# Patient Record
Sex: MALE | Race: ASIAN | NOT HISPANIC OR LATINO | URBAN - METROPOLITAN AREA
[De-identification: names, ages, dates, MRNs, and addresses within clinical notes are randomized per-mention and may not be internally consistent; named-entity substitution may affect disease eponyms.]

---

## 2023-04-23 ENCOUNTER — INPATIENT (INPATIENT)
Facility: HOSPITAL | Age: 68
LOS: 7 days | Discharge: HOME | DRG: 871 | End: 2023-05-01
Attending: STUDENT IN AN ORGANIZED HEALTH CARE EDUCATION/TRAINING PROGRAM | Admitting: STUDENT IN AN ORGANIZED HEALTH CARE EDUCATION/TRAINING PROGRAM
Payer: MEDICAID

## 2023-04-23 VITALS
HEIGHT: 71 IN | SYSTOLIC BLOOD PRESSURE: 86 MMHG | RESPIRATION RATE: 20 BRPM | HEART RATE: 99 BPM | DIASTOLIC BLOOD PRESSURE: 59 MMHG | TEMPERATURE: 98 F | OXYGEN SATURATION: 90 % | WEIGHT: 149.91 LBS

## 2023-04-23 LAB
ALBUMIN SERPL ELPH-MCNC: 2.2 G/DL — LOW (ref 3.5–5)
ALP SERPL-CCNC: 45 U/L — SIGNIFICANT CHANGE UP (ref 40–120)
ALT FLD-CCNC: 17 U/L DA — SIGNIFICANT CHANGE UP (ref 10–60)
ANION GAP SERPL CALC-SCNC: 5 MMOL/L — SIGNIFICANT CHANGE UP (ref 5–17)
APPEARANCE UR: CLEAR — SIGNIFICANT CHANGE UP
APTT BLD: 22.2 SEC — LOW (ref 27.5–35.5)
AST SERPL-CCNC: 36 U/L — SIGNIFICANT CHANGE UP (ref 10–40)
BASOPHILS # BLD AUTO: 0 K/UL — SIGNIFICANT CHANGE UP (ref 0–0.2)
BASOPHILS NFR BLD AUTO: 0 % — SIGNIFICANT CHANGE UP (ref 0–2)
BILIRUB SERPL-MCNC: 0.3 MG/DL — SIGNIFICANT CHANGE UP (ref 0.2–1.2)
BILIRUB UR-MCNC: NEGATIVE — SIGNIFICANT CHANGE UP
BUN SERPL-MCNC: 30 MG/DL — HIGH (ref 7–18)
CALCIUM SERPL-MCNC: 8.6 MG/DL — SIGNIFICANT CHANGE UP (ref 8.4–10.5)
CHLORIDE SERPL-SCNC: 96 MMOL/L — SIGNIFICANT CHANGE UP (ref 96–108)
CO2 SERPL-SCNC: 29 MMOL/L — SIGNIFICANT CHANGE UP (ref 22–31)
COLOR SPEC: YELLOW — SIGNIFICANT CHANGE UP
CREAT SERPL-MCNC: 2.61 MG/DL — HIGH (ref 0.5–1.3)
DIFF PNL FLD: ABNORMAL
EGFR: 26 ML/MIN/1.73M2 — LOW
EOSINOPHIL # BLD AUTO: 0.36 K/UL — SIGNIFICANT CHANGE UP (ref 0–0.5)
EOSINOPHIL NFR BLD AUTO: 2 % — SIGNIFICANT CHANGE UP (ref 0–6)
GLUCOSE SERPL-MCNC: 199 MG/DL — HIGH (ref 70–99)
GLUCOSE UR QL: 1000 MG/DL
HCT VFR BLD CALC: 29.5 % — LOW (ref 39–50)
HGB BLD-MCNC: 9.4 G/DL — LOW (ref 13–17)
INR BLD: 1.07 RATIO — SIGNIFICANT CHANGE UP (ref 0.88–1.16)
KETONES UR-MCNC: NEGATIVE — SIGNIFICANT CHANGE UP
LACTATE SERPL-SCNC: 1.5 MMOL/L — SIGNIFICANT CHANGE UP (ref 0.7–2)
LEUKOCYTE ESTERASE UR-ACNC: NEGATIVE — SIGNIFICANT CHANGE UP
LYMPHOCYTES # BLD AUTO: 14 % — SIGNIFICANT CHANGE UP (ref 13–44)
LYMPHOCYTES # BLD AUTO: 2.5 K/UL — SIGNIFICANT CHANGE UP (ref 1–3.3)
MCHC RBC-ENTMCNC: 26.5 PG — LOW (ref 27–34)
MCHC RBC-ENTMCNC: 31.9 GM/DL — LOW (ref 32–36)
MCV RBC AUTO: 83.1 FL — SIGNIFICANT CHANGE UP (ref 80–100)
MONOCYTES # BLD AUTO: 2.33 K/UL — HIGH (ref 0–0.9)
MONOCYTES NFR BLD AUTO: 13 % — SIGNIFICANT CHANGE UP (ref 2–14)
NEUTROPHILS # BLD AUTO: 12.34 K/UL — HIGH (ref 1.8–7.4)
NEUTROPHILS NFR BLD AUTO: 69 % — SIGNIFICANT CHANGE UP (ref 43–77)
NITRITE UR-MCNC: NEGATIVE — SIGNIFICANT CHANGE UP
PH UR: 5 — SIGNIFICANT CHANGE UP (ref 5–8)
PLATELET # BLD AUTO: 157 K/UL — SIGNIFICANT CHANGE UP (ref 150–400)
POTASSIUM SERPL-MCNC: 4.7 MMOL/L — SIGNIFICANT CHANGE UP (ref 3.5–5.3)
POTASSIUM SERPL-SCNC: 4.7 MMOL/L — SIGNIFICANT CHANGE UP (ref 3.5–5.3)
PROT SERPL-MCNC: 5.8 G/DL — LOW (ref 6–8.3)
PROT UR-MCNC: 30 MG/DL
PROTHROM AB SERPL-ACNC: 12.8 SEC — SIGNIFICANT CHANGE UP (ref 10.5–13.4)
RBC # BLD: 3.55 M/UL — LOW (ref 4.2–5.8)
RBC # FLD: 20.3 % — HIGH (ref 10.3–14.5)
SODIUM SERPL-SCNC: 130 MMOL/L — LOW (ref 135–145)
SP GR SPEC: 1.01 — SIGNIFICANT CHANGE UP (ref 1.01–1.02)
TROPONIN I, HIGH SENSITIVITY RESULT: 13.9 NG/L — SIGNIFICANT CHANGE UP
UROBILINOGEN FLD QL: NEGATIVE — SIGNIFICANT CHANGE UP
WBC # BLD: 17.89 K/UL — HIGH (ref 3.8–10.5)
WBC # FLD AUTO: 17.89 K/UL — HIGH (ref 3.8–10.5)

## 2023-04-23 PROCEDURE — 93010 ELECTROCARDIOGRAM REPORT: CPT

## 2023-04-23 PROCEDURE — 99285 EMERGENCY DEPT VISIT HI MDM: CPT

## 2023-04-23 PROCEDURE — 71045 X-RAY EXAM CHEST 1 VIEW: CPT | Mod: 26

## 2023-04-23 RX ORDER — VANCOMYCIN HCL 1 G
1000 VIAL (EA) INTRAVENOUS ONCE
Refills: 0 | Status: COMPLETED | OUTPATIENT
Start: 2023-04-23 | End: 2023-04-23

## 2023-04-23 RX ORDER — CEFEPIME 1 G/1
2000 INJECTION, POWDER, FOR SOLUTION INTRAMUSCULAR; INTRAVENOUS EVERY 8 HOURS
Refills: 0 | Status: DISCONTINUED | OUTPATIENT
Start: 2023-04-24 | End: 2023-04-24

## 2023-04-23 RX ORDER — VANCOMYCIN HCL 1 G
1000 VIAL (EA) INTRAVENOUS EVERY 12 HOURS
Refills: 0 | Status: DISCONTINUED | OUTPATIENT
Start: 2023-04-24 | End: 2023-04-24

## 2023-04-23 RX ORDER — CEFEPIME 1 G/1
INJECTION, POWDER, FOR SOLUTION INTRAMUSCULAR; INTRAVENOUS
Refills: 0 | Status: DISCONTINUED | OUTPATIENT
Start: 2023-04-23 | End: 2023-04-24

## 2023-04-23 RX ORDER — SODIUM CHLORIDE 9 MG/ML
2100 INJECTION INTRAMUSCULAR; INTRAVENOUS; SUBCUTANEOUS ONCE
Refills: 0 | Status: COMPLETED | OUTPATIENT
Start: 2023-04-23 | End: 2023-04-23

## 2023-04-23 RX ORDER — CEFEPIME 1 G/1
2000 INJECTION, POWDER, FOR SOLUTION INTRAMUSCULAR; INTRAVENOUS ONCE
Refills: 0 | Status: COMPLETED | OUTPATIENT
Start: 2023-04-23 | End: 2023-04-23

## 2023-04-23 RX ORDER — VANCOMYCIN HCL 1 G
VIAL (EA) INTRAVENOUS
Refills: 0 | Status: DISCONTINUED | OUTPATIENT
Start: 2023-04-23 | End: 2023-04-24

## 2023-04-23 RX ADMIN — Medication 250 MILLIGRAM(S): at 22:50

## 2023-04-23 RX ADMIN — CEFEPIME 100 MILLIGRAM(S): 1 INJECTION, POWDER, FOR SOLUTION INTRAMUSCULAR; INTRAVENOUS at 22:50

## 2023-04-23 RX ADMIN — SODIUM CHLORIDE 2100 MILLILITER(S): 9 INJECTION INTRAMUSCULAR; INTRAVENOUS; SUBCUTANEOUS at 22:49

## 2023-04-23 NOTE — ED ADULT TRIAGE NOTE - CHIEF COMPLAINT QUOTE
As per son " He is not eating and he is not moving like he was when he came from [place] last Thursday"  Patient responds to name calling, opens eyes

## 2023-04-24 DIAGNOSIS — R56.9 UNSPECIFIED CONVULSIONS: ICD-10-CM

## 2023-04-24 DIAGNOSIS — J96.01 ACUTE RESPIRATORY FAILURE WITH HYPOXIA: ICD-10-CM

## 2023-04-24 DIAGNOSIS — Z29.9 ENCOUNTER FOR PROPHYLACTIC MEASURES, UNSPECIFIED: ICD-10-CM

## 2023-04-24 DIAGNOSIS — G93.40 ENCEPHALOPATHY, UNSPECIFIED: ICD-10-CM

## 2023-04-24 DIAGNOSIS — N17.9 ACUTE KIDNEY FAILURE, UNSPECIFIED: ICD-10-CM

## 2023-04-24 DIAGNOSIS — A41.9 SEPSIS, UNSPECIFIED ORGANISM: ICD-10-CM

## 2023-04-24 DIAGNOSIS — N18.9 CHRONIC KIDNEY DISEASE, UNSPECIFIED: ICD-10-CM

## 2023-04-24 DIAGNOSIS — E11.9 TYPE 2 DIABETES MELLITUS WITHOUT COMPLICATIONS: ICD-10-CM

## 2023-04-24 DIAGNOSIS — R19.7 DIARRHEA, UNSPECIFIED: ICD-10-CM

## 2023-04-24 DIAGNOSIS — F03.90 UNSPECIFIED DEMENTIA WITHOUT BEHAVIORAL DISTURBANCE: ICD-10-CM

## 2023-04-24 DIAGNOSIS — I63.9 CEREBRAL INFARCTION, UNSPECIFIED: ICD-10-CM

## 2023-04-24 LAB
GAS PNL BLDV: SIGNIFICANT CHANGE UP
GLUCOSE BLDC GLUCOMTR-MCNC: 125 MG/DL — HIGH (ref 70–99)
GLUCOSE BLDC GLUCOMTR-MCNC: 132 MG/DL — HIGH (ref 70–99)
GLUCOSE BLDC GLUCOMTR-MCNC: 137 MG/DL — HIGH (ref 70–99)
HCV AB S/CO SERPL IA: 0.12 S/CO — SIGNIFICANT CHANGE UP (ref 0–0.99)
HCV AB SERPL-IMP: SIGNIFICANT CHANGE UP
RAPID RVP RESULT: SIGNIFICANT CHANGE UP
SARS-COV-2 RNA SPEC QL NAA+PROBE: SIGNIFICANT CHANGE UP

## 2023-04-24 PROCEDURE — 99223 1ST HOSP IP/OBS HIGH 75: CPT

## 2023-04-24 PROCEDURE — 70450 CT HEAD/BRAIN W/O DYE: CPT | Mod: 26,MA

## 2023-04-24 PROCEDURE — 74176 CT ABD & PELVIS W/O CONTRAST: CPT | Mod: 26

## 2023-04-24 PROCEDURE — 99498 ADVNCD CARE PLAN ADDL 30 MIN: CPT

## 2023-04-24 PROCEDURE — 99497 ADVNCD CARE PLAN 30 MIN: CPT | Mod: 25

## 2023-04-24 PROCEDURE — 95816 EEG AWAKE AND DROWSY: CPT | Mod: 26

## 2023-04-24 RX ORDER — VALPROIC ACID (AS SODIUM SALT) 250 MG/5ML
250 SOLUTION, ORAL ORAL EVERY 12 HOURS
Refills: 0 | Status: DISCONTINUED | OUTPATIENT
Start: 2023-04-24 | End: 2023-05-01

## 2023-04-24 RX ORDER — INSULIN LISPRO 100/ML
VIAL (ML) SUBCUTANEOUS EVERY 6 HOURS
Refills: 0 | Status: DISCONTINUED | OUTPATIENT
Start: 2023-04-24 | End: 2023-04-26

## 2023-04-24 RX ORDER — ONDANSETRON 8 MG/1
4 TABLET, FILM COATED ORAL EVERY 8 HOURS
Refills: 0 | Status: DISCONTINUED | OUTPATIENT
Start: 2023-04-24 | End: 2023-04-24

## 2023-04-24 RX ORDER — CEFEPIME 1 G/1
1000 INJECTION, POWDER, FOR SOLUTION INTRAMUSCULAR; INTRAVENOUS EVERY 12 HOURS
Refills: 0 | Status: DISCONTINUED | OUTPATIENT
Start: 2023-04-24 | End: 2023-04-26

## 2023-04-24 RX ORDER — SODIUM CHLORIDE 9 MG/ML
1000 INJECTION, SOLUTION INTRAVENOUS
Refills: 0 | Status: DISCONTINUED | OUTPATIENT
Start: 2023-04-24 | End: 2023-04-25

## 2023-04-24 RX ORDER — HEPARIN SODIUM 5000 [USP'U]/ML
5000 INJECTION INTRAVENOUS; SUBCUTANEOUS EVERY 12 HOURS
Refills: 0 | Status: DISCONTINUED | OUTPATIENT
Start: 2023-04-24 | End: 2023-05-01

## 2023-04-24 RX ORDER — LEVETIRACETAM 250 MG/1
1000 TABLET, FILM COATED ORAL ONCE
Refills: 0 | Status: DISCONTINUED | OUTPATIENT
Start: 2023-04-24 | End: 2023-04-24

## 2023-04-24 RX ORDER — METRONIDAZOLE 500 MG
500 TABLET ORAL EVERY 8 HOURS
Refills: 0 | Status: DISCONTINUED | OUTPATIENT
Start: 2023-04-24 | End: 2023-04-26

## 2023-04-24 RX ORDER — ACETAMINOPHEN 500 MG
650 TABLET ORAL EVERY 6 HOURS
Refills: 0 | Status: DISCONTINUED | OUTPATIENT
Start: 2023-04-24 | End: 2023-04-24

## 2023-04-24 RX ORDER — CHLORHEXIDINE GLUCONATE 213 G/1000ML
1 SOLUTION TOPICAL
Refills: 0 | Status: DISCONTINUED | OUTPATIENT
Start: 2023-04-24 | End: 2023-04-27

## 2023-04-24 RX ORDER — LANOLIN ALCOHOL/MO/W.PET/CERES
3 CREAM (GRAM) TOPICAL AT BEDTIME
Refills: 0 | Status: DISCONTINUED | OUTPATIENT
Start: 2023-04-24 | End: 2023-04-24

## 2023-04-24 RX ADMIN — Medication 100 MILLIGRAM(S): at 16:12

## 2023-04-24 RX ADMIN — SODIUM CHLORIDE 75 MILLILITER(S): 9 INJECTION, SOLUTION INTRAVENOUS at 12:13

## 2023-04-24 RX ADMIN — Medication 100 MILLIGRAM(S): at 21:16

## 2023-04-24 RX ADMIN — SODIUM CHLORIDE 2100 MILLILITER(S): 9 INJECTION INTRAMUSCULAR; INTRAVENOUS; SUBCUTANEOUS at 00:04

## 2023-04-24 RX ADMIN — CEFEPIME 2000 MILLIGRAM(S): 1 INJECTION, POWDER, FOR SOLUTION INTRAMUSCULAR; INTRAVENOUS at 00:04

## 2023-04-24 RX ADMIN — Medication 0: at 17:10

## 2023-04-24 RX ADMIN — Medication 52.5 MILLIGRAM(S): at 17:10

## 2023-04-24 RX ADMIN — CEFEPIME 100 MILLIGRAM(S): 1 INJECTION, POWDER, FOR SOLUTION INTRAMUSCULAR; INTRAVENOUS at 19:00

## 2023-04-24 RX ADMIN — HEPARIN SODIUM 5000 UNIT(S): 5000 INJECTION INTRAVENOUS; SUBCUTANEOUS at 18:59

## 2023-04-24 NOTE — CONSULT NOTE ADULT - TREATMENT GUIDELINES
FULL Code; Re: feeding tube and hemodialysis, determine use or limitation if needs arises/Antibiotic trial/IV fluid trial

## 2023-04-24 NOTE — ED ADULT NURSE NOTE - ED STAT RN HANDOFF DETAILS 3
[facility] nurse edward  gave report to holding rn/ isamar / pt on contact ipt move to holding /

## 2023-04-24 NOTE — H&P ADULT - CONVERSATION DETAILS
States that he and his family want the patient to return home in a better condition than he was before being hospitalized at [facility]. Would like the patient to undergo CPR and intubation with mechanical ventilation if clinically necessary to maintain life

## 2023-04-24 NOTE — ED ADULT NURSE NOTE - OBJECTIVE STATEMENT
68-year-old male with history of dementia, diabetes, seizure disorder on Keppra, hyperlipidemia, CKD, hyponatremia, CVA 6 years ago recently treated for pneumonia presents with lethargy.  Per family/sons at bedside patient has a progressive decline in mental status for the last 3 months after hospitalization and then stay in rehab.  Reports that he returned home from rehab 1 month ago but at that time was unable to get out of bed or walk which he had previously done 3 months prior.  Reports that last week he was taken to [facility] where he usually gets his care and was treated for pneumonia discharge 3 days ago.  Family reports he was at his normal mental status	tonight where he was more lethargic not opening his eyes and not eating or drinking tonight.  Sons report that he is at baseline he has not been able to get out of bed or move independently but is able to speak.

## 2023-04-24 NOTE — H&P ADULT - ATTENDING COMMENTS
68 year old man with medical hx including ischemic CVA, dementia, DM2, seizure d/o, HLD, CKD who was brought in by family on account of acute change in mental status with hypersomnolence, lethargy with reduced responsiveness.   OF note, family describes progressive decline in his cognitive and physical status over the last 6 months including periods of hospital admission for infection- most recently for pneumonia in his primary hospital.     Vital Signs Last 24 Hrs  T(C): 36.6 (24 Apr 2023 00:41), Max: 36.7 (23 Apr 2023 22:14)  T(F): 97.9 (24 Apr 2023 00:41), Max: 98 (23 Apr 2023 22:14)  HR: 84 (24 Apr 2023 00:41) (84 - 99)  BP: 133/77 (24 Apr 2023 00:41) (86/59 - 133/77)  RR: 17 (24 Apr 2023 00:41) (17 - 20)  SpO2: 100% (24 Apr 2023 00:41) (90% - 100%)    Parameters below as of 24 Apr 2023 00:41  Patient On (Oxygen Delivery Method): mask, nonrebreather  O2 Flow (L/min): 15    labs                         9.4    17.89 )-----------( 157      ( 23 Apr 2023 22:51 )             29.5     04-23    130<L>  |  96  |  30<H>  ----------------------------<  199<H>  4.7   |  29  |  2.61<H>    Ca    8.6      23 Apr 2023 22:51    TPro  5.8<L>  /  Alb  2.2<L>  /  TBili  0.3  /  DBili  x   /  AST  36  /  ALT  17  /  AlkPhos  45  04-23    UA -ve for wbcs  Glycosuria    RVP -ve    CT head - no acute findings    CXR   Grossly unremarkable with upper right  lobe perihilar prominence    Impression   - Acute on chronic encephelopathy   suspect sepsis /infection from acute enterocolitis   - Sepsis   - NEVILLE on CKD  - Progressive chronic cognitive decline - hx of dementia ( ?FTD), CVA   - Hyponatremia      Plan   - Admit to Medicine  - Sepsis work up - send stool for c.diff and stool cultures   - Empiric antibiotics with IV flagyl and cefepime renally dosed  - ID consult   - Gentle IVF hydration with normal saline.  - NPO  - EEG study   - - Change PO meds to IV for now  - replace electrolytes  - Fall precaution   - Goals of care discussion ; family wants him full code

## 2023-04-24 NOTE — CONSULT NOTE ADULT - PROBLEM SELECTOR RECOMMENDATION 5
worse by deconditioning due to hospitalization and acute on chronic conditions     Baseline: Ambulatory short distances with 2 assist but bedbound most recently    Recommendations:   Early PT evaluation  OOB to chair with close supervision  Fall precautions   Frequent reposition and offloading   Keep patient clean and dry

## 2023-04-24 NOTE — H&P ADULT - PROBLEM SELECTOR PLAN 4
pt has h/o seizures, had seizure on Saturday  unclear if related to sepsis vs medication adherence vs need for increased dose  takes divalproex 250 mg bid    - c/w IV valproic acid  - seizure precautions  - EEG  - consider Neuro consult if EEG shows epileptic foci

## 2023-04-24 NOTE — CONSULT NOTE ADULT - PROBLEM SELECTOR RECOMMENDATION 9
Severe. Due to Hx of two CVAs. Total care. Total dependent in all IADL, ADLs, incontinent x 2, recently bedbound    > CT Head No Cont (04.24.23): Chronic lacunar infarcts in the left lentiform nucleus and left cerebellum. Patchy and confluent areas ... likely the sequela of moderate severity chronic microvascular change.    Baseline: A&O x0    FAST Score: 7 B  Eligible for hospice => Family wants to take patient home and needs more support at home    Recommendations:  Supportive care  Palliative Care/ Hospice education still needed

## 2023-04-24 NOTE — CONSULT NOTE ADULT - SUBJECTIVE AND OBJECTIVE BOX
[facility] Geriatric and Palliative Consult Service:  Dr. Janey Guadalupe: cell ([phone])  Dr. Mars Kohli: cell ([phone])   Sugar Bae DNP: cell ([phone])  Tan Millan NP: cell ([phone])   Angela Barber NP: TEAMS  Marvel Morris LMSW: cell ([phone])     HPI:  68 year old M from home with PMH of DM, CVA (), HLD, CKD, seizure, Dementia presents with lethargy from home. Pt unable to provide history, son was called for further history who reports that the patient just returned from [facility] after being treated for pneumonia with Abx on Thursday, and has been lethargic and unable to swallow. Also reports that patient had a seizure on Saturday. States that pt has been deconditioning significantly over the past few months after staying in [facility] for 2 months earlier this year. Mentions 3-4 episodes of foul smelling loose watery diarrhea since being hospitalized and was given medication to decrease the diarrhea. Denies any fever, chills, n/v chest pain sob palpitations since returning home from [facility].     ED Course  Vitals: BP 86/59 > 133/77 P 99 R 20 T 98F SpO2 90% RA  > 100% 15L NRB  Meds: vancomycin, cefepime, 2.1 L NS  EKG: NSR @ 78 bpm (2023 03:41)      PAST MEDICAL & SURGICAL HISTORY:  DM (diabetes mellitus)      Seizure      CVA (cerebrovascular accident)      HLD (hyperlipidemia)      CKD (chronic kidney disease)      Dementia      No significant past surgical history          SOCIAL HISTORY:    Admitted from:  home  (with HHA)           assisted living          [facility]       LTC   [ none ] Substance abuse, [ none ] Tobacco hx, [ none ] Alcohol hx, [ none ] Home Opioid Hx    FAMILY HISTORY:  No pertinent family history in first degree relatives     unable to obtain from patient due to poor mentation, family unable to give information, see H&P for history  Baseline ADLs (prior to admission):    Allergies    No Known Allergies    Intolerances      Present Symptoms:   Pain:  Fatigue:  Nausea:  Lack of Appetite:   SOB:  Depression:  Anxiety:  Review of Systems: [All others negative or Unable to obtain due to poor mentation]    MEDICATIONS  (STANDING):  cefepime   IVPB 1000 milliGRAM(s) IV Intermittent every 12 hours  chlorhexidine 2% Cloths 1 Application(s) Topical <User Schedule>  dextrose 5% + sodium chloride 0.9%. 1000 milliLiter(s) (75 mL/Hr) IV Continuous <Continuous>  heparin   Injectable 5000 Unit(s) SubCutaneous every 12 hours  insulin lispro (ADMELOG) corrective regimen sliding scale   SubCutaneous every 6 hours  metroNIDAZOLE  IVPB 500 milliGRAM(s) IV Intermittent every 8 hours  valproate sodium  IVPB 250 milliGRAM(s) IV Intermittent every 12 hours    MEDICATIONS  (PRN):      PHYSICAL EXAM:  Vital Signs Last 24 Hrs  T(C): 36.6 (2023 11:10), Max: 36.7 (2023 22:14)  T(F): 97.8 (2023 11:10), Max: 98 (2023 22:14)  HR: 87 (2023 11:10) (82 - 114)  BP: 105/64 (2023 11:10) (86/59 - 146/76)  BP(mean): --  RR: 18 (2023 11:10) (17 - 20)  SpO2: 100% (2023 11:10) (90% - 100%)    Parameters below as of 2023 11:10  Patient On (Oxygen Delivery Method): nasal cannula  O2 Flow (L/min): 2      General: alert  oriented x ____    lethargic distressed cachexia  nonverbal  unarousable verbal    Palliative Performance Scale/Karnofsky Score:  ECOG Performance:    HEENT: no abnormal lesion, dry mouth  ET tube/trach oral lesions:  Lungs: tachypnea/labored breathing, audible excessive secretions  CV: RRR, S1S2, tachycardia  GI: soft non distended non tender  incontinent               PEG/NG/OG tube  constipation  last BM:   : incontinent  oliguria/anuria  bangura  Musculoskeletal: weakness x4 edema x4    ambulatory with assistance   mostly/fully bedbound/wheelchair bound  Skin: no abnormal skin lesions, poor skin turgor, pressure ulcer stage:   Neuro: no deficits, mild cognitive impairment dsyphagia/dysarthria paresis  Oral intake ability: unable/only mouth care, minimal moderate full capability    LABS:                        9.4    17.89 )-----------( 157      ( 2023 22:51 )             29.5     -    130<L>  |  96  |  30<H>  ----------------------------<  199<H>  4.7   |  29  |  2.61<H>    Ca    8.6      2023 22:51    TPro  5.8<L>  /  Alb  2.2<L>  /  TBili  0.3  /  DBili  x   /  AST  36  /  ALT  17  /  AlkPhos  45  -    Urinalysis Basic - ( 2023 22:51 )    Color: Yellow / Appearance: Clear / S.010 / pH: x  Gluc: x / Ketone: Negative  / Bili: Negative / Urobili: Negative   Blood: x / Protein: 30 mg/dL / Nitrite: Negative   Leuk Esterase: Negative / RBC: 0-2 /HPF / WBC 0-2 /HPF   Sq Epi: x / Non Sq Epi: x / Bacteria: x        RADIOLOGY & ADDITIONAL STUDIES:       [facility] Geriatric and Palliative Consult Service:  Dr. Janey Guadalupe: cell ([phone])  Dr. Mars Kohli: cell ([phone])   Sugar Bae DNP: cell ([phone])  Tan Millan NP: cell ([phone])   Kasandra Barber NP: TEAMS  Marvel Morris LMSW: cell ([phone])     HPI:  68 year old M from home with PMH of DM, CVA (), HLD, CKD, seizure, Dementia presents with lethargy from home. Pt unable to provide history, son was called for further history who reports that the patient just returned from [facility] after being treated for pneumonia with Abx on Thursday, and has been lethargic and unable to swallow. Also reports that patient had a seizure on Saturday. States that pt has been deconditioning significantly over the past few months after staying in [facility] for 2 months earlier this year. Mentions 3-4 episodes of foul smelling loose watery diarrhea since being hospitalized and was given medication to decrease the diarrhea. Denies any fever, chills, n/v chest pain sob palpitations since returning home from [facility].     ED Course  Vitals: BP 86/59 > 133/77 P 99 R 20 T 98F SpO2 90% RA  > 100% 15L NRB  Meds: vancomycin, cefepime, 2.1 L NS  EKG: NSR @ 78 bpm (2023 03:41)    INTERIM HX:  Assessed patient in the ER 23 A area. Family not at bedside. Call family (See GOC conversation)      PAST MEDICAL & SURGICAL HISTORY:  CVA (cerebrovascular accident) x 2 (as per family)  Dementia  Seizure  DM (diabetes mellitus)  HLD (hyperlipidemia)  CKD (chronic kidney disease)      No significant past surgical history      SOCIAL HISTORY:    [facility]  [facility]  [facility]    [ none ] Substance abuse, [ none ] Tobacco hx, [ none ] Alcohol hx, [ none ] Home Opioid Hx    FAMILY HISTORY:  No pertinent family history in first degree relatives     unable to obtain from patient due to poor mentation, family unable to give information, see H&P for history    Baseline ADLs (prior to admission): A&O x 0, confused. Ambulatory short distances with 2 assist but bedbound most recently    Allergies    No Known Allergies    Intolerances      Present Symptoms: Unable to obtain due to poor mentation  Pain:  Fatigue:  Nausea:  Lack of Appetite:   SOB:  Depression:  Anxiety:  Review of Systems: [Unable to obtain due to poor mentation]    MEDICATIONS  (STANDING):  cefepime   IVPB 1000 milliGRAM(s) IV Intermittent every 12 hours  chlorhexidine 2% Cloths 1 Application(s) Topical <User Schedule>  dextrose 5% + sodium chloride 0.9%. 1000 milliLiter(s) (75 mL/Hr) IV Continuous <Continuous>  heparin   Injectable 5000 Unit(s) SubCutaneous every 12 hours  insulin lispro (ADMELOG) corrective regimen sliding scale   SubCutaneous every 6 hours  metroNIDAZOLE  IVPB 500 milliGRAM(s) IV Intermittent every 8 hours  valproate sodium  IVPB 250 milliGRAM(s) IV Intermittent every 12 hours    MEDICATIONS  (PRN):      PHYSICAL EXAM:  Vital Signs Last 24 Hrs  T(C): 36.6 (2023 11:10), Max: 36.7 (2023 22:14)  T(F): 97.8 (2023 11:10), Max: 98 (2023 22:14)  HR: 87 (2023 11:10) (82 - 114)  BP: 105/64 (2023 11:10) (86/59 - 146/76)  BP(mean): --  RR: 18 (2023 11:10) (17 - 20)  SpO2: 100% (2023 11:10) (90% - 100%)    Parameters below as of 2023 11:10  Patient On (Oxygen Delivery Method): nasal cannula  O2 Flow (L/min): 2      General: appears chronically ill, in NAD    Palliative Performance Scale/Karnofsky Score: 30%    HEENT: conjunctivae clear, dry mouth   Lungs: nonlabored breathing, CTA  CV: RRR, S1S2 present, tachycardia  GI: soft, non distended, non tender, incontinent  : incontinent  Musculoskeletal: weakness x4, no edema x4, bedbound   Skin: warm, dry, fair skin turgor   Neuro: A&O x0, confused, nonverbal, severe cognitive impairment  Oral intake ability: no capability    LABS:                        9.4    17.89 )-----------( 157      ( 2023 22:51 )             29.5         130<L>  |  96  |  30<H>  ----------------------------<  199<H>  4.7   |  29  |  2.61<H>    Ca    8.6      2023 22:51    TPro  5.8<L>  /  Alb  2.2<L>  /  TBili  0.3  /  DBili  x   /  AST  36  /  ALT  17  /  AlkPhos  45  -    Urinalysis Basic - ( 2023 22:51 )    Color: Yellow / Appearance: Clear / S.010 / pH: x  Gluc: x / Ketone: Negative  / Bili: Negative / Urobili: Negative   Blood: x / Protein: 30 mg/dL / Nitrite: Negative   Leuk Esterase: Negative / RBC: 0-2 /HPF / WBC 0-2 /HPF   Sq Epi: x / Non Sq Epi: x / Bacteria: x        RADIOLOGY & ADDITIONAL STUDIES:  #1.  < from: CT Abdomen and Pelvis No Cont (23 @ 08:33) >    ACC: [id number] EXAM:  CT ABDOMEN AND PELVIS   ORDERED BY: ALIX LEVINE     PROCEDURE DATE:  2023      INTERPRETATION:  CLINICAL INFORMATION: 68 years  Male with r/o colitis.    COMPARISON: None.    CONTRAST/COMPLICATIONS:  IV Contrast: NONE  Oral Contrast: NONE  Complications: None reported at time of study completion    PROCEDURE:  CT of the Abdomen and Pelvis was performed.  Sagittal and coronal reformats were performed.    LIMITATIONS: Evaluation of the solid organs, vascular structures and GI tract is limited without oral and IV contrast.    FINDINGS:  LOWER CHEST: Bibasilar dependent atelectasis and trace bilateral pleural effusions..    LIVER: Within normal limits.  BILE DUCTS: Normal caliber.  GALLBLADDER: Within normal limits.  SPLEEN: Within normal limits.  PANCREAS: Within normal limits.  ADRENALS: 5 mm right adrenal nodule.  KIDNEYS/URETERS: No hydroureteronephrosis or calculus. Mild bilateral nonspecific perinephric fat stranding.    BLADDER: Within normal limits.  REPRODUCTIVE ORGANS: Prostate within normal limits.    BOWEL: No bowel obstruction. Appendix is normal. Moderate colonic stool burden.  PERITONEUM: No ascites.  VESSELS: Atherosclerotic changes.  RETROPERITONEUM/LYMPH NODES: No lymphadenopathy.  ABDOMINAL WALL: Subcutaneous edema overlying the right hip   BONES: Symmetrical bilateral subcentimeter iliac sclerotic foci, possibly   bone islands.    IMPRESSION:    Subcutaneous edema overlying the right hip may reflect anasarca or ecchymosis.    Mild constipation.    --- End of Report ---    DIONNE RIVERA MD; Attending Radiologist  This document has been electronically signed. 2023  8:56AM    < end of copied text >    #2.  < from: CT Head No Cont (23 @ 00:50) >     ACC: [id number] EXAM:  CT BRAIN   ORDERED BY: RAFFI BROWN     PROCEDURE DATE:  2023      INTERPRETATION:  CLINICAL HISTORY:  Altered mental status.    COMPARISON: None available.    FINDINGS:    There is no acute intracranial hemorrhage, vasogenic edema or evidence for acute large vascular territory infarct. Chronic lacunar infarcts in the left lentiform nucleus and left cerebellum. Patchy and confluent   areas of low-attenuation are seen additionally, nonspecific, but likely the sequela of moderate severity chronic microvascular change.    The ventricles, sulci and cisternal spaces are diffusely prominent but in proportion compatible with cerebral volume loss, somewhat advanced for age.  There is no midline shift or abnormal extra-axial fluid collection.    The calvarium is intact.  There are no osteoblastic or lytic calvarial or skull base lesions.  The paranasal sinuses and mastoid air cells are clear.    IMPRESSION:  No acute intracranial hemorrhage, vasogenic edema, extra-axial collection or hydrocephalus. Chronic findings as above.    --- End of Report ---    KASANDRA LEVINE MD; Attending Radiologist  This document has been electronically signed. 2023  2:49AM    < end of copied text >    #3.  < from: Xray Chest 1 View-PORTABLE IMMEDIATE (23 @ 22:48) >  ACC: [id number] EXAM:  XR CHEST PORTABLE IMMED 1V   ORDERED BY: RAFFI BROWN     PROCEDURE DATE:  2023      INTERPRETATION:  Clinical history: 68-year-old male, sepsis.    Single view of the chest is correlated with the abdominal CT of2023.    FINDINGS: Normal cardiac silhouette and normal pulmonary vasculature with no lobar consolidation, effusion, pneumothorax or acute osseous finding.    Mild platelike atelectasis/ bronchiectasis at the bases, better characterized on CT.    IMPRESSION:  Mild bibasilar platelike atelectasis/lower lobe cylindrical bronchiectasis, unchanged    --- End of Report ---    AMITA SULLIVAN DO; Attending Radiologist  This document has been electronically signed. 2023  3:46PM    < end of copied text >

## 2023-04-24 NOTE — CONSULT NOTE ADULT - CONSULT REASON
Consult to: Discuss complex medical decision making related to goals of care Consult to: Discuss complex medical decision making related to goals of care and hospice eligibility

## 2023-04-24 NOTE — H&P ADULT - ASSESSMENT
68 year old M from home with PMH of DM, CVA (2017), HLD, CKD, seizure, Dementia presents with lethargy from home, admitted for sepsis 2/2 ?diarrhea

## 2023-04-24 NOTE — ED ADULT NURSE NOTE - CHIEF COMPLAINT QUOTE
As per son " He is not eating and he is not moving like he was when he came from [place] last Thursday"  Patient responds to name calling, opens eyes

## 2023-04-24 NOTE — H&P ADULT - PROBLEM SELECTOR PLAN 1
patient presents with sepsis which is fluid responsive  BP low on admission, WBC 17.9k, HR 99  lactate 1.5  pt has loose watery stools 3-4 times daily, was recently on abx for PNA at [facility]  s/p vancomycin, cefepime in ED    - obtain CT A/P to rule out acute abdominal infection  - IV flagyl, cefepime  - contact isolation, rule out Cdiff  - GI PCR, stool culture, stool o&p

## 2023-04-24 NOTE — PATIENT PROFILE ADULT - FALL HARM RISK - HARM RISK INTERVENTIONS
Assistance with ambulation/Assistance OOB with selected safe patient handling equipment/Communicate Risk of Fall with Harm to all staff/Discuss with provider need for PT consult/Monitor gait and stability/Reinforce activity limits and safety measures with patient and family/Tailored Fall Risk Interventions/Visual Cue: Yellow wristband and red socks/Bed in lowest position, wheels locked, appropriate side rails in place/Call bell, personal items and telephone in reach/Instruct patient to call for assistance before getting out of bed or chair/Non-slip footwear when patient is out of bed/[place] to call system/Physically safe environment - no spills, clutter or unnecessary equipment/Purposeful Proactive Rounding/Room/bathroom lighting operational, light cord in reach

## 2023-04-24 NOTE — H&P ADULT - NSHPPHYSICALEXAM_GEN_ALL_CORE
PHYSICAL EXAM:  GENERAL: NAD, lying in bed, on 4 L NC  HEAD:  Atraumatic, Normocephalic  EYES: EOMI, PERRLA, conjunctiva and sclera clear  ENT: Dry mucous membranes  NECK: Supple, No JVD  CHEST/LUNG: Clear to auscultation bilaterally; No rales, rhonchi, wheezing, or rubs  HEART: Regular rate and rhythm; No murmurs, rubs, or gallops  ABDOMEN: Bowel sounds present; Hard Nontender, Nondistended.  EXTREMITIES:  2+ Peripheral Pulses, brisk capillary refill. No clubbing, cyanosis, or edema  NERVOUS SYSTEM:  Alert & Oriented X0, nonverbal, does not follow commands  MSK: full passive ROM all 4 extremities,   SKIN: No rashes or lesions

## 2023-04-24 NOTE — ED PROVIDER NOTE - OBJECTIVE STATEMENT
68-year-old male with history of dementia, diabetes, seizure disorder on Keppra, hyperlipidemia, CKD, hyponatremia, CVA 6 years ago recently treated for pneumonia presents with lethargy.  Per family/sons at bedside patient has a progressive decline in mental status for the last 3 months after hospitalization and then stay in rehab.  Reports that he returned home from rehab 1 month ago but at that time was unable to get out of bed or walk which he had previously done 3 months prior.  Reports that last week he was taken to [facility] where he usually gets his care and was treated for pneumonia discharge 3 days ago.  Family reports he was at his normal mental status	tonight where he was more lethargic not opening his eyes and not eating or drinking tonight.  Sons report that he is at baseline he has not been able to get out of bed or move independently but is able to speak.  Denies any new fevers, cough, vomiting, diarrhea. Sons reports he did have a seizure 1 day ago and reported in the past he would have up to 3 seizures per day. Per EMS patient hypotensive and unresponsive on scene but started to respond on route to ED. 68-year-old male with history of dementia, diabetes, seizure disorder on Keppra, hyperlipidemia, CKD, hyponatremia, CVA 6 years ago recently treated for pneumonia presents with lethargy.  Per family/sons at bedside patient has a progressive decline in mental status for the last 3 months after hospitalization and then stay in rehab.  Reports that he returned home from rehab 1 month ago but at that time was unable to get out of bed or walk which he had previously done 3 months prior.  Reports that last week he was taken to [facility] where he usually gets his care and was treated for pneumonia discharge 3 days ago.  Family reports he was at his normal mental status	tonight where he was more lethargic not opening his eyes and not eating or drinking tonight.  Sons report that he is at baseline he has not been able to get out of bed or move independently but is able to speak.  Denies any new fevers, cough, vomiting, diarrhea. Sons reports he did have a seizure 1 day ago and reported in the past he would have up to 3 seizures per day. Per EMS patient hypotensive and unresponsive on scene but started to respond on route to ED.  meds: amlodipine, ASA, atorvastatin, keppra,, sitagliptin, tamsulosin, farxiga, famotadine, finasteride  emergency contcats/son: Fazal [phone]

## 2023-04-24 NOTE — H&P ADULT - PROBLEM SELECTOR PLAN 6
pt has h/o CKD per family but unclear baseline  suspect some portion of NEVILLE given pt appears dehydrated and without PO intake    - IV hydration  - monitor Cr  - avoid nephrotoxic agents

## 2023-04-24 NOTE — H&P ADULT - HISTORY OF PRESENT ILLNESS
ED Course  Vitals: BP 86/59 > 133/77 P 99 R 20 T 98F SpO2 90% RA  > 100% 15L NRB  Meds: vancomycin, cefepime, 2.1 L NS  EKG: NSR @ 78 bpm 68 year old M from home with PMH of DM, CVA (2017), HLD, CKD, seizure, Dementia presents with lethargy from home. Pt unable to provide history, son was called for further history who reports that the patient just returned from [facility] [place] after being treated for pneumonia with Abx on Thursday, and has been lethargic and unable to swallow. Also reports that patient had a seizure on Saturday. States that pt has been deconditioning significantly over the past few months after staying in [facility] for 2 months earlier this year. Mentions 3-4 episodes of foul smelling loose watery diarrhea since being hospitalized and was given medication to decrease the diarrhea. Denies any fever, chills, n/v chest pain sob palpitations since returning home from [facility].     ED Course  Vitals: BP 86/59 > 133/77 P 99 R 20 T 98F SpO2 90% RA  > 100% 15L NRB  Meds: vancomycin, cefepime, 2.1 L NS  EKG: NSR @ 78 bpm

## 2023-04-24 NOTE — CHART NOTE - NSCHARTNOTEFT_GEN_A_CORE
Pt. seen and examined at [facility] #23A bedside.  Pt. appears lethargic, minimally responsive, abundant with stiff upper and lower extremities, + upper extremities tremors eyes rolling back when not stimulated.  As per RN, no bms have been noted.  Pt. for transport to CT for CT A/P.  Pt.' son Olegario Jones contacted at [phone] in an attempt to update family re pt.'s condition and to establish code status.  Son speaks English however was not able to comprehend questions re code status therefore passed the phone to his sister who is pt.'s HCP though not officially yet.  Pt.'s daughter sounded emotional and also unable to comprehend questions.  Will discuss with attending palliative care consult.

## 2023-04-24 NOTE — H&P ADULT - NSICDXPASTMEDICALHX_GEN_ALL_CORE_FT
PAST MEDICAL HISTORY:  CKD (chronic kidney disease)     CVA (cerebrovascular accident)     Dementia     DM (diabetes mellitus)     HLD (hyperlipidemia)     Seizure

## 2023-04-24 NOTE — CONSULT NOTE ADULT - CONVERSATION DETAILS
Spoke with patient's daughter, Ihsan, her , and patient's son  by phone. Asked questions regarding patient's baseline status at home, and GOC. DTR Ihsan stated that patient lives with his family who wants to continue caring  for him at home. Palliative Care/Hospice education initiated regarding keeping patient comfortable and improving his quality of life. However, family was trying to understand patient current diagnostic, prognosis, and treatment options. Many questions answered but family still needs answers related to final results.      Continued the GOC conversation explaining in length the risks and benefits of cardiopulmonary resuscitative measures including CPR, shock, pressors and invasive ventilation relating to artificial life support. Also explained the difference between artificial life prolonging measures (including artificial nutrition) to extend life. Family asked multiple questions regarding artificial nutrition and stated that patient had a NGT but it was previously removed, as per his MD's recommendation.    Explained the burden/suffering/complications vs natural death and supportive/conservative interventions to maximize quality and quantity of life without causing significant burden and suffering to the patient and caregivers. Family decided a few things as below. However still needed time to think about resuscitation instructions. At the moment, patient remains FULL code. MOLST orders NOT completed.     Case discussed with Primary Team

## 2023-04-24 NOTE — PATIENT PROFILE ADULT - LEGAL HELP
Viral Upper Respiratory Illness with Wheezing (Adult)  You have a viral upper respiratory illness (URI), which is another term for the common cold. When the infection causes a lot of irritation, the air passages can go into spasm. This causes wheezing and shortness of breath.     This illness is contagious during the first few days. It is spread through the air by coughing and sneezing. It may also be spread by direct contact (touching the sick person and then touching your own eyes, nose, or mouth). Frequent handwashing will decrease the risk.  Most viral illnesses go away within 7 to 10 days with rest and simple home remedies. Sometimes the illness may last for several weeks. Antibiotics will not kill a virus, and they are generally not prescribed for this condition.  Home care  · If symptoms are severe, rest at home for the first 2 to 3 days. When you resume activity, don't let yourself get too tired.  · Avoid being exposed to cigarette smoke (yours or others’).  · You may use acetaminophen or ibuprofen to control pain and fever, unless another medicine was prescribed. (Note: If you have chronic liver or kidney disease, have ever had a stomach ulcer or gastrointestinal bleeding, or are taking blood-thinning medicines, talk with your healthcare provider before using these medicines.) Aspirin should never be given to anyone under 18 years of age who is ill with a viral infection or fever. It may cause severe liver or brain damage.  · Your appetite may be poor, so a light diet is fine. Avoid dehydration by drinking 6 to 8 glasses of fluids per day (water, soft drinks, juices, tea, or soup). Extra fluids will help loosen secretions in the nose and lungs.  · Over-the-counter cold medicines will not shorten the length of time you’re sick, but they may be helpful for the following symptoms: cough, sore throat, and nasal and sinus congestion. (Note: Do not use decongestants if you have high blood pressure.)  Follow-up  care  Follow up with your healthcare provider, or as advised.  When to seek medical advice  Call your healthcare provider right away if any of these occur:  · Cough with lots of colored sputum (mucus)  · Severe headache; face, neck, or ear pain  · Difficulty swallowing due to throat pain  · Fever of 100.4ºF (38ºC) or higher, or as directed by your healthcare provider  Call 911, or get immediate medical care  Call emergency services right away if any of these occur:  · Chest pain, shortness of breath, worsening wheezing, or difficulty breathing  · Coughing up blood  · Inability to swallow due to throat pain  © 8665-8192 [employer]. [address] [place] [address], [place] [address]. All rights reserved. This information is not intended as a substitute for professional medical care. Always follow your healthcare professional's instructions.         no

## 2023-04-24 NOTE — CONSULT NOTE ADULT - WHAT MATTERS MOST
to take patient back home with services Cartilage Graft Text: The defect edges were debeveled with a #15 scalpel blade.  Given the location of the defect, shape of the defect, the fact the defect involved a full thickness cartilage defect a cartilage graft was deemed most appropriate.  An appropriate donor site was identified, cleansed, and anesthetized. The cartilage graft was then harvested and transferred to the recipient site, oriented appropriately and then sutured into place.  The secondary defect was then repaired using a primary closure.

## 2023-04-24 NOTE — H&P ADULT - PROBLEM SELECTOR PLAN 5
plan as above  likely 2/2 sepsis, post-ictal state, and poor PO intake    NPO with dextrose containing IV hydration

## 2023-04-24 NOTE — ED PROVIDER NOTE - CLINICAL SUMMARY MEDICAL DECISION MAKING FREE TEXT BOX
68-year-old male with history of dementia, diabetes, seizure disorder on Keppra, hyperlipidemia, CKD, hyponatremia, CVA 6 years ago recently treated for pneumonia presents with lethargy. Code sepsis initiated, will obtain labs, CXR and CT head. Given empiric abx and IVF. 68-year-old male with history of dementia, diabetes, seizure disorder on Keppra, hyperlipidemia, CKD, hyponatremia, CVA 6 years ago recently treated for pneumonia presents with lethargy. Code sepsis initiated, will obtain labs, CXR and CT head. Given empiric abx and IVF.  ekg interpretation- no acute ischemic changes  lab interpretation- wbc 17K, Na 130, Cr 2.6, ua neg  imaging interpretation- CXR shows no focal infiltrate  CThead shows No acute intracranial hemorrhage, vasogenic edema, extra-axial collection or hydrocephalus. Chronic findings as above.  Discussed above with family  Discussed above with hospitalist and MAR  patient stable for admission

## 2023-04-24 NOTE — CONSULT NOTE ADULT - PROBLEM SELECTOR RECOMMENDATION 6
69 y/o with multiple comorbidities and deteriorating conditions. Patient is at high risk of mortality, morbidity, infections, and hospitalizations.     Karnofsky= 30%   FAST Score: 7 B  Eligible for hospice => Family wants to take patient home and needs education re: Home Hospice services    Code Status: FULL Code    Palliative Care will follow.

## 2023-04-24 NOTE — EEG REPORT - NS EEG TEXT BOX
KERA SCHWARTZ [id number] 68y (1955)M  Admitting MD: Dr. Margie Morris    Study Date: 04-24-23    --------------------------------------------------------------------------------------------------  History:  CC/ HPI Patient is a 68y old  Male who presents with a chief complaint of Hypotension/Diarrhea (24 Apr 2023 03:41)    cefepime   IVPB 1000 milliGRAM(s) IV Intermittent every 12 hours  chlorhexidine 2% Cloths 1 Application(s) Topical <User Schedule>  dextrose 5% + sodium chloride 0.9%. 1000 milliLiter(s) IV Continuous <Continuous>  heparin   Injectable 5000 Unit(s) SubCutaneous every 12 hours  insulin lispro (ADMELOG) corrective regimen sliding scale   SubCutaneous every 6 hours  metroNIDAZOLE  IVPB 500 milliGRAM(s) IV Intermittent every 8 hours  valproate sodium  IVPB 250 milliGRAM(s) IV Intermittent every 12 hours    --------------------------------------------------------------------------------------------------  Study Interpretation:    [[[Abbreviation Key:  PDR=alpha rhythm/posterior dominant rhythm. A-P=anterior posterior gradient.  Amplitude: ‘very low’:<20; ‘low’:20-50; ‘medium’:; ‘high’:>200uV.  Persistence for periodic/rhythmic patterns (% of epoch) ‘rare’:<1%; ‘occasional’:1-10%; ‘frequent’:10-50%; ‘abundant’:50-90%; ‘continuous’:>90%.  Persistence for sporadic discharges: ‘rare’:<1/hr; ‘occasional’:1/min-1/hr; ‘frequent’:>1/min; ‘abundant’:>1/10 sec.  GRDA=generalized rhythmic delta activity; FIRDA=frontal intermittent GRDA; LRDA=lateralized rhythmic delta activity; TIRDA=temporal intermittent rhythmic delta activity;  LPD=PLED=lateralized periodic discharges; GPD=generalized periodic discharges; BiPDs=BiPLEDs=bilateral independent periodic epileptiform discharges; SIRPID=stimulus induced rhythmic, periodic, or ictal appearing discharges; BIRDs=brief potentially ictal rhythmic discharges >4 Hz, lasting .5-10s; PFA (paroxysmal bursts >13 Hz or =8 Hz).  Modifiers: +F=with fast component; +S=with spike component; +R=with rhythmic component.  S-B=burst suppression pattern.  Max=maximal. N1-drowsy; N2-stage II sleep; N3-slow wave sleep. SSS/BETS=small sharp spikes/benign epileptiform transients of sleep. HV=hyperventilation; PS=photic stimulation]]]    FINDINGS:  The background was continuous, spontaneously variable and reactive.  During wakefulness, no clear posterior dominant rhythm was identified.    Background Slowing:  Generalized slowing: none was present.  Focal slowing: none was present.    Sleep Background:  -Drowsiness was characterized by fragmentation, attenuation, and slowing of the background activity.    -N2 sleep transients were not recorded.    Epileptiform Activity:   No interictal epileptiform discharges were present.    Events:  No clinical events were recorded.  No seizures were recorded.    Activation Procedures:   -Hyperventilation was not performed.    -Photic stimulation was performed and did not elicit any abnormalities.      Artifacts:  Intermittent myogenic and external motion artifacts were noted.    ECG:  The heart rate on single channel ECG at baseline was predominantly near BPM = 70  -----------------------------------------------------------------------------------------------------    EEG Classification / Summary:  Normal EEG study, awake / drowsy    -----------------------------------------------------------------------------------------------------    Clinical Impression:  There were no epileptiform abnormalities recorded.      -------------------------------------------------------------------------------------------------------  [facility] EEG Reading Room Ph#: [phone]  Epilepsy Answering Service after 5PM and before 8:30AM: Ph#: [phone]    Violetta James MD  PGY-6 Pediatric Epilepsy    ***THIS IS A PRELIMINARY FELLOW REPORT PENDING REVIEW WITH ATTENDING EPILEPTOLOGIST***     KERA SCHWARTZ [id number] 68y (1955)M  Admitting MD: Dr. Margie Morris    Study Date: 04-24-23    --------------------------------------------------------------------------------------------------  History:  CC/ HPI Patient is a 68y old  Male who presents with a chief complaint of Hypotension/Diarrhea (24 Apr 2023 03:41)    cefepime   IVPB 1000 milliGRAM(s) IV Intermittent every 12 hours  chlorhexidine 2% Cloths 1 Application(s) Topical <User Schedule>  dextrose 5% + sodium chloride 0.9%. 1000 milliLiter(s) IV Continuous <Continuous>  heparin   Injectable 5000 Unit(s) SubCutaneous every 12 hours  insulin lispro (ADMELOG) corrective regimen sliding scale   SubCutaneous every 6 hours  metroNIDAZOLE  IVPB 500 milliGRAM(s) IV Intermittent every 8 hours  valproate sodium  IVPB 250 milliGRAM(s) IV Intermittent every 12 hours    --------------------------------------------------------------------------------------------------  Study Interpretation:    [[[Abbreviation Key:  PDR=alpha rhythm/posterior dominant rhythm. A-P=anterior posterior gradient.  Amplitude: ‘very low’:<20; ‘low’:20-50; ‘medium’:; ‘high’:>200uV.  Persistence for periodic/rhythmic patterns (% of epoch) ‘rare’:<1%; ‘occasional’:1-10%; ‘frequent’:10-50%; ‘abundant’:50-90%; ‘continuous’:>90%.  Persistence for sporadic discharges: ‘rare’:<1/hr; ‘occasional’:1/min-1/hr; ‘frequent’:>1/min; ‘abundant’:>1/10 sec.  GRDA=generalized rhythmic delta activity; FIRDA=frontal intermittent GRDA; LRDA=lateralized rhythmic delta activity; TIRDA=temporal intermittent rhythmic delta activity;  LPD=PLED=lateralized periodic discharges; GPD=generalized periodic discharges; BiPDs=BiPLEDs=bilateral independent periodic epileptiform discharges; SIRPID=stimulus induced rhythmic, periodic, or ictal appearing discharges; BIRDs=brief potentially ictal rhythmic discharges >4 Hz, lasting .5-10s; PFA (paroxysmal bursts >13 Hz or =8 Hz).  Modifiers: +F=with fast component; +S=with spike component; +R=with rhythmic component.  S-B=burst suppression pattern.  Max=maximal. N1-drowsy; N2-stage II sleep; N3-slow wave sleep. SSS/BETS=small sharp spikes/benign epileptiform transients of sleep. HV=hyperventilation; PS=photic stimulation]]]    FINDINGS:  The background was continuous, spontaneously variable and reactive.  During wakefulness, no clear posterior dominant rhythm was identified.    Background Slowing:  Generalized slowing: there is low amplitude theta diffusely in the background.   Focal slowing: none was present.    Sleep Background:  -Drowsiness was characterized by fragmentation, attenuation, and slowing of the background activity.    -N2 sleep transients were not recorded.    Epileptiform Activity:   No interictal epileptiform discharges were present.    Events:  No clinical events were recorded.  No seizures were recorded.    Activation Procedures:   -Hyperventilation was not performed.    -Photic stimulation was performed and did not elicit any abnormalities.      Artifacts:  Intermittent myogenic and external motion artifacts were noted.    ECG:  The heart rate on single channel ECG at baseline was predominantly near BPM = 70  -----------------------------------------------------------------------------------------------------    EEG Classification / Summary:  Abnormal EEG study, awake / drowsy    -----------------------------------------------------------------------------------------------------    Clinical Impression:  There is mild non-specific diffuse cerebral dysfunction.  There were no epileptiform abnormalities recorded.      -------------------------------------------------------------------------------------------------------  [facility] EEG Reading Room Ph#: [phone]  Epilepsy Answering Service after 5PM and before 8:30AM: Ph#: [phone]    Violetta James MD  PGY-6 Pediatric Epilepsy    Demarco Johnson MD PhD  Director, Epilepsy Division, [facility]

## 2023-04-24 NOTE — ED ADULT NURSE NOTE - NSIMPLEMENTINTERV_GEN_ALL_ED
Implemented All Fall Risk Interventions:  [place] to call system. Call bell, personal items and telephone within reach. Instruct patient to call for assistance. Room bathroom lighting operational. Non-slip footwear when patient is off stretcher. Physically safe environment: no spills, clutter or unnecessary equipment. Stretcher in lowest position, wheels locked, appropriate side rails in place. Provide visual cue, wrist band, yellow gown, etc. Monitor gait and stability. Monitor for mental status changes and reorient to person, place, and time. Review medications for side effects contributing to fall risk. Reinforce activity limits and safety measures with patient and family.

## 2023-04-24 NOTE — CONSULT NOTE ADULT - PROBLEM SELECTOR RECOMMENDATION 2
Severe. Unable to swallow since 4/20/2023    Recommendations:  Early Swallowing evaluation => Family favoring pleasure feeds as tolerated if passes the test  Re: Artificial Nutrition - Family requested to determine use or limitation if needs arises.  Nutrition consult, Nutritional supplements as tolerated  Aspiration precautions, elevate the head of the bed 60-90 degrees when feeding patient, careful hand-feeding, teaching to caregivers

## 2023-04-25 DIAGNOSIS — K59.00 CONSTIPATION, UNSPECIFIED: ICD-10-CM

## 2023-04-25 DIAGNOSIS — Z51.5 ENCOUNTER FOR PALLIATIVE CARE: ICD-10-CM

## 2023-04-25 DIAGNOSIS — I69.391 DYSPHAGIA FOLLOWING CEREBRAL INFARCTION: ICD-10-CM

## 2023-04-25 DIAGNOSIS — R53.81 OTHER MALAISE: ICD-10-CM

## 2023-04-25 DIAGNOSIS — F01.50 VASCULAR DEMENTIA WITHOUT BEHAVIORAL DISTURBANCE: ICD-10-CM

## 2023-04-25 DIAGNOSIS — E43 UNSPECIFIED SEVERE PROTEIN-CALORIE MALNUTRITION: ICD-10-CM

## 2023-04-25 DIAGNOSIS — Z02.9 ENCOUNTER FOR ADMINISTRATIVE EXAMINATIONS, UNSPECIFIED: ICD-10-CM

## 2023-04-25 LAB
A1C WITH ESTIMATED AVERAGE GLUCOSE RESULT: 7.4 % — HIGH (ref 4–5.6)
ALBUMIN SERPL ELPH-MCNC: 1.9 G/DL — LOW (ref 3.5–5)
ALP SERPL-CCNC: 44 U/L — SIGNIFICANT CHANGE UP (ref 40–120)
ALT FLD-CCNC: 14 U/L DA — SIGNIFICANT CHANGE UP (ref 10–60)
ANION GAP SERPL CALC-SCNC: 3 MMOL/L — LOW (ref 5–17)
AST SERPL-CCNC: 20 U/L — SIGNIFICANT CHANGE UP (ref 10–40)
BILIRUB SERPL-MCNC: 0.4 MG/DL — SIGNIFICANT CHANGE UP (ref 0.2–1.2)
BUN SERPL-MCNC: 25 MG/DL — HIGH (ref 7–18)
CALCIUM SERPL-MCNC: 9 MG/DL — SIGNIFICANT CHANGE UP (ref 8.4–10.5)
CHLORIDE SERPL-SCNC: 105 MMOL/L — SIGNIFICANT CHANGE UP (ref 96–108)
CO2 SERPL-SCNC: 31 MMOL/L — SIGNIFICANT CHANGE UP (ref 22–31)
CREAT SERPL-MCNC: 1.47 MG/DL — HIGH (ref 0.5–1.3)
CULTURE RESULTS: NO GROWTH — SIGNIFICANT CHANGE UP
EGFR: 52 ML/MIN/1.73M2 — LOW
ESTIMATED AVERAGE GLUCOSE: 166 MG/DL — HIGH (ref 68–114)
GLUCOSE BLDC GLUCOMTR-MCNC: 118 MG/DL — HIGH (ref 70–99)
GLUCOSE BLDC GLUCOMTR-MCNC: 122 MG/DL — HIGH (ref 70–99)
GLUCOSE BLDC GLUCOMTR-MCNC: 145 MG/DL — HIGH (ref 70–99)
GLUCOSE BLDC GLUCOMTR-MCNC: 159 MG/DL — HIGH (ref 70–99)
GLUCOSE BLDC GLUCOMTR-MCNC: 192 MG/DL — HIGH (ref 70–99)
GLUCOSE SERPL-MCNC: 151 MG/DL — HIGH (ref 70–99)
HCT VFR BLD CALC: 29.4 % — LOW (ref 39–50)
HGB BLD-MCNC: 9 G/DL — LOW (ref 13–17)
MCHC RBC-ENTMCNC: 26.2 PG — LOW (ref 27–34)
MCHC RBC-ENTMCNC: 30.6 GM/DL — LOW (ref 32–36)
MCV RBC AUTO: 85.5 FL — SIGNIFICANT CHANGE UP (ref 80–100)
NRBC # BLD: 0 /100 WBCS — SIGNIFICANT CHANGE UP (ref 0–0)
PLATELET # BLD AUTO: 180 K/UL — SIGNIFICANT CHANGE UP (ref 150–400)
POTASSIUM SERPL-MCNC: 4.2 MMOL/L — SIGNIFICANT CHANGE UP (ref 3.5–5.3)
POTASSIUM SERPL-SCNC: 4.2 MMOL/L — SIGNIFICANT CHANGE UP (ref 3.5–5.3)
PROT SERPL-MCNC: 5.6 G/DL — LOW (ref 6–8.3)
RBC # BLD: 3.44 M/UL — LOW (ref 4.2–5.8)
RBC # FLD: 20.8 % — HIGH (ref 10.3–14.5)
SODIUM SERPL-SCNC: 139 MMOL/L — SIGNIFICANT CHANGE UP (ref 135–145)
SPECIMEN SOURCE: SIGNIFICANT CHANGE UP
WBC # BLD: 12.58 K/UL — HIGH (ref 3.8–10.5)
WBC # FLD AUTO: 12.58 K/UL — HIGH (ref 3.8–10.5)

## 2023-04-25 PROCEDURE — 99233 SBSQ HOSP IP/OBS HIGH 50: CPT

## 2023-04-25 RX ORDER — PANTOPRAZOLE SODIUM 20 MG/1
40 TABLET, DELAYED RELEASE ORAL DAILY
Refills: 0 | Status: DISCONTINUED | OUTPATIENT
Start: 2023-04-25 | End: 2023-05-01

## 2023-04-25 RX ORDER — SODIUM CHLORIDE 9 MG/ML
1000 INJECTION, SOLUTION INTRAVENOUS
Refills: 0 | Status: DISCONTINUED | OUTPATIENT
Start: 2023-04-25 | End: 2023-04-26

## 2023-04-25 RX ADMIN — Medication 52.5 MILLIGRAM(S): at 06:10

## 2023-04-25 RX ADMIN — Medication 100 MILLIGRAM(S): at 22:12

## 2023-04-25 RX ADMIN — HEPARIN SODIUM 5000 UNIT(S): 5000 INJECTION INTRAVENOUS; SUBCUTANEOUS at 17:34

## 2023-04-25 RX ADMIN — HEPARIN SODIUM 5000 UNIT(S): 5000 INJECTION INTRAVENOUS; SUBCUTANEOUS at 06:11

## 2023-04-25 RX ADMIN — Medication 100 MILLIGRAM(S): at 14:52

## 2023-04-25 RX ADMIN — SODIUM CHLORIDE 75 MILLILITER(S): 9 INJECTION, SOLUTION INTRAVENOUS at 17:33

## 2023-04-25 RX ADMIN — CHLORHEXIDINE GLUCONATE 1 APPLICATION(S): 213 SOLUTION TOPICAL at 05:13

## 2023-04-25 RX ADMIN — Medication 1: at 06:11

## 2023-04-25 RX ADMIN — Medication 52.5 MILLIGRAM(S): at 18:14

## 2023-04-25 RX ADMIN — CEFEPIME 100 MILLIGRAM(S): 1 INJECTION, POWDER, FOR SOLUTION INTRAMUSCULAR; INTRAVENOUS at 05:13

## 2023-04-25 RX ADMIN — CEFEPIME 100 MILLIGRAM(S): 1 INJECTION, POWDER, FOR SOLUTION INTRAMUSCULAR; INTRAVENOUS at 17:34

## 2023-04-25 RX ADMIN — SODIUM CHLORIDE 75 MILLILITER(S): 9 INJECTION, SOLUTION INTRAVENOUS at 09:16

## 2023-04-25 RX ADMIN — PANTOPRAZOLE SODIUM 40 MILLIGRAM(S): 20 TABLET, DELAYED RELEASE ORAL at 17:34

## 2023-04-25 RX ADMIN — Medication 100 MILLIGRAM(S): at 06:11

## 2023-04-25 NOTE — SWALLOW BEDSIDE ASSESSMENT ADULT - ORAL PREPARATORY PHASE
poor pucker to cup & spoon presentation/Reduced oral grading/Decreased mastication ability Reduced oral grading/Decreased mastication ability/Bolus falls into anterior sulcus Reduced oral grading/Decreased mastication ability poor lip seal; reduced bolus containment. poor pucker to cup & spoon presentation; anterior spillage on cup sip only./Reduced oral grading/Anterior loss of bolus

## 2023-04-25 NOTE — SWALLOW BEDSIDE ASSESSMENT ADULT - MODE OF PRESENTATION
spoon x 3; cup x 3/cup/spoon/fed by clinician spoon/fed by clinician fed by clinician spoon x 3; cup x 2; straw x 2/cup/spoon/straw/fed by clinician

## 2023-04-25 NOTE — SWALLOW BEDSIDE ASSESSMENT ADULT - SWALLOW EVAL: DIAGNOSIS
Pt p/w s&s oropharyngeal dysphagia weak labial seal, impaired bolus formation, slow A-P transport, base-of-tongue pumping, prolonged oral bolus holding,delayed swallow reflex, reduced hyoid excursion, but laryngeal elevation WFL. While no overt s&s of penetration/aspiration seen at this exam, suspected premature spillage of bolus to the pharynx.

## 2023-04-25 NOTE — SWALLOW BEDSIDE ASSESSMENT ADULT - ORAL PHASE
Decreased anterior-posterior movement of the bolus/Delayed oral transit time/Stasis in anterior sulcus base-of-tongue pumping ; prolonged oral bolus holding/Decreased anterior-posterior movement of the bolus/Delayed oral transit time/Stasis in anterior sulcus/Lingual stasis prolonged oral bolus holding; base-of-tongue pumping/Decreased anterior-posterior movement of the bolus/Delayed oral transit time/Stasis in anterior sulcus

## 2023-04-25 NOTE — PROGRESS NOTE ADULT - SUBJECTIVE AND OBJECTIVE BOX
NP Note discussed with  primary attending    Patient is a 68y old  Male who presents with a chief complaint of Hypotension/Diarrhea (2023 14:50)      INTERVAL HPI/OVERNIGHT EVENTS: no acute medical events overnight       MEDICATIONS  (STANDING):  cefepime   IVPB 1000 milliGRAM(s) IV Intermittent every 12 hours  chlorhexidine 2% Cloths 1 Application(s) Topical <User Schedule>  dextrose 5% + sodium chloride 0.9%. 1000 milliLiter(s) (75 mL/Hr) IV Continuous <Continuous>  heparin   Injectable 5000 Unit(s) SubCutaneous every 12 hours  insulin lispro (ADMELOG) corrective regimen sliding scale   SubCutaneous every 6 hours  metroNIDAZOLE  IVPB 500 milliGRAM(s) IV Intermittent every 8 hours  valproate sodium  IVPB 250 milliGRAM(s) IV Intermittent every 12 hours    MEDICATIONS  (PRN):      __________________________________________________  REVIEW OF SYSTEMS:    limited due to mental status     Vital Signs Last 24 Hrs  T(C): 36.6 (2023 13:00), Max: 36.7 (2023 17:53)  T(F): 97.9 (2023 13:00), Max: 98 (2023 17:53)  HR: 69 (2023 13:00) (69 - 82)  BP: 109/81 (2023 13:00) (91/78 - 127/73)  BP(mean): 81 (2023 05:09) (81 - 81)  RR: 16 (2023 13:00) (16 - 17)  SpO2: 95% (2023 13:00) (95% - 100%)    Parameters below as of 2023 13:00  Patient On (Oxygen Delivery Method): room air        ________________________________________________  PHYSICAL EXAM:  GENERAL: frail chronically ill appearing   HEENT: B/L temporal waisting   NECK : supple  CHEST/LUNG: poor effort   HEART: S1 S2  regular; no murmurs, gallops or rubs  ABDOMEN: Soft, Nontender, Nondistended; Bowel sounds present  EXTREMITIES: no cyanosis; no edema; no calf tenderness  SKIN: warm and dry; no rash  NERVOUS SYSTEM: lethargic grimaces to verbal stimuli does not follow commands     _________________________________________________  LABS:                        9.0    12.58 )-----------( 180      ( 2023 05:35 )             29.4         139  |  105  |  25<H>  ----------------------------<  151<H>  4.2   |  31  |  1.47<H>    Ca    9.0      2023 05:35    TPro  5.6<L>  /  Alb  1.9<L>  /  TBili  0.4  /  DBili  x   /  AST  20  /  ALT  14  /  AlkPhos  44  25    PT/INR - ( 2023 22:51 )   PT: 12.8 sec;   INR: 1.07 ratio         PTT - ( 2023 22:51 )  PTT:22.2 sec  Urinalysis Basic - ( 2023 22:51 )    Color: Yellow / Appearance: Clear / S.010 / pH: x  Gluc: x / Ketone: Negative  / Bili: Negative / Urobili: Negative   Blood: x / Protein: 30 mg/dL / Nitrite: Negative   Leuk Esterase: Negative / RBC: 0-2 /HPF / WBC 0-2 /HPF   Sq Epi: x / Non Sq Epi: x / Bacteria: x      CAPILLARY BLOOD GLUCOSE      POCT Blood Glucose.: 118 mg/dL (2023 11:57)  POCT Blood Glucose.: 159 mg/dL (2023 05:57)  POCT Blood Glucose.: 145 mg/dL (2023 00:07)  POCT Blood Glucose.: 132 mg/dL (2023 16:57)        RADIOLOGY & ADDITIONAL TESTS:   < from: CT Abdomen and Pelvis No Cont (23 @ 08:33) >    IMPRESSION:    Subcutaneous edema overlying the right hip may reflect anasarca or   ecchymosis.    Mild constipation.        --- End of Report ---    < end of copied text >    < from: CT Head No Cont (23 @ 00:50) >    IMPRESSION:  No acute intracranial hemorrhage, vasogenic edema, extra-axial collection   or hydrocephalus. Chronic findings as above.    --- End of Report ---    < end of copied text >< from: Xray Chest 1 View-PORTABLE IMMEDIATE (23 @ 22:48) >  IMPRESSION:  Mild bibasilar platelike atelectasis/lower lobe cylindrical   bronchiectasis, unchanged    --- End of Report ---    < end of copied text >    Imaging Personally Reviewed:  YES/    Consultant(s) Notes Reviewed:   YES      Plan of care was discussed with patient and /or primary care giver; all questions and concerns were addressed and care was aligned with patient's wishes.

## 2023-04-25 NOTE — SWALLOW BEDSIDE ASSESSMENT ADULT - PHARYNGEAL PHASE
Delayed pharyngeal swallow/Decreased laryngeal elevation reduced hyoid excursion, cued for repeat swallow/Delayed pharyngeal swallow/Multiple swallows reduced hyoid excursion, cued for repeat swallow/Delayed pharyngeal swallow

## 2023-04-25 NOTE — PROGRESS NOTE ADULT - ATTENDING COMMENTS
68 year old man with medical hx including ischemic CVA, dementia, DM2, seizure d/o, HLD, CKD who was brought in by family on account of acute change in mental status with hypersomnolence, lethargy with reduced responsiveness.   OF note, family describes progressive decline in his cognitive and physical status over the last 6 months including periods of hospital admission for infection- most recently for pneumonia in his primary hospital.     #Acute on chronic encephelopathy   suspect sepsis /infection from acute enterocolitis   # Sepsis   # NEVILLE on CKD  # Progressive chronic cognitive decline - hx of dementia ( ?FTD), CVA   # Hyponatremia  CXR: Grossly unremarkable with upper right  lobe perihilar prominence  - Sepsis work up - send stool for c.diff and stool cultures   - c/w Empiric antibiotics with IV flagyl and cefepime renally dosed  - f/u ID consult recs  - Gentle IVF hydration with normal saline.  - Continue NPO due to mental status  - EEG study - no epileptiform activity  - - Change PO meds to IV for now  - Fall precaution   - Goals of care discussion ; family wants him full code   - Palliative care consult

## 2023-04-25 NOTE — SWALLOW BEDSIDE ASSESSMENT ADULT - COMMENTS
Pt  A+Ox1 (name only), replied only Y/N & OK. HOB elevated to 90°. On cup sip only, Pt pucker to cup was reduced, resulting in slight open mouth postures. Pt need frequent cueing to seal lips during swallow. Notable improvement with bolus containment with spoon & straw sips.

## 2023-04-25 NOTE — SWALLOW BEDSIDE ASSESSMENT ADULT - SWALLOW EVAL: RECOMMENDED DIET
Puree with thin liquids, SPOON FEED OR SMALL STRAW SIPS FOR LIQUIDS. Avoid cups sips, as Pt has less control of bolus.

## 2023-04-26 ENCOUNTER — TRANSCRIPTION ENCOUNTER (OUTPATIENT)
Age: 68
End: 2023-04-26

## 2023-04-26 LAB
ANION GAP SERPL CALC-SCNC: 3 MMOL/L — LOW (ref 5–17)
BUN SERPL-MCNC: 20 MG/DL — HIGH (ref 7–18)
CALCIUM SERPL-MCNC: 8.9 MG/DL — SIGNIFICANT CHANGE UP (ref 8.4–10.5)
CHLORIDE SERPL-SCNC: 107 MMOL/L — SIGNIFICANT CHANGE UP (ref 96–108)
CO2 SERPL-SCNC: 30 MMOL/L — SIGNIFICANT CHANGE UP (ref 22–31)
CREAT SERPL-MCNC: 1.36 MG/DL — HIGH (ref 0.5–1.3)
EGFR: 57 ML/MIN/1.73M2 — LOW
GLUCOSE BLDC GLUCOMTR-MCNC: 145 MG/DL — HIGH (ref 70–99)
GLUCOSE BLDC GLUCOMTR-MCNC: 147 MG/DL — HIGH (ref 70–99)
GLUCOSE BLDC GLUCOMTR-MCNC: 165 MG/DL — HIGH (ref 70–99)
GLUCOSE BLDC GLUCOMTR-MCNC: 178 MG/DL — HIGH (ref 70–99)
GLUCOSE BLDC GLUCOMTR-MCNC: 185 MG/DL — HIGH (ref 70–99)
GLUCOSE SERPL-MCNC: 139 MG/DL — HIGH (ref 70–99)
HCT VFR BLD CALC: 27.1 % — LOW (ref 39–50)
HGB BLD-MCNC: 8.5 G/DL — LOW (ref 13–17)
MCHC RBC-ENTMCNC: 26.4 PG — LOW (ref 27–34)
MCHC RBC-ENTMCNC: 31.4 GM/DL — LOW (ref 32–36)
MCV RBC AUTO: 84.2 FL — SIGNIFICANT CHANGE UP (ref 80–100)
NRBC # BLD: 0 /100 WBCS — SIGNIFICANT CHANGE UP (ref 0–0)
PLATELET # BLD AUTO: 200 K/UL — SIGNIFICANT CHANGE UP (ref 150–400)
POTASSIUM SERPL-MCNC: 4.1 MMOL/L — SIGNIFICANT CHANGE UP (ref 3.5–5.3)
POTASSIUM SERPL-SCNC: 4.1 MMOL/L — SIGNIFICANT CHANGE UP (ref 3.5–5.3)
RBC # BLD: 3.22 M/UL — LOW (ref 4.2–5.8)
RBC # FLD: 20.6 % — HIGH (ref 10.3–14.5)
SODIUM SERPL-SCNC: 140 MMOL/L — SIGNIFICANT CHANGE UP (ref 135–145)
WBC # BLD: 11.14 K/UL — HIGH (ref 3.8–10.5)
WBC # FLD AUTO: 11.14 K/UL — HIGH (ref 3.8–10.5)

## 2023-04-26 PROCEDURE — 99498 ADVNCD CARE PLAN ADDL 30 MIN: CPT | Mod: 25

## 2023-04-26 PROCEDURE — 99497 ADVNCD CARE PLAN 30 MIN: CPT | Mod: 25

## 2023-04-26 PROCEDURE — 99233 SBSQ HOSP IP/OBS HIGH 50: CPT

## 2023-04-26 RX ORDER — SODIUM CHLORIDE 9 MG/ML
1000 INJECTION INTRAMUSCULAR; INTRAVENOUS; SUBCUTANEOUS ONCE
Refills: 0 | Status: DISCONTINUED | OUTPATIENT
Start: 2023-04-26 | End: 2023-04-26

## 2023-04-26 RX ORDER — GLUCAGON INJECTION, SOLUTION 0.5 MG/.1ML
1 INJECTION, SOLUTION SUBCUTANEOUS ONCE
Refills: 0 | Status: DISCONTINUED | OUTPATIENT
Start: 2023-04-26 | End: 2023-05-01

## 2023-04-26 RX ORDER — SODIUM CHLORIDE 9 MG/ML
1000 INJECTION, SOLUTION INTRAVENOUS
Refills: 0 | Status: DISCONTINUED | OUTPATIENT
Start: 2023-04-26 | End: 2023-05-01

## 2023-04-26 RX ORDER — DEXTROSE 50 % IN WATER 50 %
25 SYRINGE (ML) INTRAVENOUS ONCE
Refills: 0 | Status: DISCONTINUED | OUTPATIENT
Start: 2023-04-26 | End: 2023-05-01

## 2023-04-26 RX ORDER — INSULIN LISPRO 100/ML
VIAL (ML) SUBCUTANEOUS
Refills: 0 | Status: DISCONTINUED | OUTPATIENT
Start: 2023-04-26 | End: 2023-05-01

## 2023-04-26 RX ORDER — DEXTROSE 50 % IN WATER 50 %
12.5 SYRINGE (ML) INTRAVENOUS ONCE
Refills: 0 | Status: DISCONTINUED | OUTPATIENT
Start: 2023-04-26 | End: 2023-05-01

## 2023-04-26 RX ORDER — INSULIN LISPRO 100/ML
VIAL (ML) SUBCUTANEOUS AT BEDTIME
Refills: 0 | Status: DISCONTINUED | OUTPATIENT
Start: 2023-04-26 | End: 2023-05-01

## 2023-04-26 RX ORDER — DEXTROSE 50 % IN WATER 50 %
15 SYRINGE (ML) INTRAVENOUS ONCE
Refills: 0 | Status: DISCONTINUED | OUTPATIENT
Start: 2023-04-26 | End: 2023-05-01

## 2023-04-26 RX ORDER — SODIUM CHLORIDE 9 MG/ML
1000 INJECTION, SOLUTION INTRAVENOUS
Refills: 0 | Status: DISCONTINUED | OUTPATIENT
Start: 2023-04-26 | End: 2023-04-27

## 2023-04-26 RX ADMIN — Medication 52.5 MILLIGRAM(S): at 06:08

## 2023-04-26 RX ADMIN — CHLORHEXIDINE GLUCONATE 1 APPLICATION(S): 213 SOLUTION TOPICAL at 06:08

## 2023-04-26 RX ADMIN — PANTOPRAZOLE SODIUM 40 MILLIGRAM(S): 20 TABLET, DELAYED RELEASE ORAL at 12:33

## 2023-04-26 RX ADMIN — HEPARIN SODIUM 5000 UNIT(S): 5000 INJECTION INTRAVENOUS; SUBCUTANEOUS at 06:08

## 2023-04-26 RX ADMIN — HEPARIN SODIUM 5000 UNIT(S): 5000 INJECTION INTRAVENOUS; SUBCUTANEOUS at 18:07

## 2023-04-26 RX ADMIN — Medication 100 MILLIGRAM(S): at 13:45

## 2023-04-26 RX ADMIN — SODIUM CHLORIDE 60 MILLILITER(S): 9 INJECTION, SOLUTION INTRAVENOUS at 19:54

## 2023-04-26 RX ADMIN — CEFEPIME 100 MILLIGRAM(S): 1 INJECTION, POWDER, FOR SOLUTION INTRAMUSCULAR; INTRAVENOUS at 04:40

## 2023-04-26 RX ADMIN — Medication 1: at 12:33

## 2023-04-26 RX ADMIN — SODIUM CHLORIDE 60 MILLILITER(S): 9 INJECTION, SOLUTION INTRAVENOUS at 19:02

## 2023-04-26 RX ADMIN — Medication 100 MILLIGRAM(S): at 05:13

## 2023-04-26 RX ADMIN — Medication 52.5 MILLIGRAM(S): at 18:07

## 2023-04-26 NOTE — DISCHARGE NOTE PROVIDER - ATTENDING DISCHARGE PHYSICAL EXAMINATION:
GENERAL: frail chronically ill appearing   HEENT: B/L temporal waisting   NECK : supple  CHEST/LUNG: poor effort   HEART: S1 S2  regular; no murmurs, gallops or rubs  ABDOMEN: Soft, Nontender, Nondistended; Bowel sounds present  EXTREMITIES: no cyanosis; no edema; no calf tenderness  SKIN: warm and dry; no rash  NERVOUS SYSTEM: arouses to verbal stimuli, lethargic

## 2023-04-26 NOTE — DISCHARGE NOTE PROVIDER - CARE PROVIDER_API CALL
NEWTON RENDON  Internal Medicine  [address] [place] [id number]  Phone: [phone]  Fax: [phone]  Follow Up Time: 1 week

## 2023-04-26 NOTE — PROGRESS NOTE ADULT - SUBJECTIVE AND OBJECTIVE BOX
NP Note discussed with  primary attending    Patient is a 68y old  Male who presents with a chief complaint of Hypotension/Diarrhea (25 Apr 2023 11:28)      INTERVAL HPI/OVERNIGHT EVENTS: no acute medical events overnight     MEDICATIONS  (STANDING):  cefepime   IVPB 1000 milliGRAM(s) IV Intermittent every 12 hours  chlorhexidine 2% Cloths 1 Application(s) Topical <User Schedule>  dextrose 5% + sodium chloride 0.9%. 1000 milliLiter(s) (75 mL/Hr) IV Continuous <Continuous>  heparin   Injectable 5000 Unit(s) SubCutaneous every 12 hours  insulin lispro (ADMELOG) corrective regimen sliding scale   SubCutaneous every 6 hours  metroNIDAZOLE  IVPB 500 milliGRAM(s) IV Intermittent every 8 hours  pantoprazole  Injectable 40 milliGRAM(s) IV Push daily  valproate sodium  IVPB 250 milliGRAM(s) IV Intermittent every 12 hours    MEDICATIONS  (PRN):      __________________________________________________  REVIEW OF SYSTEMS:    limited due to mental status     Vital Signs Last 24 Hrs  T(C): 36.6 (26 Apr 2023 05:10), Max: 37 (25 Apr 2023 20:07)  T(F): 97.8 (26 Apr 2023 05:10), Max: 98.6 (25 Apr 2023 20:07)  HR: 72 (26 Apr 2023 05:10) (68 - 72)  BP: 138/60 (26 Apr 2023 05:10) (103/64 - 138/60)  BP(mean): --  RR: 16 (26 Apr 2023 05:10) (16 - 16)  SpO2: 99% (26 Apr 2023 05:10) (95% - 99%)    Parameters below as of 26 Apr 2023 05:10  Patient On (Oxygen Delivery Method): room air        ________________________________________________  PHYSICAL EXAM:  GENERAL: frail chronically ill appearing   HEENT: B/L temporal waisting   NECK : supple  CHEST/LUNG: poor effort   HEART: S1 S2  regular; no murmurs, gallops or rubs  ABDOMEN: Soft, Nontender, Nondistended; Bowel sounds present  EXTREMITIES: no cyanosis; no edema; no calf tenderness  SKIN: warm and dry; no rash  NERVOUS SYSTEM: lethargic grimaces to verbal stimuli does not follow commands   _________________________________________________  LABS:                        8.5    11.14 )-----------( 200      ( 26 Apr 2023 07:33 )             27.1     04-26    140  |  107  |  20<H>  ----------------------------<  139<H>  4.1   |  30  |  1.36<H>    Ca    8.9      26 Apr 2023 07:33    TPro  5.6<L>  /  Alb  1.9<L>  /  TBili  0.4  /  DBili  x   /  AST  20  /  ALT  14  /  AlkPhos  44  04-25        CAPILLARY BLOOD GLUCOSE      POCT Blood Glucose.: 145 mg/dL (26 Apr 2023 05:44)  POCT Blood Glucose.: 192 mg/dL (25 Apr 2023 23:55)  POCT Blood Glucose.: 122 mg/dL (25 Apr 2023 17:35)  POCT Blood Glucose.: 118 mg/dL (25 Apr 2023 11:57)    RADIOLOGY & ADDITIONAL TESTS:   < from: CT Abdomen and Pelvis No Cont (04.24.23 @ 08:33) >  IMPRESSION:    Subcutaneous edema overlying the right hip may reflect anasarca or   ecchymosis.    Mild constipation.        --- End of Report ---    < end of copied text >  < from: CT Head No Cont (04.24.23 @ 00:50) >    IMPRESSION:  No acute intracranial hemorrhage, vasogenic edema, extra-axial collection   or hydrocephalus. Chronic findings as above.    --- End of Report ---    < end of copied text >  < from: Xray Chest 1 View-PORTABLE IMMEDIATE (04.23.23 @ 22:48) >  IMPRESSION:  Mild bibasilar platelike atelectasis/lower lobe cylindrical   bronchiectasis, unchanged    --- End of Report ---    < end of copied text >    Imaging Personally Reviewed:  YES    Consultant(s) Notes Reviewed:   YES    Plan of care was discussed with patient and /or primary care giver; all questions and concerns were addressed and care was aligned with patient's wishes.

## 2023-04-26 NOTE — DISCHARGE NOTE PROVIDER - NSDCCPCAREPLAN_GEN_ALL_CORE_FT
PRINCIPAL DISCHARGE DIAGNOSIS  Diagnosis: Acute metabolic encephalopathy  Assessment and Plan of Treatment: Encephalopathy is a term used to describe brain disease or brain damage. It usually develops because of a health condition, likely your acute infection. This was treated with intravenous antibiotics and has now resolved.      SECONDARY DISCHARGE DIAGNOSES  Diagnosis: Sepsis, unspecified organism  Assessment and Plan of Treatment: Sepsis happens when an infection spreads and causes your body to react strongly. Your body's immune system normally releases chemicals to fight off infection at the infected area. When infection spreads, chemicals are released throughout your body. The chemicals cause inflammation and clotting in small blood vessels that is difficult to control. Inflammation and clotting decrease blood flow and oxygen to your organs. This may cause your organs to stop working correctly. Sepsis is a life-threatening emergency. This was treated with intravenous antibiotics and has now resolved    Diagnosis: Diarrhea  Assessment and Plan of Treatment: You presented with diarrhea, this has now resolved and did not require treatment    Diagnosis: NEVILLE (acute kidney injury)  Assessment and Plan of Treatment: You have been diagnosed with acute kidney injury. This means that your kidneys are not working properly. When both kidneys are healthy, they help filter out fluid and waste from the blood and body. Acute kidney injury has many causes. These include urinary blockages, infection, lack of enough blood supply, and medicines that can injure kidneys. In some cases, acute kidney injury is short-term (temporary), lasting several days to a few months. This is because the kidney can repair itself. But acute kidney injury can also result in chronic kidney disease or end stage renal failure.   It is important that you stay hydrated, and keep a record of how much you urinate. Avoid kidney toxic medications like NSAIDs (Motrin ibuprofen and IV contrast).      Diagnosis: CVA (cerebrovascular accident)  Assessment and Plan of Treatment: A stroke is a brain attack that occurs when an artery or a blood vessel becomes occluded breaks, interrupting blood flow to an area of the brain cells begin to die. Please call [phone] for facial droop slurring speech, unable to move a limb or sudden weakness in one limb or one side of the body or confusion. Continue physical/occupation theraphy for continued management of this condition    Diagnosis: Seizure  Assessment and Plan of Treatment: You have a history of seizure, its important to continue your prescribed home medication and follow up with your neurologist routinely for continued management of this condition    Diagnosis: DM (diabetes mellitus)  Assessment and Plan of Treatment: Your HgA1C was 7.4 this admission. Make sure you get your HgA1c checked every three months. If you take oral diabetes medications, check your blood glucose two times a day. If you take insulin, check your blood glucose before meals and at bedtime. It's important not to skip any meals. Keep a log of your blood glucose results and always take it with you to your doctor appointments. Keep a list of your current medications including injectables and over the counter medications and bring this medication list with you to all your doctor appointments.  If you have not seen your ophthalmologist this year call for appointment. Check your feet daily for redness, sores, or openings. Do not self treat. If no improvement in two days call your primary care physician for an appointment.     PRINCIPAL DISCHARGE DIAGNOSIS  Diagnosis: Acute metabolic encephalopathy  Assessment and Plan of Treatment: Encephalopathy is a term used to describe brain disease or brain damage. It usually develops because of a health condition, likely your acute infection. This was treated with intravenous antibiotics and has now resolved.      SECONDARY DISCHARGE DIAGNOSES  Diagnosis: Sepsis, unspecified organism  Assessment and Plan of Treatment: Sepsis happens when an infection spreads and causes your body to react strongly. Your body's immune system normally releases chemicals to fight off infection at the infected area. When infection spreads, chemicals are released throughout your body. The chemicals cause inflammation and clotting in small blood vessels that is difficult to control. Inflammation and clotting decrease blood flow and oxygen to your organs. This may cause your organs to stop working correctly. Sepsis is a life-threatening emergency. This was treated with intravenous antibiotics and has now resolved    Diagnosis: Acute kidney injury superimposed on CKD  Assessment and Plan of Treatment: You have been diagnosed with acute kidney injury. This means that your kidneys are not working properly. When both kidneys are healthy, they help filter out fluid and waste from the blood and body. Acute kidney injury has many causes. These include urinary blockages, infection, lack of enough blood supply, and medicines that can injure kidneys. In some cases, acute kidney injury is short-term (temporary), lasting several days to a few months. This is because the kidney can repair itself. But acute kidney injury can also result in chronic kidney disease or end stage renal failure.   It is important that you stay hydrated, and keep a record of how much you urinate. Avoid kidney toxic medications like NSAIDs (Motrin ibuprofen and IV contrast).      Diagnosis: Diarrhea  Assessment and Plan of Treatment: You presented with diarrhea, this has now resolved and did not require treatment    Diagnosis: CVA (cerebrovascular accident)  Assessment and Plan of Treatment: A stroke is a brain attack that occurs when an artery or a blood vessel becomes occluded breaks, interrupting blood flow to an area of the brain cells begin to die. Please call 911 for facial droop slurring speech, unable to move a limb or sudden weakness in one limb or one side of the body or confusion. Continue physical/occupation theraphy for continued management of this condition    Diagnosis: Seizure  Assessment and Plan of Treatment: You have a history of seizure, its important to continue your prescribed home medication and follow up with your neurologist routinely for continued management of this condition    Diagnosis: DM (diabetes mellitus)  Assessment and Plan of Treatment: Your HgA1C was 7.4 this admission. Make sure you get your HgA1c checked every three months. If you take oral diabetes medications, check your blood glucose two times a day. If you take insulin, check your blood glucose before meals and at bedtime. It's important not to skip any meals. Keep a log of your blood glucose results and always take it with you to your doctor appointments. Keep a list of your current medications including injectables and over the counter medications and bring this medication list with you to all your doctor appointments.  If you have not seen your ophthalmologist this year call for appointment. Check your feet daily for redness, sores, or openings. Do not self treat. If no improvement in two days call your primary care physician for an appointment.

## 2023-04-26 NOTE — PROGRESS NOTE ADULT - SUBJECTIVE AND OBJECTIVE BOX
follow up on:  complex medical decision making related to goals of care    [facility] Geriatric and Palliative Consult Service:  Dr. Janey Guadalupe: cell ([phone])  Dr. Mars Kohli: cell ([phone])  Sugar Bae DNP: cell ([phone])  Tan Millan NP: cell [phone])   Angela Barber NP: SARTHAK Morris LMSW: cell ([phone])     OVERNIGHT EVENTS:    Present Symptoms:   Pain:   Fatigue:  Nausea:  Lack of Appetite:   SOB:  Depression:  Anxiety:  Review of Systems: [All others negative or Unable to obtain due to poor mentation]    MEDICATIONS  (STANDING):  cefepime   IVPB 1000 milliGRAM(s) IV Intermittent every 12 hours  chlorhexidine 2% Cloths 1 Application(s) Topical <User Schedule>  dextrose 5% + sodium chloride 0.9%. 1000 milliLiter(s) (75 mL/Hr) IV Continuous <Continuous>  dextrose 5%. 1000 milliLiter(s) (50 mL/Hr) IV Continuous <Continuous>  dextrose 5%. 1000 milliLiter(s) (100 mL/Hr) IV Continuous <Continuous>  dextrose 50% Injectable 25 Gram(s) IV Push once  dextrose 50% Injectable 25 Gram(s) IV Push once  dextrose 50% Injectable 12.5 Gram(s) IV Push once  glucagon  Injectable 1 milliGRAM(s) IntraMuscular once  heparin   Injectable 5000 Unit(s) SubCutaneous every 12 hours  insulin lispro (ADMELOG) corrective regimen sliding scale   SubCutaneous three times a day before meals  insulin lispro (ADMELOG) corrective regimen sliding scale   SubCutaneous at bedtime  metroNIDAZOLE  IVPB 500 milliGRAM(s) IV Intermittent every 8 hours  pantoprazole  Injectable 40 milliGRAM(s) IV Push daily  valproate sodium  IVPB 250 milliGRAM(s) IV Intermittent every 12 hours    MEDICATIONS  (PRN):  dextrose Oral Gel 15 Gram(s) Oral once PRN Blood Glucose LESS THAN 70 milliGRAM(s)/deciliter      PHYSICAL EXAM:  Vital Signs Last 24 Hrs  T(C): 36.9 (26 Apr 2023 13:30), Max: 37 (25 Apr 2023 20:07)  T(F): 98.4 (26 Apr 2023 13:30), Max: 98.6 (25 Apr 2023 20:07)  HR: 66 (26 Apr 2023 14:39) (66 - 73)  BP: 95/54 (26 Apr 2023 14:39) (94/56 - 138/60)  BP(mean): --  RR: 18 (26 Apr 2023 13:30) (16 - 18)  SpO2: 98% (26 Apr 2023 13:30) (97% - 99%)    Parameters below as of 26 Apr 2023 13:30  Patient On (Oxygen Delivery Method): room air        General: alert  oriented x ____    lethargic distressed cachexia  verbal nonverbal  unarousable     Palliative Performance Scale/Karnofsky Score:  ECOG Performance:    HEENT: no abnormal lesion, dry mouth  ET tube/trach oral lesions:  Lungs: tachypnea/labored breathing, audible excessive secretions  CV: RRR, S1S2, tachycardia  GI: soft non distended non tender  incontinent               PEG/NG/OG tube  constipation  last BM:   : incontinent  oliguria/anuria  bangura  Musculoskeletal: weakness x4 edema x4    ambulatory with assistance   mostly/fully bedbound/wheelchair bound  Skin: no abnormal skin lesions, poor skin turgor, pressure ulcers stage:   Neuro: no deficits, mild cognitive impairment dsyphagia/dysarthria paresis  Oral intake ability: unable/only mouth care, minimal moderate full capability    LABS:                          8.5    11.14 )-----------( 200      ( 26 Apr 2023 07:33 )             27.1     04-26    140  |  107  |  20<H>  ----------------------------<  139<H>  4.1   |  30  |  1.36<H>    Ca    8.9      26 Apr 2023 07:33    TPro  5.6<L>  /  Alb  1.9<L>  /  TBili  0.4  /  DBili  x   /  AST  20  /  ALT  14  /  AlkPhos  44  04-25        RADIOLOGY & ADDITIONAL STUDIES: follow up on:  complex medical decision making related to goals of care    [facility] Geriatric and Palliative Consult Service:  Dr. Janey Guadalupe: cell ([phone])  Dr. Mars Kohli: cell ([phone])  Sugar Bae DNP: cell ([phone])  Tan Millan NP: cell ([phone])   Angela Barber NP: TEAMS  Marvel Morris LMSW: cell ([phone])     INTERIM HX:  Assessed patient but family not at bedside. Called patient's DTR/HCP (See GOC conversation)    OVERNIGHT EVENTS: no acute events overnight    Present Symptoms: Unable to obtain due to poor mentation  Pain:  Fatigue:  Nausea:  Lack of Appetite:   SOB:  Depression:  Anxiety:  Review of Systems: [Unable to obtain due to poor mentation]      MEDICATIONS  (STANDING):  cefepime   IVPB 1000 milliGRAM(s) IV Intermittent every 12 hours  chlorhexidine 2% Cloths 1 Application(s) Topical <User Schedule>  dextrose 5% + sodium chloride 0.9%. 1000 milliLiter(s) (75 mL/Hr) IV Continuous <Continuous>  dextrose 5%. 1000 milliLiter(s) (50 mL/Hr) IV Continuous <Continuous>  dextrose 5%. 1000 milliLiter(s) (100 mL/Hr) IV Continuous <Continuous>  dextrose 50% Injectable 25 Gram(s) IV Push once  dextrose 50% Injectable 25 Gram(s) IV Push once  dextrose 50% Injectable 12.5 Gram(s) IV Push once  glucagon  Injectable 1 milliGRAM(s) IntraMuscular once  heparin   Injectable 5000 Unit(s) SubCutaneous every 12 hours  insulin lispro (ADMELOG) corrective regimen sliding scale   SubCutaneous three times a day before meals  insulin lispro (ADMELOG) corrective regimen sliding scale   SubCutaneous at bedtime  metroNIDAZOLE  IVPB 500 milliGRAM(s) IV Intermittent every 8 hours  pantoprazole  Injectable 40 milliGRAM(s) IV Push daily  valproate sodium  IVPB 250 milliGRAM(s) IV Intermittent every 12 hours    MEDICATIONS  (PRN):  dextrose Oral Gel 15 Gram(s) Oral once PRN Blood Glucose LESS THAN 70 milliGRAM(s)/deciliter      PHYSICAL EXAM:  Vital Signs Last 24 Hrs  T(C): 36.9 (26 Apr 2023 13:30), Max: 37 (25 Apr 2023 20:07)  T(F): 98.4 (26 Apr 2023 13:30), Max: 98.6 (25 Apr 2023 20:07)  HR: 66 (26 Apr 2023 14:39) (66 - 73)  BP: 95/54 (26 Apr 2023 14:39) (94/56 - 138/60)  BP(mean): --  RR: 18 (26 Apr 2023 13:30) (16 - 18)  SpO2: 98% (26 Apr 2023 13:30) (97% - 99%)    Parameters below as of 26 Apr 2023 13:30  Patient On (Oxygen Delivery Method): room air    General: appears chronically ill, in NAD    Palliative Performance Scale/Karnofsky Score: 30%    HEENT: conjunctivae clear, dry mouth   Lungs: nonlabored breathing, CTA  CV: RRR, S1S2 present   GI: soft, non distended, non tender, incontinent  : incontinent  Musculoskeletal: weakness x4, no edema x4, bedbound   Skin: warm, dry, fair skin turgor   Neuro: A&O x0, confused, nonverbal, severe cognitive impairment  Oral intake ability: no capability      LABS:                          8.5    11.14 )-----------( 200      ( 26 Apr 2023 07:33 )             27.1     04-26    140  |  107  |  20<H>  ----------------------------<  139<H>  4.1   |  30  |  1.36<H>    Ca    8.9      26 Apr 2023 07:33    TPro  5.6<L>  /  Alb  1.9<L>  /  TBili  0.4  /  DBili  x   /  AST  20  /  ALT  14  /  AlkPhos  44  04-25        RADIOLOGY & ADDITIONAL STUDIES:

## 2023-04-26 NOTE — DISCHARGE NOTE PROVIDER - HOSPITAL COURSE
68 year old M from home with PMH of DM, CVA (2017), HLD, CKD, seizure, Dementia presents with lethargy from home. Of note recently discharge from [facility] after being treated for pneumonia with Abx on Thursday, and has been lethargic and unable to swallow since. Also reports that patient had a seizure on Saturday and deconditioning significantly over the past few months after staying in [facility] for 2 months earlier this year. + history of 3-4 episodes of foul smelling loose watery diarrhea. Found septic on admission placed on isolation to R/O [facility], started on IV Flagyl and cefepime. Pending blood, urine and stool cultures. EEG negative for epileptic foci.  -*-*-*-* INCOMPLETE     Given clinical course decision made to discharge patient with outpateint follow up  Discharge discussed with attending   This is only a brief summary of patient's hospital stay, for full course please see EMR 68 year old M from home with PMH of DM, CVA (2017), HLD, CKD, seizure, Dementia presents with lethargy from home. Of note recently discharge from [facility] after being treated for pneumonia with Abx on Thursday, and has been lethargic and unable to swallow since. Also reports that patient had a seizure on Saturday and deconditioning significantly over the past few months after staying in [facility] for 2 months earlier this year. + history of 3-4 episodes of foul smelling loose watery diarrhea. Found septic on admission placed on isolation to R/O [facility], started on IV Flagyl and cefepime. blood, urine and stool cultures remained negative. EEG negative for epileptic foci.    Given clinical course decision made to discharge patient with outpatient follow up  Discharge discussed with attending   This is only a brief summary of patient's hospital stay, for full course please see EMR 68 year old M from home with PMH of DM, CVA (2017), HLD, CKD, seizure, Dementia presents with lethargy from home. Of note recently discharge from [facility] after being treated for pneumonia with Abx on Thursday, and has been lethargic and unable to swallow since. Also reports that patient had a seizure on Saturday and deconditioning significantly over the past few months after staying in [facility] for 2 months earlier this year. + history of 3-4 episodes of foul smelling loose watery diarrhea. Admitted for sepsis from acute enterocolitis and acute on chronic encephalopathy and NEVILLE on CKD and severe protein calorie malnutrition, FTT. Found septic on admission [facility], started on IV Flagyl and cefepime and patient with no diarrhea since being admitted. blood, urine and stool cultures remained negative. EEG negative for epileptic foci. Patient with improvement of mental status but only able to answer simple questions and follow simple commands. Patient is able to eat his whole meal when fed. Family discussed with palliative care team but would like full code and take the patient home. PT rec home PT.     Given clinical course decision made to discharge patient with outpatient follow up  Discharge discussed with attending   This is only a brief summary of patient's hospital stay, for full course please see EMR

## 2023-04-26 NOTE — DISCHARGE NOTE PROVIDER - NSDCMRMEDTOKEN_GEN_ALL_CORE_FT
amLODIPine 5 mg oral tablet: 1 tab(s) orally once a day  aspirin 81 mg oral tablet, chewable: 1 tab(s) chewed once a day  citalopram 10 mg oral tablet: 1 tab(s) orally once a day  ergocalciferol 1.25 mg (50,000 intl units) oral tablet: 1 tab(s) orally once a week  famotidine 40 mg oral tablet: 1 tab(s) orally once a day  Farxiga 10 mg oral tablet: 1 tab(s) orally once a day  finasteride 5 mg oral tablet: 1 tab(s) orally once a day  folic acid 1 mg oral tablet: 1 tab(s) orally once a day  Januvia 25 mg oral tablet: 1 tab(s) orally once a day  losartan 25 mg oral tablet: 1 tab(s) orally once a day  tamsulosin 0.4 mg oral capsule: 1 tab(s) orally once a day  valproic acid 500 mg oral delayed release capsule: 1 cap(s) orally once a day   aspirin 81 mg oral tablet, chewable: 1 tab(s) chewed once a day  citalopram 10 mg oral tablet: 1 tab(s) orally once a day  ergocalciferol 1.25 mg (50,000 intl units) oral tablet: 1 tab(s) orally once a week  famotidine 40 mg oral tablet: 1 tab(s) orally once a day  finasteride 5 mg oral tablet: 1 tab(s) orally once a day  folic acid 1 mg oral tablet: 1 tab(s) orally once a day  tamsulosin 0.4 mg oral capsule: 1 tab(s) orally once a day  valproic acid 500 mg oral delayed release capsule: 1 cap(s) orally once a day

## 2023-04-26 NOTE — DISCHARGE NOTE PROVIDER - DETAILS OF MALNUTRITION DIAGNOSIS/DIAGNOSES
This patient has been assessed with a concern for Malnutrition and was treated during this hospitalization for the following Nutrition diagnosis/diagnoses:     -  04/27/2023: Severe protein-calorie malnutrition   -  04/27/2023: Underweight (BMI < 19)

## 2023-04-26 NOTE — DISCHARGE NOTE PROVIDER - NSDCCAREPROVSEEN_GEN_ALL_CORE_FT
Zeina, Mars Leon [FreeTextEntry1] : GI bleed, change in bowel habits, decreased appetite, weakness, rectal bleeding, terminal ileal ulcers

## 2023-04-27 LAB
ANION GAP SERPL CALC-SCNC: 3 MMOL/L — LOW (ref 5–17)
BUN SERPL-MCNC: 21 MG/DL — HIGH (ref 7–18)
CALCIUM SERPL-MCNC: 8.8 MG/DL — SIGNIFICANT CHANGE UP (ref 8.4–10.5)
CHLORIDE SERPL-SCNC: 104 MMOL/L — SIGNIFICANT CHANGE UP (ref 96–108)
CO2 SERPL-SCNC: 30 MMOL/L — SIGNIFICANT CHANGE UP (ref 22–31)
CREAT SERPL-MCNC: 1.37 MG/DL — HIGH (ref 0.5–1.3)
EGFR: 57 ML/MIN/1.73M2 — LOW
GLUCOSE BLDC GLUCOMTR-MCNC: 141 MG/DL — HIGH (ref 70–99)
GLUCOSE BLDC GLUCOMTR-MCNC: 144 MG/DL — HIGH (ref 70–99)
GLUCOSE BLDC GLUCOMTR-MCNC: 148 MG/DL — HIGH (ref 70–99)
GLUCOSE BLDC GLUCOMTR-MCNC: 182 MG/DL — HIGH (ref 70–99)
GLUCOSE SERPL-MCNC: 153 MG/DL — HIGH (ref 70–99)
HCT VFR BLD CALC: 28.6 % — LOW (ref 39–50)
HGB BLD-MCNC: 9 G/DL — LOW (ref 13–17)
MCHC RBC-ENTMCNC: 26 PG — LOW (ref 27–34)
MCHC RBC-ENTMCNC: 31.5 GM/DL — LOW (ref 32–36)
MCV RBC AUTO: 82.7 FL — SIGNIFICANT CHANGE UP (ref 80–100)
NRBC # BLD: 0 /100 WBCS — SIGNIFICANT CHANGE UP (ref 0–0)
PLATELET # BLD AUTO: 275 K/UL — SIGNIFICANT CHANGE UP (ref 150–400)
POTASSIUM SERPL-MCNC: 4.6 MMOL/L — SIGNIFICANT CHANGE UP (ref 3.5–5.3)
POTASSIUM SERPL-SCNC: 4.6 MMOL/L — SIGNIFICANT CHANGE UP (ref 3.5–5.3)
RBC # BLD: 3.46 M/UL — LOW (ref 4.2–5.8)
RBC # FLD: 20.5 % — HIGH (ref 10.3–14.5)
SODIUM SERPL-SCNC: 137 MMOL/L — SIGNIFICANT CHANGE UP (ref 135–145)
WBC # BLD: 10.62 K/UL — HIGH (ref 3.8–10.5)
WBC # FLD AUTO: 10.62 K/UL — HIGH (ref 3.8–10.5)

## 2023-04-27 PROCEDURE — 99497 ADVNCD CARE PLAN 30 MIN: CPT | Mod: 25

## 2023-04-27 PROCEDURE — 99233 SBSQ HOSP IP/OBS HIGH 50: CPT

## 2023-04-27 RX ADMIN — SODIUM CHLORIDE 60 MILLILITER(S): 9 INJECTION, SOLUTION INTRAVENOUS at 12:02

## 2023-04-27 RX ADMIN — HEPARIN SODIUM 5000 UNIT(S): 5000 INJECTION INTRAVENOUS; SUBCUTANEOUS at 16:50

## 2023-04-27 RX ADMIN — HEPARIN SODIUM 5000 UNIT(S): 5000 INJECTION INTRAVENOUS; SUBCUTANEOUS at 05:00

## 2023-04-27 RX ADMIN — Medication 52.5 MILLIGRAM(S): at 05:00

## 2023-04-27 RX ADMIN — Medication 1: at 16:50

## 2023-04-27 RX ADMIN — SODIUM CHLORIDE 60 MILLILITER(S): 9 INJECTION, SOLUTION INTRAVENOUS at 05:00

## 2023-04-27 RX ADMIN — PANTOPRAZOLE SODIUM 40 MILLIGRAM(S): 20 TABLET, DELAYED RELEASE ORAL at 12:01

## 2023-04-27 RX ADMIN — Medication 52.5 MILLIGRAM(S): at 16:51

## 2023-04-27 NOTE — DIETITIAN INITIAL EVALUATION ADULT - ORAL INTAKE PTA/DIET HISTORY
Per daughter, patient consumes "blenderized foods" daily with assistant feeding  Avoids porks   Per daughter, patient consumes "blenderized foods" daily with assistant feeding  Avoids pork

## 2023-04-27 NOTE — DIETITIAN INITIAL EVALUATION ADULT - ADD RECOMMEND
Recommend alternate nutrition support if pt with persistent poor po intake. Family deciding on PEG placement & awaiting decision?

## 2023-04-27 NOTE — PROGRESS NOTE ADULT - SUBJECTIVE AND OBJECTIVE BOX
Patient is a 68y old  Male who presents with a chief complaint of Encephalopathy     (27 Apr 2023 09:50)      INTERVAL HPI/OVERNIGHT EVENTS: no acute events overnight         REVIEW OF SYSTEMS: unable to assess due to pt mental status  T(C): 36.4 (04-27-23 @ 05:10), Max: 37.3 (04-26-23 @ 18:22)  HR: 67 (04-27-23 @ 05:10) (66 - 73)  BP: 157/70 (04-27-23 @ 05:10) (94/56 - 157/70)  RR: 17 (04-27-23 @ 05:10) (17 - 18)  SpO2: 100% (04-27-23 @ 05:10) (98% - 100%)  Wt(kg): --Vital Signs Last 24 Hrs  T(C): 36.4 (27 Apr 2023 05:10), Max: 37.3 (26 Apr 2023 18:22)  T(F): 97.6 (27 Apr 2023 05:10), Max: 99.1 (26 Apr 2023 18:22)  HR: 67 (27 Apr 2023 05:10) (66 - 73)  BP: 157/70 (27 Apr 2023 05:10) (94/56 - 157/70)  BP(mean): --  RR: 17 (27 Apr 2023 05:10) (17 - 18)  SpO2: 100% (27 Apr 2023 05:10) (98% - 100%)    Parameters below as of 27 Apr 2023 05:10  Patient On (Oxygen Delivery Method): room air    MEDICATIONS  (STANDING):  dextrose 5% + sodium chloride 0.9%. 1000 milliLiter(s) (60 mL/Hr) IV Continuous <Continuous>  dextrose 5%. 1000 milliLiter(s) (100 mL/Hr) IV Continuous <Continuous>  dextrose 50% Injectable 25 Gram(s) IV Push once  dextrose 50% Injectable 12.5 Gram(s) IV Push once  dextrose 50% Injectable 25 Gram(s) IV Push once  glucagon  Injectable 1 milliGRAM(s) IntraMuscular once  heparin   Injectable 5000 Unit(s) SubCutaneous every 12 hours  insulin lispro (ADMELOG) corrective regimen sliding scale   SubCutaneous three times a day before meals  insulin lispro (ADMELOG) corrective regimen sliding scale   SubCutaneous at bedtime  pantoprazole  Injectable 40 milliGRAM(s) IV Push daily  valproate sodium  IVPB 250 milliGRAM(s) IV Intermittent every 12 hours    MEDICATIONS  (PRN):  dextrose Oral Gel 15 Gram(s) Oral once PRN Blood Glucose LESS THAN 70 milliGRAM(s)/deciliter      PHYSICAL EXAM:  GENERAL: NAD  EYES: clear conjunctiva; EOMI  ENMT: Moist mucous membranes  NECK: Supple, No JVD, Normal thyroid  CHEST/LUNG: Diminished  breath sounds at bases  HEART: S1, S2, Regular rate and rhythm  ABDOMEN: Soft, Nontender, Nondistended; Bowel sounds present  NEURO: lethargic, accusable    EXTREMITIES: No LE edema, no calf tenderness  LYMPH: No lymphadenopathy noted  SKIN: No rashes or lesions    Consultant(s) Notes Reviewed:  [x ] YES  [ ] NO  Care Discussed with Consultants/Other Providers [ x] YES  [ ] NO    LABS:                        9.0    10.62 )-----------( 275      ( 27 Apr 2023 07:35 )             28.6     04-27    137  |  104  |  21<H>  ----------------------------<  153<H>  4.6   |  30  |  1.37<H>    Ca    8.8      27 Apr 2023 07:35        CAPILLARY BLOOD GLUCOSE      POCT Blood Glucose.: 144 mg/dL (27 Apr 2023 11:35)  POCT Blood Glucose.: 141 mg/dL (27 Apr 2023 07:40)  POCT Blood Glucose.: 165 mg/dL (26 Apr 2023 21:00)  POCT Blood Glucose.: 147 mg/dL (26 Apr 2023 16:33)  POCT Blood Glucose.: 178 mg/dL (26 Apr 2023 12:30)    RADIOLOGY & ADDITIONAL TESTS:    Imaging Personally Reviewed:  [ x] YES  [ ] NO  < from: CT Abdomen and Pelvis No Cont (04.24.23 @ 08:33) >  ACC: [id number] EXAM:  CT ABDOMEN AND PELVIS   ORDERED BY: ALIX LEVINE     PROCEDURE DATE:  04/24/2023          INTERPRETATION:  CLINICAL INFORMATION: 68 years  Male with r/o colitis.    COMPARISON: None.    CONTRAST/COMPLICATIONS:  IV Contrast: NONE  Oral Contrast: NONE  Complications: None reported at time of study completion    PROCEDURE:  CT of the Abdomen and Pelvis was performed.  Sagittal and coronal reformats were performed.    LIMITATIONS: Evaluation of the solid organs, vascular structures and GI   tract is limited without oral and IV contrast.    FINDINGS:  LOWER CHEST: Bibasilar dependent atelectasis and trace bilateral pleural   effusions..    LIVER: Within normal limits.  BILE DUCTS: Normal caliber.  GALLBLADDER: Within normal limits.  SPLEEN: Within normal limits.  PANCREAS: Within normal limits.  ADRENALS: 5 mm right adrenal nodule.  KIDNEYS/URETERS: No hydroureteronephrosis or calculus. Mild bilateral   nonspecific perinephric fat stranding.    BLADDER: Within normal limits.  REPRODUCTIVE ORGANS: Prostate within normal limits.    BOWEL: No bowel obstruction. Appendix is normal. Moderate colonic stool   burden.  PERITONEUM: No ascites.  VESSELS: Atherosclerotic changes.  RETROPERITONEUM/LYMPH NODES: No lymphadenopathy.  ABDOMINAL WALL: Subcutaneous edema overlying the right hip  BONES: Symmetrical bilateral subcentimeter iliac sclerotic foci, possibly   bone islands.    IMPRESSION:    Subcutaneous edema overlying the right hip may reflect anasarca or   ecchymosis.    Mild constipation.        --- End of Report ---            DIONNE RIVERA MD; Attending Radiologist  This document has been electronically signed. Apr 24 2023  8:56AM    < end of copied text >  < from: CT Head No Cont (04.24.23 @ 00:50) >    ACC: [id number] EXAM:  CT BRAIN   ORDERED BY: RAFFI BROWN     PROCEDURE DATE:  04/24/2023          INTERPRETATION:  CLINICAL HISTORY:  Altered mental status.    TECHNIQUE:  CT of the head without contrast.  Contiguous transaxial images of the head were acquired from the skull   base to the vertex without the administration of iodinated contrast.   Coronal and sagittal reformatted images are provided.    COMPARISON: None available.    FINDINGS:    There is no acute intracranial hemorrhage, vasogenic edema or evidence   for acute large vascular territory infarct. Chronic lacunar infarcts in   the left lentiform nucleus and left cerebellum. Patchy and confluent   areas of low-attenuation are seen additionally, nonspecific, but likely   the sequela of moderate severity chronic microvascular change.    The ventricles, sulci and cisternal spaces are diffusely prominent but in   proportion compatible with cerebral volume loss, somewhat advanced for   age.  There is no midline shift or abnormal extra-axial fluid collection.    The calvarium is intact.  There are no osteoblastic or lytic calvarial or   skull base lesions.  The paranasal sinuses and mastoid air cells are   clear.    IMPRESSION:  No acute intracranial hemorrhage, vasogenic edema, extra-axial collection   or hydrocephalus. Chronic findings as above.    --- End of Report ---            KASANDRA LEVINE MD; Attending Radiologist  This document has been electronically signed. Apr 24 2023  2:49AM    < end of copied text >

## 2023-04-27 NOTE — PROGRESS NOTE ADULT - PROBLEM SELECTOR PLAN 9
patient from home   bedbound x6 months   pending blood and urine cultures   Cdiff testing   remains on IV ABx   plan of care discussed with son and son in law   all questions answered support provided
patient from home   bedbound x6 months   Palliative following for [facility] home with hospice.
patient from home   bedbound x6 months   pending final blood and urine cultures   remains on IV ABx   plan of care discussed with son and son in law   all questions answered support provided

## 2023-04-27 NOTE — PROGRESS NOTE ADULT - CONVERSATION DETAILS
Spoke with patient's daughter, Ihsan, by phone. Asked questions regarding GOC and family's preference for discharge disposition. Palliative Care/Hospice education and counseling provided. DTR stated that her family understands patient current diagnostic, prognosis, and treatment options. They are now accepting Home Hospice services but would like to keep Code Status as FULL Code as they very much want patient to live as long as possible.    Case discussed with Palliative Care Team. LMSW will assist family with Home Hospice referral choices and Primary Team Attending, Ron Pastrana MD
Spoke with patient's daughter and her  (as above) by phone. Reviewed understanding of patient's current Dx and disease trajectory, and GOC regarding request for feeding tube and discharge disposition. DTR stated that she is concerned that her father would be feeling hungry because he is not eating his home food. Family is again considering a NGT. Patient was at [facility] in recent past and had severe anorexia and was refusing to eat. They placed a NGT.    Explained the difference between artificial life prolonging measures (including artificial nutrition) to extend life vs. comfort feeding. Informed the family that patient was eating a little better although his protein caloric nutrition is still very compromise. Explained risk vs. benefits of artificial nutrition. Family is again favoring comfort feeding.    Also reviewed today's refusal of Home Hospice services. Palliative Care LMSW addressed hospice care referral and answered questions but family again opted out. Family seems to confused and/or unsure about what services would be best for the patient. I reviewed again hospice election of benefits and philosophy and encouraged the family to Google the hospice benefits and other people experiences. Family agreed to carefully review the information and to make a final decision by tomorrow.      Case discussed with Primary Team Attending, Ron Pastrana MD
Multiple discussions with family regarding plan of care and GOC. Spoke with Mary Dan (VALENTIN) and Robert Marv (Son) and daughter. Discussed current management, Currently patient was on empiric antibiotics and IVF with potential sepsis from diarrheal source. However CT A/P unremarkable, CXR showing atelectasis, cultures are NGTD and patient with no diarrhea or BM throughout admission. WBC, NEVILLE improving and afebrile. Although patient's mental status is improved upon admission, Continues to be A/Ox0~1. Able to answer simple questions now but does not know where he is or date. Not following commands. Plan is to hold antibiotics as no infectious source evident and no infectious at this time and will monitor off abx. CTH negative, EEG with no epileptiform activity, will monitor mental status. Family agrees with current management plan.     Discussed what goals of family are. They report that prior to admission at outside hospital patient was able to ambulate with assitance, feed himself and answer questions appropriately but with some signs of dementia. Patient had rapid detioration of functional status after being discharged to rehab facility for 2 months per family and since being discharged from facility has been bedbound and minimally responsive. Family would like to see some improvement of his mental status but unsure how much it will improve. Discussed of poor prognosis at this time. Family in the meantime are requesting full medical care and FULL CODE. Although VALENTIN was requesting hospice, he was also requesting full medical care, so does not appear to understand hospice care despite continued education and would have to continue to discuss. Family states that if his nutritional/mental status does not improve significantly would consider PEG tube placement for nutrition. Family plan to continue to discuss GOC. I will continue to discuss GOC with family.

## 2023-04-27 NOTE — DIETITIAN INITIAL EVALUATION ADULT - PERTINENT LABORATORY DATA
04-27    137  |  104  |  21<H>  ----------------------------<  153<H>  4.6   |  30  |  1.37<H>    Ca    8.8      27 Apr 2023 07:35    POCT Blood Glucose.: 141 mg/dL (04-27-23 @ 07:40)  A1C with Estimated Average Glucose Result: 7.4 % (04-25-23 @ 05:35)

## 2023-04-27 NOTE — DIETITIAN INITIAL EVALUATION ADULT - PERTINENT MEDS FT
MEDICATIONS  (STANDING):  dextrose 5% + sodium chloride 0.9%. 1000 milliLiter(s) (60 mL/Hr) IV Continuous <Continuous>  dextrose 5%. 1000 milliLiter(s) (100 mL/Hr) IV Continuous <Continuous>  dextrose 5%. 1000 milliLiter(s) (50 mL/Hr) IV Continuous <Continuous>  dextrose 50% Injectable 25 Gram(s) IV Push once  dextrose 50% Injectable 12.5 Gram(s) IV Push once  dextrose 50% Injectable 25 Gram(s) IV Push once  glucagon  Injectable 1 milliGRAM(s) IntraMuscular once  heparin   Injectable 5000 Unit(s) SubCutaneous every 12 hours  insulin lispro (ADMELOG) corrective regimen sliding scale   SubCutaneous three times a day before meals  insulin lispro (ADMELOG) corrective regimen sliding scale   SubCutaneous at bedtime  pantoprazole  Injectable 40 milliGRAM(s) IV Push daily  valproate sodium  IVPB 250 milliGRAM(s) IV Intermittent every 12 hours    MEDICATIONS  (PRN):  dextrose Oral Gel 15 Gram(s) Oral once PRN Blood Glucose LESS THAN 70 milliGRAM(s)/deciliter

## 2023-04-27 NOTE — DIETITIAN INITIAL EVALUATION ADULT - OTHER INFO
Patient from  Patient from home lives with family admitted for sepsis secondly to diarrhea? Visited pt. with PCA at bedside attending to pt. needs, pt. responding to RD questions with "eyes closed", saying "yes" to questions, dosing weight 133.9 Lbs on 04/26/23, also was able to conduct nutrition focus physical exam in the presence of PCA & see below fields. Speech/Swallow evaluation completed on 04/25/23 & recommendations noted. Per PCA, pt. usual "eats very well with feeding assists" & needs encouragement, per flow sheets intake >50% per flow sheets.  Contacted pt. son-in-law, Ni & spoke with daughter Lurdes, reports "for last few months pt. eating little", offers "blenderized foods" since with difficulty swallowing in December, 2022 & unable to completing meals, only having only a "few spoonfuls". Per daughter pt. has being "losing weight" since discharged from "Rehab" two months ago, recalls pt. weight was "higher" & noted wt. 135 Lbs once retuned to home. Presently family declined home hospice services & considering PEG placement? awaiting decision, seen Palliative Care team, discussed with MD/team.

## 2023-04-27 NOTE — DIETITIAN INITIAL EVALUATION ADULT - PROBLEM SELECTOR PLAN 1
patient presents with sepsis which is fluid responsive  BP low on admission, WBC 17.9k, HR 99  lactate 1.5  pt has loose watery stools 3-4 times daily, was recently on abx for PNA at [facility]  s/p vancomycin, cefepime in ED    - obtain CT A/P to rule out acute abdominal infection  - IV flagyl, cefepime  - contact isolation, rule out Cdiff  - GI PCR, stool culture, stool o&p

## 2023-04-27 NOTE — PROGRESS NOTE ADULT - SUBJECTIVE AND OBJECTIVE BOX
follow up on:  complex medical decision making related to goals of care    [facility] Geriatric and Palliative Consult Service:  Dr. Janey Guadalupe: cell ([phone])  Dr. Mars Kohli: cell ([phone])  Sugar Bae DNP: cell ([phone])  Tan Millan NP: cell ([phone])   Angela Barber NP: SARTHAK Morris LMSW: cell ([phone])     OVERNIGHT EVENTS:    Present Symptoms:   Pain:   Fatigue:  Nausea:  Lack of Appetite:   SOB:  Depression:  Anxiety:  Review of Systems: [All others negative or Unable to obtain due to poor mentation]    MEDICATIONS  (STANDING):  dextrose 5% + sodium chloride 0.9%. 1000 milliLiter(s) (60 mL/Hr) IV Continuous <Continuous>  dextrose 5%. 1000 milliLiter(s) (100 mL/Hr) IV Continuous <Continuous>  dextrose 50% Injectable 25 Gram(s) IV Push once  dextrose 50% Injectable 25 Gram(s) IV Push once  dextrose 50% Injectable 12.5 Gram(s) IV Push once  glucagon  Injectable 1 milliGRAM(s) IntraMuscular once  heparin   Injectable 5000 Unit(s) SubCutaneous every 12 hours  insulin lispro (ADMELOG) corrective regimen sliding scale   SubCutaneous three times a day before meals  insulin lispro (ADMELOG) corrective regimen sliding scale   SubCutaneous at bedtime  pantoprazole  Injectable 40 milliGRAM(s) IV Push daily  valproate sodium  IVPB 250 milliGRAM(s) IV Intermittent every 12 hours    MEDICATIONS  (PRN):  dextrose Oral Gel 15 Gram(s) Oral once PRN Blood Glucose LESS THAN 70 milliGRAM(s)/deciliter      PHYSICAL EXAM:  Vital Signs Last 24 Hrs  T(C): 36.4 (27 Apr 2023 05:10), Max: 37.3 (26 Apr 2023 18:22)  T(F): 97.6 (27 Apr 2023 05:10), Max: 99.1 (26 Apr 2023 18:22)  HR: 67 (27 Apr 2023 05:10) (66 - 73)  BP: 157/70 (27 Apr 2023 05:10) (94/56 - 157/70)  BP(mean): --  RR: 17 (27 Apr 2023 05:10) (17 - 18)  SpO2: 100% (27 Apr 2023 05:10) (98% - 100%)    Parameters below as of 27 Apr 2023 05:10  Patient On (Oxygen Delivery Method): room air        General: alert  oriented x ____    lethargic distressed cachexia  verbal nonverbal  unarousable     Palliative Performance Scale/Karnofsky Score:  ECOG Performance:    HEENT: no abnormal lesion, dry mouth  ET tube/trach oral lesions:  Lungs: tachypnea/labored breathing, audible excessive secretions  CV: RRR, S1S2, tachycardia  GI: soft non distended non tender  incontinent               PEG/NG/OG tube  constipation  last BM:   : incontinent  oliguria/anuria  bangura  Musculoskeletal: weakness x4 edema x4    ambulatory with assistance   mostly/fully bedbound/wheelchair bound  Skin: no abnormal skin lesions, poor skin turgor, pressure ulcers stage:   Neuro: no deficits, mild cognitive impairment dsyphagia/dysarthria paresis  Oral intake ability: unable/only mouth care, minimal moderate full capability    LABS:                          9.0    10.62 )-----------( 275      ( 27 Apr 2023 07:35 )             28.6     04-27    137  |  104  |  21<H>  ----------------------------<  153<H>  4.6   |  30  |  1.37<H>    Ca    8.8      27 Apr 2023 07:35          RADIOLOGY & ADDITIONAL STUDIES: follow up on:  complex medical decision making related to goals of care    [facility] Geriatric and Palliative Consult Service:  Dr. Janey Guadalupe: cell ([phone])  Dr. Mars Kohli: cell ([phone])  Sugar Bae DNP: cell ([phone])  Tan Millan NP: cell ([phone])   Angela Barber NP: TEAMS  Marvel Morris LMSW: cell ([phone])     HPI  68 year old M from home with PMH of DM, CVA (2017), HLD, CKD, seizure, Dementia presents with lethargy from home. Pt unable to provide history, son was called for further history who reports that the patient just returned from [facility] after being treated for pneumonia with Abx on Thursday, and has been lethargic and unable to swallow. Also reports that patient had a seizure on Saturday. States that pt has been deconditioning significantly over the past few months after staying in [facility] for 2 months earlier this year. Mentions 3-4 episodes of foul smelling loose watery diarrhea since being hospitalized and was given medication to decrease the diarrhea. Denies any fever, chills, n/v chest pain sob palpitations since returning home from [facility].     INTERIM HX:  Came to see patient at 12:30 but was unable to speak with patient's family. Returned in the afternoon and was able to have a nice discussion with the family. See GOC.    OVERNIGHT EVENTS:    Present Symptoms: Unable to obtain due to poor mentation  Pain:   Fatigue:  Nausea:  Lack of Appetite:   SOB:  Depression:  Anxiety:  Review of Systems: [Unable to obtain due to poor mentation]    MEDICATIONS  (STANDING):  dextrose 5% + sodium chloride 0.9%. 1000 milliLiter(s) (60 mL/Hr) IV Continuous <Continuous>  dextrose 5%. 1000 milliLiter(s) (100 mL/Hr) IV Continuous <Continuous>  dextrose 50% Injectable 25 Gram(s) IV Push once  dextrose 50% Injectable 25 Gram(s) IV Push once  dextrose 50% Injectable 12.5 Gram(s) IV Push once  glucagon  Injectable 1 milliGRAM(s) IntraMuscular once  heparin   Injectable 5000 Unit(s) SubCutaneous every 12 hours  insulin lispro (ADMELOG) corrective regimen sliding scale   SubCutaneous three times a day before meals  insulin lispro (ADMELOG) corrective regimen sliding scale   SubCutaneous at bedtime  pantoprazole  Injectable 40 milliGRAM(s) IV Push daily  valproate sodium  IVPB 250 milliGRAM(s) IV Intermittent every 12 hours    MEDICATIONS  (PRN):  dextrose Oral Gel 15 Gram(s) Oral once PRN Blood Glucose LESS THAN 70 milliGRAM(s)/deciliter      PHYSICAL EXAM:  Vital Signs Last 24 Hrs  T(C): 36.4 (27 Apr 2023 05:10), Max: 37.3 (26 Apr 2023 18:22)  T(F): 97.6 (27 Apr 2023 05:10), Max: 99.1 (26 Apr 2023 18:22)  HR: 67 (27 Apr 2023 05:10) (66 - 73)  BP: 157/70 (27 Apr 2023 05:10) (94/56 - 157/70)  BP(mean): --  RR: 17 (27 Apr 2023 05:10) (17 - 18)  SpO2: 100% (27 Apr 2023 05:10) (98% - 100%)    Parameters below as of 27 Apr 2023 05:10  Patient On (Oxygen Delivery Method): room air (RA)    General: appears chronically ill, in NAD    Palliative Performance Scale/Karnofsky Score: 30%    HEENT: conjunctivae clear, dry mouth   Lungs: nonlabored breathing, comfortable on RA   CV: RRR, S1S2 present   GI: soft, non distended, non tender, incontinent  : incontinent  Musculoskeletal: weakness x4, no edema x4, bedbound   Skin: warm, dry, fair skin turgor   Neuro: A&O x0, confused, nonverbal, severe cognitive impairment  Oral intake ability: eating better, no capability    LABS:                          9.0    10.62 )-----------( 275      ( 27 Apr 2023 07:35 )             28.6     04-27    137  |  104  |  21<H>  ----------------------------<  153<H>  4.6   |  30  |  1.37<H>    Ca    8.8      27 Apr 2023 07:35      RADIOLOGY & ADDITIONAL STUDIES:    No new studies

## 2023-04-27 NOTE — DIETITIAN INITIAL EVALUATION ADULT - FACTORS AFF FOOD INTAKE
weakness/difficulty chewing/difficulty feeding self/difficulty swallowing/difficulty with food procurement/preparation/persistent diarrhea/persistent lack of appetite/Faith/ethnic/cultural/personal food preferences/other (specify) weakness, dysphagia/difficulty chewing/difficulty feeding self/difficulty swallowing/difficulty with food procurement/preparation/persistent diarrhea/persistent lack of appetite/Pent[employer]ostalism/ethnic/cultural/personal food preferences/other (specify)

## 2023-04-28 DIAGNOSIS — G93.41 METABOLIC ENCEPHALOPATHY: ICD-10-CM

## 2023-04-28 LAB
GLUCOSE BLDC GLUCOMTR-MCNC: 133 MG/DL — HIGH (ref 70–99)
GLUCOSE BLDC GLUCOMTR-MCNC: 136 MG/DL — HIGH (ref 70–99)
GLUCOSE BLDC GLUCOMTR-MCNC: 203 MG/DL — HIGH (ref 70–99)
GLUCOSE BLDC GLUCOMTR-MCNC: 223 MG/DL — HIGH (ref 70–99)

## 2023-04-28 PROCEDURE — 99232 SBSQ HOSP IP/OBS MODERATE 35: CPT

## 2023-04-28 RX ORDER — SODIUM CHLORIDE 9 MG/ML
1000 INJECTION INTRAMUSCULAR; INTRAVENOUS; SUBCUTANEOUS ONCE
Refills: 0 | Status: COMPLETED | OUTPATIENT
Start: 2023-04-28 | End: 2023-04-28

## 2023-04-28 RX ADMIN — Medication 52.5 MILLIGRAM(S): at 17:45

## 2023-04-28 RX ADMIN — HEPARIN SODIUM 5000 UNIT(S): 5000 INJECTION INTRAVENOUS; SUBCUTANEOUS at 05:03

## 2023-04-28 RX ADMIN — Medication 52.5 MILLIGRAM(S): at 05:03

## 2023-04-28 RX ADMIN — HEPARIN SODIUM 5000 UNIT(S): 5000 INJECTION INTRAVENOUS; SUBCUTANEOUS at 17:45

## 2023-04-28 RX ADMIN — SODIUM CHLORIDE 1000 MILLILITER(S): 9 INJECTION INTRAMUSCULAR; INTRAVENOUS; SUBCUTANEOUS at 21:48

## 2023-04-28 RX ADMIN — Medication 2: at 12:09

## 2023-04-28 RX ADMIN — PANTOPRAZOLE SODIUM 40 MILLIGRAM(S): 20 TABLET, DELAYED RELEASE ORAL at 12:09

## 2023-04-28 NOTE — CHART NOTE - NSCHARTNOTEFT_GEN_A_CORE
Telephone call made to patient's daughter, Merline Jones ([phone]) to follow up on previous communication re: family preferences for best services for patient after discharge. DTR stated that after they reviewed hospice benefits and philosophy they do not want the services. Palliative care teaching and counseling provided. Daughter would like a Home Visiting PCP program at this time.     Case discussed with Primary Team Attending, Ron Pastrana MD who agreed with the plan. Telephone call made to patient's daughter, Merline Jones ([phone]) to follow up on previous communication re: family preferences for best services for patient after discharge. DTR stated that after they reviewed hospice benefits and philosophy they do not want the services. Palliative care teaching and counseling provided. Daughter would like a Home Visiting PCP program at this time. Explained that I will communicate the family's preferences and requested to patient's Attending. Emotional support provided.    Case discussed with Primary Team Attending, Ron Pastrana MD who agreed with the plan. Telephone call made to patient's daughter, Merline Jones ([phone]) to follow up on previous communication re: family preferences for best services for patient after discharge. DTR stated that after they reviewed hospice benefits and philosophy they do not want the services. Palliative care teaching and counseling provided. Daughter would like a Home Visiting PCP program at this time. Supported family's preferences. Explained that I will communicate the family's preferences and request to patient's Attending. Emotional support provided.    Case discussed with Primary Team Attending, Ron Pastrana MD who agreed with the plan.

## 2023-04-28 NOTE — PROGRESS NOTE ADULT - SUBJECTIVE AND OBJECTIVE BOX
Patient is a 68y old  Male who presents with a chief complaint of Hypotension/Diarrhea (27 Apr 2023 12:30)      INTERVAL HPI/OVERNIGHT EVENTS: no acute events overnight. Pt is more alert awake today, ate 90% of berakast        REVIEW OF SYSTEMS:  CONSTITUTIONAL: No fever, chills  ENMT:  No difficulty hearing, no change in vision  NECK: No pain or stiffness  RESPIRATORY: No cough, SOB  CARDIOVASCULAR: No chest pain, palpitations  GASTROINTESTINAL: No abdominal pain. No nausea, vomiting, or diarrhea  GENITOURINARY: No dysuria  NEUROLOGICAL: No HA  SKIN: No itching, burning, rashes, or lesions   LYMPH NODES: No enlarged glands  ENDOCRINE: No heat or cold intolerance; No hair loss  MUSCULOSKELETAL: No joint pain or swelling; No muscle, back, or extremity pain  PSYCHIATRIC: No depression, anxiety  HEME/LYMPH: No easy bruising, or bleeding gums    T(C): 36.4 (04-28-23 @ 05:10), Max: 36.4 (04-27-23 @ 14:05)  HR: 70 (04-28-23 @ 05:10) (66 - 86)  BP: 115/65 (04-28-23 @ 05:10) (95/53 - 115/65)  RR: 16 (04-28-23 @ 05:10) (16 - 17)  SpO2: 95% (04-28-23 @ 05:10) (95% - 99%)  Wt(kg): --Vital Signs Last 24 Hrs  T(C): 36.4 (28 Apr 2023 05:10), Max: 36.4 (27 Apr 2023 14:05)  T(F): 97.5 (28 Apr 2023 05:10), Max: 97.5 (27 Apr 2023 14:05)  HR: 70 (28 Apr 2023 05:10) (66 - 86)  BP: 115/65 (28 Apr 2023 05:10) (95/53 - 115/65)  BP(mean): --  RR: 16 (28 Apr 2023 05:10) (16 - 17)  SpO2: 95% (28 Apr 2023 05:10) (95% - 99%)    Parameters below as of 28 Apr 2023 05:10  Patient On (Oxygen Delivery Method): room air    MEDICATIONS  (STANDING):  dextrose 5% + sodium chloride 0.9%. 1000 milliLiter(s) (60 mL/Hr) IV Continuous <Continuous>  dextrose 5%. 1000 milliLiter(s) (100 mL/Hr) IV Continuous <Continuous>  dextrose 50% Injectable 25 Gram(s) IV Push once  dextrose 50% Injectable 25 Gram(s) IV Push once  dextrose 50% Injectable 12.5 Gram(s) IV Push once  glucagon  Injectable 1 milliGRAM(s) IntraMuscular once  heparin   Injectable 5000 Unit(s) SubCutaneous every 12 hours  insulin lispro (ADMELOG) corrective regimen sliding scale   SubCutaneous three times a day before meals  insulin lispro (ADMELOG) corrective regimen sliding scale   SubCutaneous at bedtime  pantoprazole  Injectable 40 milliGRAM(s) IV Push daily  valproate sodium  IVPB 250 milliGRAM(s) IV Intermittent every 12 hours    MEDICATIONS  (PRN):  dextrose Oral Gel 15 Gram(s) Oral once PRN Blood Glucose LESS THAN 70 milliGRAM(s)/deciliter      PHYSICAL EXAM:  GENERAL: NAD  EYES: clear conjunctiva; EOMI  ENMT: Moist mucous membranes  NECK: Supple, No JVD, Normal thyroid  CHEST/LUNG: Clear to auscultation bilaterally; No rales, rhonchi, wheezing, or rubs  HEART: S1, S2, Regular rate and rhythm  ABDOMEN: Soft, Nontender, Nondistended; Bowel sounds present  NEURO: Alert & awake  EXTREMITIES: No LE edema, no calf tenderness  LYMPH: No lymphadenopathy noted  SKIN: No rashes or lesions  MEEHAN: clear yellow urine     Consultant(s) Notes Reviewed:  [x ] YES  [ ] NO  Care Discussed with Consultants/Other Providers [ x] YES  [ ] NO    LABS:                        9.0    10.62 )-----------( 275      ( 27 Apr 2023 07:35 )             28.6     04-27    137  |  104  |  21<H>  ----------------------------<  153<H>  4.6   |  30  |  1.37<H>    Ca    8.8      27 Apr 2023 07:35        CAPILLARY BLOOD GLUCOSE      POCT Blood Glucose.: 133 mg/dL (28 Apr 2023 07:46)  POCT Blood Glucose.: 148 mg/dL (27 Apr 2023 21:21)  POCT Blood Glucose.: 182 mg/dL (27 Apr 2023 16:19)    RADIOLOGY & ADDITIONAL TESTS:    Imaging Personally Reviewed:  [x ] YES  [ ] NO  < from: CT Abdomen and Pelvis No Cont (04.24.23 @ 08:33) >    ACC: [id number] EXAM:  CT ABDOMEN AND PELVIS   ORDERED BY: ALIX LEVINE     PROCEDURE DATE:  04/24/2023          INTERPRETATION:  CLINICAL INFORMATION: 68 years  Male with r/o colitis.    COMPARISON: None.    CONTRAST/COMPLICATIONS:  IV Contrast: NONE  Oral Contrast: NONE  Complications: None reported at time of study completion    PROCEDURE:  CT of the Abdomen and Pelvis was performed.  Sagittal and coronal reformats were performed.    LIMITATIONS: Evaluation of the solid organs, vascular structures and GI   tract is limited without oral and IV contrast.    FINDINGS:  LOWER CHEST: Bibasilar dependent atelectasis and trace bilateral pleural   effusions..    LIVER: Within normal limits.  BILE DUCTS: Normal caliber.  GALLBLADDER: Within normal limits.  SPLEEN: Within normal limits.  PANCREAS: Within normal limits.  ADRENALS: 5 mm right adrenal nodule.  KIDNEYS/URETERS: No hydroureteronephrosis or calculus. Mild bilateral   nonspecific perinephric fat stranding.    BLADDER: Within normal limits.  REPRODUCTIVE ORGANS: Prostate within normal limits.    BOWEL: No bowel obstruction. Appendix is normal. Moderate colonic stool   burden.  PERITONEUM: No ascites.  VESSELS: Atherosclerotic changes.  RETROPERITONEUM/LYMPH NODES: No lymphadenopathy.  ABDOMINAL WALL: Subcutaneous edema overlying the right hip  BONES: Symmetrical bilateral subcentimeter iliac sclerotic foci, possibly   bone islands.    IMPRESSION:    Subcutaneous edema overlying the right hip may reflect anasarca or   ecchymosis.    Mild constipation.        --- End of Report ---            DIONNE RIVERA MD; Attending Radiologist  This document has been electronically signed. Apr 24 2023  8:56AM    < end of copied text >  < from: CT Head No Cont (04.24.23 @ 00:50) >    ACC: [id number] EXAM:  CT BRAIN   ORDERED BY: RAFFI BROWN     PROCEDURE DATE:  04/24/2023          INTERPRETATION:  CLINICAL HISTORY:  Altered mental status.    TECHNIQUE:  CT of the head without contrast.  Contiguous transaxial images of the head were acquired from the skull   base to the vertex without the administration of iodinated contrast.   Coronal and sagittal reformatted images are provided.    COMPARISON: None available.    FINDINGS:    There is no acute intracranial hemorrhage, vasogenic edema or evidence   for acute large vascular territory infarct. Chronic lacunar infarcts in   the left lentiform nucleus and left cerebellum. Patchy and confluent   areas of low-attenuation are seen additionally, nonspecific, but likely   the sequela of moderate severity chronic microvascular change.    The ventricles, sulci and cisternal spaces are diffusely prominent but in   proportion compatible with cerebral volume loss, somewhat advanced for   age.  There is no midline shift or abnormal extra-axial fluid collection.    The calvarium is intact.  There are no osteoblastic or lytic calvarial or   skull base lesions.  The paranasal sinuses and mastoid air cells are   clear.    IMPRESSION:  No acute intracranial hemorrhage, vasogenic edema, extra-axial collection   or hydrocephalus. Chronic findings as above.    --- End of Report ---            KASANDRA LEVINE MD; Attending Radiologist  This document has been electronically signed. Apr 24 2023  2:49AM    < end of copied text >

## 2023-04-28 NOTE — PHYSICAL THERAPY INITIAL EVALUATION ADULT - ADDITIONAL COMMENTS
patient has been bedbound more recently after returning from recent hospitalization and 2 months in subacute rehabilitation; previously able to participate in mobility, transfers, and ADL activities; family reports owning DME of hospital bed, rolling walker, and wheelchair

## 2023-04-28 NOTE — PHYSICAL THERAPY INITIAL EVALUATION ADULT - GENERAL OBSERVATIONS, REHAB EVAL
awake, alert, NAD; + [place] catheter; + peripheral IV access on left forearm; patient is unable to make eye contact -as per family, patient has vision problems and is only able to see shadows as per recent visit with opthalmologist

## 2023-04-28 NOTE — PHYSICAL THERAPY INITIAL EVALUATION ADULT - IMPAIRMENTS FOUND, PT EVAL
arousal, attention, and cognition/cognitive impairment/decreased midline orientation/gait, locomotion, and balance/gross motor/muscle strength/poor safety awareness/posture/visual motor

## 2023-04-28 NOTE — PHYSICAL THERAPY INITIAL EVALUATION ADULT - TRANSFER SAFETY CONCERNS NOTED: BED/CHAIR, REHAB EVAL
Called pt back and no one answered.    ----- Message from Berta Sanchez sent at 7/22/2022 10:53 AM CDT -----  Contact: Stacey  Type:  Patient Returning Call    Who Called:Stacey  Who Left Message for Patient:Tatyana  Does the patient know what this is regarding?:Scheduling appt  Would the patient rather a call back or a response via [facility]? Call back  Best Call Back Number:[phone]  Additional Information:           decreased balance during turns/decreased safety awareness/losing balance/decreased sequencing ability

## 2023-04-28 NOTE — PROGRESS NOTE ADULT - NS ATTEND AMEND GEN_ALL_CORE FT
68 year old man with medical hx including ischemic CVA, dementia, DM2, seizure d/o, HLD, CKD who was brought in by family on account of acute change in mental status with hypersomnolence, lethargy with reduced responsiveness. OF note, family describes progressive decline in his cognitive and physical status over the last 6 months including periods of hospital admission for infection- most recently for pneumonia in his primary hospital.     # Acute on chronic encephelopathy   # sepsis from acute enterocolitis, but no infectious etiology  # NEVILLE on CKD  # Progressive chronic cognitive decline - hx of dementia (?FTD), CVA   # Hyponatremia  # Severe protein calorie malnutrition  CXR: Grossly unremarkable with upper right  lobe perihilar prominence  - Patient with no diarrhea since admission. Will DC stool studies as no diarrhea at this time  - Mental status improved since admission   - Eating all his meals with assistance   - PT rec home PT  - DCed abx. Monitoring off abx - no infectious symptoms  - Cultures reviewed and NGTD  - Continue IVF  - pureed diet w/ thin liquid per S/S  - Aspiration precautions  - EEG study - no epileptiform activity  - Fall precaution   - Palliative care consulted for further GOC with family  - Nutrition consulted - f/u recs.
68 year old man with medical hx including ischemic CVA, dementia, DM2, seizure d/o, HLD, CKD who was brought in by family on account of acute change in mental status with hypersomnolence, lethargy with reduced responsiveness.   OF note, family describes progressive decline in his cognitive and physical status over the last 6 months including periods of hospital admission for infection- most recently for pneumonia in his primary hospital.     #Acute on chronic encephelopathy   # sepsis from acute enterocolitis, but no infectious etiology  # NEVILLE on CKD  # Progressive chronic cognitive decline - hx of dementia ( ?FTD), CVA   # Hyponatremia  #Severe protein calorie malnutrition  CXR: Grossly unremarkable with upper right  lobe perihilar prominence  - Patient with no BM since admission. Will DC stool studies as no diarrhea at this time  - DCed abx yesterday. Monitoring off abx  - Cultures reviewed and NGTD  - CBC/BMP/Mg reviewed with improvement of WBC, creatinine improving, continue ot monitor  - Continue IVF  - pureed diet w/ thin liquid per S/S  - Aspiration precautions  - EEG study - no epileptiform activity  - Fall precaution   - Palliative care consulted for further GOC with family  - Nutrition consulted - f/u recs
68 year old man with medical hx including ischemic CVA, dementia, DM2, seizure d/o, HLD, CKD who was brought in by family on account of acute change in mental status with hypersomnolence, lethargy with reduced responsiveness.   OF note, family describes progressive decline in his cognitive and physical status over the last 6 months including periods of hospital admission for infection- most recently for pneumonia in his primary hospital.     #Acute on chronic encephelopathy   # sepsis from acute enterocolitis, but no infectious etiology  # NEVILLE on CKD  # Progressive chronic cognitive decline - hx of dementia ( ?FTD), CVA   # Hyponatremia  #Severe protein calorie malnutrition  CXR: Grossly unremarkable with upper right  lobe perihilar prominence  - Patient with no BM since admission. Will DC stool studies as no diarrhea at this time  - Will DC empiric abx as no infectious source apparent, Will monitor of abx  - Cultures reviewed and NGTD  - CBC/BMP/Mg reviewed with improvement of WBC, creatinine improving, continue ot monitor  - Continue IVF  - pureed diet w/ thin liquid per S/S  - Aspiration precautions  - EEG study - no epileptiform activity  - Fall precaution   - Goals of care discussion as per above   - Palliative care consulted  - Nutrition consult

## 2023-04-28 NOTE — PHYSICAL THERAPY INITIAL EVALUATION ADULT - PATIENT/FAMILY/SIGNIFICANT OTHER GOALS STATEMENT, PT EVAL
as per discussion with son Marv and daughter Ihsan [phone], they want their father to be able to participate better in performing bed mobility, standing, and transfers to and from bed or chair, to improve quality of life and ability to participate in basic tasks

## 2023-04-28 NOTE — PHYSICAL THERAPY INITIAL EVALUATION ADULT - DIAGNOSIS, PT EVAL
diagnosed to have hypotension and diarrhea; generalized weakness; impaired vision; difficulty performing bed mobility, standing, and transfer activities

## 2023-04-29 LAB
CULTURE RESULTS: SIGNIFICANT CHANGE UP
CULTURE RESULTS: SIGNIFICANT CHANGE UP
GLUCOSE BLDC GLUCOMTR-MCNC: 109 MG/DL — HIGH (ref 70–99)
GLUCOSE BLDC GLUCOMTR-MCNC: 133 MG/DL — HIGH (ref 70–99)
GLUCOSE BLDC GLUCOMTR-MCNC: 147 MG/DL — HIGH (ref 70–99)
GLUCOSE BLDC GLUCOMTR-MCNC: 218 MG/DL — HIGH (ref 70–99)
SPECIMEN SOURCE: SIGNIFICANT CHANGE UP
SPECIMEN SOURCE: SIGNIFICANT CHANGE UP

## 2023-04-29 PROCEDURE — 99233 SBSQ HOSP IP/OBS HIGH 50: CPT

## 2023-04-29 RX ADMIN — PANTOPRAZOLE SODIUM 40 MILLIGRAM(S): 20 TABLET, DELAYED RELEASE ORAL at 14:02

## 2023-04-29 RX ADMIN — HEPARIN SODIUM 5000 UNIT(S): 5000 INJECTION INTRAVENOUS; SUBCUTANEOUS at 18:47

## 2023-04-29 RX ADMIN — Medication 52.5 MILLIGRAM(S): at 18:47

## 2023-04-29 RX ADMIN — Medication 52.5 MILLIGRAM(S): at 05:16

## 2023-04-29 RX ADMIN — HEPARIN SODIUM 5000 UNIT(S): 5000 INJECTION INTRAVENOUS; SUBCUTANEOUS at 05:16

## 2023-04-29 NOTE — PROGRESS NOTE ADULT - SUBJECTIVE AND OBJECTIVE BOX
[facility] Medicine  Patient is a 68y old  Male who presents with a chief complaint of Hypotension/Diarrhea (28 Apr 2023 11:38)      SUBJECTIVE / OVERNIGHT EVENTS:  No acute events over night. Denies any symptoms or fevers/chills, headache, CP, SOB, abd pain, N/V/D, constipation, or leg swelling.       MEDICATIONS  (STANDING):  dextrose 5% + sodium chloride 0.9%. 1000 milliLiter(s) (60 mL/Hr) IV Continuous <Continuous>  dextrose 5%. 1000 milliLiter(s) (100 mL/Hr) IV Continuous <Continuous>  dextrose 50% Injectable 25 Gram(s) IV Push once  dextrose 50% Injectable 25 Gram(s) IV Push once  dextrose 50% Injectable 12.5 Gram(s) IV Push once  glucagon  Injectable 1 milliGRAM(s) IntraMuscular once  heparin   Injectable 5000 Unit(s) SubCutaneous every 12 hours  insulin lispro (ADMELOG) corrective regimen sliding scale   SubCutaneous three times a day before meals  insulin lispro (ADMELOG) corrective regimen sliding scale   SubCutaneous at bedtime  pantoprazole  Injectable 40 milliGRAM(s) IV Push daily  valproate sodium  IVPB 250 milliGRAM(s) IV Intermittent every 12 hours    MEDICATIONS  (PRN):  dextrose Oral Gel 15 Gram(s) Oral once PRN Blood Glucose LESS THAN 70 milliGRAM(s)/deciliter          OBJECTIVE:  Vital Signs Last 24 Hrs  T(C): 36.5 (29 Apr 2023 05:05), Max: 36.8 (28 Apr 2023 13:35)  T(F): 97.7 (29 Apr 2023 05:05), Max: 98.3 (28 Apr 2023 13:35)  HR: 66 (29 Apr 2023 05:05) (66 - 84)  BP: 117/85 (29 Apr 2023 05:05) (88/40 - 141/71)  BP(mean): 94 (28 Apr 2023 12:34) (83 - 94)  RR: 18 (29 Apr 2023 05:05) (16 - 18)  SpO2: 100% (29 Apr 2023 05:05) (97% - 100%)    Parameters below as of 29 Apr 2023 05:05  Patient On (Oxygen Delivery Method): room air      PHYSICAL EXAM:  GENERAL: NAD  EYES: clear conjunctiva; EOMI  ENMT: Moist mucous membranes  NECK: Supple, No JVD, Normal thyroid  CHEST/LUNG: Clear to auscultation bilaterally; No rales, rhonchi, wheezing, or rubs  HEART: S1, S2, Regular rate and rhythm  ABDOMEN: Soft, Nontender, Nondistended; Bowel sounds present  NEURO: Alert & awake  EXTREMITIES: No LE edema, no calf tenderness  LYMPH: No lymphadenopathy noted  SKIN: No rashes or lesions  MEEHAN: clear yellow urine     CAPILLARY BLOOD GLUCOSE      POCT Blood Glucose.: 133 mg/dL (29 Apr 2023 07:54)  POCT Blood Glucose.: 223 mg/dL (28 Apr 2023 20:59)  POCT Blood Glucose.: 136 mg/dL (28 Apr 2023 16:28)  POCT Blood Glucose.: 203 mg/dL (28 Apr 2023 11:57)    I&O's Summary    28 Apr 2023 07:01  -  29 Apr 2023 07:00  --------------------------------------------------------  IN: 0 mL / OUT: 300 mL / NET: -300 mL              LABS:                        RADIOLOGY & ADDITIONAL TESTS:

## 2023-04-29 NOTE — CHART NOTE - NSCHARTNOTEFT_GEN_A_CORE
EVENT:  68 year old M from home with PMH of DM, CVA (2017), HLD, CKD, Seizure, Dementia presents with lethargy from home. Pt unable to provide history, son was called for further history who reports that the patient just returned from [facility] [place] after being treated for pneumonia with Abx on Thursday, and has been lethargic and unable to swallow. Also reports that patient had seizure on Saturday. States that pt has been deconditioning significantly over the past few months after staying in [facility] for 2 months earlier this year. Mentions 3-4 episodes of foul smelling loose watery diarrhea since being hospitalized and was given medication to decrease the diarrhea.     SUBJECTIVE:  Patient noted with low blood pressure    OBJECTIVE:  Vital Signs Last 24 Hrs  T(C): 36.5 (29 Apr 2023 05:05), Max: 36.8 (28 Apr 2023 13:35)  T(F): 97.7 (29 Apr 2023 05:05), Max: 98.3 (28 Apr 2023 13:35)  HR: 66 (29 Apr 2023 05:05) (66 - 84)  BP: 117/85 (29 Apr 2023 05:05) (88/40 - 141/71)  BP(mean): 94 (28 Apr 2023 12:34) (83 - 94)  RR: 18 (29 Apr 2023 05:05) (16 - 18)  SpO2: 100% (29 Apr 2023 05:05) (97% - 100%)    Parameters below as of 29 Apr 2023 05:05  Patient On (Oxygen Delivery Method): room air    A/P:  Hypotension due to poor po intake of fluid  -1L NS bolus ordered  -BP improved  -Monitor BP

## 2023-04-30 LAB
GLUCOSE BLDC GLUCOMTR-MCNC: 115 MG/DL — HIGH (ref 70–99)
GLUCOSE BLDC GLUCOMTR-MCNC: 125 MG/DL — HIGH (ref 70–99)
GLUCOSE BLDC GLUCOMTR-MCNC: 132 MG/DL — HIGH (ref 70–99)
GLUCOSE BLDC GLUCOMTR-MCNC: 181 MG/DL — HIGH (ref 70–99)

## 2023-04-30 PROCEDURE — 99232 SBSQ HOSP IP/OBS MODERATE 35: CPT

## 2023-04-30 RX ADMIN — HEPARIN SODIUM 5000 UNIT(S): 5000 INJECTION INTRAVENOUS; SUBCUTANEOUS at 17:37

## 2023-04-30 RX ADMIN — Medication 52.5 MILLIGRAM(S): at 17:38

## 2023-04-30 RX ADMIN — Medication 52.5 MILLIGRAM(S): at 06:40

## 2023-04-30 RX ADMIN — HEPARIN SODIUM 5000 UNIT(S): 5000 INJECTION INTRAVENOUS; SUBCUTANEOUS at 06:40

## 2023-04-30 RX ADMIN — PANTOPRAZOLE SODIUM 40 MILLIGRAM(S): 20 TABLET, DELAYED RELEASE ORAL at 13:18

## 2023-04-30 NOTE — PROGRESS NOTE ADULT - SUBJECTIVE AND OBJECTIVE BOX
[facility] Medicine  Patient is a 68y old  Male who presents with a chief complaint of Hypotension/Diarrhea (29 Apr 2023 10:51)      SUBJECTIVE / OVERNIGHT EVENTS:  No acute events over night. Denies any fevers/chills, headache, CP, SOB, abd pain, N/V/D, constipation, or leg swelling.       MEDICATIONS  (STANDING):  dextrose 5% + sodium chloride 0.9%. 1000 milliLiter(s) (60 mL/Hr) IV Continuous <Continuous>  dextrose 5%. 1000 milliLiter(s) (100 mL/Hr) IV Continuous <Continuous>  dextrose 50% Injectable 25 Gram(s) IV Push once  dextrose 50% Injectable 25 Gram(s) IV Push once  dextrose 50% Injectable 12.5 Gram(s) IV Push once  glucagon  Injectable 1 milliGRAM(s) IntraMuscular once  heparin   Injectable 5000 Unit(s) SubCutaneous every 12 hours  insulin lispro (ADMELOG) corrective regimen sliding scale   SubCutaneous three times a day before meals  insulin lispro (ADMELOG) corrective regimen sliding scale   SubCutaneous at bedtime  pantoprazole  Injectable 40 milliGRAM(s) IV Push daily  valproate sodium  IVPB 250 milliGRAM(s) IV Intermittent every 12 hours    MEDICATIONS  (PRN):  dextrose Oral Gel 15 Gram(s) Oral once PRN Blood Glucose LESS THAN 70 milliGRAM(s)/deciliter          OBJECTIVE:  Vital Signs Last 24 Hrs  T(C): 36.6 (30 Apr 2023 13:34), Max: 36.6 (30 Apr 2023 13:34)  T(F): 97.8 (30 Apr 2023 13:34), Max: 97.8 (30 Apr 2023 13:34)  HR: 65 (30 Apr 2023 13:34) (62 - 65)  BP: 112/61 (30 Apr 2023 13:34) (112/61 - 124/61)  BP(mean): --  RR: 17 (30 Apr 2023 13:34) (17 - 18)  SpO2: 98% (30 Apr 2023 13:34) (98% - 100%)    Parameters below as of 30 Apr 2023 13:34  Patient On (Oxygen Delivery Method): room air      PHYSICAL EXAM:  GENERAL: NAD  EYES: clear conjunctiva; EOMI  ENMT: Moist mucous membranes  NECK: Supple, No JVD, Normal thyroid  CHEST/LUNG: Clear to auscultation bilaterally; No rales, rhonchi, wheezing, or rubs  HEART: S1, S2, Regular rate and rhythm  ABDOMEN: Soft, Nontender, Nondistended; Bowel sounds present  NEURO: Alert & awake but A/O x 0~1  EXTREMITIES: No LE edema, no calf tenderness  LYMPH: No lymphadenopathy noted  SKIN: No rashes or lesions  MEEHAN: clear yellow urine     CAPILLARY BLOOD GLUCOSE      POCT Blood Glucose.: 132 mg/dL (30 Apr 2023 11:15)  POCT Blood Glucose.: 125 mg/dL (30 Apr 2023 07:25)  POCT Blood Glucose.: 218 mg/dL (29 Apr 2023 21:12)  POCT Blood Glucose.: 147 mg/dL (29 Apr 2023 16:29)    I&O's Summary            LABS:                        RADIOLOGY & ADDITIONAL TESTS:

## 2023-05-01 ENCOUNTER — TRANSCRIPTION ENCOUNTER (OUTPATIENT)
Age: 68
End: 2023-05-01

## 2023-05-01 VITALS
OXYGEN SATURATION: 99 % | DIASTOLIC BLOOD PRESSURE: 75 MMHG | SYSTOLIC BLOOD PRESSURE: 130 MMHG | HEART RATE: 67 BPM | TEMPERATURE: 98 F | RESPIRATION RATE: 18 BRPM

## 2023-05-01 DIAGNOSIS — G93.41 METABOLIC ENCEPHALOPATHY: ICD-10-CM

## 2023-05-01 LAB
ANION GAP SERPL CALC-SCNC: 5 MMOL/L — SIGNIFICANT CHANGE UP (ref 5–17)
BUN SERPL-MCNC: 20 MG/DL — HIGH (ref 7–18)
CALCIUM SERPL-MCNC: 9.3 MG/DL — SIGNIFICANT CHANGE UP (ref 8.4–10.5)
CHLORIDE SERPL-SCNC: 103 MMOL/L — SIGNIFICANT CHANGE UP (ref 96–108)
CO2 SERPL-SCNC: 29 MMOL/L — SIGNIFICANT CHANGE UP (ref 22–31)
CREAT SERPL-MCNC: 1.32 MG/DL — HIGH (ref 0.5–1.3)
EGFR: 59 ML/MIN/1.73M2 — LOW
GLUCOSE BLDC GLUCOMTR-MCNC: 105 MG/DL — HIGH (ref 70–99)
GLUCOSE BLDC GLUCOMTR-MCNC: 130 MG/DL — HIGH (ref 70–99)
GLUCOSE SERPL-MCNC: 121 MG/DL — HIGH (ref 70–99)
HCT VFR BLD CALC: 31.1 % — LOW (ref 39–50)
HGB BLD-MCNC: 9.6 G/DL — LOW (ref 13–17)
MCHC RBC-ENTMCNC: 25.9 PG — LOW (ref 27–34)
MCHC RBC-ENTMCNC: 30.9 GM/DL — LOW (ref 32–36)
MCV RBC AUTO: 84.1 FL — SIGNIFICANT CHANGE UP (ref 80–100)
NRBC # BLD: 0 /100 WBCS — SIGNIFICANT CHANGE UP (ref 0–0)
PLATELET # BLD AUTO: 405 K/UL — HIGH (ref 150–400)
POTASSIUM SERPL-MCNC: 4.5 MMOL/L — SIGNIFICANT CHANGE UP (ref 3.5–5.3)
POTASSIUM SERPL-SCNC: 4.5 MMOL/L — SIGNIFICANT CHANGE UP (ref 3.5–5.3)
RBC # BLD: 3.7 M/UL — LOW (ref 4.2–5.8)
RBC # FLD: 20.6 % — HIGH (ref 10.3–14.5)
SODIUM SERPL-SCNC: 137 MMOL/L — SIGNIFICANT CHANGE UP (ref 135–145)
WBC # BLD: 7.34 K/UL — SIGNIFICANT CHANGE UP (ref 3.8–10.5)
WBC # FLD AUTO: 7.34 K/UL — SIGNIFICANT CHANGE UP (ref 3.8–10.5)

## 2023-05-01 PROCEDURE — 84295 ASSAY OF SERUM SODIUM: CPT

## 2023-05-01 PROCEDURE — 85025 COMPLETE CBC W/AUTO DIFF WBC: CPT

## 2023-05-01 PROCEDURE — 85610 PROTHROMBIN TIME: CPT

## 2023-05-01 PROCEDURE — 96375 TX/PRO/DX INJ NEW DRUG ADDON: CPT

## 2023-05-01 PROCEDURE — 85027 COMPLETE CBC AUTOMATED: CPT

## 2023-05-01 PROCEDURE — 71045 X-RAY EXAM CHEST 1 VIEW: CPT

## 2023-05-01 PROCEDURE — 96374 THER/PROPH/DIAG INJ IV PUSH: CPT

## 2023-05-01 PROCEDURE — 0225U NFCT DS DNA&RNA 21 SARSCOV2: CPT

## 2023-05-01 PROCEDURE — 83036 HEMOGLOBIN GLYCOSYLATED A1C: CPT

## 2023-05-01 PROCEDURE — 93005 ELECTROCARDIOGRAM TRACING: CPT

## 2023-05-01 PROCEDURE — 84484 ASSAY OF TROPONIN QUANT: CPT

## 2023-05-01 PROCEDURE — 95957 EEG DIGITAL ANALYSIS: CPT

## 2023-05-01 PROCEDURE — 80048 BASIC METABOLIC PNL TOTAL CA: CPT

## 2023-05-01 PROCEDURE — 82803 BLOOD GASES ANY COMBINATION: CPT

## 2023-05-01 PROCEDURE — 95816 EEG AWAKE AND DROWSY: CPT

## 2023-05-01 PROCEDURE — 87086 URINE CULTURE/COLONY COUNT: CPT

## 2023-05-01 PROCEDURE — 96372 THER/PROPH/DIAG INJ SC/IM: CPT

## 2023-05-01 PROCEDURE — 70450 CT HEAD/BRAIN W/O DYE: CPT | Mod: MA

## 2023-05-01 PROCEDURE — 82330 ASSAY OF CALCIUM: CPT

## 2023-05-01 PROCEDURE — 81001 URINALYSIS AUTO W/SCOPE: CPT

## 2023-05-01 PROCEDURE — 82962 GLUCOSE BLOOD TEST: CPT

## 2023-05-01 PROCEDURE — 87040 BLOOD CULTURE FOR BACTERIA: CPT

## 2023-05-01 PROCEDURE — 86803 HEPATITIS C AB TEST: CPT

## 2023-05-01 PROCEDURE — 84132 ASSAY OF SERUM POTASSIUM: CPT

## 2023-05-01 PROCEDURE — 83605 ASSAY OF LACTIC ACID: CPT

## 2023-05-01 PROCEDURE — 36415 COLL VENOUS BLD VENIPUNCTURE: CPT

## 2023-05-01 PROCEDURE — 92610 EVALUATE SWALLOWING FUNCTION: CPT

## 2023-05-01 PROCEDURE — 99239 HOSP IP/OBS DSCHRG MGMT >30: CPT

## 2023-05-01 PROCEDURE — 85730 THROMBOPLASTIN TIME PARTIAL: CPT

## 2023-05-01 PROCEDURE — 74176 CT ABD & PELVIS W/O CONTRAST: CPT

## 2023-05-01 PROCEDURE — 99285 EMERGENCY DEPT VISIT HI MDM: CPT | Mod: 25

## 2023-05-01 PROCEDURE — 80053 COMPREHEN METABOLIC PANEL: CPT

## 2023-05-01 RX ORDER — PANTOPRAZOLE SODIUM 20 MG/1
40 TABLET, DELAYED RELEASE ORAL
Refills: 0 | Status: DISCONTINUED | OUTPATIENT
Start: 2023-05-01 | End: 2023-05-01

## 2023-05-01 RX ORDER — DAPAGLIFLOZIN 10 MG/1
1 TABLET, FILM COATED ORAL
Refills: 0 | DISCHARGE

## 2023-05-01 RX ORDER — SITAGLIPTIN 50 MG/1
1 TABLET, FILM COATED ORAL
Refills: 0 | DISCHARGE

## 2023-05-01 RX ORDER — VALPROIC ACID (AS SODIUM SALT) 250 MG/5ML
250 SOLUTION, ORAL ORAL
Refills: 0 | Status: DISCONTINUED | OUTPATIENT
Start: 2023-05-01 | End: 2023-05-01

## 2023-05-01 RX ORDER — LOSARTAN POTASSIUM 100 MG/1
1 TABLET, FILM COATED ORAL
Refills: 0 | DISCHARGE

## 2023-05-01 RX ADMIN — HEPARIN SODIUM 5000 UNIT(S): 5000 INJECTION INTRAVENOUS; SUBCUTANEOUS at 05:40

## 2023-05-01 RX ADMIN — Medication 52.5 MILLIGRAM(S): at 05:39

## 2023-05-01 NOTE — PROGRESS NOTE ADULT - PROBLEM SELECTOR PROBLEM 3
Severe protein-calorie malnutrition
NEVILLE (acute kidney injury)
Sepsis, unspecified organism
Acute encephalopathy
Constipation
Acute encephalopathy
Acute encephalopathy

## 2023-05-01 NOTE — PROGRESS NOTE ADULT - PROBLEM SELECTOR PLAN 2
likely prerenal in the setting of acute infection and poor po intake   now improved   - trend Cr  - avoid nephrotoxic agents
plan as above
plan as above
Severe. Unable to swallow since 4/20/2023    Recommendations:  Early Swallowing evaluation => Family favoring pleasure feeds as tolerated if passes the test  Re: Artificial Nutrition - Family requested to determine use or limitation if needs arises.  Nutrition consult, Nutritional supplements as tolerated  Aspiration precautions, elevate the head of the bed 60-90 degrees when feeding patient, careful hand-feeding, teaching to caregivers.
likely 2/2 sepsis,  vs worsening dementia   CTH no acute pathology with chronic changes   s/p speech and swallow eval started on puree and thin liquids   resolved
plan as above
Improved. Eating better. Previously unable to swallow since 4/20/2023    New nutritional discussion with the family today (See GOC conversation)  Re: Artificial Nutrition - Family requested to determine use or limitation if needs arises.  Nutrition consult appreciated.   c/w nutritional supplements as tolerated  Aspiration precautions, elevate the head of the bed 60-90 degrees when feeding patient, careful hand-feeding, teaching to caregivers

## 2023-05-01 NOTE — PROGRESS NOTE ADULT - PROBLEM SELECTOR PLAN 7
a1c 7.4   continue hISS  monitor BS ACHS
dvt ppx with heparin SQ  gi: protonix
a1c 7.4   continue hISS  monitor BS ACHS
dvt ppx with heparin SQ  gi: protonix
a1c 7.4   no longer on insulin  - fs q6 while NPO  - ISS

## 2023-05-01 NOTE — PROGRESS NOTE ADULT - PROBLEM SELECTOR PLAN 5
69 y/o with multiple comorbidities and deteriorating conditions. Patient is at high risk of mortality, morbidity, infections, and hospitalizations.     Karnofsky= 30%   FAST Score: 7 C  Eligible for hospice     Code Status: FULL Code    > [place] with Hospice services discussion needed => Palliative Care meeting scheduled with the family for 4/28/2023    Palliative Care will follow.
pt has h/o seizures, had seizure on Saturday  unclear if related to sepsis vs medication adherence vs need for increased dose  takes divalproex 250 mg bid  transitioned to PO   - seizure precautions  - EEG with no epileptic foci
worse by deconditioning due to hospitalization and acute on chronic conditions     Baseline: Ambulatory short distances with 2 assist but bedbound most recently    Recommendations:   Early PT evaluation  OOB to chair with close supervision  Fall precautions   Frequent reposition and offloading   Keep patient clean and dry.
pt has history of CVA  safety precautions
pt has h/o seizures, had seizure on Saturday  unclear if related to sepsis vs medication adherence vs need for increased dose  takes divalproex 250 mg bid  - c/w IV valproic acid  - seizure precautions  - EEG with no epileptic foci
pt has history of CVA  safety precautions
pt has history of CVA  f/u speech and swallow   safety precautions

## 2023-05-01 NOTE — PROGRESS NOTE ADULT - PROBLEM SELECTOR PROBLEM 8
Preventive measure
Discharge planning issues
Preventive measure
Discharge planning issues
Preventive measure

## 2023-05-01 NOTE — PROGRESS NOTE ADULT - NUTRITIONAL ASSESSMENT
This patient has been assessed with a concern for Malnutrition and has been determined to have a diagnosis/diagnoses of Severe protein-calorie malnutrition and Underweight (BMI < 19).    This patient is being managed with:   Diet Pureed-  Liquid via Teaspoon Only  Single Sips Only  Supplement Feeding Modality:  Oral  Glucerna Shake Cans or Servings Per Day:  1       Frequency:  Two Times a day  Entered: Apr 27 2023  4:35PM  
Nutrition Interventions:  Treatment: The Following Diet Has Been Recommended	Diet, Pureed:   Liquid via Teaspoon Only  Single Sips Only (04-25-23 @ 17:14) [Active]  Additional Comments/Dietitian Recommendations	BMI 18    Electronic Signatures:  Aimee Andrew (Registered Dietitian)  (Signed 27-Apr-2023 16:32)
Recommendations:  · Date	26-Apr-2023  · Recommended Consistencies	Puree with thin liquids, SPOON FEED OR SMALL STRAW SIPS FOR LIQUIDS. Avoid cups sips, as Pt has less control of bolus.  · The above findings were discussed with	Nursing; Patient; RN Chetna, NP Kyara.    Electronic Signatures:  Lesa Hagan (Speech Pathologist)  (Signed 26-Apr-2023 05:56)
This patient has been assessed with a concern for Malnutrition and has been determined to have a diagnosis/diagnoses of Severe protein-calorie malnutrition and Underweight (BMI < 19).    This patient is being managed with:   Diet Pureed-  Liquid via Teaspoon Only  Single Sips Only  Supplement Feeding Modality:  Oral  Glucerna Shake Cans or Servings Per Day:  1       Frequency:  Two Times a day  Entered: Apr 27 2023  4:35PM

## 2023-05-01 NOTE — PROGRESS NOTE ADULT - PROBLEM SELECTOR PLAN 6
a1c 7.4   continue hISS  monitor BS ACHS
pt has h/o seizures, had seizure on Saturday  unclear if related to sepsis vs medication adherence vs need for increased dose  takes divalproex 250 mg bid  - c/w IV valproic acid  - seizure precautions  - EEG with no epileptic foci
a1c 7.4   continue hISS  monitor BS ACHS
69 y/o with multiple comorbidities and deteriorating conditions. Patient is at high risk of mortality, morbidity, infections, and hospitalizations.     Karnofsky= 30%   FAST Score: 7 C  Eligible for hospice => Family accepted Home Hospice services    Code Status: FULL Code    Recommendations:  Palliative Care/ Hospice Care education and counseling needed  Discharge to Home with Hospice services    Palliative Care will follow.
pt has h/o seizures, had seizure on Saturday  unclear if related to sepsis vs medication adherence vs need for increased dose  takes divalproex 250 mg bid  - c/w IV valproic acid  - seizure precautions  - EEG with no epileptic foci
pt has h/o seizures, had seizure on Saturday  unclear if related to sepsis vs medication adherence vs need for increased dose  takes divalproex 250 mg bid  - c/w IV valproic acid  - seizure precautions  - EEG  - consider Neuro consult

## 2023-05-01 NOTE — CHART NOTE - NSCHARTNOTEFT_GEN_A_CORE
Assessment:   68yMalePatient is a 68y old  Male who presents with a chief complaint of Hypotension/Diarrhea (01 May 2023 10:20)      Factors impacting intake: [ ] none [ ] nausea  [ ] vomiting [ ] diarrhea [ ] constipation  [ ]chewing problems [ ] swallowing issues  [x ] other: visited patient in room. unable to interview patient as confused. consuming meals per RN. will remain On diet , No PEG placement. plan is for home hospice.     Diet Presciption: Diet, Pureed:   Liquid via Teaspoon Only  Single Sips Only  Supplement Feeding Modality:  Oral  Glucerna Shake Cans or Servings Per Day:  1       Frequency:  Two Times a day (04-27-23 @ 16:35)    Intake: improving     Current Weight: none   % Weight Change    Pertinent Medications: MEDICATIONS  (STANDING):  dextrose 5% + sodium chloride 0.9%. 1000 milliLiter(s) (60 mL/Hr) IV Continuous <Continuous>  dextrose 5%. 1000 milliLiter(s) (100 mL/Hr) IV Continuous <Continuous>  dextrose 50% Injectable 25 Gram(s) IV Push once  dextrose 50% Injectable 12.5 Gram(s) IV Push once  dextrose 50% Injectable 25 Gram(s) IV Push once  glucagon  Injectable 1 milliGRAM(s) IntraMuscular once  heparin   Injectable 5000 Unit(s) SubCutaneous every 12 hours  insulin lispro (ADMELOG) corrective regimen sliding scale   SubCutaneous three times a day before meals  insulin lispro (ADMELOG) corrective regimen sliding scale   SubCutaneous at bedtime  pantoprazole    Tablet 40 milliGRAM(s) Oral before breakfast  valproate sodium  IVPB 250 milliGRAM(s) IV Intermittent every 12 hours  valproic acid 250 milliGRAM(s) Oral two times a day    MEDICATIONS  (PRN):  dextrose Oral Gel 15 Gram(s) Oral once PRN Blood Glucose LESS THAN 70 milliGRAM(s)/deciliter    Pertinent Labs: 05-01 Na137 mmol/L Glu 121 mg/dL<H> K+ 4.5 mmol/L Cr  1.32 mg/dL<H> BUN 20 mg/dL<H> 04-25 Alb 1.9 g/dL<L>     CAPILLARY BLOOD GLUCOSE      POCT Blood Glucose.: 105 mg/dL (01 May 2023 11:32)  POCT Blood Glucose.: 130 mg/dL (01 May 2023 07:42)  POCT Blood Glucose.: 181 mg/dL (30 Apr 2023 20:59)  POCT Blood Glucose.: 115 mg/dL (30 Apr 2023 16:34)    Skin:  No pressure injury     Estimated Needs:   [x ] no change since previous assessment  [ ] recalculated:     Previous Nutrition Diagnosis:   [ ] Inadequate Energy Intake [ ]Inadequate Oral Intake [ ] Excessive Energy Intake   [ ] Underweight [ ] Increased Nutrient Needs [ ] Overweight/Obesity   [ ] Altered GI Function [ ] Unintended Weight Loss [ ] Food & Nutrition Related Knowledge Deficit [x ] Malnutrition , severe     Nutrition Diagnosis is [ x] ongoing  [ ] resolved [ ] not applicable     New Nutrition Diagnosis: [ ] not applicable       Interventions:   Recommend  [ ] Change Diet To:  [ ] Nutrition Supplement  [ ] Nutrition Support  [ x] Other: provide diet as ordered. suggest To add multivitamin and minerals if consistent with goals of care     Monitoring and Evaluation:   [ ] PO intake [ x ] Tolerance to diet prescription [ x ] weights [ x ] labs[ x ] follow up per protocol  [ ] other:

## 2023-05-01 NOTE — PROGRESS NOTE ADULT - PROBLEM SELECTOR PLAN 8
dvt ppx with heparin SQ  gi: protonix
dvt ppx with heparin SQ  gi: protonix
patient from home   f/u PT maribel. d/c planning rehab now
patient from home   now optimized for discharge   PT reccs STEVEN DASILVA to follow
dvt ppx with heparin SQ  gi: protonix

## 2023-05-01 NOTE — PROGRESS NOTE ADULT - PROBLEM SELECTOR PROBLEM 5
Palliative care encounter
Physical debility
CVA (cerebrovascular accident)
Seizure
CVA (cerebrovascular accident)
Seizure
CVA (cerebrovascular accident)

## 2023-05-01 NOTE — PROGRESS NOTE ADULT - REASON FOR ADMISSION
Hypotension/Diarrhea

## 2023-05-01 NOTE — PROGRESS NOTE ADULT - PROBLEM SELECTOR PROBLEM 4
NEVILLE (acute kidney injury)
CVA (cerebrovascular accident)
Physical debility
Severe protein-calorie malnutrition
NEVILLE (acute kidney injury)
NEVILLE (acute kidney injury)
CVA (cerebrovascular accident)

## 2023-05-01 NOTE — PROGRESS NOTE ADULT - ASSESSMENT
68 year old man with medical hx including ischemic CVA, dementia, DM2, seizure d/o, HLD, CKD who was brought in by family on account of acute change in mental status with hypersomnolence, lethargy with reduced responsiveness. OF note, family describes progressive decline in his cognitive and physical status over the last 6 months including periods of hospital admission for infection- most recently for pneumonia in his primary hospital.     # Acute on chronic encephelopathy   # sepsis from acute enterocolitis, but no infectious etiology - no diarrhea  # NEVILLE on CKD - improved  # Progressive chronic cognitive decline - hx of dementia (?FTD), CVA   # Hyponatremia  # Severe protein calorie malnutrition  CXR: Grossly unremarkable with upper right  lobe perihilar prominence  - Mental status improved since admission   - Eating all his meals with assistance   - PT rec home PT - pending dispo  - DCed abx. Monitoring off abx - no infectious symptoms  - Cultures reviewed and NGTD  - 1L NS IVF bolus given for hypotension this AM  - pureed diet w/ thin liquid per S/S  - Aspiration precautions  - EEG study - no epileptiform activity  - Fall precaution   - Palliative care consulted for further GOC with family - FULL CODE at this time  - Nutrition consulted - started on diet per recs
68 year old man with medical hx including ischemic CVA, dementia, DM2, seizure d/o, HLD, CKD who was brought in by family on account of acute change in mental status with hypersomnolence, lethargy with reduced responsiveness. OF note, family describes progressive decline in his cognitive and physical status over the last 6 months including periods of hospital admission for infection- most recently for pneumonia in his primary hospital.     # Acute on chronic encephelopathy   # sepsis from acute enterocolitis, but no infectious etiology - no diarrhea  # NEVILLE on CKD - improved  # Progressive chronic cognitive decline - hx of dementia (?FTD), CVA   # Hyponatremia  # Severe protein calorie malnutrition  CXR: Grossly unremarkable with upper right  lobe perihilar prominence  - Mental status improved since admission   - Eating all his meals with assistance   - PT rec home PT - pending dispo  - DCed abx. Monitoring off abx - no infectious symptoms  - Cultures reviewed and NGTD  - pureed diet w/ thin liquid per S/S  - Aspiration precautions  - EEG study - no epileptiform activity  - Fall precaution   - Palliative care consulted for further GOC with family - FULL CODE at this time  - Nutrition consulted - started on diet per recs
68 year old M from home with PMH of DM, CVA (2017), HLD, CKD, seizure, Dementia presents with lethargy from home. Of note recently discharge from [facility] after being treated for pneumonia with Abx on Thursday, and has been lethargic and unable to swallow since. Also reports that patient had a seizure on Saturday and deconditioning significantly over the past few months after staying in [facility] for 2 months earlier this year. + history of 3-4 episodes of foul smelling loose watery diarrhea. Found septic on admission placed on isolation to R/O [facility], started on IV Flagyl and cefepime. Pending blood, urine and stool cultures. EEG negative for epileptic foci.
68 year old M from home with PMH of DM, CVA (2017), HLD, CKD, seizure, Dementia presents with lethargy from home. Of note recently discharge from [facility] after being treated for pneumonia with Abx on Thursday, and has been lethargic and unable to swallow since. Also reports that patient had a seizure on Saturday and deconditioning significantly over the past few months after staying in [facility] for 2 months earlier this year. + history of 3-4 episodes of foul smelling loose watery diarrhea. Found septic on admission placed on isolation to R/O [facility], started on IV Flagyl and cefepime. Pending blood, urine and stool cultures. EEG negative for epileptic foci.  Palliative following for likely home with hospice care Vx rehab
68 year old M from home with PMH of DM, CVA (2017), HLD, CKD, seizure, Dementia presents with lethargy from home. Of note recently discharge from [facility] after being treated for pneumonia with Abx on Thursday, and has been lethargic and unable to swallow since. Also reports that patient had a seizure on Saturday and deconditioning significantly over the past few months after staying in [facility] for 2 months earlier this year. + history of 3-4 episodes of foul smelling loose watery diarrhea. Found septic on admission placed on isolation to R/O [facility], started on IV Flagyl and cefepime. Pending blood, urine and stool cultures. EEG negative for epileptic foci.  Palliative following for likely home with hospice care Vx rehab
68 year old M from home with PMH of DM, CVA (2017), HLD, CKD, seizure, Dementia presents with lethargy from home. Of note recently discharge from [facility] after being treated for pneumonia with Abx on Thursday, and has been lethargic and unable to swallow since. Also reports that patient had a seizure on Saturday and deconditioning significantly over the past few months after staying in [facility] for 2 months earlier this year. + history of 3-4 episodes of foul smelling loose watery diarrhea. Found septic on admission placed on isolation to R/O [facility], started on IV Flagyl and cefepime. Pending blood, urine and stool cultures. EEG negative for epileptic foci.  Palliative following for likely home with hospice care. 
68 year old M from home with PMH of DM, CVA (2017), HLD, CKD, seizure, Dementia presents with lethargy from home. Of note recently discharge from [facility] after being treated for pneumonia with Abx on Thursday, and has been lethargic and unable to swallow since. Also reports that patient had a seizure on Saturday and deconditioning significantly over the past few months after staying in [facility] for 2 months earlier this year. + history of 3-4 episodes of foul smelling loose watery diarrhea. Found septic on admission placed on isolation to R/O [facility], started on IV Flagyl and cefepime. Pending blood, urine and stool cultures.

## 2023-05-01 NOTE — PROGRESS NOTE ADULT - PROBLEM SELECTOR PROBLEM 2
Diarrhea
Dysphagia due to old cerebrovascular accident
NEVILLE (acute kidney injury)
Metabolic encephalopathy
Diarrhea
Diarrhea
Dysphagia due to old cerebrovascular accident

## 2023-05-01 NOTE — PROGRESS NOTE ADULT - SUBJECTIVE AND OBJECTIVE BOX
NP Note discussed with  primary attending    Patient is a 68y old  Male who presents with a chief complaint of Hypotension/Diarrhea (30 Apr 2023 14:28)      INTERVAL HPI/OVERNIGHT EVENTS: no acute medical events overnight     MEDICATIONS  (STANDING):  dextrose 5% + sodium chloride 0.9%. 1000 milliLiter(s) (60 mL/Hr) IV Continuous <Continuous>  dextrose 5%. 1000 milliLiter(s) (100 mL/Hr) IV Continuous <Continuous>  dextrose 50% Injectable 25 Gram(s) IV Push once  dextrose 50% Injectable 12.5 Gram(s) IV Push once  dextrose 50% Injectable 25 Gram(s) IV Push once  glucagon  Injectable 1 milliGRAM(s) IntraMuscular once  heparin   Injectable 5000 Unit(s) SubCutaneous every 12 hours  insulin lispro (ADMELOG) corrective regimen sliding scale   SubCutaneous three times a day before meals  insulin lispro (ADMELOG) corrective regimen sliding scale   SubCutaneous at bedtime  pantoprazole  Injectable 40 milliGRAM(s) IV Push daily  valproate sodium  IVPB 250 milliGRAM(s) IV Intermittent every 12 hours    MEDICATIONS  (PRN):  dextrose Oral Gel 15 Gram(s) Oral once PRN Blood Glucose LESS THAN 70 milliGRAM(s)/deciliter      __________________________________________________  REVIEW OF SYSTEMS:    limited due to mental status         Vital Signs Last 24 Hrs  T(C): 36.3 (01 May 2023 05:05), Max: 36.6 (30 Apr 2023 13:34)  T(F): 97.4 (01 May 2023 05:05), Max: 97.8 (30 Apr 2023 13:34)  HR: 65 (01 May 2023 05:05) (58 - 65)  BP: 138/70 (01 May 2023 05:05) (112/61 - 138/70)  BP(mean): --  RR: 16 (01 May 2023 05:05) (16 - 17)  SpO2: 97% (01 May 2023 05:05) (97% - 98%)    Parameters below as of 01 May 2023 05:05  Patient On (Oxygen Delivery Method): room air        ________________________________________________  PHYSICAL EXAM:  GENERAL: frail chronically ill appearing   HEENT: B/L temporal waisting   NECK : supple  CHEST/LUNG: poor effort   HEART: S1 S2  regular; no murmurs, gallops or rubs  ABDOMEN: Soft, Nontender, Nondistended; Bowel sounds present  EXTREMITIES: no cyanosis; no edema; no calf tenderness  SKIN: warm and dry; no rash  NERVOUS SYSTEM: arouses to verbal stimuli, lethargic   _________________________________________________  LABS:    CAPILLARY BLOOD GLUCOSE      POCT Blood Glucose.: 130 mg/dL (01 May 2023 07:42)  POCT Blood Glucose.: 181 mg/dL (30 Apr 2023 20:59)  POCT Blood Glucose.: 115 mg/dL (30 Apr 2023 16:34)  POCT Blood Glucose.: 132 mg/dL (30 Apr 2023 11:15)    RADIOLOGY & ADDITIONAL TESTS:     < from: CT Abdomen and Pelvis No Cont (04.24.23 @ 08:33) >  IMPRESSION:    Subcutaneous edema overlying the right hip may reflect anasarca or   ecchymosis.    Mild constipation.        --- End of Report ---    < end of copied text >< from: CT Head No Cont (04.24.23 @ 00:50) >  IMPRESSION:  No acute intracranial hemorrhage, vasogenic edema, extra-axial collection   or hydrocephalus. Chronic findings as above.    --- End of Report ---    < end of copied text >  < from: Xray Chest 1 View-PORTABLE IMMEDIATE (04.23.23 @ 22:48) >    IMPRESSION:  Mild bibasilar platelike atelectasis/lower lobe cylindrical   bronchiectasis, unchanged    --- End of Report ---      < end of copied text >    Imaging Personally Reviewed:  YES    Consultant(s) Notes Reviewed:   YES    Plan of care was discussed with patient and /or primary care giver; all questions and concerns were addressed and care was aligned with patient's wishes.

## 2023-05-01 NOTE — PROGRESS NOTE ADULT - PROBLEM SELECTOR PLAN 1
Septic on admission   pt has loose watery stools 3-4 times daily  recently on abx for PNA at [facility]  continue IV flagyl, cefepime  blood and urine culture prelim- NGTD  -diarrhea resolved   - f/u GI PCR, stool culture, stool o&p
recently on abx for PNA at [facility]  blood and urine culture prelim- NGTD  - s/p abx  - resolved now
likely 2/2 sepsis,  vs worsening dementia   CTH no acute pathology with chronic changes   s/p speech and swallow eval started on puree and thin liquids   resolved.
Septic on admission   pt has loose watery stools 3-4 times daily  recently on abx for PNA at [facility]  continue IV flagyl, cefepime  - contact isolation, rule out Cdiff  - GI PCR, stool culture, stool o&p
Septic on admission   pt has loose watery stools 3-4 times daily  recently on abx for PNA at [facility]  continue IV flagyl, cefepime  blood and urine culture prelim- NGTD  -diarrhea resolved   - f/u GI PCR, stool culture, stool o&p
Severe. Due to Hx of two CVAs. Total care. Total dependent in all IADL, ADLs, incontinent x 2, recently bedbound    > CT Head No Cont (04.24.23): Chronic lacunar infarcts in the left lentiform nucleus and left cerebellum. Patchy and confluent areas ... likely the sequela of moderate severity chronic microvascular change.    Baseline: A&O x0    FAST Score: 7 C  Eligible for Home Hospice => Family now wants to take patient home with hospice    Recommendations:  Supportive care  Referral given to Palliative Care LMSW
Severe. Due to Hx of two CVAs. Total care. Total dependent in all IADL, ADLs, incontinent x 2, recently bedbound    > CT Head No Cont (04.24.23): Chronic lacunar infarcts in the left lentiform nucleus and left cerebellum. Patchy and confluent areas ... likely the sequela of moderate severity chronic microvascular change.    Baseline: A&O x0    FAST Score: 7 C  Eligible for Home Hospice => Family wants to become more informed about home hospice services. Will make a decision by tomorrow     Supportive care

## 2023-05-01 NOTE — PROGRESS NOTE ADULT - PROBLEM SELECTOR PROBLEM 1
Vascular dementia
Sepsis, unspecified organism
Vascular dementia
Acute metabolic encephalopathy

## 2023-05-01 NOTE — PROGRESS NOTE ADULT - PROBLEM SELECTOR PLAN 3
likely 2/2 sepsis,  vs worsening dementia   CTH no acute pathology with chronic changes   s/p speech and swallow eval started on puree and thin liquids   safety precautions
recently on abx for PNA at [facility]  blood and urine culture prelim- NGTD  - s/p abx  monitor off abd   - resolved now
Moderate-severe    Recommendations:  Fleet enema 1 CT STAT tomorrow  Dulcolax Suppository 10 mg 1 CT Daily PRN if no BM x 3 days  START Senna 2 tablets Oral at bedtime & Miralax Oral Daily PRN => After passing the swallowing evaluation.
likely 2/2 sepsis, post-ictal state, vs worsening dementia   NPO   continue IVF   CTH no acute pathology   F/U speech and swallow eval   safety precautions
likely 2/2 sepsis,  vs worsening dementia   CTH no acute pathology with chronic changes   s/p speech and swallow eval started on puree and thin liquids   safety precautions
likely prerenal in the setting of acute infection and poor po intake   now improved   SCR 1.36  - trend Cr  - avoid nephrotoxic agents
Clinical evidence indicates that the patient has Severe protein calorie malnutrition/ 3rd degree. Serum Albumin= 1.9 (4/25) <-- 2.2 (4/23/2023).     In context of Chronic Illness (>1 month)    Energy/Food intake <50% of estimated energy requirement >5 days  Weight loss: Moderate - severe (lbs lost recently)  Body Fat loss: Severe   (Cachexia, temporal wasting, contracted, muscle atrophy)  Muscle mass loss: Severe  (Prone to skin failure/pressure ulcers)   Strength: weakened severe (bedbound)    Aspiration precautions, elevate the head of the bed 60-90 degrees when feeding patient, careful hand-feeding,

## 2023-05-01 NOTE — PROGRESS NOTE ADULT - PROBLEM SELECTOR PROBLEM 7
DM (diabetes mellitus)
DM (diabetes mellitus)
Preventive measure
DM (diabetes mellitus)
Preventive measure

## 2023-05-01 NOTE — PROGRESS NOTE ADULT - PROBLEM SELECTOR PLAN 4
pt has history of CVA  safety precautions
pt has history of CVA  safety precautions
likely prerenal in the setting of acute infection and poor po intake   now improved   SCR 1.36  - trend Cr  - avoid nephrotoxic agents
Clinical evidence indicates that the patient has Severe protein calorie malnutrition/ 3rd degree. Serum Albumin= 2.2 (4/23/2023). R    In context of Chronic Illness (>1 month)    Energy/Food intake <50% of estimated energy requirement >5 days  Weight loss: Moderate - severe (lbs lost recently)  Body Fat loss: Severe   (Cachexia, temporal wasting, contracted, muscle atrophy)  Muscle mass loss: Severe  (Prone to skin failure/pressure ulcers)   Strength: weakened severe (bedbound)    Recommend:   Swallowing evaluation appreciated. See full report and recommendations above  Nutrition consult, Nutritional supplements as tolerated  Aspiration precautions, elevate the head of the bed 60-90 degrees when feeding patient, careful hand-feeding,
likely prerenal in the setting of acute infection and poor po intake   now improved   SCR 1.36  - trend Cr  - avoid nephrotoxic agents
worse by deconditioning due to hospitalization and acute on chronic conditions     Baseline: Ambulatory short distances with 2 assist but bedbound most recently      PT evaluation  OOB to chair with close supervision  Fall precautions   Frequent reposition and offloading   Keep patient clean and dry
likely prerenal in the setting of acute infection and poor po intake   now improved   SCR 1.47   continue IV hydration  - monitor Cr  - avoid nephrotoxic agents

## 2023-05-01 NOTE — DISCHARGE NOTE NURSING/CASE MANAGEMENT/SOCIAL WORK - PATIENT PORTAL LINK FT
You can access the FollowMyHealth Patient Portal offered by Health system by registering at the following website: [email or web address]. By joining [employer]’s FollowMyHealth portal, you will also be able to view your health information using other applications (apps) compatible with our system.

## 2023-05-01 NOTE — PROGRESS NOTE ADULT - PROVIDER SPECIALTY LIST ADULT
Internal Medicine
Palliative Care
Palliative Care
Internal Medicine

## 2023-07-15 ENCOUNTER — INPATIENT (INPATIENT)
Facility: HOSPITAL | Age: 68
LOS: 7 days | Discharge: HOME HEALTH | End: 2023-07-23
Attending: STUDENT IN AN ORGANIZED HEALTH CARE EDUCATION/TRAINING PROGRAM | Admitting: STUDENT IN AN ORGANIZED HEALTH CARE EDUCATION/TRAINING PROGRAM
Payer: MEDICAID

## 2023-07-15 VITALS
HEART RATE: 73 BPM | RESPIRATION RATE: 18 BRPM | SYSTOLIC BLOOD PRESSURE: 90 MMHG | OXYGEN SATURATION: 99 % | DIASTOLIC BLOOD PRESSURE: 59 MMHG

## 2023-07-15 DIAGNOSIS — E11.9 TYPE 2 DIABETES MELLITUS WITHOUT COMPLICATIONS: ICD-10-CM

## 2023-07-15 DIAGNOSIS — N40.0 BENIGN PROSTATIC HYPERPLASIA WITHOUT LOWER URINARY TRACT SYMPTOMS: ICD-10-CM

## 2023-07-15 DIAGNOSIS — Z86.73 PERSONAL HISTORY OF TRANSIENT ISCHEMIC ATTACK (TIA), AND CEREBRAL INFARCTION WITHOUT RESIDUAL DEFICITS: ICD-10-CM

## 2023-07-15 DIAGNOSIS — G40.909 EPILEPSY, UNSPECIFIED, NOT INTRACTABLE, WITHOUT STATUS EPILEPTICUS: ICD-10-CM

## 2023-07-15 DIAGNOSIS — R77.8 OTHER SPECIFIED ABNORMALITIES OF PLASMA PROTEINS: ICD-10-CM

## 2023-07-15 DIAGNOSIS — Z91.89 OTHER SPECIFIED PERSONAL RISK FACTORS, NOT ELSEWHERE CLASSIFIED: ICD-10-CM

## 2023-07-15 DIAGNOSIS — Z29.9 ENCOUNTER FOR PROPHYLACTIC MEASURES, UNSPECIFIED: ICD-10-CM

## 2023-07-15 DIAGNOSIS — J69.0 PNEUMONITIS DUE TO INHALATION OF FOOD AND VOMIT: ICD-10-CM

## 2023-07-15 DIAGNOSIS — G93.40 ENCEPHALOPATHY, UNSPECIFIED: ICD-10-CM

## 2023-07-15 DIAGNOSIS — D64.9 ANEMIA, UNSPECIFIED: ICD-10-CM

## 2023-07-15 DIAGNOSIS — R65.10 SYSTEMIC INFLAMMATORY RESPONSE SYNDROME (SIRS) OF NON-INFECTIOUS ORIGIN WITHOUT ACUTE ORGAN DYSFUNCTION: ICD-10-CM

## 2023-07-15 DIAGNOSIS — I10 ESSENTIAL (PRIMARY) HYPERTENSION: ICD-10-CM

## 2023-07-15 DIAGNOSIS — R06.89 OTHER ABNORMALITIES OF BREATHING: ICD-10-CM

## 2023-07-15 DIAGNOSIS — R41.82 ALTERED MENTAL STATUS, UNSPECIFIED: ICD-10-CM

## 2023-07-15 DIAGNOSIS — K31.89 OTHER DISEASES OF STOMACH AND DUODENUM: ICD-10-CM

## 2023-07-15 DIAGNOSIS — N17.9 ACUTE KIDNEY FAILURE, UNSPECIFIED: ICD-10-CM

## 2023-07-15 PROBLEM — F03.90 UNSPECIFIED DEMENTIA WITHOUT BEHAVIORAL DISTURBANCE: Chronic | Status: ACTIVE | Noted: 2023-04-24

## 2023-07-15 PROBLEM — E78.5 HYPERLIPIDEMIA, UNSPECIFIED: Chronic | Status: ACTIVE | Noted: 2023-04-24

## 2023-07-15 PROBLEM — I63.9 CEREBRAL INFARCTION, UNSPECIFIED: Chronic | Status: ACTIVE | Noted: 2023-04-24

## 2023-07-15 PROBLEM — R56.9 UNSPECIFIED CONVULSIONS: Chronic | Status: ACTIVE | Noted: 2023-04-24

## 2023-07-15 PROBLEM — N18.9 CHRONIC KIDNEY DISEASE, UNSPECIFIED: Chronic | Status: ACTIVE | Noted: 2023-04-24

## 2023-07-15 LAB
ALBUMIN SERPL ELPH-MCNC: 3.2 G/DL — LOW (ref 3.3–5)
ALP SERPL-CCNC: 52 U/L — SIGNIFICANT CHANGE UP (ref 40–120)
ALT FLD-CCNC: 8 U/L — SIGNIFICANT CHANGE UP (ref 4–41)
ANION GAP SERPL CALC-SCNC: 8 MMOL/L — SIGNIFICANT CHANGE UP (ref 7–14)
APPEARANCE UR: CLEAR — SIGNIFICANT CHANGE UP
APTT BLD: 30.7 SEC — SIGNIFICANT CHANGE UP (ref 27–36.3)
AST SERPL-CCNC: 23 U/L — SIGNIFICANT CHANGE UP (ref 4–40)
BACTERIA # UR AUTO: NEGATIVE /HPF — SIGNIFICANT CHANGE UP
BASE EXCESS BLDV CALC-SCNC: 0.8 MMOL/L — SIGNIFICANT CHANGE UP (ref -2–3)
BASOPHILS # BLD AUTO: 0.03 K/UL — SIGNIFICANT CHANGE UP (ref 0–0.2)
BASOPHILS NFR BLD AUTO: 0.4 % — SIGNIFICANT CHANGE UP (ref 0–2)
BILIRUB SERPL-MCNC: <0.2 MG/DL — SIGNIFICANT CHANGE UP (ref 0.2–1.2)
BILIRUB UR-MCNC: NEGATIVE — SIGNIFICANT CHANGE UP
BLOOD GAS VENOUS COMPREHENSIVE RESULT: SIGNIFICANT CHANGE UP
BUN SERPL-MCNC: 23 MG/DL — SIGNIFICANT CHANGE UP (ref 7–23)
CALCIUM SERPL-MCNC: 8.8 MG/DL — SIGNIFICANT CHANGE UP (ref 8.4–10.5)
CAST: 0 /LPF — SIGNIFICANT CHANGE UP (ref 0–4)
CHLORIDE BLDV-SCNC: 102 MMOL/L — SIGNIFICANT CHANGE UP (ref 96–108)
CHLORIDE SERPL-SCNC: 103 MMOL/L — SIGNIFICANT CHANGE UP (ref 98–107)
CO2 BLDV-SCNC: 30.5 MMOL/L — HIGH (ref 22–26)
CO2 SERPL-SCNC: 23 MMOL/L — SIGNIFICANT CHANGE UP (ref 22–31)
COLOR SPEC: YELLOW — SIGNIFICANT CHANGE UP
CREAT SERPL-MCNC: 1.84 MG/DL — HIGH (ref 0.5–1.3)
DIFF PNL FLD: NEGATIVE — SIGNIFICANT CHANGE UP
EGFR: 40 ML/MIN/1.73M2 — LOW
EOSINOPHIL # BLD AUTO: 0.52 K/UL — HIGH (ref 0–0.5)
EOSINOPHIL NFR BLD AUTO: 6.6 % — HIGH (ref 0–6)
GAS PNL BLDV: 131 MMOL/L — LOW (ref 136–145)
GLUCOSE BLDV-MCNC: 134 MG/DL — HIGH (ref 70–99)
GLUCOSE SERPL-MCNC: 137 MG/DL — HIGH (ref 70–99)
GLUCOSE UR QL: NEGATIVE MG/DL — SIGNIFICANT CHANGE UP
HCO3 BLDV-SCNC: 28 MMOL/L — SIGNIFICANT CHANGE UP (ref 22–29)
HCT VFR BLD CALC: 23.3 % — LOW (ref 39–50)
HCT VFR BLD CALC: 25.4 % — LOW (ref 39–50)
HCT VFR BLDA CALC: 23 % — LOW (ref 39–51)
HGB BLD CALC-MCNC: 7.8 G/DL — LOW (ref 12.6–17.4)
HGB BLD-MCNC: 7.1 G/DL — LOW (ref 13–17)
HGB BLD-MCNC: 7.5 G/DL — LOW (ref 13–17)
IANC: 4.2 K/UL — SIGNIFICANT CHANGE UP (ref 1.8–7.4)
IMM GRANULOCYTES NFR BLD AUTO: 0.5 % — SIGNIFICANT CHANGE UP (ref 0–0.9)
INR BLD: 1.08 RATIO — SIGNIFICANT CHANGE UP (ref 0.88–1.16)
KETONES UR-MCNC: NEGATIVE MG/DL — SIGNIFICANT CHANGE UP
LACTATE BLDV-MCNC: 1.7 MMOL/L — SIGNIFICANT CHANGE UP (ref 0.5–2)
LEUKOCYTE ESTERASE UR-ACNC: NEGATIVE — SIGNIFICANT CHANGE UP
LYMPHOCYTES # BLD AUTO: 1.94 K/UL — SIGNIFICANT CHANGE UP (ref 1–3.3)
LYMPHOCYTES # BLD AUTO: 24.6 % — SIGNIFICANT CHANGE UP (ref 13–44)
MCHC RBC-ENTMCNC: 25.1 PG — LOW (ref 27–34)
MCHC RBC-ENTMCNC: 25.6 PG — LOW (ref 27–34)
MCHC RBC-ENTMCNC: 29.5 GM/DL — LOW (ref 32–36)
MCHC RBC-ENTMCNC: 30.5 GM/DL — LOW (ref 32–36)
MCV RBC AUTO: 84.1 FL — SIGNIFICANT CHANGE UP (ref 80–100)
MCV RBC AUTO: 84.9 FL — SIGNIFICANT CHANGE UP (ref 80–100)
MONOCYTES # BLD AUTO: 1.15 K/UL — HIGH (ref 0–0.9)
MONOCYTES NFR BLD AUTO: 14.6 % — HIGH (ref 2–14)
NEUTROPHILS # BLD AUTO: 4.2 K/UL — SIGNIFICANT CHANGE UP (ref 1.8–7.4)
NEUTROPHILS NFR BLD AUTO: 53.3 % — SIGNIFICANT CHANGE UP (ref 43–77)
NITRITE UR-MCNC: NEGATIVE — SIGNIFICANT CHANGE UP
NRBC # BLD: 0 /100 WBCS — SIGNIFICANT CHANGE UP (ref 0–0)
NRBC # BLD: 0 /100 WBCS — SIGNIFICANT CHANGE UP (ref 0–0)
NRBC # FLD: 0 K/UL — SIGNIFICANT CHANGE UP (ref 0–0)
NRBC # FLD: 0 K/UL — SIGNIFICANT CHANGE UP (ref 0–0)
PCO2 BLDV: 65 MMHG — HIGH (ref 42–55)
PH BLDV: 7.25 — LOW (ref 7.32–7.43)
PH UR: 6 — SIGNIFICANT CHANGE UP (ref 5–8)
PLATELET # BLD AUTO: 171 K/UL — SIGNIFICANT CHANGE UP (ref 150–400)
PLATELET # BLD AUTO: 201 K/UL — SIGNIFICANT CHANGE UP (ref 150–400)
PO2 BLDV: 31 MMHG — SIGNIFICANT CHANGE UP (ref 25–45)
POTASSIUM BLDV-SCNC: 4.9 MMOL/L — SIGNIFICANT CHANGE UP (ref 3.5–5.1)
POTASSIUM SERPL-MCNC: 4.8 MMOL/L — SIGNIFICANT CHANGE UP (ref 3.5–5.3)
POTASSIUM SERPL-SCNC: 4.8 MMOL/L — SIGNIFICANT CHANGE UP (ref 3.5–5.3)
PROT SERPL-MCNC: 5.8 G/DL — LOW (ref 6–8.3)
PROT UR-MCNC: NEGATIVE MG/DL — SIGNIFICANT CHANGE UP
PROTHROM AB SERPL-ACNC: 12.5 SEC — SIGNIFICANT CHANGE UP (ref 10.5–13.4)
RBC # BLD: 2.77 M/UL — LOW (ref 4.2–5.8)
RBC # BLD: 2.99 M/UL — LOW (ref 4.2–5.8)
RBC # FLD: 17 % — HIGH (ref 10.3–14.5)
RBC # FLD: 17.1 % — HIGH (ref 10.3–14.5)
RBC CASTS # UR COMP ASSIST: 0 /HPF — SIGNIFICANT CHANGE UP (ref 0–4)
SAO2 % BLDV: 40.2 % — LOW (ref 67–88)
SODIUM SERPL-SCNC: 134 MMOL/L — LOW (ref 135–145)
SP GR SPEC: 1.01 — SIGNIFICANT CHANGE UP (ref 1–1.03)
SQUAMOUS # UR AUTO: 0 /HPF — SIGNIFICANT CHANGE UP (ref 0–5)
TROPONIN T, HIGH SENSITIVITY RESULT: 106 NG/L — CRITICAL HIGH
UROBILINOGEN FLD QL: 0.2 MG/DL — SIGNIFICANT CHANGE UP (ref 0.2–1)
VALPROATE SERPL-MCNC: 54.6 UG/ML — SIGNIFICANT CHANGE UP (ref 50–100)
WBC # BLD: 7.51 K/UL — SIGNIFICANT CHANGE UP (ref 3.8–10.5)
WBC # BLD: 7.88 K/UL — SIGNIFICANT CHANGE UP (ref 3.8–10.5)
WBC # FLD AUTO: 7.51 K/UL — SIGNIFICANT CHANGE UP (ref 3.8–10.5)
WBC # FLD AUTO: 7.88 K/UL — SIGNIFICANT CHANGE UP (ref 3.8–10.5)
WBC UR QL: 0 /HPF — SIGNIFICANT CHANGE UP (ref 0–5)

## 2023-07-15 PROCEDURE — 74177 CT ABD & PELVIS W/CONTRAST: CPT | Mod: 26,MA

## 2023-07-15 PROCEDURE — 99291 CRITICAL CARE FIRST HOUR: CPT

## 2023-07-15 PROCEDURE — 71260 CT THORAX DX C+: CPT | Mod: 26,MA

## 2023-07-15 PROCEDURE — 71045 X-RAY EXAM CHEST 1 VIEW: CPT | Mod: 26

## 2023-07-15 RX ORDER — PIPERACILLIN AND TAZOBACTAM 4; .5 G/20ML; G/20ML
3.38 INJECTION, POWDER, LYOPHILIZED, FOR SOLUTION INTRAVENOUS ONCE
Refills: 0 | Status: COMPLETED | OUTPATIENT
Start: 2023-07-15 | End: 2023-07-15

## 2023-07-15 RX ORDER — VALPROIC ACID (AS SODIUM SALT) 250 MG/5ML
125 SOLUTION, ORAL ORAL EVERY 6 HOURS
Refills: 0 | Status: DISCONTINUED | OUTPATIENT
Start: 2023-07-15 | End: 2023-07-23

## 2023-07-15 RX ORDER — IPRATROPIUM/ALBUTEROL SULFATE 18-103MCG
3 AEROSOL WITH ADAPTER (GRAM) INHALATION ONCE
Refills: 0 | Status: COMPLETED | OUTPATIENT
Start: 2023-07-15 | End: 2023-07-15

## 2023-07-15 RX ORDER — PIPERACILLIN AND TAZOBACTAM 4; .5 G/20ML; G/20ML
3.38 INJECTION, POWDER, LYOPHILIZED, FOR SOLUTION INTRAVENOUS EVERY 12 HOURS
Refills: 0 | Status: COMPLETED | OUTPATIENT
Start: 2023-07-15 | End: 2023-07-20

## 2023-07-15 RX ORDER — LANOLIN ALCOHOL/MO/W.PET/CERES
3 CREAM (GRAM) TOPICAL AT BEDTIME
Refills: 0 | Status: DISCONTINUED | OUTPATIENT
Start: 2023-07-15 | End: 2023-07-23

## 2023-07-15 RX ORDER — FAMOTIDINE 10 MG/ML
1 INJECTION INTRAVENOUS
Refills: 0 | DISCHARGE

## 2023-07-15 RX ORDER — PANTOPRAZOLE SODIUM 20 MG/1
40 TABLET, DELAYED RELEASE ORAL
Refills: 0 | Status: DISCONTINUED | OUTPATIENT
Start: 2023-07-15 | End: 2023-07-23

## 2023-07-15 RX ORDER — ACETAMINOPHEN 500 MG
650 TABLET ORAL EVERY 6 HOURS
Refills: 0 | Status: DISCONTINUED | OUTPATIENT
Start: 2023-07-15 | End: 2023-07-23

## 2023-07-15 RX ORDER — PANTOPRAZOLE SODIUM 20 MG/1
40 TABLET, DELAYED RELEASE ORAL ONCE
Refills: 0 | Status: COMPLETED | OUTPATIENT
Start: 2023-07-15 | End: 2023-07-15

## 2023-07-15 RX ORDER — ERGOCALCIFEROL 1.25 MG/1
1 CAPSULE ORAL
Refills: 0 | DISCHARGE

## 2023-07-15 RX ORDER — SODIUM CHLORIDE 9 MG/ML
1000 INJECTION INTRAMUSCULAR; INTRAVENOUS; SUBCUTANEOUS ONCE
Refills: 0 | Status: COMPLETED | OUTPATIENT
Start: 2023-07-15 | End: 2023-07-15

## 2023-07-15 RX ADMIN — SODIUM CHLORIDE 1000 MILLILITER(S): 9 INJECTION INTRAMUSCULAR; INTRAVENOUS; SUBCUTANEOUS at 02:04

## 2023-07-15 RX ADMIN — PANTOPRAZOLE SODIUM 40 MILLIGRAM(S): 20 TABLET, DELAYED RELEASE ORAL at 16:29

## 2023-07-15 NOTE — H&P ADULT - HISTORY OF PRESENT ILLNESS
68M PMH CKD, CVA, HTN, DM, dementia, seizure disorder on valproic acid presenting with increased confusion for several days a/w lethargy, dyspnea, and diarrhea. Pt was BIBEMS and found to be hypotensive w/ SBP 80s received 1L NS. In the ED pt was hypothermic and hypotensive w/ SBP 90's receiving another 1L NS. 68M PMH CKD, CVA, HTN, DM, dementia (AOx0), seizure disorder on valproic acid presenting with increased confusion for several days a/w lethargy, dyspnea, and diarrhea. Pt was BIBEMS and found to be hypotensive w/ SBP 80s received 1L NS. In the ED pt was hypothermic and hypotensive w/ SBP 90's receiving another 1L NS. 68M PMH CKD, CVA, HTN, DM, dementia (AOx0), seizure disorder on valproic acid presenting with increased confusion for several days a/w lethargy, dyspnea, and diarrhea. History obtained from patients son at bedside. Son reports patient at baseline is AOx0 but able to recognize family and follow some basic commands. Over the last several days pt appeared more lethargic and less responsive to family members. Per son's report, family felt that pt was mildly short of breath. Pt also had several episodes of loose stools at home but denies watery diarrhea. Per son, patient  is fully dependent on all ADLs. Pt lives with wife and son. At baseline patient eats liquids and pureed foods. Per son, patient often coughs w/ eating.     Pt was BIBEMS and found to be hypotensive w/ SBP 80s received 1L NS en route to the hospital. In the ED pt was hypothermic and hypotensive w/ SBP 90's receiving another 1L NS.

## 2023-07-15 NOTE — H&P ADULT - NSHPSOCIALHISTORY_GEN_ALL_CORE
Patient is AOx0 at baseline and fully dependent on all ADLs. Lives with his son and wife. Has a 30+ pack year smoking history but quit smoking ~10 years ago. Denies alcohol use.

## 2023-07-15 NOTE — ED PROVIDER NOTE - PROGRESS NOTE DETAILS
Viki: Elev. troponin c/w prev. visit, EKG repeated, abnl ST ant. but no sig. dynamic change from triage EKG. Pt. asked if having pain and denies, "how do you feel?" says "ok".

## 2023-07-15 NOTE — H&P ADULT - NSHPPHYSICALEXAM_GEN_ALL_CORE
Vital Signs Last 24 Hrs  T(C): 36.6 (15 Jul 2023 19:26), Max: 36.6 (15 Jul 2023 06:44)  T(F): 97.9 (15 Jul 2023 19:26), Max: 97.9 (15 Jul 2023 19:26)  HR: 79 (15 Jul 2023 19:42) (73 - 86)  BP: 153/91 (15 Jul 2023 19:42) (90/59 - 166/76)  BP(mean): --  RR: 18 (15 Jul 2023 19:42) (15 - 22)  SpO2: 98% (15 Jul 2023 19:42) (94% - 100%)    Parameters below as of 15 Jul 2023 19:42  Patient On (Oxygen Delivery Method): room air      PHYSICAL EXAM:  GENERAL: NAD, well-groomed, well-developed  HEAD:  Atraumatic, Normocephalic  EYES: EOMI, PERRLA, conjunctiva and sclera clear  ENMT: No tonsillar erythema, exudates, or enlargement; Moist mucous membranes  NECK: Supple, No JVD, Normal thyroid  HEART: Regular rate and rhythm; No murmurs, rubs, or gallops  RESPIRATORY: CTA B/L, No W/R/R  ABDOMEN: Soft, Nontender, Nondistended; Bowel sounds present  NEUROLOGY: A&Ox0-1, nonfocal, moving all extremities  EXTREMITIES:  2+ Peripheral Pulses, No clubbing, cyanosis, or edema  SKIN: warm, dry, normal color, no rash or abnormal lesions Vital Signs Last 24 Hrs  T(C): 36.6 (15 Jul 2023 19:26), Max: 36.6 (15 Jul 2023 06:44)  T(F): 97.9 (15 Jul 2023 19:26), Max: 97.9 (15 Jul 2023 19:26)  HR: 79 (15 Jul 2023 19:42) (73 - 86)  BP: 153/91 (15 Jul 2023 19:42) (90/59 - 166/76)  BP(mean): --  RR: 18 (15 Jul 2023 19:42) (15 - 22)  SpO2: 98% (15 Jul 2023 19:42) (94% - 100%)    Parameters below as of 15 Jul 2023 19:42  Patient On (Oxygen Delivery Method): room air      PHYSICAL EXAM:  GENERAL: NAD. Malnourished  HEAD:  Atraumatic, Normocephalic  EYES: EOMI, PERRLA, conjunctiva and sclera clear  ENMT: No tonsillar erythema, exudates, or enlargement; Dry mucous membranes  NECK: Supple, No JVD, Normal thyroid  HEART: Regular rate and rhythm; No murmurs, rubs, or gallops  RESPIRATORY: Decreased breath sounds b/l. No wheezing.   ABDOMEN: Soft, Nontender, Nondistended; Bowel sounds present  NEUROLOGY: A&Ox0., Arousable to verbal stimuli. Moving all extremities. Following basic commands  EXTREMITIES:  2+ Peripheral Pulses, No clubbing, cyanosis, or edema  SKIN: warm, dry, normal color, no rash or abnormal lesions

## 2023-07-15 NOTE — H&P ADULT - PROBLEM SELECTOR PLAN 12
DVT: HSQ q8h  Diet: NPO pending S&S  Dispo: Pending clinical course DVT: HSQ q8h  Diet: NPO pending S&S  Ethics: Full Code pending family discussions  Dispo: Pending clinical course DVT: HSQ  Diet: NPO pending S&S  Ethics: Full code pending family discussions  Dispo: Pending clinical course

## 2023-07-15 NOTE — H&P ADULT - PROBLEM SELECTOR PLAN 9
Patient w/ history of CVA ~10 years ago. No residual symptoms per son  - Patient initially functional after CVA. No reported residual defecits Patient w/ seizure disorder on Valproate 500mg qd  - Transition to IV as pt NPO pending S&S eval

## 2023-07-15 NOTE — H&P ADULT - NSHPLABSRESULTS_GEN_ALL_CORE
.  LABS:                         7.1    7.51  )-----------( 171      ( 15 Jul 2023 09:40 )             23.3     07-15    134<L>  |  103  |  23  ----------------------------<  137<H>  4.8   |  23  |  1.84<H>    Ca    8.8      15 Jul 2023 02:06    TPro  5.8<L>  /  Alb  3.2<L>  /  TBili  <0.2  /  DBili  x   /  AST  23  /  ALT  8   /  AlkPhos  52  07-15    PT/INR - ( 15 Jul 2023 02:06 )   PT: 12.5 sec;   INR: 1.08 ratio         PTT - ( 15 Jul 2023 02:06 )  PTT:30.7 sec  Urinalysis Basic - ( 15 Jul 2023 02:33 )    Color: Yellow / Appearance: Clear / S.007 / pH: x  Gluc: x / Ketone: Negative mg/dL  / Bili: Negative / Urobili: 0.2 mg/dL   Blood: x / Protein: Negative mg/dL / Nitrite: Negative   Leuk Esterase: Negative / RBC: 0 /HPF / WBC 0 /HPF   Sq Epi: x / Non Sq Epi: 0 /HPF / Bacteria: Negative /HPF            RADIOLOGY, EKG & ADDITIONAL TESTS: Reviewed.     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  Bulky local necrotic metastatic adenopathy.  No distant metastatic disease.  Retained secretions in upper trachea.  Trace bilateral pleural effusions.    --- End of Report ---    < end of copied text >    < from: Xray Chest 1 View- PORTABLE-Urgent (07.15.23 @ 01:54) >    FINDINGS:  The heart is normal in size. Calcified aortic knob.  Linear opacities at the bilateral lung bases, right greater than left,   consistent with bibasilar atelectasis.  There is nopneumothorax or pleural effusion.    IMPRESSION: No focal abnormality to account for sepsis.      --- End of Report ---    < end of copied text >

## 2023-07-15 NOTE — PATIENT PROFILE ADULT - NSPROREFERSVCHOMEDIABETES_GEN_A_NUR
Nausea and Vomiting, Adult  Nausea is feeling sick to your stomach or feeling that you are about to throw up (vomit). Vomiting is when food in your stomach is thrown up and out of the mouth. Throwing up can make you feel weak. It can also make you lose too much water in your body (get dehydrated). If you lose too much water in your body, you may:  · Feel tired.  · Feel thirsty.  · Have a dry mouth.  · Have cracked lips.  · Go pee (urinate) less often.  Older adults and people with other diseases or a weak body defense system (immune system) are at higher risk for losing too much water in the body. If you feel sick to your stomach and you throw up, it is important to follow instructions from your doctor about how to take care of yourself.  Follow these instructions at home:  Watch your symptoms for any changes. Tell your doctor about them. Follow these instructions to care for yourself at home.  Eating and drinking         · Take an ORS (oral rehydration solution). This is a drink that is sold at pharmacies and stores.  · Drink clear fluids in small amounts as you are able, such as:  ? Water.  ? Ice chips.  ? Fruit juice that has water added (diluted fruit juice).  ? Low-calorie sports drinks.  · Eat bland, easy-to-digest foods in small amounts as you are able, such as:  ? Bananas.  ? Applesauce.  ? Rice.  ? Low-fat (lean) meats.  ? Toast.  ? Crackers.  · Avoid drinking fluids that have a lot of sugar or caffeine in them. This includes energy drinks, sports drinks, and soda.  · Avoid alcohol.  · Avoid spicy or fatty foods.  General instructions  · Take over-the-counter and prescription medicines only as told by your doctor.  · Drink enough fluid to keep your pee (urine) pale yellow.  · Wash your hands often with soap and water. If you cannot use soap and water, use hand .  · Make sure that all people in your home wash their hands well and often.  · Rest at home while you get better.  · Watch your condition  for any changes.  · Take slow and deep breaths when you feel sick to your stomach.  · Keep all follow-up visits as told by your doctor. This is important.  Contact a doctor if:  · Your symptoms get worse.  · You have new symptoms.  · You have a fever.  · You cannot drink fluids without throwing up.  · You feel sick to your stomach for more than 2 days.  · You feel light-headed or dizzy.  · You have a headache.  · You have muscle cramps.  · You have a rash.  · You have pain while peeing.  Get help right away if:  · You have pain in your chest, neck, arm, or jaw.  · You feel very weak or you pass out (faint).  · You throw up again and again.  · You have throw up that is bright red or looks like black coffee grounds.  · You have bloody or black poop (stools) or poop that looks like tar.  · You have a very bad headache, a stiff neck, or both.  · You have very bad pain, cramping, or bloating in your belly (abdomen).  · You have trouble breathing.  · You are breathing very quickly.  · Your heart is beating very quickly.  · Your skin feels cold and clammy.  · You feel confused.  · You have signs of losing too much water in your body, such as:  ? Dark pee, very little pee, or no pee.  ? Cracked lips.  ? Dry mouth.  ? Sunken eyes.  ? Sleepiness.  ? Weakness.  These symptoms may be an emergency. Do not wait to see if the symptoms will go away. Get medical help right away. Call your local emergency services (911 in the [place] Do not drive yourself to the hospital.  Summary  · Nausea is feeling sick to your stomach or feeling that you are about to throw up (vomit). Vomiting is when food in your stomach is thrown up and out of the mouth.  · Follow instructions from your doctor about eating and drinking to keep from losing too much water in your body.  · Take over-the-counter and prescription medicines only as told by your doctor.  · Contact your doctor if your symptoms get worse or you have new symptoms.  · Keep all follow-up  visits as told by your doctor. This is important.  This information is not intended to replace advice given to you by your health care provider. Make sure you discuss any questions you have with your health care provider.  Document Released: 06/05/2009 Document Revised: 05/28/2019 Document Reviewed: 05/28/2019  [employer] Interactive Patient Education © 2020 [employer]     no

## 2023-07-15 NOTE — ED ADULT NURSE NOTE - CHIEF COMPLAINT QUOTE
Pt arrives with EMS. As per EMS son called 911 after pt had "erratic breathing". EMS reports pt bed bound, nonverbal baseline. Arrives with 18G Rt forearm with 1L NS as per EMS pt hypotensive 80's systolic. Charge aware. Hx Seizures dx, CKD, CVA, HTN, DM.

## 2023-07-15 NOTE — H&P ADULT - CONVERSATION DETAILS
GOC initiated by resident earlier in the night with discussion regarding diagnosis and prognosis. Appears that patient's family wanted to wait until they could discuss with another family member who was a physician in the [place] but patient was to remain full code.     Called patient's son at [phone] to update as well as further discuss GOC. Updated son and son directed me to patient's sister at [phone] to touch-base on patient. Discussed with patient's sister regarding patient's care. Following discussion happened in context GOC initiated by resident earlier in the night with discussion regarding diagnosis and prognosis. Appears that patient's family wanted to wait until they could discuss with another family member who was a physician in the [place] but patient was to remain full code.     Called patient's son at [phone] to update as well as further discuss GOC. Updated son and son directed me to patient's sister at [phone] to touch-base on patient. Discussed with patient's sister regarding patient's care. Following discussion to be taken in context of happening at 3AM: Patient's sister currently also still desires for patient to be full code. Discussed with her that, given patient's baseline dementia (AAOx0 and requiring help with most ADLs) as well as the new gastric mass which was likely neoplastic, I don't foresee patient having a meaningful recovery should he code or require intubation. Sister expresses understanding but needs more time to discuss with family. She also expresses desire for biopsy if warranted.

## 2023-07-15 NOTE — H&P ADULT - PROBLEM SELECTOR PLAN 2
Patient w/ baseline dementia (AOx0) p/w decreased responsiveness and lethargy for 2 days likely iso metabolic encephalopathy d/t aspiration PNA vs hypercapnia   - CT w/ secretions in esophagus and history of coughing w/ eating c/f aspiration. Pt hypothermic and hypotensive meeting SIRS criteria s/p 2L Bolus  - f/u infectious w/u and management as below  - Pt noted w/ hypercarbia on VBG likely iso COPD given extensive smoking history although no official diagnosis per son

## 2023-07-15 NOTE — H&P ADULT - PROBLEM SELECTOR PLAN 6
Patient w/ history of CVA ~10 years ago. No residual symptoms per son  - Patient initially functional Pt w/ CKD w/ baseline Cr ~1.3. With Cr 1.8 on presentation  - Likely pre-renal iso decreased PO intake.   - s/p 2L NC  - Maintenance 75cc/hr LR

## 2023-07-15 NOTE — ED ADULT TRIAGE NOTE - CHIEF COMPLAINT QUOTE
Pt arrives with EMS. As per EMS son called 911 after pt had "erratic breathing". EMS reports pt bed bound, nonverbal baseline. Arrives with 18G Rt forearm with 1L NS as per EMS pt hypotensive 80's systolic. Charge aware. Pt arrives with EMS. As per EMS son called 911 after pt had "erratic breathing". EMS reports pt bed bound, nonverbal baseline. Arrives with 18G Rt forearm with 1L NS as per EMS pt hypotensive 80's systolic. Charge aware. Hx Seizures dx, CKD, CVA, HTN, DM.

## 2023-07-15 NOTE — H&P ADULT - PROBLEM SELECTOR PLAN 7
Patient w/ anemia 7.5 on presentation down to 7.1  - Pt's son denies any hematochezia or melena. No other source of bleed reported  - Possibly iso pyloric mass  - Iron studies in AM  - IV protonix  - Active T&S  - Consent in chart

## 2023-07-15 NOTE — H&P ADULT - ASSESSMENT
Mr. Page is a 68M w/ PMH CKD, CVA, HTn, DM, Dementia, seizure disorder (on VPA) who p/w acute encephalopathy found to meet SIRS criteria possibly 2/2 aspiration PNA incidentally found to have pyloric mass c/f adenocarcinoma w/ associated lymphadenopathy indicating metastatic disease.  Mr. Page is a 68M w/ PMH CKD, CVA, HTn, DM, Dementia, seizure disorder (on VPA) who p/w acute encephalopathy found to meet SIRS criteria possibly 2/2 aspiration PNA and incidentally found to have pyloric mass c/f adenocarcinoma w/ associated lymphadenopathy indicating metastatic disease.  Mr. Jones is a 68M w/ PMH CKD, CVA, HTn, DM, Dementia, seizure disorder (on VPA) who p/w acute encephalopathy found to meet SIRS criteria possibly 2/2 aspiration PNA and incidentally found to have pyloric mass c/f adenocarcinoma w/ associated lymphadenopathy indicating metastatic disease.

## 2023-07-15 NOTE — ED PROVIDER NOTE - ATTENDING CONTRIBUTION TO CARE
68 M baseline dementia, nearly nonverbal, history of seizure and diabetes brought to ED with son.  Patient with AMS, lethargy, and increased confusion even above baseline.  Son felt patient was breathing heavily and called EMS.  Patient was hypotensive when EMS arrived and IV fluid was been begun.  Son denies recent illness, no fever/chills, patient has been having diarrhea which she has had on and off.  Patient has history of previous episodes of hypotension.  Makes needs known to son, and ED responsive to physical stimuli and occasional one-word answers.  MM dry, clear lungs, shallow resps, heart reg, abd soft, NT to palp, no dk/guarding, no CVAT, no edema, NT calves.

## 2023-07-15 NOTE — ED ADULT NURSE NOTE - NSFALLHARMRISKINTERV_ED_ALL_ED
Assistance OOB with selected safe patient handling equipment if applicable/Assistance with ambulation/Communicate risk of Fall with Harm to all staff, patient, and family/Monitor gait and stability/Monitor for mental status changes and reorient to person, place, and time, as needed/Move patient closer to nursing station/within visual sight of ED staff/Provide visual cue: red socks, yellow wristband, yellow gown, etc/Reinforce activity limits and safety measures with patient and family/Toileting schedule using arm’s reach rule for commode and bathroom/Use of alarms - bed, stretcher, chair and/or video monitoring/Bed in lowest position, wheels locked, appropriate side rails in place/Call bell, personal items and telephone in reach/Instruct patient to call for assistance before getting out of bed/chair/stretcher/Non-slip footwear applied when patient is off stretcher/[place] to call system/Physically safe environment - no spills, clutter or unnecessary equipment/Purposeful Proactive Rounding/Room/bathroom lighting operational, light cord in reach

## 2023-07-15 NOTE — ED ADULT NURSE REASSESSMENT NOTE - NS ED NURSE REASSESS COMMENT FT1
Patient to go to floor at this time. Patient at baseline mental status, non-verbal responsive to verbal stimuli. As per MD OK to send to floor with FS 98. RR equal and unlabored. NSR on cardiac monitor. Comfort measures provided. Safety maintained.
Patient resting in stretcher non-verbal, opens eyes to verbal stimuli, no active distress noted at this time, does not appear to be in any pain at this time. RR equal and unlabored, NSR on cardiac monitor. VS stable. IV intact. Care plan continued. Comfort measures provided. Safety maintained. Awaiting CT scan at this time.
Patient resting in stretcher nonverbal, responsive to verbal stimuli, no indicators of pain noted. RR equal and unlabored, no active respiratory distress noted, O2 saturation 100% on room air. NSR on cardiac monitor. Care plan continued. Comfort measures provided. Safety maintained. Awaiting imaging results.

## 2023-07-15 NOTE — H&P ADULT - PROBLEM SELECTOR PLAN 10
Per son controlled w/o anti-hypertensives  - c/t monitor. Patient w/ BPH on home tamsulosin 0.4 and finasteride 5mg  - Oral meds held iso aspiration risk  - Bladder scan q8h  - Monitor for urinary retention and need for st cath/bangura

## 2023-07-15 NOTE — H&P ADULT - PROBLEM SELECTOR PLAN 4
Patient w/ anemia 7.5 on presentation down to 7.1  - Pt's son denies any hematochezia or melena. No other source of bleed reported  - Possibly iso pyloric mass  - Iron studies in AM  - IV protonix  - Active T&S  - Consent in chart Patient w/ gastric pyloric mass w/ bulky local necrotic metastatic adenopathy on CT scan c/f adenocarcinoma  - Discussed GoC with patient's daughter and son. Decision to pursue further testing to be discussed with family   - Continue with goals of care

## 2023-07-15 NOTE — ED PROVIDER NOTE - OBJECTIVE STATEMENT
68M PMH CKD, CVA, HTN, DM, dementia, seizure disorder on valproic acid presenting with AMS. According to pt's son, her has been more confused and tired over the past couple days. Tonight he has an episode of abnormal breathing where he appeared to have shortness of breath and son called EMS. Pt has also been having diarrhea. EMS found pt to be hypotensive with systolic in the 80s, given 1L NS. Pt is only verbal with his son. No fever or vomiting.

## 2023-07-15 NOTE — ED ADULT NURSE REASSESSMENT NOTE - TEMPERATURE IN FAHRENHEIT (DEGREES F)
"Chief Complaint  blood sugar f/u, Earache (right, x couple months), and Eye Pain (both, milky in the morning as he states)    Subjective          Dallas Hurt presents to [facility]  History of Present Illness    DM II-  States that sugar has been high, but maybe better  He has been trying to change his eating  He is eating about half of the carbs that he was previously  Still getting hungry  Trying more green veggies    When he wakes up in the morning he notices a Notty feelign in his eye  His right era give him fluid and and he has cleaned a lot of wax out of it  For over two months he has had a lot of snot coming out of his nose  It seems better some days and worse some days  Has been doing his allergy meds    Saw the dermatologist for the spot on his head  They told him it was a wart and it was benign    Sees urology on Monday      Objective   Vital Signs:   /71 (BP Location: Left arm, Patient Position: Sitting)   Pulse 98   Temp 98 °F (36.7 °C) (Oral)   Resp 18   Ht 182.9 cm (72.01\")   Wt 121 kg (266 lb)   SpO2 98%   BMI 36.07 kg/m²     Physical Exam  Vitals reviewed.   Constitutional:       Appearance: Normal appearance. He is well-developed. He is obese.   HENT:      Head: Normocephalic and atraumatic.      Right Ear: External ear normal.      Left Ear: External ear normal.      Ears:      Comments: Effusions bilaterally  Eyes:      Conjunctiva/sclera: Conjunctivae normal.      Pupils: Pupils are equal, round, and reactive to light.   Cardiovascular:      Rate and Rhythm: Normal rate and regular rhythm.      Heart sounds: No murmur heard.  No friction rub. No gallop.    Pulmonary:      Effort: Pulmonary effort is normal.      Breath sounds: Normal breath sounds. No wheezing or rhonchi.   Skin:     General: Skin is warm and dry.   Neurological:      Mental Status: He is alert and oriented to person, place, and time.      Cranial Nerves: No cranial " nerve deficit.   Psychiatric:         Mood and Affect: Affect normal.         Behavior: Behavior normal.         Thought Content: Thought content normal.        Result Review :     Common labs    Common Labsle 2/1/21 2/1/21 2/1/21 2/1/21 2/1/21 6/23/21 6/23/21 6/23/21 6/23/21 10/25/21 10/25/21 10/25/21 10/25/21    2107 2107 2107 2107 2107 1506 1506 1506 1506 1557 1557 1557 1557   Glucose    139 (A)    147 (A)     203 (A)   BUN    14    15     11   Creatinine    1.07    0.60 (A)     0.79   eGFR Non African Am        137     100   Sodium    138    136     133 (A)   Potassium    4.7    4.8     4.3   Chloride    101    101     99   Calcium    9.8    9.6     9.8   Albumin    4.5    4.60     4.50   Total Bilirubin    0.41    0.5     0.5   Alkaline Phosphatase    69    66     73   AST (SGOT)    28    24     23   ALT (SGPT)    47 (A)    38     42 (A)   WBC 9.37     9.20    7.22      Hemoglobin 17.5     18.4 (A)    16.8      Hematocrit 51.2     52.3 (A)    50.6      Platelets 216     225    213      Total Cholesterol         166  174     Total Cholesterol     152           Triglycerides     197 (A)    260 (A)  168 (A)     HDL Cholesterol     42    37 (A)  40     LDL Cholesterol      71    86  105 (A)     Hemoglobin A1C  8.5 (A)     9.04 (A)     10.80 (A)    PSA   0.31             (A) Abnormal value       Comments are available for some flowsheets but are not being displayed.              Procedures      Assessment and Plan    Diagnoses and all orders for this visit:    1. Type 2 diabetes mellitus with other circulatory complication, without long-term current use of insulin (HCC) (Primary)  Comments:  doing a great job with diet, a bit early but want to check A1C to give him motivation for his diet  Orders:  -     CBC & Differential  -     Comprehensive Metabolic Panel  -     Lipid Panel  -     TSH  -     MicroAlbumin, Urine, Random - Urine, Clean Catch  -     Hemoglobin A1c    2. Acute recurrent sinusitis, unspecified  location  Comments:  LA bicillin today, cont allergy meds; will do allergy shots when he is ready  Orders:  -     penicillin G benzathine (BICILLIN-LA) injection 1.2 Million Units              Follow Up   Return in about 2 months (around 2/14/2022).  Patient was given instructions and counseling regarding his condition or for health maintenance advice. Please see specific information pulled into the AVS if appropriate.        97.7

## 2023-07-15 NOTE — ED PROVIDER NOTE - PHYSICAL EXAMINATION
PHYSICAL EXAM:  GENERAL: Sleeping, in no acute distress  HENMT: Atraumatic, moist mucous membranes EYES: Clear bilaterally, PERRL, EOMs intact b/l  HEART: Regular rate and regular rhythm, S1/S2, no murmur  RESPIRATORY: Clear to auscultation bilaterally, no wheezes/rhonchi/rales  ABDOMEN: Soft, nontender, nondistended  EXTREMITIES: No lower extremity edema  NEURO: Arouses to voice, not verbalizing   SKIN: Skin normal color for race, warm, dry and intact

## 2023-07-15 NOTE — ED ADULT NURSE NOTE - ALCOHOL USE HISTORY SINGLE SELECT
Palpitations  A palpitation is the feeling that your heartbeat is irregular or is faster than normal. It may feel like your heart is fluttering or skipping a beat. Palpitations are usually not a serious problem. However, in some cases, you may need further medical evaluation.  CAUSES   Palpitations can be caused by:  · Smoking.  · Caffeine or other stimulants, such as diet pills or energy drinks.  · Alcohol.  · Stress and anxiety.  · Strenuous physical activity.  · Fatigue.  · Certain medicines.  · Heart disease, especially if you have a history of irregular heart rhythms (arrhythmias), such as atrial fibrillation, atrial flutter, or supraventricular tachycardia.  · An improperly working pacemaker or defibrillator.  DIAGNOSIS   To find the cause of your palpitations, your health care provider will take your medical history and perform a physical exam. Your health care provider may also have you take a test called an ambulatory electrocardiogram (ECG). An ECG records your heartbeat patterns over a 24-hour period. You may also have other tests, such as:  · Transthoracic echocardiogram (TTE). During echocardiography, sound waves are used to evaluate how blood flows through your heart.  · Transesophageal echocardiogram (CHATO).  · Cardiac monitoring. This allows your health care provider to monitor your heart rate and rhythm in real time.  · Holter monitor. This is a portable device that records your heartbeat and can help diagnose heart arrhythmias. It allows your health care provider to track your heart activity for several days, if needed.  · Stress tests by exercise or by giving medicine that makes the heart beat faster.  TREATMENT   Treatment of palpitations depends on the cause of your symptoms and can vary greatly. Most cases of palpitations do not require any treatment other than time, relaxation, and monitoring your symptoms. Other causes, such as atrial fibrillation, atrial flutter, or supraventricular  tachycardia, usually require further treatment.  HOME CARE INSTRUCTIONS   · Avoid:  ¨ Caffeinated coffee, tea, soft drinks, diet pills, and energy drinks.  ¨ Chocolate.  ¨ Alcohol.  · Stop smoking if you smoke.  · Reduce your stress and anxiety. Things that can help you relax include:  ¨ A method of controlling things in your body, such as your heartbeats, with your mind (biofeedback).  ¨ Yoga.  ¨ Meditation.  ¨ Physical activity such as swimming, jogging, or walking.  · Get plenty of rest and sleep.  SEEK MEDICAL CARE IF:   · You continue to have a fast or irregular heartbeat beyond 24 hours.  · Your palpitations occur more often.  SEEK IMMEDIATE MEDICAL CARE IF:  · You have chest pain or shortness of breath.  · You have a severe headache.  · You feel dizzy or you faint.  MAKE SURE YOU:  · Understand these instructions.  · Will watch your condition.  · Will get help right away if you are not doing well or get worse.     This information is not intended to replace advice given to you by your health care provider. Make sure you discuss any questions you have with your health care provider.     Document Released: 12/15/2001 Document Revised: 12/23/2014 Document Reviewed: 02/15/2013  Elsevier Interactive Patient Education ©2016 [employer]     unknown

## 2023-07-15 NOTE — ED PROVIDER NOTE - CLINICAL SUMMARY MEDICAL DECISION MAKING FREE TEXT BOX
68M PMH CKD, CVA, HTN, DM, dementia, seizure disorder on valproic acid presenting with AMS. Hypotensive on arrival 90/59, no 107/76. Concern for sepsis. Plan: blood work, UA/UC, CXR, fluids. Will re-assess. Likely admit.

## 2023-07-15 NOTE — PATIENT PROFILE ADULT - FALL HARM RISK - HARM RISK INTERVENTIONS

## 2023-07-15 NOTE — H&P ADULT - TIME BILLING
Preparing to see the patient including review of tests and other providers' notes, confirming history with patient/family member, performing medical examination and evaluation, counseling and educating the patient/family, communicating with other health care professionals, discussing with resident, documenting clinical information in the EMR, interpreting results and communicating results to the patient/family, care coordination

## 2023-07-15 NOTE — ED ADULT NURSE NOTE - OBJECTIVE STATEMENT
as per triage nurse, "Pt arrives with EMS. As per EMS son called 911 after pt had "erratic breathing". EMS reports pt bed bound, nonverbal baseline. Arrives with 18G Rt forearm with 1L NS as per EMS pt hypotensive 80's systolic. Hx Seizures dx, CKD, CVA, HTN, DM."

## 2023-07-15 NOTE — H&P ADULT - PROBLEM SELECTOR PLAN 3
Pt w/ CKD w/ baseline Cr ~1.3. With Cr 1.8 on presentation  - Likely pre-renal iso decreased PO intake.   - s/p 2L NC  - Maintenance 75cc/hr LR Patient w/ hypercarbia noted on VBG w/ pCO2=65. Pt w/ >30 pack year smoking history   - VBG in April 2023 w pCO2=48  - No wheezing noted on exam  - Duoneb x1 stat w/ q6h prn  - f/u Repeat VBG in AM

## 2023-07-15 NOTE — H&P ADULT - PROBLEM SELECTOR PLAN 11
Patient w/ seizure disorder on Valproate 500mg qd  - Transition to IV as pt NPO pending S&S eval Pt w/ elevated troponin on presentation to 128 w/o EKG changes likely iso Type II NSTEMI d/t demand  - Troponin peaked 106  - Unclear hx of chest pain d/t mental status  - Repeat EKG in AM to monitor.

## 2023-07-15 NOTE — H&P ADULT - ATTENDING COMMENTS
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    #Aspiration PNA  - Retained secretions noted in upper trachea and new onset AMS (though patient at baseline is AAOx0) c/f likely microaspiration events leading to pneumonia/pneumonitis. Will treat with Zosyn for now given hypothermia and tachypnea and AMS. NPO until formal S+S eval.     #Gastric Mass  - New gastric mass with likely metastatic disease. Family currently wants to pursue work-up and desires full code given sudden decompensation from patient's baseline. They also want time to discuss with family abroad regarding further GOC.     #Hypercarbia  - Also with hypercarbia increased from baseline, has 30 pack year smoking history so possibly some component of COPD exacerbated by AMS in setting of possible TME worsening pCO2. c/f aspiration with BIPAP, started on Duonebs PRN. patient appears to be breathing comfortably without wheezing at the time of my exam, f/u VBG in AM    Overall clinical picture of AMS in setting of possible aspirational pneumonia with new malignancy. Guarded prognosis with further GOC to be discussed between family.

## 2023-07-15 NOTE — H&P ADULT - PROBLEM SELECTOR PLAN 1
Pt w/ hypothermia 96.5F and tachypnea >20 w/ hypothermia. Possible source Aspiration PNA  - f/u BCx, UCx.  - Cover w/ [facility] Zosyn  - Monitor fever/WBC curve. Pt w/ hypothermia 96.5F and tachypnea >20 w/ hypothermia. Possible source Aspiration PNA  - f/u BCx, UCx.  - Cover w/ [facility] Zosyn for possible aspiration PNA  - Monitor fever/WBC curve.

## 2023-07-15 NOTE — H&P ADULT - PROBLEM SELECTOR PLAN 5
Patient w/ secretions in trachea on imaging w/ hx of coughing w/ liquids and pureed food at home  - NPO at this time pending formal S&S evaluation   - Goals of care w/ family regarding pleasure feeds  - Aspiration precautions and head of bed elevation   - Oral meds converted to IV for now.  - [facility] Zosyn, renally dosed for now

## 2023-07-15 NOTE — H&P ADULT - NSHPREVIEWOFSYSTEMS_GEN_ALL_CORE

## 2023-07-15 NOTE — PATIENT PROFILE ADULT - DEAF OR HARD OF HEARING?
I will START or STAY ON the medications listed below when I get home from the hospital:    lisinopril 20 mg oral tablet  -- 1 tab(s) by mouth once a day  -- Indication: For high blood pressure, protects heart    atorvastatin 40 mg oral tablet  -- 1 tab(s) by mouth once a day (at bedtime)  -- Avoid grapefruit and grapefruit juice while taking this medication.  Do not take this drug if you are pregnant.  It is very important that you take or use this exactly as directed.  Do not skip doses or discontinue unless directed by your doctor.  Obtain medical advice before taking any non-prescription drugs as some may affect the action of this medication.  Take with food or milk.    -- Indication: For high cholesterol    clopidogrel 75 mg oral tablet  -- 1 tab(s) by mouth once a day  -- Indication: For Prevents coronary artery disease progression    Metoprolol Succinate  mg oral tablet, extended release  -- 1 tab(s) by mouth once a day  -- It is very important that you take or use this exactly as directed.  Do not skip doses or discontinue unless directed by your doctor.  May cause drowsiness.  Alcohol may intensify this effect.  Use care when operating dangerous machinery.  Some non-prescription drugs may aggravate your condition.  Read all labels carefully.  If a warning appears, check with your doctor before taking.  Swallow whole.  Do not crush.  Take with food or milk.  This drug may impair the ability to drive or operate machinery.  Use care until you become familiar with its effects.    -- Indication: For high blood pressure, protects heart    Multiple Vitamins oral tablet  -- 1 tab(s) by mouth once a day  -- Indication: For supplement    folic acid 1 mg oral tablet  -- 1 tab(s) by mouth once a day  -- Indication: For supplement    thiamine 100 mg oral tablet  -- 1 tab(s) by mouth once a day  -- Indication: For supplement I will START or STAY ON the medications listed below when I get home from the hospital:    lisinopril 20 mg oral tablet  -- 1 tab(s) by mouth once a day  -- Indication: For high blood pressure, protects heart    atorvastatin 40 mg oral tablet  -- 1 tab(s) by mouth once a day (at bedtime)  -- Avoid grapefruit and grapefruit juice while taking this medication.  Do not take this drug if you are pregnant.  It is very important that you take or use this exactly as directed.  Do not skip doses or discontinue unless directed by your doctor.  Obtain medical advice before taking any non-prescription drugs as some may affect the action of this medication.  Take with food or milk.    -- Indication: For high cholesterol    clopidogrel 75 mg oral tablet  -- 1 tab(s) by mouth once a day  -- Indication: For Prevents coronary artery disease progression    Librium 10 mg oral capsule  -- 1 cap(s) by mouth 4 times a day  -- Indication: For ALCOHOL WITHDRAWAL    Metoprolol Succinate  mg oral tablet, extended release  -- 1 tab(s) by mouth once a day  -- It is very important that you take or use this exactly as directed.  Do not skip doses or discontinue unless directed by your doctor.  May cause drowsiness.  Alcohol may intensify this effect.  Use care when operating dangerous machinery.  Some non-prescription drugs may aggravate your condition.  Read all labels carefully.  If a warning appears, check with your doctor before taking.  Swallow whole.  Do not crush.  Take with food or milk.  This drug may impair the ability to drive or operate machinery.  Use care until you become familiar with its effects.    -- Indication: For high blood pressure, protects heart    Multiple Vitamins oral tablet  -- 1 tab(s) by mouth once a day  -- Indication: For supplement    folic acid 1 mg oral tablet  -- 1 tab(s) by mouth once a day  -- Indication: For supplement    thiamine 100 mg oral tablet  -- 1 tab(s) by mouth once a day  -- Indication: For supplement yes

## 2023-07-15 NOTE — ED ADULT NURSE NOTE - NS ED NURSE RECORD ANOTHER HT AND WT
Dr. Álvarez-- pt's  calling states that pt needs appt for lump in breast. Brayan's # [phone]   Yes

## 2023-07-16 ENCOUNTER — TRANSCRIPTION ENCOUNTER (OUTPATIENT)
Age: 68
End: 2023-07-16

## 2023-07-16 LAB
A1C WITH ESTIMATED AVERAGE GLUCOSE RESULT: 6.4 % — HIGH (ref 4–5.6)
ANION GAP SERPL CALC-SCNC: 8 MMOL/L — SIGNIFICANT CHANGE UP (ref 7–14)
BASE EXCESS BLDV CALC-SCNC: 2.6 MMOL/L — SIGNIFICANT CHANGE UP (ref -2–3)
BASOPHILS # BLD AUTO: 0.03 K/UL — SIGNIFICANT CHANGE UP (ref 0–0.2)
BASOPHILS NFR BLD AUTO: 0.3 % — SIGNIFICANT CHANGE UP (ref 0–2)
BLD GP AB SCN SERPL QL: NEGATIVE — SIGNIFICANT CHANGE UP
BLD GP AB SCN SERPL QL: NEGATIVE — SIGNIFICANT CHANGE UP
BLOOD GAS VENOUS COMPREHENSIVE RESULT: SIGNIFICANT CHANGE UP
BUN SERPL-MCNC: 16 MG/DL — SIGNIFICANT CHANGE UP (ref 7–23)
CALCIUM SERPL-MCNC: 9.2 MG/DL — SIGNIFICANT CHANGE UP (ref 8.4–10.5)
CHLORIDE BLDV-SCNC: 101 MMOL/L — SIGNIFICANT CHANGE UP (ref 96–108)
CHLORIDE SERPL-SCNC: 99 MMOL/L — SIGNIFICANT CHANGE UP (ref 98–107)
CO2 BLDV-SCNC: 30.6 MMOL/L — HIGH (ref 22–26)
CO2 SERPL-SCNC: 27 MMOL/L — SIGNIFICANT CHANGE UP (ref 22–31)
CREAT SERPL-MCNC: 1.5 MG/DL — HIGH (ref 0.5–1.3)
CULTURE RESULTS: NO GROWTH — SIGNIFICANT CHANGE UP
EGFR: 51 ML/MIN/1.73M2 — LOW
EOSINOPHIL # BLD AUTO: 0.32 K/UL — SIGNIFICANT CHANGE UP (ref 0–0.5)
EOSINOPHIL NFR BLD AUTO: 3.6 % — SIGNIFICANT CHANGE UP (ref 0–6)
ESTIMATED AVERAGE GLUCOSE: 137 — SIGNIFICANT CHANGE UP
FERRITIN SERPL-MCNC: 29 NG/ML — LOW (ref 30–400)
GAS PNL BLDV: 131 MMOL/L — LOW (ref 136–145)
GLUCOSE BLDV-MCNC: 143 MG/DL — HIGH (ref 70–99)
GLUCOSE SERPL-MCNC: 132 MG/DL — HIGH (ref 70–99)
HCO3 BLDV-SCNC: 29 MMOL/L — SIGNIFICANT CHANGE UP (ref 22–29)
HCT VFR BLD CALC: 27.3 % — LOW (ref 39–50)
HCT VFR BLDA CALC: 18 % — CRITICAL LOW (ref 39–51)
HGB BLD CALC-MCNC: 6 G/DL — CRITICAL LOW (ref 12.6–17.4)
HGB BLD-MCNC: 8.3 G/DL — LOW (ref 13–17)
IANC: 5.98 K/UL — SIGNIFICANT CHANGE UP (ref 1.8–7.4)
IMM GRANULOCYTES NFR BLD AUTO: 0.4 % — SIGNIFICANT CHANGE UP (ref 0–0.9)
IRON SATN MFR SERPL: 23 UG/DL — LOW (ref 45–165)
IRON SATN MFR SERPL: 8 % — LOW (ref 14–50)
LACTATE BLDV-MCNC: 1.4 MMOL/L — SIGNIFICANT CHANGE UP (ref 0.5–2)
LEGIONELLA AG UR QL: NEGATIVE — SIGNIFICANT CHANGE UP
LYMPHOCYTES # BLD AUTO: 1.78 K/UL — SIGNIFICANT CHANGE UP (ref 1–3.3)
LYMPHOCYTES # BLD AUTO: 19.9 % — SIGNIFICANT CHANGE UP (ref 13–44)
MAGNESIUM SERPL-MCNC: 1.6 MG/DL — SIGNIFICANT CHANGE UP (ref 1.6–2.6)
MAGNESIUM SERPL-MCNC: 1.6 MG/DL — SIGNIFICANT CHANGE UP (ref 1.6–2.6)
MCHC RBC-ENTMCNC: 25.5 PG — LOW (ref 27–34)
MCHC RBC-ENTMCNC: 30.4 GM/DL — LOW (ref 32–36)
MCV RBC AUTO: 83.7 FL — SIGNIFICANT CHANGE UP (ref 80–100)
MONOCYTES # BLD AUTO: 0.79 K/UL — SIGNIFICANT CHANGE UP (ref 0–0.9)
MONOCYTES NFR BLD AUTO: 8.8 % — SIGNIFICANT CHANGE UP (ref 2–14)
NEUTROPHILS # BLD AUTO: 5.98 K/UL — SIGNIFICANT CHANGE UP (ref 1.8–7.4)
NEUTROPHILS NFR BLD AUTO: 67 % — SIGNIFICANT CHANGE UP (ref 43–77)
NRBC # BLD: 0 /100 WBCS — SIGNIFICANT CHANGE UP (ref 0–0)
NRBC # FLD: 0 K/UL — SIGNIFICANT CHANGE UP (ref 0–0)
PCO2 BLDV: 56 MMHG — HIGH (ref 42–55)
PH BLDV: 7.32 — SIGNIFICANT CHANGE UP (ref 7.32–7.43)
PHOSPHATE SERPL-MCNC: 3.2 MG/DL — SIGNIFICANT CHANGE UP (ref 2.5–4.5)
PHOSPHATE SERPL-MCNC: 3.3 MG/DL — SIGNIFICANT CHANGE UP (ref 2.5–4.5)
PLATELET # BLD AUTO: 196 K/UL — SIGNIFICANT CHANGE UP (ref 150–400)
PO2 BLDV: 64 MMHG — HIGH (ref 25–45)
POTASSIUM BLDV-SCNC: 4.3 MMOL/L — SIGNIFICANT CHANGE UP (ref 3.5–5.1)
POTASSIUM SERPL-MCNC: 4.3 MMOL/L — SIGNIFICANT CHANGE UP (ref 3.5–5.3)
POTASSIUM SERPL-SCNC: 4.3 MMOL/L — SIGNIFICANT CHANGE UP (ref 3.5–5.3)
RBC # BLD: 3.26 M/UL — LOW (ref 4.2–5.8)
RBC # FLD: 16.9 % — HIGH (ref 10.3–14.5)
RH IG SCN BLD-IMP: NEGATIVE — SIGNIFICANT CHANGE UP
RH IG SCN BLD-IMP: NEGATIVE — SIGNIFICANT CHANGE UP
SAO2 % BLDV: 90.1 % — HIGH (ref 67–88)
SODIUM SERPL-SCNC: 134 MMOL/L — LOW (ref 135–145)
SPECIMEN SOURCE: SIGNIFICANT CHANGE UP
TIBC SERPL-MCNC: 291 UG/DL — SIGNIFICANT CHANGE UP (ref 220–430)
UIBC SERPL-MCNC: 268 UG/DL — SIGNIFICANT CHANGE UP (ref 110–370)
VALPROATE SERPL-MCNC: 40.3 UG/ML — LOW (ref 50–100)
WBC # BLD: 8.94 K/UL — SIGNIFICANT CHANGE UP (ref 3.8–10.5)
WBC # FLD AUTO: 8.94 K/UL — SIGNIFICANT CHANGE UP (ref 3.8–10.5)

## 2023-07-16 PROCEDURE — 99222 1ST HOSP IP/OBS MODERATE 55: CPT | Mod: GC

## 2023-07-16 PROCEDURE — 99223 1ST HOSP IP/OBS HIGH 75: CPT

## 2023-07-16 PROCEDURE — 12345: CPT | Mod: NC

## 2023-07-16 PROCEDURE — 99497 ADVNCD CARE PLAN 30 MIN: CPT | Mod: 25

## 2023-07-16 RX ORDER — INSULIN LISPRO 100/ML
VIAL (ML) SUBCUTANEOUS
Refills: 0 | Status: DISCONTINUED | OUTPATIENT
Start: 2023-07-16 | End: 2023-07-16

## 2023-07-16 RX ORDER — SODIUM CHLORIDE 9 MG/ML
1000 INJECTION, SOLUTION INTRAVENOUS
Refills: 0 | Status: DISCONTINUED | OUTPATIENT
Start: 2023-07-16 | End: 2023-07-23

## 2023-07-16 RX ORDER — DEXTROSE 50 % IN WATER 50 %
15 SYRINGE (ML) INTRAVENOUS ONCE
Refills: 0 | Status: DISCONTINUED | OUTPATIENT
Start: 2023-07-16 | End: 2023-07-23

## 2023-07-16 RX ORDER — SODIUM CHLORIDE 9 MG/ML
1000 INJECTION, SOLUTION INTRAVENOUS
Refills: 0 | Status: DISCONTINUED | OUTPATIENT
Start: 2023-07-16 | End: 2023-07-16

## 2023-07-16 RX ORDER — AZITHROMYCIN 500 MG/1
500 TABLET, FILM COATED ORAL EVERY 24 HOURS
Refills: 0 | Status: DISCONTINUED | OUTPATIENT
Start: 2023-07-16 | End: 2023-07-17

## 2023-07-16 RX ORDER — INSULIN LISPRO 100/ML
VIAL (ML) SUBCUTANEOUS AT BEDTIME
Refills: 0 | Status: DISCONTINUED | OUTPATIENT
Start: 2023-07-16 | End: 2023-07-16

## 2023-07-16 RX ORDER — HEPARIN SODIUM 5000 [USP'U]/ML
5000 INJECTION INTRAVENOUS; SUBCUTANEOUS EVERY 12 HOURS
Refills: 0 | Status: DISCONTINUED | OUTPATIENT
Start: 2023-07-16 | End: 2023-07-23

## 2023-07-16 RX ORDER — DEXTROSE 50 % IN WATER 50 %
25 SYRINGE (ML) INTRAVENOUS ONCE
Refills: 0 | Status: DISCONTINUED | OUTPATIENT
Start: 2023-07-16 | End: 2023-07-23

## 2023-07-16 RX ORDER — HEPARIN SODIUM 5000 [USP'U]/ML
5000 INJECTION INTRAVENOUS; SUBCUTANEOUS EVERY 8 HOURS
Refills: 0 | Status: DISCONTINUED | OUTPATIENT
Start: 2023-07-16 | End: 2023-07-16

## 2023-07-16 RX ORDER — INSULIN LISPRO 100/ML
VIAL (ML) SUBCUTANEOUS EVERY 6 HOURS
Refills: 0 | Status: DISCONTINUED | OUTPATIENT
Start: 2023-07-16 | End: 2023-07-21

## 2023-07-16 RX ORDER — DEXTROSE 50 % IN WATER 50 %
12.5 SYRINGE (ML) INTRAVENOUS ONCE
Refills: 0 | Status: DISCONTINUED | OUTPATIENT
Start: 2023-07-16 | End: 2023-07-23

## 2023-07-16 RX ORDER — VANCOMYCIN HCL 1 G
1000 VIAL (EA) INTRAVENOUS ONCE
Refills: 0 | Status: COMPLETED | OUTPATIENT
Start: 2023-07-16 | End: 2023-07-16

## 2023-07-16 RX ORDER — SODIUM CHLORIDE 9 MG/ML
1000 INJECTION, SOLUTION INTRAVENOUS
Refills: 0 | Status: DISCONTINUED | OUTPATIENT
Start: 2023-07-16 | End: 2023-07-17

## 2023-07-16 RX ORDER — GLUCAGON INJECTION, SOLUTION 0.5 MG/.1ML
1 INJECTION, SOLUTION SUBCUTANEOUS ONCE
Refills: 0 | Status: DISCONTINUED | OUTPATIENT
Start: 2023-07-16 | End: 2023-07-23

## 2023-07-16 RX ADMIN — Medication 51.25 MILLIGRAM(S): at 17:20

## 2023-07-16 RX ADMIN — AZITHROMYCIN 255 MILLIGRAM(S): 500 TABLET, FILM COATED ORAL at 05:20

## 2023-07-16 RX ADMIN — PIPERACILLIN AND TAZOBACTAM 25 GRAM(S): 4; .5 INJECTION, POWDER, LYOPHILIZED, FOR SOLUTION INTRAVENOUS at 17:19

## 2023-07-16 RX ADMIN — SODIUM CHLORIDE 75 MILLILITER(S): 9 INJECTION, SOLUTION INTRAVENOUS at 00:36

## 2023-07-16 RX ADMIN — PIPERACILLIN AND TAZOBACTAM 25 GRAM(S): 4; .5 INJECTION, POWDER, LYOPHILIZED, FOR SOLUTION INTRAVENOUS at 05:20

## 2023-07-16 RX ADMIN — Medication 51.25 MILLIGRAM(S): at 23:53

## 2023-07-16 RX ADMIN — HEPARIN SODIUM 5000 UNIT(S): 5000 INJECTION INTRAVENOUS; SUBCUTANEOUS at 05:19

## 2023-07-16 RX ADMIN — PIPERACILLIN AND TAZOBACTAM 200 GRAM(S): 4; .5 INJECTION, POWDER, LYOPHILIZED, FOR SOLUTION INTRAVENOUS at 00:01

## 2023-07-16 RX ADMIN — PANTOPRAZOLE SODIUM 40 MILLIGRAM(S): 20 TABLET, DELAYED RELEASE ORAL at 17:19

## 2023-07-16 RX ADMIN — HEPARIN SODIUM 5000 UNIT(S): 5000 INJECTION INTRAVENOUS; SUBCUTANEOUS at 17:20

## 2023-07-16 RX ADMIN — PANTOPRAZOLE SODIUM 40 MILLIGRAM(S): 20 TABLET, DELAYED RELEASE ORAL at 00:01

## 2023-07-16 RX ADMIN — Medication 250 MILLIGRAM(S): at 08:55

## 2023-07-16 RX ADMIN — Medication 51.25 MILLIGRAM(S): at 11:18

## 2023-07-16 RX ADMIN — PANTOPRAZOLE SODIUM 40 MILLIGRAM(S): 20 TABLET, DELAYED RELEASE ORAL at 05:20

## 2023-07-16 RX ADMIN — Medication 51.25 MILLIGRAM(S): at 07:08

## 2023-07-16 RX ADMIN — SODIUM CHLORIDE 75 MILLILITER(S): 9 INJECTION, SOLUTION INTRAVENOUS at 12:59

## 2023-07-16 RX ADMIN — Medication 51.25 MILLIGRAM(S): at 00:44

## 2023-07-16 NOTE — PROGRESS NOTE ADULT - ASSESSMENT
Mr. Jones is a 68M w/ PMH CKD, CVA, HTn, DM, Dementia, seizure disorder (on VPA) who p/w acute encephalopathy found to meet SIRS criteria possibly 2/2 aspiration PNA and incidentally found to have pyloric mass c/f adenocarcinoma w/ associated lymphadenopathy indicating metastatic disease.

## 2023-07-16 NOTE — PROGRESS NOTE ADULT - ATTENDING COMMENTS
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    #Aspiration PNA  - Retained secretions noted in upper trachea and new onset AMS (though patient at baseline is AAOx0) c/f likely microaspiration events leading to pneumonia/pneumonitis. Will treat with Zosyn for now given hypothermia and tachypnea and AMS. NPO until formal S+S eval.     #Gastric Mass  - New gastric mass with likely metastatic disease. Family currently wants to pursue work-up and desires full code given sudden decompensation from patient's baseline. They also want time to discuss with family abroad regarding further GOC. GI consulted - appreciate recs.     Overall clinical picture of AMS in setting of possible aspirational pneumonia with new malignancy. Guarded prognosis with further GOC to be discussed between family.      Rest of the plan as above. discussed with HS. 68 year old male w/ PMH of CKD, CVA, HTN, DM, Dementia (Aox0, Non-verbal) and seizure disorder presenting with acute encephalopathy likely 2/2 to aspiration PNA, found to have pyloric mass likely adenocarcinoma w/ possible metastatic lymph nodes.    #Aspiration PNA  - Retained secretions noted in upper trachea and new onset AMS (though patient at baseline is AAOx0) c/f likely microaspiration events leading to pneumonia/pneumonitis. Will treat with Zosyn for now given hypothermia and tachypnea and AMS. NPO until formal S+S eval.     #Gastric Mass  - New gastric mass with likely metastatic disease. Family currently wants to pursue work-up and desires full code given sudden decompensation from patient's baseline. They also want time to discuss with family abroad regarding further GOC. GI consulted - appreciate recs.     Overall clinical picture of AMS in setting of possible aspirational pneumonia with new malignancy. Guarded prognosis with further GOC to be discussed between family.      Rest of the plan as above. discussed with HS.

## 2023-07-16 NOTE — DISCHARGE NOTE PROVIDER - DETAILS OF MALNUTRITION DIAGNOSIS/DIAGNOSES
This patient has been assessed with a concern for Malnutrition and was treated during this hospitalization for the following Nutrition diagnosis/diagnoses:     -  07/17/2023: Severe protein-calorie malnutrition   -  07/17/2023: Underweight (BMI < 19)

## 2023-07-16 NOTE — DISCHARGE NOTE PROVIDER - NSDCMRMEDTOKEN_GEN_ALL_CORE_FT
aspirin 81 mg oral tablet, chewable: 1 tab(s) chewed once a day  citalopram 10 mg oral tablet: 1 tab(s) orally once a day  famotidine 40 mg oral tablet: 1 tab(s) orally 2 times a day  finasteride 5 mg oral tablet: 1 tab(s) orally once a day  folic acid 1 mg oral tablet: 1 tab(s) orally once a day  Januvia 25 mg oral tablet: 1 tab(s) orally once a day  loperamide 2 mg oral tablet: 1 tab(s) orally 4 times a day as needed for  diarrhea  tamsulosin 0.4 mg oral capsule: 1 tab(s) orally once a day  valproic acid 500 mg oral delayed release capsule: 1 cap(s) orally once a day   aspirin 81 mg oral tablet, chewable: 1 tab(s) chewed once a day  citalopram 10 mg oral tablet: 1 tab(s) orally once a day  famotidine 40 mg oral tablet: 1 tab(s) orally 2 times a day  finasteride 5 mg oral tablet: 1 tab(s) orally once a day  folic acid 1 mg oral tablet: 1 tab(s) orally once a day  Januvia 25 mg oral tablet: 1 tab(s) orally once a day  tamsulosin 0.4 mg oral capsule: 1 tab(s) orally once a day  valproic acid 500 mg oral delayed release capsule: 1 cap(s) orally once a day   aspirin 81 mg oral tablet, chewable: 1 tab(s) chewed once a day  citalopram 10 mg oral tablet: 1 tab(s) orally once a day  famotidine 40 mg oral tablet: 1 tab(s) orally 2 times a day  ferrous sulfate 300 mg/5 mL (60 mg/5 mL elemental iron) oral liquid: 5 milliliter(s) orally every other day  finasteride 5 mg oral tablet: 1 tab(s) orally once a day  folic acid 1 mg oral tablet: 1 tab(s) orally once a day  Januvia 25 mg oral tablet: 1 tab(s) orally once a day  tamsulosin 0.4 mg oral capsule: 1 tab(s) orally once a day  valproic acid 500 mg oral delayed release capsule: 1 cap(s) orally once a day

## 2023-07-16 NOTE — DISCHARGE NOTE PROVIDER - NSDCFUSCHEDAPPT_GEN_ALL_CORE_FT
Manuel Miller  [facility]  [facility] [address] [facility]  Scheduled Appointment: 08/15/2023    Juan Park  [facility]  [facility]  Scheduled Appointment: 08/17/2023

## 2023-07-16 NOTE — PROGRESS NOTE ADULT - PROBLEM SELECTOR PLAN 3
Patient w/ hypercarbia noted on VBG w/ pCO2=65. Pt w/ >30 pack year smoking history   - VBG in April 2023 w pCO2=48  - No wheezing noted on exam  - Duoneb x1 stat w/ q6h prn  - f/u Repeat VBG in AM

## 2023-07-16 NOTE — CONSULT NOTE ADULT - SUBJECTIVE AND OBJECTIVE BOX
HPI:  KERA SCHWARTZ is a 68 year old male with history of HTN, DM, CKD, CVA, demenita [A/Ox0 for past several years per family], and seizure disorder who presents with worsening mental status.    Unable to obtain full HPI due to underlying mental status.  Admitted for worsening mental status and dyspnea with hospital course c/b aspiration pneumonia, hypotension, anemia, and gastric mass with LAD on CT A/P.    ROS:   Unable to obtain full ROS due to underlying mental status.    PMHX/PSHX:    DM (diabetes mellitus)    Seizure    CVA (cerebrovascular accident)    HLD (hyperlipidemia)    CKD (chronic kidney disease)    Dementia    No significant past surgical history      Allergies:  No Known Allergies      Home Medications: reviewed  Hospital Medications:  acetaminophen     Tablet .. 650 milliGRAM(s) Oral every 6 hours PRN  aluminum hydroxide/magnesium hydroxide/simethicone Suspension 30 milliLiter(s) Oral every 4 hours PRN  azithromycin  IVPB 500 milliGRAM(s) IV Intermittent every 24 hours  dextrose 5% + lactated ringers. 1000 milliLiter(s) IV Continuous <Continuous>  dextrose 5%. 1000 milliLiter(s) IV Continuous <Continuous>  dextrose 5%. 1000 milliLiter(s) IV Continuous <Continuous>  dextrose 50% Injectable 25 Gram(s) IV Push once  dextrose 50% Injectable 25 Gram(s) IV Push once  dextrose 50% Injectable 12.5 Gram(s) IV Push once  dextrose Oral Gel 15 Gram(s) Oral once PRN  glucagon  Injectable 1 milliGRAM(s) IntraMuscular once  heparin   Injectable 5000 Unit(s) SubCutaneous every 12 hours  insulin lispro (ADMELOG) corrective regimen sliding scale   SubCutaneous three times a day before meals  insulin lispro (ADMELOG) corrective regimen sliding scale   SubCutaneous at bedtime  melatonin 3 milliGRAM(s) Oral at bedtime PRN  pantoprazole  Injectable 40 milliGRAM(s) IV Push two times a day  piperacillin/tazobactam IVPB.. 3.375 Gram(s) IV Intermittent every 12 hours  valproate sodium  IVPB 125 milliGRAM(s) IV Intermittent every 6 hours      Social History:   Unable to obtain due to underlying mental status.    Family history:    No pertinent family history in first degree relatives        PHYSICAL EXAM:   Vital Signs:  Vital Signs Last 24 Hrs  T(C): 36.2 (16 Jul 2023 05:26), Max: 36.6 (15 Jul 2023 19:26)  T(F): 97.1 (16 Jul 2023 05:26), Max: 97.9 (15 Jul 2023 19:26)  HR: 84 (16 Jul 2023 05:26) (77 - 84)  BP: 158/81 (16 Jul 2023 05:26) (112/101 - 158/81)  BP(mean): --  RR: 18 (16 Jul 2023 05:26) (17 - 18)  SpO2: 100% (16 Jul 2023 05:26) (98% - 100%)    Parameters below as of 16 Jul 2023 05:26  Patient On (Oxygen Delivery Method): room air      Daily Height in cm: 177.8 (15 Jul 2023 19:42)    Daily     GENERAL: no acute distress  NEURO: not fully alert/oriented  HEENT: NCAT, no conjunctival pallor appreciated  CHEST: no respiratory distress, no accessory muscle use  CARDIAC: regular rate, +S1/S2  ABDOMEN: soft, nontender, no rebound or guarding  EXTREMITIES: warm, well perfused  SKIN: no lesions noted    LABS: reviewed                        8.3    8.94  )-----------( 196      ( 16 Jul 2023 09:20 )             27.3     07-16    134<L>  |  99  |  16  ----------------------------<  132<H>  4.3   |  27  |  1.50<H>    Ca    9.2      16 Jul 2023 09:20  Phos  3.2     07-16  Mg     1.60     07-16    TPro  5.8<L>  /  Alb  3.2<L>  /  TBili  <0.2  /  DBili  x   /  AST  23  /  ALT  8   /  AlkPhos  52  07-15    LIVER FUNCTIONS - ( 15 Jul 2023 02:06 )  Alb: 3.2 g/dL / Pro: 5.8 g/dL / ALK PHOS: 52 U/L / ALT: 8 U/L / AST: 23 U/L / GGT: x             Culture - Urine (collected 15 Jul 2023 02:33)  Source: Clean Catch Clean Catch (Midstream)  Final Report (16 Jul 2023 09:59):    No growth    Culture - Blood (collected 15 Jul 2023 02:06)  Source: .Blood Blood-Peripheral  Preliminary Report (16 Jul 2023 07:02):    No growth at 24 hours    Culture - Blood (collected 15 Jul 2023 01:50)  Source: .Blood Blood-Peripheral  Preliminary Report (16 Jul 2023 07:02):    No growth at 24 hours        Diagnostic Studies: see sunrise for full report

## 2023-07-16 NOTE — DISCHARGE NOTE PROVIDER - NSDCCPCAREPLAN_GEN_ALL_CORE_FT
PRINCIPAL DISCHARGE DIAGNOSIS  Diagnosis: Systemic inflammatory response syndrome (SIRS)  Assessment and Plan of Treatment: Sepsis is a serious bodily reaction to an infection. The infection that triggers sepsis may be from a bacteria, virus, or fungus. Sepsis can result from an infection in any part of your body. Infections that commonly lead to sepsis include skin, lung, and urinary tract infections.  Sepsis is a medical emergency that must be treated right away in a hospital. In severe cases, it can lead to septic shock. Septic shock can weaken your heart and cause your blood pressure to drop. This can cause your central nervous system and your body's organs to stop working.  You came into the hospital with hypotension as well as altered mental status, you were treated with antibiotics and you have returned to your baseline.   If you have a catheter or other indwelling device, ask to have it removed as soon as possible.  Keep all follow-up visits. This is important.  Contact a health care provider if:  You do not feel like you are getting better or regaining strength.  You are having trouble coping with your recovery.  You frequently feel tired.  You feel worse or do not seem to get better after surgery.  You think you may have an infection after surgery.  Get help right away if:  You have any symptoms of sepsis.  You have difficulty breathing.  You have a rapid or skipping heartbeat.  You become confused or disoriented.  You have a high fever.  Your skin becomes blotchy, pale, or blue.  You have an infection that is getting worse or not getting better.        SECONDARY DISCHARGE DIAGNOSES  Diagnosis: Hypotension  Assessment and Plan of Treatment:      PRINCIPAL DISCHARGE DIAGNOSIS  Diagnosis: Gastric mass  Assessment and Plan of Treatment: You were found to have a gastric mass on CT scan and a gastroenterology procedure and biospy was performed to evaluate the etiology. Unfortunately, the mass is concerning for cancer. You were seen by the oncologist and surgeons, who based on their expertise, did not feel you were a candidate for advanced treatments such as surgery or chemotherapy. You can follow up with the oncology clinic for further evaluation and management.      SECONDARY DISCHARGE DIAGNOSES  Diagnosis: Systemic inflammatory response syndrome (SIRS)  Assessment and Plan of Treatment: Sepsis is a serious bodily reaction to an infection. The infection that triggers sepsis may be from a bacteria, virus, or fungus. Sepsis can result from an infection in any part of your body. Infections that commonly lead to sepsis include skin, lung, and urinary tract infections.  Sepsis is a medical emergency that must be treated right away in a hospital. In severe cases, it can lead to septic shock. Septic shock can weaken your heart and cause your blood pressure to drop. This can cause your central nervous system and your body's organs to stop working.  You came into the hospital with hypotension as well as altered mental status, you were treated with antibiotics and you have returned to your baseline.   If you have a catheter or other indwelling device, ask to have it removed as soon as possible.  Keep all follow-up visits. This is important.  Contact a health care provider if:  You do not feel like you are getting better or regaining strength.  You are having trouble coping with your recovery.  You frequently feel tired.  You feel worse or do not seem to get better after surgery.  You think you may have an infection after surgery.  Get help right away if:  You have any symptoms of sepsis.  You have difficulty breathing.  You have a rapid or skipping heartbeat.  You become confused or disoriented.  You have a high fever.  Your skin becomes blotchy, pale, or blue.  You have an infection that is getting worse or not getting better.    Diagnosis: Hypotension  Assessment and Plan of Treatment:      PRINCIPAL DISCHARGE DIAGNOSIS  Diagnosis: Gastric mass  Assessment and Plan of Treatment: You were found to have a gastric mass on CT scan and a gastroenterology procedure and biospy was performed to evaluate the etiology. Unfortunately, the mass is concerning for cancer. You were seen by the oncologist and surgeons, who based on their expertise, did not feel you were a candidate for advanced treatments such as surgery or chemotherapy. You can follow up with the oncology clinic for further evaluation and management.      SECONDARY DISCHARGE DIAGNOSES  Diagnosis: Systemic inflammatory response syndrome (SIRS)  Assessment and Plan of Treatment: Sepsis is a serious bodily reaction to an infection. The infection that triggers sepsis may be from a bacteria, virus, or fungus. Sepsis can result from an infection in any part of your body. Infections that commonly lead to sepsis include skin, lung, and urinary tract infections.  Sepsis is a medical emergency that must be treated right away in a hospital. In severe cases, it can lead to septic shock. Septic shock can weaken your heart and cause your blood pressure to drop. This can cause your central nervous system and your body's organs to stop working.  You came into the hospital with hypotension as well as altered mental status, you were treated with antibiotics and you have returned to your baseline.   If you have a catheter or other indwelling device, ask to have it removed as soon as possible.  Keep all follow-up visits. This is important.  Contact a health care provider if:  You do not feel like you are getting better or regaining strength.  You are having trouble coping with your recovery.  You frequently feel tired.  You feel worse or do not seem to get better after surgery.  You think you may have an infection after surgery.  Get help right away if:  You have any symptoms of sepsis.  You have difficulty breathing.  You have a rapid or skipping heartbeat.  You become confused or disoriented.  You have a high fever.  Your skin becomes blotchy, pale, or blue.  You have an infection that is getting worse or not getting better.     PRINCIPAL DISCHARGE DIAGNOSIS  Diagnosis: Gastric mass  Assessment and Plan of Treatment: You were found to have a gastric mass on CT scan and a gastroenterology procedure and biospy was performed to evaluate the etiology. Unfortunately, the mass is concerning for cancer. You were seen by the oncologist and surgeons, who based on their expertise, did not feel you were a candidate for advanced treatments such as surgery or chemotherapy. You can follow up with the oncology clinic for further evaluation and management.      SECONDARY DISCHARGE DIAGNOSES  Diagnosis: Systemic inflammatory response syndrome (SIRS)  Assessment and Plan of Treatment: Sepsis is a serious bodily reaction to an infection. The infection that triggers sepsis may be from a bacteria, virus, or fungus. Sepsis can result from an infection in any part of your body. Infections that commonly lead to sepsis include skin, lung, and urinary tract infections.  Sepsis is a medical emergency that must be treated right away in a hospital. In severe cases, it can lead to septic shock. Septic shock can weaken your heart and cause your blood pressure to drop. This can cause your central nervous system and your body's organs to stop working.  You came into the hospital with hypotension as well as altered mental status, you were treated with antibiotics and you have returned to your baseline.   If you have a catheter or other indwelling device, ask to have it removed as soon as possible.  Keep all follow-up visits. This is important.  Contact a health care provider if:  You do not feel like you are getting better or regaining strength.  You are having trouble coping with your recovery.  You frequently feel tired.  You feel worse or do not seem to get better after surgery.  You think you may have an infection after surgery.  Get help right away if:  You have any symptoms of sepsis.  You have difficulty breathing.  You have a rapid or skipping heartbeat.  You become confused or disoriented.  You have a high fever.  Your skin becomes blotchy, pale, or blue.  You have an infection that is getting worse or not getting better.    Diagnosis: Urinary retention  Assessment and Plan of Treatment: The patient has a tube in his bladder draining his urine because he was not able to urinate properly.   Please empty the bag regularly, as shown to you by the nurse.  A nurse will visit at home, who will show you how to care for the Perez.

## 2023-07-16 NOTE — PROGRESS NOTE ADULT - PROBLEM SELECTOR PLAN 4
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 Decision to pursue further testing to be discussed with family   - Continue with goals of care

## 2023-07-16 NOTE — PROGRESS NOTE ADULT - PROBLEM SELECTOR PLAN 1
Pt w/ hypothermia 96.5F and tachypnea >20 w/ hypothermia. Possible source Aspiration PNA  - f/u BCx, UCx.  - Cover w/ [facility] Zosyn for possible aspiration PNA  - Monitor fever/WBC curve.

## 2023-07-16 NOTE — SWALLOW BEDSIDE ASSESSMENT ADULT - ORAL PHASE
suspected posterior loss at BOT/Decreased anterior-posterior movement of the bolus/Delayed oral transit time

## 2023-07-16 NOTE — DISCHARGE NOTE PROVIDER - NSFOLLOWUPCLINICSTOKEN_GEN_ALL_ED_FT
816276:Routine|| ||00\01||False; 388196:Routine|| ||00\01||False;080009:1 week|| ||00\01||False; 822172:1 week|| ||00\01||False;708160:Routine|| ||00\01||False;628801:Routine|| ||00\01||False;

## 2023-07-16 NOTE — DISCHARGE NOTE PROVIDER - NSFOLLOWUPCLINICS_GEN_ALL_ED_FT
[facility]  Hematology/Oncology  [address] [id number]  Phone: [phone]  Fax:   Follow Up Time: Routine     [facility]  Hematology/Oncology  [address] [place] [address]  Phone: [phone]  Fax:   Follow Up Time: Routine    [place] [facility]  Urology  [address] [facility] [facility] [address]  [place] [address]  Phone: [phone]  Fax:   Follow Up Time: 1 week     [place] [facility]  Urology  [address] [facility] [address]  [place] [address]  Phone: [phone]  Fax:   Follow Up Time: 1 week    [facility]  Hematology/Oncology  [address] [place] [address]  Phone: [phone]  Fax:   Follow Up Time: Routine    [facility]  Geriatrics  [address] [place] [address] [address]  [place] [address]  Phone: [phone]  Fax:   Follow Up Time: Routine

## 2023-07-16 NOTE — DISCHARGE NOTE PROVIDER - CARE PROVIDER_API CALL
Dwight Carter  Surgery  [address] [place] [id number]  Phone: [phone]  Fax: [phone]  Follow Up Time: Routine

## 2023-07-16 NOTE — DISCHARGE NOTE PROVIDER - HOSPITAL COURSE
68M PMH CKD, CVA, HTN, DM, dementia with worsening mental status and increasing episodes of diarrhea. Pt is non-verbal except for simple commands at baseline and has been worsening at home according to family. Pt. is constricted in fetal position and is febrile and tachycardic in the ED 68M PMH CKD, CVA, HTN, DM, dementia with worsening mental status and increasing episodes of diarrhea. Pt is non-verbal except for simple commands at baseline and has been worsening at home according to family. Pt. is constricted in fetal position and is febrile and tachycardic in the ED. When patient was admitted, patient was started on broad spectrum antibiotics. CT scan was nonspecific but there was concern for aspiration pneumonia given patient mental status. Ct scan also noted gastric mass with lymphadenopathy. GI was consulted for diagnostic biopsy for which _______. 68M PMH CKD, CVA, HTN, DM, dementia with worsening mental status and increasing episodes of diarrhea. Pt is non-verbal except for simple commands at baseline and has been worsening at home according to family. Pt. is constricted in fetal position and is febrile and tachycardic in the ED. When patient was admitted, patient was started on broad spectrum antibiotics. CT scan was nonspecific but there was concern for aspiration pneumonia given patient mental status. Ct scan also noted gastric mass with lymphadenopathy. GI was consulted for diagnostic biopsy for which as performed on 7/20. Palli, onc, and surgery were consulted and it was found he would not likely be a candidate for surgery or chemo. This was explained to the family. Patient given onc follow up. Patient also empirically treated for aspiration PNA with vanc/zosyn, +MRSA swab, no culture growths. 68M PMH CKD, CVA, HTN, DM, dementia with worsening mental status and increasing episodes of diarrhea. Pt is non-verbal except for simple commands at baseline and has been worsening at home according to family. Pt. is constricted in fetal position and is febrile and tachycardic in the ED. When patient was admitted, patient was started on broad spectrum antibiotics. CT scan was nonspecific but there was concern for aspiration pneumonia given patient mental status. Ct scan also noted gastric mass with lymphadenopathy. GI was consulted for diagnostic biopsy for which as performed on 7/20 show . Palli, onc, and surgery were consulted and it was found he would not likely be a candidate for surgery or chemo. This was explained to the family. Patient given onc follow up. Patient also empirically treated for aspiration PNA with vanc/zosyn, +MRSA swab, no culture growths. GI performed endoscopy with biopsy showing likely malignant gastric tumor in the gastric antrum that was biopsied. EUS notable for three lymph nodes were visualized and measured in the  perigastric region in addition to lymph node was visualized and measured in the felicia hepatis region with no signs of distant metastasis. Patient to be discharged for further management in the outpatient setting with oncology follow up. 68M PMH CKD, CVA, HTN, DM, dementia with worsening mental status and increasing episodes of diarrhea. Pt is non-verbal except for simple commands at baseline and has been worsening at home according to family. Pt. is constricted in fetal position and is febrile and tachycardic in the ED. When patient was admitted, patient was started on broad spectrum antibiotics. CT scan was nonspecific but there was concern for aspiration pneumonia given patient mental status. Ct scan also noted gastric mass with lymphadenopathy. GI was consulted for diagnostic biopsy for which as performed on 7/20 show malignancy . Palli, onc, and surgery were consulted and it was found he would not likely be a candidate for surgery or chemo. This was explained to the family. Patient given onc follow up. Patient also empirically treated for aspiration PNA with vanc/zosyn, +MRSA swab, no culture growths. GI performed endoscopy with biopsy showing likely malignant gastric tumor in the gastric antrum that was biopsied. EUS notable for three lymph nodes were visualized and measured in the  perigastric region in addition to lymph node was visualized and measured in the felicia hepatis region with no signs of distant metastasis. Patient to be discharged for further management in the outpatient setting with oncology follow up.

## 2023-07-16 NOTE — PROGRESS NOTE ADULT - SUBJECTIVE AND OBJECTIVE BOX
Internal Medicine   Rafael Jurado| PGY-1    OVERNIGHT EVENTS: YONATHAN      SUBJECTIVE: Pt. replies yes to requests and shakes head and responds to questions about pain       MEDICATIONS  (STANDING):  azithromycin  IVPB 500 milliGRAM(s) IV Intermittent every 24 hours  dextrose 5%. 1000 milliLiter(s) (50 mL/Hr) IV Continuous <Continuous>  dextrose 5%. 1000 milliLiter(s) (100 mL/Hr) IV Continuous <Continuous>  dextrose 50% Injectable 25 Gram(s) IV Push once  dextrose 50% Injectable 12.5 Gram(s) IV Push once  dextrose 50% Injectable 25 Gram(s) IV Push once  glucagon  Injectable 1 milliGRAM(s) IntraMuscular once  insulin lispro (ADMELOG) corrective regimen sliding scale   SubCutaneous three times a day before meals  insulin lispro (ADMELOG) corrective regimen sliding scale   SubCutaneous at bedtime  lactated ringers. 1000 milliLiter(s) (75 mL/Hr) IV Continuous <Continuous>  pantoprazole  Injectable 40 milliGRAM(s) IV Push two times a day  piperacillin/tazobactam IVPB.. 3.375 Gram(s) IV Intermittent every 12 hours  valproate sodium  IVPB 125 milliGRAM(s) IV Intermittent every 6 hours    MEDICATIONS  (PRN):  acetaminophen     Tablet .. 650 milliGRAM(s) Oral every 6 hours PRN Temp greater or equal to 38C (100.4F), Mild Pain (1 - 3)  aluminum hydroxide/magnesium hydroxide/simethicone Suspension 30 milliLiter(s) Oral every 4 hours PRN Dyspepsia  dextrose Oral Gel 15 Gram(s) Oral once PRN Blood Glucose LESS THAN 70 milliGRAM(s)/deciliter  melatonin 3 milliGRAM(s) Oral at bedtime PRN Insomnia        T(F): 97.1 (07-16-23 @ 05:26), Max: 97.9 (07-15-23 @ 19:26)  HR: 84 (07-16-23 @ 05:26) (77 - 86)  BP: 158/81 (07-16-23 @ 05:26) (112/101 - 158/81)  BP(mean): --  RR: 18 (07-16-23 @ 05:26) (17 - 18)  SpO2: 100% (07-16-23 @ 05:26) (98% - 100%)    PHYSICAL EXAM:     GENERAL: NAD, lying in bed fetal position  HEAD:  Atraumatic, Normocephalic  EYES: EOMI, PERRLA, conjunctiva and sclera clear, no nystagmus noted  ENT: Moist mucous membranes,   NECK: Supple, No JVD, trachea midline  CHEST/LUNG: Clear to auscultation bilaterally; No rales, rhonchi, wheezing, or rubs. Unlabored respirations  HEART: Regular rate and rhythm; No murmurs, rubs, or gallops, normal S1/S2  ABDOMEN: tender, and guarded, nondistended,  EXTREMITIES:  2+ Peripheral Pulses, brisk capillary refill. No clubbing, cyanosis, or edema  MSK: Legs extremely thin and arms are contracted bilaterally  Neurological:  A&Ox0,   SKIN: No rashes or lesions  PSYCH: functionally non-verbal    LABS:                        7.1    7.51  )-----------( 171      ( 15 Jul 2023 09:40 )             23.3     07-15    134<L>  |  103  |  23  ----------------------------<  137<H>  4.8   |  23  |  1.84<H>    Ca    8.8      15 Jul 2023 02:06  Phos  3.3     07-16  Mg     1.60     07-16    TPro  5.8<L>  /  Alb  3.2<L>  /  TBili  <0.2  /  DBili  x   /  AST  23  /  ALT  8   /  AlkPhos  52  07-15        PT/INR - ( 15 Jul 2023 02:06 )   PT: 12.5 sec;   INR: 1.08 ratio         PTT - ( 15 Jul 2023 02:06 )  PTT:30.7 sec    Creatinine Trend: 1.84<--  I&O's Summary    15 Jul 2023 07:01  -  16 Jul 2023 07:00  --------------------------------------------------------  IN: 0 mL / OUT: 1000 mL / NET: -1000 mL      BNP    RADIOLOGY & ADDITIONAL STUDIES: Reviewed

## 2023-07-16 NOTE — CONSULT NOTE ADULT - ASSESSMENT
68 year old male with history of HTN, DM, CKD, CVA, demenita [A/Ox0 for past several years per family], and seizure disorder who presents with worsening mental status and dyspnea with hospital course c/b aspiration pneumonia, hypotension, anemia, and gastric mass with LAD on CT A/P.    # Gastric mass with LAD on CT imaging  # Iron deficiency anemia likely secondary to above    Recommendations:  -trend vitals, CBC, and monitor for clinical signs of bleeding  -maintain active type and screen  -transfusion goal to maintain hemoglobin >/= 7.0 and platelets >/= 50  -avoid NSAIDs  -PPI IV BID  -recommend palliative care consult; extensive discussion had with patient's daughter, Ihsan Jones [[phone]]; somewhat discordant, however she tells me that patient would want to undergo endoscopy with biopsy of potential gastric mass, would want to listen to and pursue offers for treatment should this confirm gastric cancer [however told her unsure what would be offered given his functional status], but long term nutrition with PEG tube would not be within patient's scope of care  -keep NPO for tentative EGD and biopsy Monday versus Tuesday pending medical optimization [dyspnea, aspiration pneumonia] and schedule availability    Note incomplete until finalized by attending signature/attestation.    Alex Urias  GI/Hepatology Fellow    MONDAY-FRIDAY 8AM-5PM:  Pager# [phone] ([facility]) or [phone] ([facility])    NON-URGENT CONSULTS:  Please email [email or web address]  AT NIGHT AND ON WEEKENDS:  Contact on-call GI fellow via answering service ([phone]) from 5pm-8am and on weekends/holidays

## 2023-07-16 NOTE — SWALLOW BEDSIDE ASSESSMENT ADULT - MUCOSAL QUALITY
wet oral secretions noted in oral cavity; SLP provided oral care via yankauer suctioning prior to administration of PO trials

## 2023-07-16 NOTE — SWALLOW BEDSIDE ASSESSMENT ADULT - COMMENTS
Consult received and chart reviewed. Patient seen at bedside this AM for initial assessment of swallow function, at which time he was alert, oriented x0, cooperative. [place]  ID # [id number] provided Azeri translation throughout assessment; however patient with minimal ability to engage in basic conversational exchanges and did not follow low level commands, despite max cues. Patient did not display any non verbal behaviors suggestive of pain. Patient was receptive to PO trials throughout assessment. Patient noted with poor secretion management in oral cavity and SLP provided oral care via yankauer suctioning prior to administration to PO trials.     Per charting the patient is a " 68M w/ PMH CKD, CVA, HTn, DM, Dementia, seizure disorder (on VPA) who p/w acute encephalopathy found to meet SIRS criteria possibly 2/2 aspiration PNA and incidentally found to have pyloric mass c/f adenocarcinoma w/ associated lymphadenopathy indicating metastatic disease. "    Patient left bedside, as received, NAD. Discussed results with patient, RN, and call out to ACP. Full report/recommendations to follow. Consult received and chart reviewed. Patient seen at bedside this AM for initial assessment of swallow function, at which time he was alert, oriented x0, cooperative. [facility]  ID # [id number] provided Hungarian translation throughout assessment; however patient with minimal ability to engage in basic conversational exchanges and did not follow low level commands, despite max cues. Patient did not display any non verbal behaviors suggestive of pain. Patient was receptive to PO trials throughout assessment. Patient noted with poor secretion management in oral cavity and SLP provided oral care via yankauer suctioning prior to administration to PO trials.     Per charting the patient is a " 68M w/ PMH CKD, CVA, HTn, DM, Dementia, seizure disorder (on VPA) who p/w acute encephalopathy found to meet SIRS criteria possibly 2/2 aspiration PNA and incidentally found to have pyloric mass c/f adenocarcinoma w/ associated lymphadenopathy indicating metastatic disease. "    WBC WFL.  CXR 7/15/23 revealed "Gastric pyloric mass, suspect adenocarcinoma. Bulky local necrotic metastatic adenopathy. No distant metastatic disease.  Retained secretions in upper trachea. Trace bilateral pleural effusions."    Discussed results with patient, RN, and call out to ACP.

## 2023-07-17 DIAGNOSIS — Z51.5 ENCOUNTER FOR PALLIATIVE CARE: ICD-10-CM

## 2023-07-17 DIAGNOSIS — R53.2 FUNCTIONAL QUADRIPLEGIA: ICD-10-CM

## 2023-07-17 DIAGNOSIS — Z71.89 OTHER SPECIFIED COUNSELING: ICD-10-CM

## 2023-07-17 DIAGNOSIS — G93.49 OTHER ENCEPHALOPATHY: ICD-10-CM

## 2023-07-17 LAB
ALBUMIN SERPL ELPH-MCNC: 3.1 G/DL — LOW (ref 3.3–5)
ALP SERPL-CCNC: 59 U/L — SIGNIFICANT CHANGE UP (ref 40–120)
ALT FLD-CCNC: 9 U/L — SIGNIFICANT CHANGE UP (ref 4–41)
ANION GAP SERPL CALC-SCNC: 8 MMOL/L — SIGNIFICANT CHANGE UP (ref 7–14)
AST SERPL-CCNC: 16 U/L — SIGNIFICANT CHANGE UP (ref 4–40)
BILIRUB SERPL-MCNC: 0.2 MG/DL — SIGNIFICANT CHANGE UP (ref 0.2–1.2)
BUN SERPL-MCNC: 11 MG/DL — SIGNIFICANT CHANGE UP (ref 7–23)
CALCIUM SERPL-MCNC: 9.3 MG/DL — SIGNIFICANT CHANGE UP (ref 8.4–10.5)
CHLORIDE SERPL-SCNC: 101 MMOL/L — SIGNIFICANT CHANGE UP (ref 98–107)
CO2 SERPL-SCNC: 26 MMOL/L — SIGNIFICANT CHANGE UP (ref 22–31)
CREAT SERPL-MCNC: 1.58 MG/DL — HIGH (ref 0.5–1.3)
EGFR: 48 ML/MIN/1.73M2 — LOW
GLUCOSE BLDC GLUCOMTR-MCNC: 112 MG/DL — HIGH (ref 70–99)
GLUCOSE SERPL-MCNC: 125 MG/DL — HIGH (ref 70–99)
HCT VFR BLD CALC: 21.6 % — LOW (ref 39–50)
HCT VFR BLD CALC: 25.8 % — LOW (ref 39–50)
HGB BLD-MCNC: 5.6 G/DL — CRITICAL LOW (ref 13–17)
HGB BLD-MCNC: 8 G/DL — LOW (ref 13–17)
MAGNESIUM SERPL-MCNC: 1.5 MG/DL — LOW (ref 1.6–2.6)
MCHC RBC-ENTMCNC: 25.6 PG — LOW (ref 27–34)
MCHC RBC-ENTMCNC: 25.6 PG — LOW (ref 27–34)
MCHC RBC-ENTMCNC: 25.9 GM/DL — LOW (ref 32–36)
MCHC RBC-ENTMCNC: 31 GM/DL — LOW (ref 32–36)
MCV RBC AUTO: 82.4 FL — SIGNIFICANT CHANGE UP (ref 80–100)
MCV RBC AUTO: 98.6 FL — SIGNIFICANT CHANGE UP (ref 80–100)
MRSA PCR RESULT.: DETECTED
NRBC # BLD: 0 /100 WBCS — SIGNIFICANT CHANGE UP (ref 0–0)
NRBC # BLD: 0 /100 WBCS — SIGNIFICANT CHANGE UP (ref 0–0)
NRBC # FLD: 0 K/UL — SIGNIFICANT CHANGE UP (ref 0–0)
NRBC # FLD: 0 K/UL — SIGNIFICANT CHANGE UP (ref 0–0)
PHOSPHATE SERPL-MCNC: 3.3 MG/DL — SIGNIFICANT CHANGE UP (ref 2.5–4.5)
PLATELET # BLD AUTO: 131 K/UL — LOW (ref 150–400)
PLATELET # BLD AUTO: 199 K/UL — SIGNIFICANT CHANGE UP (ref 150–400)
POTASSIUM SERPL-MCNC: 4.3 MMOL/L — SIGNIFICANT CHANGE UP (ref 3.5–5.3)
POTASSIUM SERPL-SCNC: 4.3 MMOL/L — SIGNIFICANT CHANGE UP (ref 3.5–5.3)
PROT SERPL-MCNC: 5.7 G/DL — LOW (ref 6–8.3)
RBC # BLD: 2.19 M/UL — LOW (ref 4.2–5.8)
RBC # BLD: 3.13 M/UL — LOW (ref 4.2–5.8)
RBC # FLD: 16.6 % — HIGH (ref 10.3–14.5)
RBC # FLD: 17.9 % — HIGH (ref 10.3–14.5)
S AUREUS DNA NOSE QL NAA+PROBE: DETECTED
SODIUM SERPL-SCNC: 135 MMOL/L — SIGNIFICANT CHANGE UP (ref 135–145)
VANCOMYCIN FLD-MCNC: <4 UG/ML — SIGNIFICANT CHANGE UP
WBC # BLD: 4.42 K/UL — SIGNIFICANT CHANGE UP (ref 3.8–10.5)
WBC # BLD: 6.95 K/UL — SIGNIFICANT CHANGE UP (ref 3.8–10.5)
WBC # FLD AUTO: 4.42 K/UL — SIGNIFICANT CHANGE UP (ref 3.8–10.5)
WBC # FLD AUTO: 6.95 K/UL — SIGNIFICANT CHANGE UP (ref 3.8–10.5)

## 2023-07-17 PROCEDURE — 99223 1ST HOSP IP/OBS HIGH 75: CPT

## 2023-07-17 PROCEDURE — 99223 1ST HOSP IP/OBS HIGH 75: CPT | Mod: GC

## 2023-07-17 PROCEDURE — 99232 SBSQ HOSP IP/OBS MODERATE 35: CPT | Mod: GC

## 2023-07-17 PROCEDURE — 99233 SBSQ HOSP IP/OBS HIGH 50: CPT | Mod: GC

## 2023-07-17 PROCEDURE — 99497 ADVNCD CARE PLAN 30 MIN: CPT | Mod: 25

## 2023-07-17 RX ORDER — MUPIROCIN 20 MG/G
1 OINTMENT TOPICAL EVERY 12 HOURS
Refills: 0 | Status: DISCONTINUED | OUTPATIENT
Start: 2023-07-17 | End: 2023-07-17

## 2023-07-17 RX ORDER — MUPIROCIN 20 MG/G
1 OINTMENT TOPICAL
Refills: 0 | Status: DISCONTINUED | OUTPATIENT
Start: 2023-07-17 | End: 2023-07-23

## 2023-07-17 RX ORDER — VANCOMYCIN HCL 1 G
1000 VIAL (EA) INTRAVENOUS ONCE
Refills: 0 | Status: COMPLETED | OUTPATIENT
Start: 2023-07-17 | End: 2023-07-17

## 2023-07-17 RX ORDER — SODIUM CHLORIDE 9 MG/ML
1000 INJECTION, SOLUTION INTRAVENOUS
Refills: 0 | Status: DISCONTINUED | OUTPATIENT
Start: 2023-07-17 | End: 2023-07-19

## 2023-07-17 RX ORDER — MAGNESIUM SULFATE 500 MG/ML
2 VIAL (ML) INJECTION ONCE
Refills: 0 | Status: COMPLETED | OUTPATIENT
Start: 2023-07-17 | End: 2023-07-17

## 2023-07-17 RX ADMIN — MUPIROCIN 1 APPLICATION(S): 20 OINTMENT TOPICAL at 17:07

## 2023-07-17 RX ADMIN — PANTOPRAZOLE SODIUM 40 MILLIGRAM(S): 20 TABLET, DELAYED RELEASE ORAL at 17:09

## 2023-07-17 RX ADMIN — PIPERACILLIN AND TAZOBACTAM 25 GRAM(S): 4; .5 INJECTION, POWDER, LYOPHILIZED, FOR SOLUTION INTRAVENOUS at 06:59

## 2023-07-17 RX ADMIN — PIPERACILLIN AND TAZOBACTAM 25 GRAM(S): 4; .5 INJECTION, POWDER, LYOPHILIZED, FOR SOLUTION INTRAVENOUS at 18:04

## 2023-07-17 RX ADMIN — Medication 51.25 MILLIGRAM(S): at 17:07

## 2023-07-17 RX ADMIN — HEPARIN SODIUM 5000 UNIT(S): 5000 INJECTION INTRAVENOUS; SUBCUTANEOUS at 05:06

## 2023-07-17 RX ADMIN — HEPARIN SODIUM 5000 UNIT(S): 5000 INJECTION INTRAVENOUS; SUBCUTANEOUS at 17:08

## 2023-07-17 RX ADMIN — Medication 51.25 MILLIGRAM(S): at 11:35

## 2023-07-17 RX ADMIN — SODIUM CHLORIDE 75 MILLILITER(S): 9 INJECTION, SOLUTION INTRAVENOUS at 17:06

## 2023-07-17 RX ADMIN — AZITHROMYCIN 255 MILLIGRAM(S): 500 TABLET, FILM COATED ORAL at 05:07

## 2023-07-17 RX ADMIN — Medication 51.25 MILLIGRAM(S): at 05:28

## 2023-07-17 RX ADMIN — Medication 25 GRAM(S): at 12:46

## 2023-07-17 RX ADMIN — Medication 250 MILLIGRAM(S): at 22:04

## 2023-07-17 RX ADMIN — SODIUM CHLORIDE 75 MILLILITER(S): 9 INJECTION, SOLUTION INTRAVENOUS at 11:37

## 2023-07-17 RX ADMIN — PANTOPRAZOLE SODIUM 40 MILLIGRAM(S): 20 TABLET, DELAYED RELEASE ORAL at 05:06

## 2023-07-17 NOTE — PROGRESS NOTE ADULT - ATTENDING COMMENTS
68 year old male w/ PMH of CKD, CVA, HTN, DM, Dementia (Aox0, Non-verbal) and seizure disorder presenting with acute encephalopathy likely 2/2 to aspiration PNA, found to have pyloric mass likely adenocarcinoma w/ possible metastatic lymph nodes.      Spoke to pt with aid of Sinhala  using [employer]. Pt stated name and location -- hospital. Stopped participating when asked why he is in the hospital. Places covers over his head.     #Gastric cancer:   -family wanted biopsy; however, now apprehensive. Per prior team's conversations with family, would not want aggressive treatment, even if biospy is consistent with malignancy. In light of current GOC, would not pursue biopsy as risks outweigh benefits. Given pt's nonambulatory status, dementia, and other comorbidities, would not be a candidate for chemotherapy or surgery. ECOG 4. Palliative on board, will continue GOC with family.     #Acute encephalopathy:   -likely toxic metabolic encephalopathy in setting of infection  -improving, currently A&O x 2    #CKD:   -Cr baseline appears to be 1.3-1.4. Currently at 1.58. Continue to monitor. Suspect likely pre-renal; continue IVF.     Remainder of chronic problems as above.

## 2023-07-17 NOTE — PROGRESS NOTE ADULT - PROBLEM SELECTOR PLAN 5
Patient w/ secretions in trachea on imaging w/ hx of coughing w/ liquids and pureed food at home  - NPO at this time pending formal S&S evaluation   - Goals of care w/ family regarding pleasure feeds  - Aspiration precautions and head of bed elevation   - Oral meds converted to IV for now.  - Emperic Zosyn, renally dosed for now Patient w/ secretions in trachea on imaging w/ hx of coughing w/ liquids and pureed food at home  - NPO at this time pending formal S&S evaluation   - Goals of care w/ family regarding pleasure feeds: would be amenable to NG tube but not invasive G tube  - Aspiration precautions and head of bed elevation   - Oral meds converted to IV for now.  - Emperic Zosyn, renally dosed for now

## 2023-07-17 NOTE — CONSULT NOTE ADULT - PROBLEM SELECTOR RECOMMENDATION 5
Reached out to son Marv who deferred for conversations to his sister (patient's daughter)  Reached out to patient's daughter Ihsan who was with patient's son-in-law Mary Dan. Reviewed concern of underlying malignancy. Discussed concern of patient's poor performance status and candidacy for DMT.  Family state they are interested in diagnostic workup to determine treatment options such as surgical interventions, but have further questions first regarding the anesthesia that will be used and potential complications of the biopsy.   Please see GO note for further details.

## 2023-07-17 NOTE — PROGRESS NOTE ADULT - SUBJECTIVE AND OBJECTIVE BOX
Interval Events:   No acute events overnight. Patient without acute symptoms at this time. No known GIB episodes overnight. No BM's. Patient denied pain but detailed ROS unable to obtain due to baseline mental status change.       Hospital Medications:  acetaminophen     Tablet .. 650 milliGRAM(s) Oral every 6 hours PRN  aluminum hydroxide/magnesium hydroxide/simethicone Suspension 30 milliLiter(s) Oral every 4 hours PRN  dextrose 5% + lactated ringers. 1000 milliLiter(s) IV Continuous <Continuous>  dextrose 5%. 1000 milliLiter(s) IV Continuous <Continuous>  dextrose 5%. 1000 milliLiter(s) IV Continuous <Continuous>  dextrose 50% Injectable 25 Gram(s) IV Push once  dextrose 50% Injectable 25 Gram(s) IV Push once  dextrose 50% Injectable 12.5 Gram(s) IV Push once  dextrose Oral Gel 15 Gram(s) Oral once PRN  glucagon  Injectable 1 milliGRAM(s) IntraMuscular once  heparin   Injectable 5000 Unit(s) SubCutaneous every 12 hours  insulin lispro (ADMELOG) corrective regimen sliding scale   SubCutaneous every 6 hours  melatonin 3 milliGRAM(s) Oral at bedtime PRN  mupirocin 2% Ointment 1 Application(s) Topical two times a day  pantoprazole  Injectable 40 milliGRAM(s) IV Push two times a day  piperacillin/tazobactam IVPB.. 3.375 Gram(s) IV Intermittent every 12 hours  valproate sodium  IVPB 125 milliGRAM(s) IV Intermittent every 6 hours      PHYSICAL EXAM:   Vital Signs:  Vital Signs Last 24 Hrs  T(C): 36.3 (17 Jul 2023 13:37), Max: 36.6 (17 Jul 2023 05:07)  T(F): 97.3 (17 Jul 2023 13:37), Max: 97.9 (17 Jul 2023 05:07)  HR: 77 (17 Jul 2023 13:37) (73 - 78)  BP: 158/73 (17 Jul 2023 13:37) (126/62 - 158/73)  BP(mean): --  RR: 18 (17 Jul 2023 13:37) (16 - 18)  SpO2: 100% (17 Jul 2023 13:37) (100% - 100%)    Parameters below as of 17 Jul 2023 13:37  Patient On (Oxygen Delivery Method): room air      Daily     Daily     GENERAL: no acute distress  NEURO: alert  HEENT: NCAT, no conjunctival pallor appreciated  CHEST: no respiratory distress, no accessory muscle use  CARDIAC: regular rate, +S1/S2  ABDOMEN: soft, tender at LUQ; no rebound or guarding  EXTREMITIES: warm, well perfused  SKIN: no lesions noted    LABS: reviewed                        8.0    6.95  )-----------( 199      ( 17 Jul 2023 09:05 )             25.8     07-17    135  |  101  |  11  ----------------------------<  125<H>  4.3   |  26  |  1.58<H>    Ca    9.3      17 Jul 2023 09:05  Phos  3.3     07-17  Mg     1.50     07-17    TPro  5.7<L>  /  Alb  3.1<L>  /  TBili  0.2  /  DBili  x   /  AST  16  /  ALT  9   /  AlkPhos  59  07-17    LIVER FUNCTIONS - ( 17 Jul 2023 09:05 )  Alb: 3.1 g/dL / Pro: 5.7 g/dL / ALK PHOS: 59 U/L / ALT: 9 U/L / AST: 16 U/L / GGT: x             Interval Diagnostic Studies: see sunrise for full report

## 2023-07-17 NOTE — CONSULT NOTE ADULT - SUBJECTIVE AND OBJECTIVE BOX
Date of Service 07-17-23 @ 17:25    HPI:  68M PMH CKD, CVA, HTN, DM, dementia (AOx0), seizure disorder on valproic acid presenting with increased confusion for several days a/w lethargy, dyspnea, and diarrhea. History obtained from patients son at bedside. Son reports patient at baseline is AOx0 but able to recognize family and follow some basic commands. Over the last several days pt appeared more lethargic and less responsive to family members. Per son's report, family felt that pt was mildly short of breath. Pt also had several episodes of loose stools at home but denies watery diarrhea. Per son, patient  is fully dependent on all ADLs. Pt lives with wife and son. At baseline patient eats liquids and pureed foods. Per son, patient often coughs w/ eating.     Pt was BIBEMS and found to be hypotensive w/ SBP 80s received 1L NS en route to the hospital. In the ED pt was hypothermic and hypotensive w/ SBP 90's receiving another 1L NS. (15 Jul 2023 19:53)      Interval History:   [facility] : [id number]. Patient AAOx2 (person, place- hospital).  Stopped participating when asked why he is in the hospital. Places covers over his head.     PERTINENT PM/SXH:   DM (diabetes mellitus)  Seizure  CVA (cerebrovascular accident)  HLD (hyperlipidemia)  CKD (chronic kidney disease)  Dementia  No significant past surgical history    FAMILY HISTORY: unable to obtain due to encephalopathy     ITEMS NOT CHECKED ARE NOT PRESENT    SOCIAL HISTORY:   Significant other/partner[ ]  Children[ x]  Worship/Spirituality:  Substance hx:  [ ]   Tobacco hx:  [ ]   Alcohol hx: [ ]   Home Opioid hx:  [ ] I-Stop Reference No:  Living Situation: [ x]Home  [ ]Long term care  [ ]Rehab [ ]Other    ADVANCE DIRECTIVES:    MOLST  [ ]  Living Will  [ ]   DECISION MAKER(s):  [ ] Health Care Proxy(s)  [x ] Surrogate(s)  [ ] Guardian           Name(s): Phone Number(s):  Son Marv Jones: [phone]  Daughter Ihsan: [phone]     BASELINE (I)ADL(s) (prior to admission):  [place]: [ ]Total  [ ] Moderate [x ]Dependent    Allergies    No Known Allergies    Intolerances    MEDICATIONS  (STANDING):  dextrose 5% + lactated ringers. 1000 milliLiter(s) (75 mL/Hr) IV Continuous <Continuous>  dextrose 5%. 1000 milliLiter(s) (50 mL/Hr) IV Continuous <Continuous>  dextrose 5%. 1000 milliLiter(s) (100 mL/Hr) IV Continuous <Continuous>  dextrose 50% Injectable 25 Gram(s) IV Push once  dextrose 50% Injectable 12.5 Gram(s) IV Push once  dextrose 50% Injectable 25 Gram(s) IV Push once  glucagon  Injectable 1 milliGRAM(s) IntraMuscular once  heparin   Injectable 5000 Unit(s) SubCutaneous every 12 hours  insulin lispro (ADMELOG) corrective regimen sliding scale   SubCutaneous every 6 hours  mupirocin 2% Ointment 1 Application(s) Topical two times a day  pantoprazole  Injectable 40 milliGRAM(s) IV Push two times a day  piperacillin/tazobactam IVPB.. 3.375 Gram(s) IV Intermittent every 12 hours  valproate sodium  IVPB 125 milliGRAM(s) IV Intermittent every 6 hours  vancomycin  IVPB 1000 milliGRAM(s) IV Intermittent once    MEDICATIONS  (PRN):  acetaminophen     Tablet .. 650 milliGRAM(s) Oral every 6 hours PRN Temp greater or equal to 38C (100.4F), Mild Pain (1 - 3)  aluminum hydroxide/magnesium hydroxide/simethicone Suspension 30 milliLiter(s) Oral every 4 hours PRN Dyspepsia  dextrose Oral Gel 15 Gram(s) Oral once PRN Blood Glucose LESS THAN 70 milliGRAM(s)/deciliter  melatonin 3 milliGRAM(s) Oral at bedtime PRN Insomnia        ITEMS UNCHECKED ARE NOT PRESENT     PRESENT SYMPTOMS: [ x]Unable to self-report due to altered mental status  [ ] CPOT [ x] PAINADs [ x] RDOS  Source if other than patient:  [ ]Family   [ ]Team     Pain: [ ]yes [ ]no  QOL impact -   Location -                    Aggravating factors -  Quality -  Radiation -  Timing-  Severity (0-10 scale):  Minimal acceptable level / Pain goal (0-10 scale):     CPOT:    https://www.sccm.org/getattachment/kki95d44-9n5b-5k8n-6h0n-6932v2135v0w/Critical-Care-Pain-Observation-Tool-(CPOT)    Dyspnea:                           [ ]Mild [ ]Moderate [ ]Severe  Anxiety:                             [ ]Mild [ ]Moderate [ ]Severe  Agitation:                          [ ]Mild [ ]Moderate [ ]Severe  Fatigue:                             [ ]Mild [ ]Moderate [ ]Severe  Nausea:                             [ ]Mild [ ]Moderate [ ]Severe  Loss of appetite:              [ ]Mild [ ]Moderate [ ]Severe  Constipation:                   [ ]Mild [ ]Moderate [ ]Severe  Diarrhea:                          [ ]Mild [ ]Moderate [ ]Severe      PCSSQ[Palliative Care Spiritual Screening Question]   Severity (0-10):  Score of 4 or > indicate consideration of Chaplaincy referral.  Chaplaincy Referral: [ ] yes [ ] refused [ ] following [ x] deferred    Caregiver Graham? : [ ] yes [ ] no [ ] Declined   [x ] Deferred            Social work referral [ ] Patient & Family Centered Care Referral [ ]     Anticipatory Grief present?:  [ ] yes [ ] no  [x ] Deferred                  Social work referral [ ] Chaplaincy Referral[ ]    Other Symptoms:  [ ]All other review of systems negative - unable to obtain due to encephalopathy     PHYSICAL EXAM:  Vital Signs Last 24 Hrs  T(C): 36.3 (17 Jul 2023 13:37), Max: 36.6 (17 Jul 2023 05:07)  T(F): 97.3 (17 Jul 2023 13:37), Max: 97.9 (17 Jul 2023 05:07)  HR: 77 (17 Jul 2023 13:37) (73 - 78)  BP: 158/73 (17 Jul 2023 13:37) (126/62 - 158/73)  BP(mean): --  RR: 18 (17 Jul 2023 13:37) (16 - 18)  SpO2: 100% (17 Jul 2023 13:37) (100% - 100%)    Parameters below as of 17 Jul 2023 13:37  Patient On (Oxygen Delivery Method): room air         I&O's Summary    16 Jul 2023 07:01  -  17 Jul 2023 07:00  --------------------------------------------------------  IN: 1100 mL / OUT: 575 mL / NET: 525 mL        GENERAL:  [x ]Alert  [ x]Oriented x 2  [ ]Lethargic  [ ]Cachexia  [ ]Unarousable  [x ]Verbal  [ ]Non-Verbal  [x ] No Distress  Behavioral:   [ ] Anxiety  [ ] Delirium [ ] Agitation [x ] Calm  [ ] Other  HEENT:  [x ]Normal  [ ] Temporal Wasting  [ ]Dry mouth   [ ]ET Tube/Trach  [ ]Oral lesions  [ ] Mucositis  PULMONARY:   [ ]Clear [ ]Tachypnea  [ ]Audible excessive secretions   [ ]Rhonchi        [ ]Right [ ]Left [ ]Bilateral  [ ]Crackles        [ ]Right [ ]Left [ ]Bilateral  [ ]Wheezing     [ ]Right [ ]Left [ ]Bilateral  [ x]Diminished breath sounds [ ]right [ ]left [x ]bilateral  CARDIOVASCULAR:    [ x]Regular [ ]Irregular [ ]Tachy  [ ]Wang [ ]Murmur [ ]Other  GASTROINTESTINAL:  [ x]Soft  [ ]Distended   [ ]+BS  [x ]Non tender [ ]Tender  [ ]PEG [ ]OGT/ NGT  Last BM:   GENITOURINARY:  [ ]Normal [ x] Incontinent   [ ]Oliguria/Anuria   [ ]Perez  MUSCULOSKELETAL:   [ ]Normal   [ ]Weakness  [x ]Bed/Wheelchair bound [ ]Edema  [  ] amputation  [  ] contraction  NEUROLOGIC:   [x ]No focal deficits  [ x]Cognitive impairment  [x ]Dysphagia [ ]Dysarthria [ ]Paresis [ ]Other   SKIN: Incontinence Associated Dermatitis. See Nursing Skin Assessment for further details  [ ]Normal    [ ]Rash  [ ]Pressure ulcer(s)       Present on admission [ ]y [ ]n   [  ]  Wound    [  ] hyperpigmentation    CRITICAL CARE:  [ ] Shock Present  [ ]Septic [ ]Cardiogenic [ ]Neurologic [ ]Hypovolemic  [ ]  Vasopressors [ ]  Inotropes   [ ]Respiratory failure present [ ]Mechanical ventilation [ ]Non-invasive ventilatory support [ ]High flow    [ ]Acute  [ ]Chronic [ ]Hypoxic  [ ]Hypercarbic [ ]Other  [ ]Other organ failure     LABS:  reviewed                         8.0    6.95  )-----------( 199      ( 17 Jul 2023 09:05 )             25.8   07-17    135  |  101  |  11  ----------------------------<  125<H>  4.3   |  26  |  1.58<H>    Ca    9.3      17 Jul 2023 09:05  Phos  3.3     07-17  Mg     1.50     07-17    TPro  5.7<L>  /  Alb  3.1<L>  /  TBili  0.2  /  DBili  x   /  AST  16  /  ALT  9   /  AlkPhos  59  07-17    Urinalysis Basic - ( 17 Jul 2023 09:05 )    Color: x / Appearance: x / SG: x / pH: x  Gluc: 125 mg/dL / Ketone: x  / Bili: x / Urobili: x   Blood: x / Protein: x / Nitrite: x   Leuk Esterase: x / RBC: x / WBC x   Sq Epi: x / Non Sq Epi: x / Bacteria: x      CAPILLARY BLOOD GLUCOSE      POCT Blood Glucose.: 114 mg/dL (17 Jul 2023 11:54)  POCT Blood Glucose.: 123 mg/dL (17 Jul 2023 05:23)  POCT Blood Glucose.: 127 mg/dL (16 Jul 2023 23:48)  POCT Blood Glucose.: 94 mg/dL (16 Jul 2023 18:35)      RADIOLOGY & ADDITIONAL STUDIES: reviewed     PROTEIN CALORIE MALNUTRITION PRESENT: [ ]mild [ ]moderate [ ]severe [ ]underweight [ ]morbid obesity  https://www.andeal.org/vault/2440/web/files/ONC/Table_Clinical%20Characteristics%20to%20Document%20Malnutrition-White%20JV%20et%20al%202012.pdf    Height (cm): 177.8 (07-15-23 @ 19:42), 180.3 (04-23-23 @ 22:14)  Weight (kg): 59.5 (07-15-23 @ 19:42), 68 (04-23-23 @ 22:14)  BMI (kg/m2): 18.8 (07-15-23 @ 19:42), 20.9 (04-23-23 @ 22:14)    [ x]PPSV2 < or = to 30% [ ]significant weight loss  [ ]poor nutritional intake  [ ]anasarca [ ]Artificial Nutrition      REFERRALS:   [ ]Chaplaincy  [ ]Hospice  [ ]Child Life  [ ]Social Work  [ x]Case management [ ]Holistic Therapy

## 2023-07-17 NOTE — PROGRESS NOTE ADULT - ASSESSMENT
68 year old male with history of HTN, DM, CKD, CVA, demenita [A/Ox0 for past several years per family], and seizure disorder who presents with worsening mental status and dyspnea with hospital course c/b aspiration pneumonia, hypotension, anemia, and gastric mass with LAD on CT A/P.    # Gastric mass with LAD on CT imaging  # Iron deficiency anemia likely secondary to above  - Currently H&H and hemodynamically stable  - No urgent indication for EGD (diagnostic purpose)  - Per discussion with daughter: (+) Concerns regarding anesthesia risk with EGD, and would like to talk to primary team and oncology team about potential prognosis and therapeutic options prior to proceeding with EGD.     Recommendations:  -trend vitals, CBC, and monitor for clinical signs of bleeding  -maintain active type and screen  -transfusion goal to maintain hemoglobin >/= 7.0 and platelets >/= 50  -avoid NSAIDs  -PPI IV BID  -Pending family decision on EGD; may keep patient NPO after midnight for tentative EGD tomorrow if wish to proceed.     Note incomplete until finalized by attending signature/attestation.    Virginia Grayson  GI/Hepatology Fellow    MONDAY-FRIDAY 8AM-5PM:  Pager# [phone] ([facility]) or [phone] ([facility])    NON-URGENT CONSULTS:  Please email [email or web address]  AT NIGHT AND ON WEEKENDS:  Contact on-call GI fellow via answering service ([phone]) from 5pm-8am and on weekends/holidays

## 2023-07-17 NOTE — CONSULT NOTE ADULT - PROBLEM SELECTOR RECOMMENDATION 2
- CT 7/15/23- Gastric pyloric mass, suspect adenocarcinoma. Bulky local necrotic metastatic adenopathy.  No distant metastatic disease.  - GI recommendations appreciated

## 2023-07-17 NOTE — PROGRESS NOTE ADULT - PROBLEM SELECTOR PLAN 1
Pt w/ hypothermia 96.5F and tachypnea >20 w/ hypothermia. Possible source Aspiration PNA  - f/u BCx, UCx.  - Cover w/ Emperic Zosyn for possible aspiration PNA  - Monitor fever/WBC curve. Pt w/ hypothermia 96.5F and tachypnea >20 w/ hypothermia. Possible source Aspiration PNA  - f/u BCx, UCx.  - Cover w/ Emperic Zosyn for possible aspiration PNA  - Monitor fever/WBC curve.  - Legionella neg: NING soto Pt w/ hypothermia 96.5F and tachypnea >20 w/ hypothermia. Possible source Aspiration PNA  - f/u BCx, UCx.  - Cover w/ Emperic Zosyn for possible aspiration PNA  - +MRSA: vanc by level  - Monitor fever/WBC curve.  - Legionella neg: NING soto

## 2023-07-17 NOTE — CONSULT NOTE ADULT - SUBJECTIVE AND OBJECTIVE BOX
HPI:  68M PMH CKD, CVA, HTN, DM, dementia (AOx0), seizure disorder on valproic acid presenting with increased confusion for several days a/w lethargy, dyspnea, and diarrhea. History obtained from chart review and primary care.  Patient at baseline is AOx0 but able to recognize family and follow some basic commands. Over the last several days pt appeared more lethargic and less responsive to family members. Per son's report, family felt that pt was mildly short of breath. Pt also had several episodes of loose stools at home but denies watery diarrhea. patient  is fully dependent on all ADLs. Pt lives with wife and son. At baseline patient eats liquids and pureed foods. Per son, patient often coughs w/ eating.   Pt was BIBEMS and found to be hypotensive w/ SBP 80s received 1L NS en route to the hospital. CT chest/A/P done which shows findings concerning for gastric cancer.   Oncology was consulted for suspected gastric cancer.     PAST MEDICAL & SURGICAL HISTORY:  DM (diabetes mellitus)      Seizure      CVA (cerebrovascular accident)      HLD (hyperlipidemia)      CKD (chronic kidney disease)      Dementia      No significant past surgical history          Allergies    No Known Allergies    Intolerances        MEDICATIONS  (STANDING):  dextrose 5% + lactated ringers. 1000 milliLiter(s) (75 mL/Hr) IV Continuous <Continuous>  dextrose 5%. 1000 milliLiter(s) (50 mL/Hr) IV Continuous <Continuous>  dextrose 5%. 1000 milliLiter(s) (100 mL/Hr) IV Continuous <Continuous>  dextrose 50% Injectable 25 Gram(s) IV Push once  dextrose 50% Injectable 25 Gram(s) IV Push once  dextrose 50% Injectable 12.5 Gram(s) IV Push once  glucagon  Injectable 1 milliGRAM(s) IntraMuscular once  heparin   Injectable 5000 Unit(s) SubCutaneous every 12 hours  insulin lispro (ADMELOG) corrective regimen sliding scale   SubCutaneous every 6 hours  mupirocin 2% Ointment 1 Application(s) Topical two times a day  pantoprazole  Injectable 40 milliGRAM(s) IV Push two times a day  piperacillin/tazobactam IVPB.. 3.375 Gram(s) IV Intermittent every 12 hours  valproate sodium  IVPB 125 milliGRAM(s) IV Intermittent every 6 hours  vancomycin  IVPB 1000 milliGRAM(s) IV Intermittent once    MEDICATIONS  (PRN):  acetaminophen     Tablet .. 650 milliGRAM(s) Oral every 6 hours PRN Temp greater or equal to 38C (100.4F), Mild Pain (1 - 3)  aluminum hydroxide/magnesium hydroxide/simethicone Suspension 30 milliLiter(s) Oral every 4 hours PRN Dyspepsia  dextrose Oral Gel 15 Gram(s) Oral once PRN Blood Glucose LESS THAN 70 milliGRAM(s)/deciliter  melatonin 3 milliGRAM(s) Oral at bedtime PRN Insomnia      FAMILY HISTORY:  No pertinent family history in first degree relatives        SOCIAL HISTORY: No EtOH, no tobacco    REVIEW OF SYSTEMS:     All other review of systems is negative unless indicated above.        T(F): 97.3 (07-17-23 @ 13:37), Max: 97.9 (07-17-23 @ 05:07)  HR: 77 (07-17-23 @ 13:37)  BP: 158/73 (07-17-23 @ 13:37)  RR: 18 (07-17-23 @ 13:37)  SpO2: 100% (07-17-23 @ 13:37)  Wt(kg): --      Physical exam   GENERAL: NAD, frail and thin  HEAD:  Atraumatic, Normocephalic  EYES: EOMI, PERRLA, conjunctiva and sclera clear  NECK: Supple, No JVD  CHEST/LUNG: Clear to auscultation bilaterally; No wheeze  HEART: Regular rate and rhythm; No murmurs, rubs, or gallops  ABDOMEN: Soft, Nontender, Nondistended; Bowel sounds present  EXTREMITIES:  2+ Peripheral Pulses, No clubbing, cyanosis, or edema  NEUROLOGY: not interactive, sleepy                          8.0    6.95  )-----------( 199      ( 17 Jul 2023 09:05 )             25.8       07-17    135  |  101  |  11  ----------------------------<  125<H>  4.3   |  26  |  1.58<H>    Ca    9.3      17 Jul 2023 09:05  Phos  3.3     07-17  Mg     1.50     07-17    TPro  5.7<L>  /  Alb  3.1<L>  /  TBili  0.2  /  DBili  x   /  AST  16  /  ALT  9   /  AlkPhos  59  07-17      Magnesium: 1.50 mg/dL (07-17 @ 09:05)  Phosphorus: 3.3 mg/dL (07-17 @ 09:05)          Clean Catch Clean Catch (Midstream)  07-15 @ 02:33   No growth  --  --      .Blood Blood-Peripheral  07-15 @ 02:06   No growth at 48 Hours  --  --      .Blood Blood-Peripheral  07-15 @ 01:50   No growth at 48 Hours  --  --      .Blood Blood-Peripheral  04-23 @ 22:52   No Growth Final  --  --      Clean Catch Clean Catch (Midstream)  04-23 @ 22:51   No growth  --  --       HPI:  69 yo M PMH CKD, CVA, HTN, DM, dementia (AOx0), seizure disorder on valproic acid presenting with increased confusion for several days a/w lethargy, dyspnea, and diarrhea. History obtained from chart review and primary care.  Patient at baseline is AOx0 but able to recognize family and follow some basic commands. Over the last several days pt appeared more lethargic and less responsive to family members. Per son's report, family felt that pt was mildly short of breath. Pt also had several episodes of loose stools at home but denies watery diarrhea. patient  is fully dependent on all ADLs. Pt lives with wife and son. At baseline patient eats liquids and pureed foods. Per son, patient often coughs w/ eating.   Pt was BIBEMS and found to be hypotensive w/ SBP 80s received 1L NS en route to the hospital. CT chest/A/P done which shows findings concerning for gastric cancer.   Oncology was consulted for suspected gastric cancer.     PAST MEDICAL & SURGICAL HISTORY:  DM (diabetes mellitus)      Seizure      CVA (cerebrovascular accident)      HLD (hyperlipidemia)      CKD (chronic kidney disease)      Dementia      No significant past surgical history          Allergies    No Known Allergies    Intolerances        MEDICATIONS  (STANDING):  dextrose 5% + lactated ringers. 1000 milliLiter(s) (75 mL/Hr) IV Continuous <Continuous>  dextrose 5%. 1000 milliLiter(s) (50 mL/Hr) IV Continuous <Continuous>  dextrose 5%. 1000 milliLiter(s) (100 mL/Hr) IV Continuous <Continuous>  dextrose 50% Injectable 25 Gram(s) IV Push once  dextrose 50% Injectable 25 Gram(s) IV Push once  dextrose 50% Injectable 12.5 Gram(s) IV Push once  glucagon  Injectable 1 milliGRAM(s) IntraMuscular once  heparin   Injectable 5000 Unit(s) SubCutaneous every 12 hours  insulin lispro (ADMELOG) corrective regimen sliding scale   SubCutaneous every 6 hours  mupirocin 2% Ointment 1 Application(s) Topical two times a day  pantoprazole  Injectable 40 milliGRAM(s) IV Push two times a day  piperacillin/tazobactam IVPB.. 3.375 Gram(s) IV Intermittent every 12 hours  valproate sodium  IVPB 125 milliGRAM(s) IV Intermittent every 6 hours  vancomycin  IVPB 1000 milliGRAM(s) IV Intermittent once    MEDICATIONS  (PRN):  acetaminophen     Tablet .. 650 milliGRAM(s) Oral every 6 hours PRN Temp greater or equal to 38C (100.4F), Mild Pain (1 - 3)  aluminum hydroxide/magnesium hydroxide/simethicone Suspension 30 milliLiter(s) Oral every 4 hours PRN Dyspepsia  dextrose Oral Gel 15 Gram(s) Oral once PRN Blood Glucose LESS THAN 70 milliGRAM(s)/deciliter  melatonin 3 milliGRAM(s) Oral at bedtime PRN Insomnia      FAMILY HISTORY:  No pertinent family history in first degree relatives        SOCIAL HISTORY: No EtOH, no tobacco    REVIEW OF SYSTEMS:     All other review of systems is negative unless indicated above.        T(F): 97.3 (07-17-23 @ 13:37), Max: 97.9 (07-17-23 @ 05:07)  HR: 77 (07-17-23 @ 13:37)  BP: 158/73 (07-17-23 @ 13:37)  RR: 18 (07-17-23 @ 13:37)  SpO2: 100% (07-17-23 @ 13:37)  Wt(kg): --      Physical exam   GENERAL: NAD, frail and thin  HEAD:  Atraumatic, Normocephalic  EYES: EOMI, PERRLA, conjunctiva and sclera clear  NECK: Supple, No JVD  CHEST/LUNG: Clear to auscultation bilaterally; No wheeze  HEART: Regular rate and rhythm; No murmurs, rubs, or gallops  ABDOMEN: Soft, Nontender, Nondistended; Bowel sounds present  EXTREMITIES:  2+ Peripheral Pulses, No clubbing, cyanosis, or edema  NEUROLOGY: not interactive, sleepy                          8.0    6.95  )-----------( 199      ( 17 Jul 2023 09:05 )             25.8       07-17    135  |  101  |  11  ----------------------------<  125<H>  4.3   |  26  |  1.58<H>    Ca    9.3      17 Jul 2023 09:05  Phos  3.3     07-17  Mg     1.50     07-17    TPro  5.7<L>  /  Alb  3.1<L>  /  TBili  0.2  /  DBili  x   /  AST  16  /  ALT  9   /  AlkPhos  59  07-17      Magnesium: 1.50 mg/dL (07-17 @ 09:05)  Phosphorus: 3.3 mg/dL (07-17 @ 09:05)          Clean Catch Clean Catch (Midstream)  07-15 @ 02:33   No growth  --  --      .Blood Blood-Peripheral  07-15 @ 02:06   No growth at 48 Hours  --  --      .Blood Blood-Peripheral  07-15 @ 01:50   No growth at 48 Hours  --  --      .Blood Blood-Peripheral  04-23 @ 22:52   No Growth Final  --  --      Clean Catch Clean Catch (Midstream)  04-23 @ 22:51   No growth  --  --

## 2023-07-17 NOTE — PROGRESS NOTE ADULT - PROBLEM SELECTOR PLAN 4
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

## 2023-07-17 NOTE — DIETITIAN INITIAL EVALUATION ADULT - PHYSCIAL ASSESSMENT
Appearance consistent with BMI, underweight. Unable to perform nutrition focused physical exam secondary to current clinical condition.

## 2023-07-17 NOTE — CONSULT NOTE ADULT - PROBLEM SELECTOR RECOMMENDATION 3
- patient with dementia +/- delirium   - please coordinate care with family  -Frequent reassurance and verbal orientation   -Family members or other familiar persons by his bedside.   -Move to a room with a window   -Update the calendar date in the room.    - Monitor for constipation, urinary retention, pain, hunger, thirst, etc.    - Promote sleep wake cycle and reorientation as indicated.  - Minimize use of benzodiazepines, opioids, anticholinergics, and other deliriogenic agents whenever possible.

## 2023-07-17 NOTE — CONSULT NOTE ADULT - ASSESSMENT
68M PMH CKD, CVA, HTN, DM, dementia (AOx0), seizure disorder on valproic acid presenting with increased confusion for several days a/w lethargy, dyspnea, and diarrhea, found to be hypotension on admission. CT chest/A/P done for abdominal pain and SOB, shows findings concerning for gastric cancer. Oncology was consulted for suspected gastric cancer.       # Suspected gastric cancer  - CT chest/A&P shows gastric pyloric mass, suspect adenocarcinoma. Bulky local necrotic metastatic adenopathy. No distant metastatic disease. Retained secretions in upper trachea. Trace bilateral pleural effusions.  - Ideally, biopsy would be needed to determine the type of cancer   - If this is gastric adenoCA, depending on the stage, surgical resection may be an option however, he might not be a good candidate for surgery given that he has poor nutritional status   - For similar reason, given his functional status and failure of S&S test, failure to thrive, he might not be a good candidate for chemoTx   - For these reasons, it is reasonable to consider focusing on comfort care and palliative measures  - If he has abdominal pain from gastric outlet obstruction, palliative stenting can be considered.  - Oncology will follow up and discuss with family further tomorrow     # PRACHI  - Hb 8, MCV normal with increased RDW  - Check Fe panel, B12, folate   - Transfuse to keep Hb>7 69 yo M PMH CKD, CVA, HTN, DM, dementia (AOx0), seizure disorder on valproic acid presenting with increased confusion for several days a/w lethargy, dyspnea, and diarrhea, found to be hypotension on admission. CT chest/A/P done for abdominal pain and SOB, shows findings concerning for gastric cancer. Oncology was consulted for suspected gastric cancer.       # Suspected gastric cancer  - CT chest/A&P shows gastric pyloric mass, suspect adenocarcinoma. Bulky local necrotic metastatic adenopathy. No distant metastatic disease. Retained secretions in upper trachea. Trace bilateral pleural effusions.  - Ideally, biopsy would be needed to determine the type of cancer   - If this is gastric adenoCA, depending on the stage, surgical resection may be an option however, he might not be a good candidate for surgery given that he has poor nutritional status   - For similar reason, given his functional status and failure of S&S test, failure to thrive, he might not be a good candidate for chemoTx   - For these reasons, it is reasonable to consider focusing on comfort care and palliative measures  - If he has abdominal pain from gastric outlet obstruction, palliative stenting can be considered.  - Oncology will follow up and discuss with family further tomorrow     # PRACHI  - Hb 8, MCV normal with increased RDW  - Check Fe panel, B12, folate   - Transfuse to keep Hb>7

## 2023-07-17 NOTE — DIETITIAN INITIAL EVALUATION ADULT - PERTINENT LABORATORY DATA
(7/17) Na 135, BUN 11, Cr 1.58<H>, <H>, K+ 4.3, Phos 3.3, Mg 1.50<L>, Alk Phos 59, ALT/SGPT 9, AST/SGOT 16  (7/16) HbA1c 6.4%<H>  POCT: (7/17) 123, (7/16)

## 2023-07-17 NOTE — PROGRESS NOTE ADULT - SUBJECTIVE AND OBJECTIVE BOX
Marvel Kay MD  Emergency Medicine & Internal Medicine PGY-2    PROGRESS NOTE:    ID #:     Patient is a 68y old  Male who presents with a chief complaint of Sepsis  (16 Jul 2023 17:10)      MAJOR INTERVAL HOSPITAL EVENTS:     SUBJECTIVE / OVERNIGHT EVENTS: No acute overnight events.     REVIEW OF SYSTEMS:  CONSTITUTIONAL: No weakness, fevers or chills  EYES/ENT: No visual changes;  No vertigo or throat pain   NECK: No pain or stiffness  RESPIRATORY: No cough, wheezing, hemoptysis; No shortness of breath  CARDIOVASCULAR: No chest pain or palpitations  GASTROINTESTINAL: No abdominal or epigastric pain. No nausea, vomiting, or hematemesis; No diarrhea or constipation. No melena or hematochezia.  GENITOURINARY: No dysuria, frequency or hematuria  NEUROLOGICAL: No numbness or weakness  SKIN: No itching, burning, rashes, or lesions   All other review of systems is negative unless indicated above.    MEDICATIONS  (STANDING):  azithromycin  IVPB 500 milliGRAM(s) IV Intermittent every 24 hours  dextrose 5% + lactated ringers. 1000 milliLiter(s) (75 mL/Hr) IV Continuous <Continuous>  dextrose 5%. 1000 milliLiter(s) (50 mL/Hr) IV Continuous <Continuous>  dextrose 5%. 1000 milliLiter(s) (100 mL/Hr) IV Continuous <Continuous>  dextrose 50% Injectable 25 Gram(s) IV Push once  dextrose 50% Injectable 25 Gram(s) IV Push once  dextrose 50% Injectable 12.5 Gram(s) IV Push once  glucagon  Injectable 1 milliGRAM(s) IntraMuscular once  heparin   Injectable 5000 Unit(s) SubCutaneous every 12 hours  insulin lispro (ADMELOG) corrective regimen sliding scale   SubCutaneous every 6 hours  pantoprazole  Injectable 40 milliGRAM(s) IV Push two times a day  piperacillin/tazobactam IVPB.. 3.375 Gram(s) IV Intermittent every 12 hours  valproate sodium  IVPB 125 milliGRAM(s) IV Intermittent every 6 hours    MEDICATIONS  (PRN):  acetaminophen     Tablet .. 650 milliGRAM(s) Oral every 6 hours PRN Temp greater or equal to 38C (100.4F), Mild Pain (1 - 3)  aluminum hydroxide/magnesium hydroxide/simethicone Suspension 30 milliLiter(s) Oral every 4 hours PRN Dyspepsia  dextrose Oral Gel 15 Gram(s) Oral once PRN Blood Glucose LESS THAN 70 milliGRAM(s)/deciliter  melatonin 3 milliGRAM(s) Oral at bedtime PRN Insomnia      CAPILLARY BLOOD GLUCOSE      POCT Blood Glucose.: 123 mg/dL (17 Jul 2023 05:23)  POCT Blood Glucose.: 127 mg/dL (16 Jul 2023 23:48)  POCT Blood Glucose.: 94 mg/dL (16 Jul 2023 18:35)  POCT Blood Glucose.: 106 mg/dL (16 Jul 2023 12:55)  POCT Blood Glucose.: 123 mg/dL (16 Jul 2023 07:50)    I&O's Summary    15 Jul 2023 07:01  -  16 Jul 2023 07:00  --------------------------------------------------------  IN: 0 mL / OUT: 1000 mL / NET: -1000 mL    16 Jul 2023 07:01  -  17 Jul 2023 06:50  --------------------------------------------------------  IN: 1100 mL / OUT: 575 mL / NET: 525 mL        PHYSICAL EXAM:  Vital Signs Last 24 Hrs  T(C): 36.6 (17 Jul 2023 05:07), Max: 36.7 (16 Jul 2023 14:56)  T(F): 97.9 (17 Jul 2023 05:07), Max: 98.1 (16 Jul 2023 14:56)  HR: 78 (17 Jul 2023 05:07) (73 - 78)  BP: 149/74 (17 Jul 2023 05:07) (126/62 - 155/84)  BP(mean): --  RR: 16 (17 Jul 2023 05:07) (16 - 18)  SpO2: 100% (17 Jul 2023 05:07) (100% - 100%)    Parameters below as of 17 Jul 2023 05:07  Patient On (Oxygen Delivery Method): room air        GENERAL: No acute distress, well-developed  HEAD:  Atraumatic, Normocephalic  EYES: EOMI, PERRLA, conjunctiva and sclera clear  NECK: Supple, no lymphadenopathy, no JVD  CHEST/LUNG: CTAB; No wheezes, rales, or rhonchi  HEART: Regular rate and rhythm; Normal s1 and s2, No murmurs, rubs, or gallops  ABDOMEN: Soft, non-tender, non-distended; normal bowel sounds, no organomegaly  EXTREMITIES:  2+ peripheral pulses b/l, No clubbing, cyanosis, or edema  NEUROLOGY: A&O x 3, no focal deficits  SKIN: No rashes or lesions          LABS:                        8.3    8.94  )-----------( 196      ( 16 Jul 2023 09:20 )             27.3     07-16    134<L>  |  99  |  16  ----------------------------<  132<H>  4.3   |  27  |  1.50<H>    Ca    9.2      16 Jul 2023 09:20  Phos  3.2     07-16  Mg     1.60     07-16            Urinalysis Basic - ( 16 Jul 2023 09:20 )    Color: x / Appearance: x / SG: x / pH: x  Gluc: 132 mg/dL / Ketone: x  / Bili: x / Urobili: x   Blood: x / Protein: x / Nitrite: x   Leuk Esterase: x / RBC: x / WBC x   Sq Epi: x / Non Sq Epi: x / Bacteria: x        Culture - Urine (collected 15 Jul 2023 02:33)  Source: Clean Catch Clean Catch (Midstream)  Final Report (16 Jul 2023 09:59):    No growth    Culture - Blood (collected 15 Jul 2023 02:06)  Source: .Blood Blood-Peripheral  Preliminary Report (16 Jul 2023 07:02):    No growth at 24 hours    Culture - Blood (collected 15 Jul 2023 01:50)  Source: .Blood Blood-Peripheral  Preliminary Report (16 Jul 2023 07:02):    No growth at 24 hours        RADIOLOGY & ADDITIONAL TESTS:  Results Reviewed:   Imaging Personally Reviewed:  Electrocardiogram Personally Reviewed:    COORDINATION OF CARE:  Care Discussed with Consultants/Other Providers [Y/N]:  Prior or Outpatient Records Reviewed [Y/N]:   Marvel Kay MD  Emergency Medicine & Internal Medicine PGY-2    PROGRESS NOTE:    ID #:     Patient is a 68y old  Male who presents with a chief complaint of Sepsis  (16 Jul 2023 17:10)      MAJOR INTERVAL HOSPITAL EVENTS: bladder scanned: 497ml,  was straight cathed     SUBJECTIVE / OVERNIGHT EVENTS: Patient not answering questions, speaking, or following commands    Discussed patient's status with daughter ([phone]): family has reservations about endoscopy and anesthesia given patient's frail condition.     MEDICATIONS  (STANDING):  azithromycin  IVPB 500 milliGRAM(s) IV Intermittent every 24 hours  dextrose 5% + lactated ringers. 1000 milliLiter(s) (75 mL/Hr) IV Continuous <Continuous>  dextrose 5%. 1000 milliLiter(s) (50 mL/Hr) IV Continuous <Continuous>  dextrose 5%. 1000 milliLiter(s) (100 mL/Hr) IV Continuous <Continuous>  dextrose 50% Injectable 25 Gram(s) IV Push once  dextrose 50% Injectable 25 Gram(s) IV Push once  dextrose 50% Injectable 12.5 Gram(s) IV Push once  glucagon  Injectable 1 milliGRAM(s) IntraMuscular once  heparin   Injectable 5000 Unit(s) SubCutaneous every 12 hours  insulin lispro (ADMELOG) corrective regimen sliding scale   SubCutaneous every 6 hours  pantoprazole  Injectable 40 milliGRAM(s) IV Push two times a day  piperacillin/tazobactam IVPB.. 3.375 Gram(s) IV Intermittent every 12 hours  valproate sodium  IVPB 125 milliGRAM(s) IV Intermittent every 6 hours    MEDICATIONS  (PRN):  acetaminophen     Tablet .. 650 milliGRAM(s) Oral every 6 hours PRN Temp greater or equal to 38C (100.4F), Mild Pain (1 - 3)  aluminum hydroxide/magnesium hydroxide/simethicone Suspension 30 milliLiter(s) Oral every 4 hours PRN Dyspepsia  dextrose Oral Gel 15 Gram(s) Oral once PRN Blood Glucose LESS THAN 70 milliGRAM(s)/deciliter  melatonin 3 milliGRAM(s) Oral at bedtime PRN Insomnia      CAPILLARY BLOOD GLUCOSE      POCT Blood Glucose.: 123 mg/dL (17 Jul 2023 05:23)  POCT Blood Glucose.: 127 mg/dL (16 Jul 2023 23:48)  POCT Blood Glucose.: 94 mg/dL (16 Jul 2023 18:35)  POCT Blood Glucose.: 106 mg/dL (16 Jul 2023 12:55)  POCT Blood Glucose.: 123 mg/dL (16 Jul 2023 07:50)    I&O's Summary    15 Jul 2023 07:01  -  16 Jul 2023 07:00  --------------------------------------------------------  IN: 0 mL / OUT: 1000 mL / NET: -1000 mL    16 Jul 2023 07:01  -  17 Jul 2023 06:50  --------------------------------------------------------  IN: 1100 mL / OUT: 575 mL / NET: 525 mL        PHYSICAL EXAM:  Vital Signs Last 24 Hrs  T(C): 36.6 (17 Jul 2023 05:07), Max: 36.7 (16 Jul 2023 14:56)  T(F): 97.9 (17 Jul 2023 05:07), Max: 98.1 (16 Jul 2023 14:56)  HR: 78 (17 Jul 2023 05:07) (73 - 78)  BP: 149/74 (17 Jul 2023 05:07) (126/62 - 155/84)  BP(mean): --  RR: 16 (17 Jul 2023 05:07) (16 - 18)  SpO2: 100% (17 Jul 2023 05:07) (100% - 100%)    Parameters below as of 17 Jul 2023 05:07  Patient On (Oxygen Delivery Method): room air        GENERAL: No acute distress, well-developed  HEAD:  Atraumatic, Normocephalic  EYES: EOMI, PERRLA, conjunctiva and sclera clear  NECK: Supple, no lymphadenopathy, no JVD  CHEST/LUNG: CTAB; No wheezes, rales, or rhonchi  HEART: Regular rate and rhythm; Normal s1 and s2, No murmurs, rubs, or gallops  ABDOMEN: Soft, non-tender, non-distended; normal bowel sounds, no organomegaly  EXTREMITIES:  2+ peripheral pulses b/l, No clubbing, cyanosis, or edema  NEUROLOGY: A&O x 3, no focal deficits  SKIN: No rashes or lesions          LABS:                        8.3    8.94  )-----------( 196      ( 16 Jul 2023 09:20 )             27.3     07-16    134<L>  |  99  |  16  ----------------------------<  132<H>  4.3   |  27  |  1.50<H>    Ca    9.2      16 Jul 2023 09:20  Phos  3.2     07-16  Mg     1.60     07-16            Urinalysis Basic - ( 16 Jul 2023 09:20 )    Color: x / Appearance: x / SG: x / pH: x  Gluc: 132 mg/dL / Ketone: x  / Bili: x / Urobili: x   Blood: x / Protein: x / Nitrite: x   Leuk Esterase: x / RBC: x / WBC x   Sq Epi: x / Non Sq Epi: x / Bacteria: x        Culture - Urine (collected 15 Jul 2023 02:33)  Source: Clean Catch Clean Catch (Midstream)  Final Report (16 Jul 2023 09:59):    No growth    Culture - Blood (collected 15 Jul 2023 02:06)  Source: .Blood Blood-Peripheral  Preliminary Report (16 Jul 2023 07:02):    No growth at 24 hours    Culture - Blood (collected 15 Jul 2023 01:50)  Source: .Blood Blood-Peripheral  Preliminary Report (16 Jul 2023 07:02):    No growth at 24 hours        RADIOLOGY & ADDITIONAL TESTS:  Results Reviewed:   Imaging Personally Reviewed:  Electrocardiogram Personally Reviewed:    COORDINATION OF CARE:  Care Discussed with Consultants/Other Providers [Y/N]:  Prior or Outpatient Records Reviewed [Y/N]:

## 2023-07-17 NOTE — DIETITIAN INITIAL EVALUATION ADULT - OTHER INFO
Per chart, pt is 68 year old Arabic speaking male PMH CKD, CVA, HTN, type 2 DM, dementia, seizure disorder presenting with acute encephalopathy found to meet SIRS criteria possibly secondary to aspiration PNA. Incidentally found to have pyloric mass with associated lymphadenopathy concerning for adenocarcinoma, metastatic disease.     Pt unable to meaningfully participate in discussion, comprehensive chart review completed. NKFA. Pt lives at home with wife and son, requires assistance with ADLs. History of type 2 DM, HbA1c (7/16) 6.4%, on Januvia PTA. Per chart review: pt coughing with eating PTA, consumes puree solids/thin liquids, difficulty swallowing and decreased PO intake since 12/2022.     Pt has remained NPO since admission (2 days). S/p bedside swallow assessment (7/16) which recommended NPO status. Possible plan for EGD and biopsy with GI. No BM thus far in house, fecal incontinence noted, no bowel regimen ordered at this time. Fingersticks WDL.

## 2023-07-17 NOTE — PROGRESS NOTE ADULT - ATTENDING COMMENTS
68 year old male with history of HTN, DM, CKD, CVA, demenita [A/Ox0 for past several years per family], and seizure disorder who presents with worsening mental status and dyspnea with hospital course c/b aspiration pneumonia, hypotension, anemia, and gastric mass with LAD on CT A/P.    # Gastric mass with LAD on CT imaging  # Iron deficiency anemia likely secondary to above  - Currently H&H and hemodynamically stable  - No urgent indication for EGD (diagnostic purpose)  - Per discussion with daughter: (+) Concerns regarding anesthesia risk with EGD, and would like to talk to primary team and oncology team about potential prognosis and therapeutic options prior to proceeding with EGD.     Recommendations:  -Pending family decision on EGD; may keep patient NPO after midnight for tentative EGD tomorrow if wish to proceed. Need to discuss GOC.

## 2023-07-17 NOTE — CONSULT NOTE ADULT - PROBLEM SELECTOR RECOMMENDATION 6
Case reviewed with primary team. Per discussion, agree with oncology consult prior to further diagnostic workup.   Thank you for allowing us to participate in your patient's care. Please page [phone] for any questions/concerns.

## 2023-07-17 NOTE — DIETITIAN INITIAL EVALUATION ADULT - OTHER CALCULATIONS
Dosing weight (7/15) 131.1 lbs / 59.5 kg.  Weight history, per HIE: (4/23) 149 lbs, (3/6) 137 lbs, (2/7) 132 lbs, (1/23) 135 lbs.   Apparent ~18 lb (12%) weight loss x3 months, clinically significant.  Ideal Body Weight: 166 lbs / 75.5 kg +/-10%

## 2023-07-17 NOTE — CONSULT NOTE ADULT - ATTENDING COMMENTS
Agree w above. 67 yo w hx of dementia, seizures, admitted for change in mental status, aspiration pneumonia, anemia. CT A/P with gastric mass. EGD early this week. PPI tx. Trend CBC.
67 yo M PMH CKD, CVA, HTN, DM, dementia (AOx0), seizure disorder on valproic acid presenting with increased confusion for several days a/w lethargy, dyspnea, and diarrhea, found to be hypotension on admission. CT chest/A/P done for abdominal pain and SOB, shows gastric pyloric mass, suspect adenocarcinoma. Bulky local necrotic metastatic adenopathy. No distant metastatic disease. Retained secretions in upper trachea. Trace bilateral pleural effusions. Further evaluation may be done with EGD, but family is hesitant to proceed. Can offer palliative care with hospice if the family does not want any intervention.

## 2023-07-17 NOTE — DIETITIAN INITIAL EVALUATION ADULT - PERTINENT MEDS FT
dextrose 5% + lactated ringers IV Continuous   ADMELOG corrective regimen sliding scale    magnesium sulfate IV  pantoprazole IV  piperacillin/tazobactam IV  aluminum hydroxide/magnesium hydroxide/simethicone Suspension PRN

## 2023-07-17 NOTE — CONSULT NOTE ADULT - ASSESSMENT
68 year old male w/ PMH of CKD, CVA, HTN, DM, Dementia (Aox0, Non-verbal) and seizure disorder presenting with acute encephalopathy likely 2/2 to aspiration PNA, found to have pyloric mass likely adenocarcinoma w/ possible metastatic lymph nodes.  Palliative Care consulted for complex decision making  related to goals of care discussions

## 2023-07-17 NOTE — DIETITIAN INITIAL EVALUATION ADULT - ADD RECOMMEND
- Repeat bedside swallow assessment as appropriate. Pleasure feeds vs alternate means of nutrition per GOC.   - Obtain weekly weights.   - Monitor electrolytes (K+, Mg, P) and replete to within desired limits as clinically indicated.

## 2023-07-17 NOTE — DIETITIAN NUTRITION RISK NOTIFICATION - ADDITIONAL COMMENTS/DIETITIAN RECOMMENDATIONS
Please see Dietitian Initial Assessment for complete recommendations.  Melanie Bowie, RANDY, CDN #[id number]  Also available on Microsoft Teams

## 2023-07-17 NOTE — GOALS OF CARE CONVERSATION - ADVANCED CARE PLANNING - CONVERSATION DETAILS
Reached out to son Marv who is aware that patient has likely underlying cancer. He defers for conversations about patient's medical care and medical decisions to his sister (patient's daughter) Ihsan.     Reached out to patient's daughter Ihsan who was with patient's son-in-law Mary Dan. They share patient resides at home with son Marv and his family. Reviewed concern of underlying gastric malignancy. Per their request, reviewed prior CT Abd/pelvis imaging in late April with them and reviewed new findings on CT Abd/ Pelvis imaging in July. Reviewed imaging indicates "Gastric pyloric mass, suspect adenocarcinoma. Bulky local necrotic metastatic adenopathy". Addressed his questions that cancer is likely from gastric mass. Discussed concern of patient's being a poor candidate for DMT due to poor performance status and if not a candidate for DMT, diagnostic workup unlikely to be beneficial for his care.   However, son-in-law shares that he has personal experience where patients have been treated surgically for their malignancy which was able to extend their life. As a result, family state they are interested in diagnostic workup to determine treatment options such as surgical interventions, but have further questions first regarding the type and amount of anesthesia that will be used and potential complications of the biopsy; discussed will have the medical to team to follow up to address these questions.

## 2023-07-18 LAB
ALBUMIN SERPL ELPH-MCNC: 3.6 G/DL — SIGNIFICANT CHANGE UP (ref 3.3–5)
ALP SERPL-CCNC: 60 U/L — SIGNIFICANT CHANGE UP (ref 40–120)
ALT FLD-CCNC: 8 U/L — SIGNIFICANT CHANGE UP (ref 4–41)
ANION GAP SERPL CALC-SCNC: 11 MMOL/L — SIGNIFICANT CHANGE UP (ref 7–14)
AST SERPL-CCNC: 16 U/L — SIGNIFICANT CHANGE UP (ref 4–40)
BASE EXCESS BLDV CALC-SCNC: 3.1 MMOL/L — HIGH (ref -2–3)
BASOPHILS # BLD AUTO: 0.03 K/UL — SIGNIFICANT CHANGE UP (ref 0–0.2)
BASOPHILS NFR BLD AUTO: 0.5 % — SIGNIFICANT CHANGE UP (ref 0–2)
BILIRUB SERPL-MCNC: 0.3 MG/DL — SIGNIFICANT CHANGE UP (ref 0.2–1.2)
BLOOD GAS VENOUS COMPREHENSIVE RESULT: SIGNIFICANT CHANGE UP
BUN SERPL-MCNC: 9 MG/DL — SIGNIFICANT CHANGE UP (ref 7–23)
CALCIUM SERPL-MCNC: 9.5 MG/DL — SIGNIFICANT CHANGE UP (ref 8.4–10.5)
CHLORIDE BLDV-SCNC: 100 MMOL/L — SIGNIFICANT CHANGE UP (ref 96–108)
CHLORIDE SERPL-SCNC: 100 MMOL/L — SIGNIFICANT CHANGE UP (ref 98–107)
CO2 BLDV-SCNC: 32 MMOL/L — HIGH (ref 22–26)
CO2 SERPL-SCNC: 26 MMOL/L — SIGNIFICANT CHANGE UP (ref 22–31)
CREAT SERPL-MCNC: 1.58 MG/DL — HIGH (ref 0.5–1.3)
EGFR: 48 ML/MIN/1.73M2 — LOW
EOSINOPHIL # BLD AUTO: 0.69 K/UL — HIGH (ref 0–0.5)
EOSINOPHIL NFR BLD AUTO: 12.4 % — HIGH (ref 0–6)
GAS PNL BLDV: 135 MMOL/L — LOW (ref 136–145)
GLUCOSE BLDC GLUCOMTR-MCNC: 133 MG/DL — HIGH (ref 70–99)
GLUCOSE BLDC GLUCOMTR-MCNC: 140 MG/DL — HIGH (ref 70–99)
GLUCOSE BLDC GLUCOMTR-MCNC: 142 MG/DL — HIGH (ref 70–99)
GLUCOSE BLDC GLUCOMTR-MCNC: 146 MG/DL — HIGH (ref 70–99)
GLUCOSE BLDV-MCNC: 105 MG/DL — HIGH (ref 70–99)
GLUCOSE SERPL-MCNC: 104 MG/DL — HIGH (ref 70–99)
HCO3 BLDV-SCNC: 30 MMOL/L — HIGH (ref 22–29)
HCT VFR BLD CALC: 27.8 % — LOW (ref 39–50)
HCT VFR BLDA CALC: 27 % — LOW (ref 39–51)
HGB BLD CALC-MCNC: 9 G/DL — LOW (ref 12.6–17.4)
HGB BLD-MCNC: 8.7 G/DL — LOW (ref 13–17)
IANC: 2.48 K/UL — SIGNIFICANT CHANGE UP (ref 1.8–7.4)
IMM GRANULOCYTES NFR BLD AUTO: 0.9 % — SIGNIFICANT CHANGE UP (ref 0–0.9)
LACTATE BLDV-MCNC: 1.5 MMOL/L — SIGNIFICANT CHANGE UP (ref 0.5–2)
LYMPHOCYTES # BLD AUTO: 1.63 K/UL — SIGNIFICANT CHANGE UP (ref 1–3.3)
LYMPHOCYTES # BLD AUTO: 29.2 % — SIGNIFICANT CHANGE UP (ref 13–44)
MAGNESIUM SERPL-MCNC: 1.8 MG/DL — SIGNIFICANT CHANGE UP (ref 1.6–2.6)
MCHC RBC-ENTMCNC: 25.6 PG — LOW (ref 27–34)
MCHC RBC-ENTMCNC: 31.3 GM/DL — LOW (ref 32–36)
MCV RBC AUTO: 81.8 FL — SIGNIFICANT CHANGE UP (ref 80–100)
MONOCYTES # BLD AUTO: 0.7 K/UL — SIGNIFICANT CHANGE UP (ref 0–0.9)
MONOCYTES NFR BLD AUTO: 12.5 % — SIGNIFICANT CHANGE UP (ref 2–14)
NEUTROPHILS # BLD AUTO: 2.48 K/UL — SIGNIFICANT CHANGE UP (ref 1.8–7.4)
NEUTROPHILS NFR BLD AUTO: 44.5 % — SIGNIFICANT CHANGE UP (ref 43–77)
NRBC # BLD: 0 /100 WBCS — SIGNIFICANT CHANGE UP (ref 0–0)
NRBC # FLD: 0 K/UL — SIGNIFICANT CHANGE UP (ref 0–0)
PCO2 BLDV: 60 MMHG — HIGH (ref 42–55)
PH BLDV: 7.31 — LOW (ref 7.32–7.43)
PHOSPHATE SERPL-MCNC: 3.7 MG/DL — SIGNIFICANT CHANGE UP (ref 2.5–4.5)
PLATELET # BLD AUTO: 193 K/UL — SIGNIFICANT CHANGE UP (ref 150–400)
PO2 BLDV: 33 MMHG — SIGNIFICANT CHANGE UP (ref 25–45)
POTASSIUM BLDV-SCNC: 3.7 MMOL/L — SIGNIFICANT CHANGE UP (ref 3.5–5.1)
POTASSIUM SERPL-MCNC: 3.9 MMOL/L — SIGNIFICANT CHANGE UP (ref 3.5–5.3)
POTASSIUM SERPL-SCNC: 3.9 MMOL/L — SIGNIFICANT CHANGE UP (ref 3.5–5.3)
PROT SERPL-MCNC: 6.2 G/DL — SIGNIFICANT CHANGE UP (ref 6–8.3)
RBC # BLD: 3.4 M/UL — LOW (ref 4.2–5.8)
RBC # FLD: 16.6 % — HIGH (ref 10.3–14.5)
SAO2 % BLDV: 40.7 % — LOW (ref 67–88)
SODIUM SERPL-SCNC: 137 MMOL/L — SIGNIFICANT CHANGE UP (ref 135–145)
VANCOMYCIN TROUGH SERPL-MCNC: 16.7 UG/ML — SIGNIFICANT CHANGE UP (ref 10–20)
WBC # BLD: 5.58 K/UL — SIGNIFICANT CHANGE UP (ref 3.8–10.5)
WBC # FLD AUTO: 5.58 K/UL — SIGNIFICANT CHANGE UP (ref 3.8–10.5)

## 2023-07-18 PROCEDURE — 99232 SBSQ HOSP IP/OBS MODERATE 35: CPT | Mod: GC

## 2023-07-18 PROCEDURE — 99255 IP/OBS CONSLTJ NEW/EST HI 80: CPT

## 2023-07-18 PROCEDURE — 99233 SBSQ HOSP IP/OBS HIGH 50: CPT | Mod: GC

## 2023-07-18 RX ORDER — VANCOMYCIN HCL 1 G
750 VIAL (EA) INTRAVENOUS ONCE
Refills: 0 | Status: COMPLETED | OUTPATIENT
Start: 2023-07-18 | End: 2023-07-18

## 2023-07-18 RX ADMIN — Medication 51.25 MILLIGRAM(S): at 22:14

## 2023-07-18 RX ADMIN — Medication 51.25 MILLIGRAM(S): at 11:13

## 2023-07-18 RX ADMIN — PANTOPRAZOLE SODIUM 40 MILLIGRAM(S): 20 TABLET, DELAYED RELEASE ORAL at 06:48

## 2023-07-18 RX ADMIN — PIPERACILLIN AND TAZOBACTAM 25 GRAM(S): 4; .5 INJECTION, POWDER, LYOPHILIZED, FOR SOLUTION INTRAVENOUS at 18:16

## 2023-07-18 RX ADMIN — MUPIROCIN 1 APPLICATION(S): 20 OINTMENT TOPICAL at 18:20

## 2023-07-18 RX ADMIN — SODIUM CHLORIDE 75 MILLILITER(S): 9 INJECTION, SOLUTION INTRAVENOUS at 07:51

## 2023-07-18 RX ADMIN — PANTOPRAZOLE SODIUM 40 MILLIGRAM(S): 20 TABLET, DELAYED RELEASE ORAL at 18:15

## 2023-07-18 RX ADMIN — Medication 51.25 MILLIGRAM(S): at 15:44

## 2023-07-18 RX ADMIN — Medication 51.25 MILLIGRAM(S): at 03:36

## 2023-07-18 RX ADMIN — Medication 250 MILLIGRAM(S): at 23:21

## 2023-07-18 RX ADMIN — HEPARIN SODIUM 5000 UNIT(S): 5000 INJECTION INTRAVENOUS; SUBCUTANEOUS at 06:48

## 2023-07-18 RX ADMIN — MUPIROCIN 1 APPLICATION(S): 20 OINTMENT TOPICAL at 06:48

## 2023-07-18 RX ADMIN — PIPERACILLIN AND TAZOBACTAM 25 GRAM(S): 4; .5 INJECTION, POWDER, LYOPHILIZED, FOR SOLUTION INTRAVENOUS at 06:47

## 2023-07-18 RX ADMIN — HEPARIN SODIUM 5000 UNIT(S): 5000 INJECTION INTRAVENOUS; SUBCUTANEOUS at 18:15

## 2023-07-18 NOTE — PROGRESS NOTE ADULT - SUBJECTIVE AND OBJECTIVE BOX
Interval Events:   No acute events overnight.  Patient without acute symptoms at this time. No reported GIB or bloody BM's.         Hospital Medications:  acetaminophen     Tablet .. 650 milliGRAM(s) Oral every 6 hours PRN  aluminum hydroxide/magnesium hydroxide/simethicone Suspension 30 milliLiter(s) Oral every 4 hours PRN  dextrose 5% + lactated ringers. 1000 milliLiter(s) IV Continuous <Continuous>  dextrose 5%. 1000 milliLiter(s) IV Continuous <Continuous>  dextrose 5%. 1000 milliLiter(s) IV Continuous <Continuous>  dextrose 50% Injectable 25 Gram(s) IV Push once  dextrose 50% Injectable 12.5 Gram(s) IV Push once  dextrose 50% Injectable 25 Gram(s) IV Push once  dextrose Oral Gel 15 Gram(s) Oral once PRN  glucagon  Injectable 1 milliGRAM(s) IntraMuscular once  heparin   Injectable 5000 Unit(s) SubCutaneous every 12 hours  insulin lispro (ADMELOG) corrective regimen sliding scale   SubCutaneous every 6 hours  melatonin 3 milliGRAM(s) Oral at bedtime PRN  mupirocin 2% Ointment 1 Application(s) Topical two times a day  pantoprazole  Injectable 40 milliGRAM(s) IV Push two times a day  piperacillin/tazobactam IVPB.. 3.375 Gram(s) IV Intermittent every 12 hours  valproate sodium  IVPB 125 milliGRAM(s) IV Intermittent every 6 hours  vancomycin  IVPB 750 milliGRAM(s) IV Intermittent once      PHYSICAL EXAM:   Vital Signs:  Vital Signs Last 24 Hrs  T(C): 37 (18 Jul 2023 13:18), Max: 37 (18 Jul 2023 13:18)  T(F): 98.6 (18 Jul 2023 13:18), Max: 98.6 (18 Jul 2023 13:18)  HR: 75 (18 Jul 2023 13:18) (67 - 77)  BP: 161/93 (18 Jul 2023 13:18) (143/81 - 161/93)  BP(mean): --  RR: 17 (18 Jul 2023 13:18) (17 - 18)  SpO2: 100% (18 Jul 2023 13:18) (97% - 100%)    Parameters below as of 18 Jul 2023 13:18  Patient On (Oxygen Delivery Method): room air      Daily     Daily     GENERAL: no acute distress  NEURO: Sleeping, awake to voice, but not answering questions  HEENT: NCAT, no conjunctival pallor appreciated  CHEST: no respiratory distress, no accessory muscle use  CARDIAC: regular rate, +S1/S2  ABDOMEN: soft, nontender, no rebound or guarding  EXTREMITIES: warm, well perfused  SKIN: no lesions noted    LABS: reviewed                        8.7    5.58  )-----------( 193      ( 18 Jul 2023 05:07 )             27.8     07-18    137  |  100  |  9   ----------------------------<  104<H>  3.9   |  26  |  1.58<H>    Ca    9.5      18 Jul 2023 05:07  Phos  3.7     07-18  Mg     1.80     07-18    TPro  6.2  /  Alb  3.6  /  TBili  0.3  /  DBili  x   /  AST  16  /  ALT  8   /  AlkPhos  60  07-18    LIVER FUNCTIONS - ( 18 Jul 2023 05:07 )  Alb: 3.6 g/dL / Pro: 6.2 g/dL / ALK PHOS: 60 U/L / ALT: 8 U/L / AST: 16 U/L / GGT: x             Interval Diagnostic Studies: see sunrise for full report

## 2023-07-18 NOTE — PROGRESS NOTE ADULT - SUBJECTIVE AND OBJECTIVE BOX
Marvel Kay MD  Emergency Medicine & Internal Medicine PGY-2    PROGRESS NOTE:    ID #:     Patient is a 68y old  Male who presents with a chief complaint of Altered mental status     (17 Jul 2023 10:42)      MAJOR INTERVAL HOSPITAL EVENTS:     SUBJECTIVE / OVERNIGHT EVENTS: No acute overnight events.     REVIEW OF SYSTEMS:  CONSTITUTIONAL: No weakness, fevers or chills  EYES/ENT: No visual changes;  No vertigo or throat pain   NECK: No pain or stiffness  RESPIRATORY: No cough, wheezing, hemoptysis; No shortness of breath  CARDIOVASCULAR: No chest pain or palpitations  GASTROINTESTINAL: No abdominal or epigastric pain. No nausea, vomiting, or hematemesis; No diarrhea or constipation. No melena or hematochezia.  GENITOURINARY: No dysuria, frequency or hematuria  NEUROLOGICAL: No numbness or weakness  SKIN: No itching, burning, rashes, or lesions   All other review of systems is negative unless indicated above.    MEDICATIONS  (STANDING):  dextrose 5% + lactated ringers. 1000 milliLiter(s) (75 mL/Hr) IV Continuous <Continuous>  dextrose 5%. 1000 milliLiter(s) (50 mL/Hr) IV Continuous <Continuous>  dextrose 5%. 1000 milliLiter(s) (100 mL/Hr) IV Continuous <Continuous>  dextrose 50% Injectable 25 Gram(s) IV Push once  dextrose 50% Injectable 12.5 Gram(s) IV Push once  dextrose 50% Injectable 25 Gram(s) IV Push once  glucagon  Injectable 1 milliGRAM(s) IntraMuscular once  heparin   Injectable 5000 Unit(s) SubCutaneous every 12 hours  insulin lispro (ADMELOG) corrective regimen sliding scale   SubCutaneous every 6 hours  mupirocin 2% Ointment 1 Application(s) Topical two times a day  pantoprazole  Injectable 40 milliGRAM(s) IV Push two times a day  piperacillin/tazobactam IVPB.. 3.375 Gram(s) IV Intermittent every 12 hours  valproate sodium  IVPB 125 milliGRAM(s) IV Intermittent every 6 hours    MEDICATIONS  (PRN):  acetaminophen     Tablet .. 650 milliGRAM(s) Oral every 6 hours PRN Temp greater or equal to 38C (100.4F), Mild Pain (1 - 3)  aluminum hydroxide/magnesium hydroxide/simethicone Suspension 30 milliLiter(s) Oral every 4 hours PRN Dyspepsia  dextrose Oral Gel 15 Gram(s) Oral once PRN Blood Glucose LESS THAN 70 milliGRAM(s)/deciliter  melatonin 3 milliGRAM(s) Oral at bedtime PRN Insomnia      CAPILLARY BLOOD GLUCOSE      POCT Blood Glucose.: 146 mg/dL (18 Jul 2023 01:35)  POCT Blood Glucose.: 112 mg/dL (17 Jul 2023 17:52)  POCT Blood Glucose.: 114 mg/dL (17 Jul 2023 11:54)    I&O's Summary    17 Jul 2023 07:01  -  18 Jul 2023 07:00  --------------------------------------------------------  IN: 900 mL / OUT: 350 mL / NET: 550 mL        PHYSICAL EXAM:  Vital Signs Last 24 Hrs  T(C): 36.4 (17 Jul 2023 21:21), Max: 36.4 (17 Jul 2023 21:21)  T(F): 97.6 (17 Jul 2023 21:21), Max: 97.6 (17 Jul 2023 21:21)  HR: 67 (17 Jul 2023 21:21) (67 - 77)  BP: 143/81 (17 Jul 2023 21:21) (143/81 - 158/73)  BP(mean): --  RR: 18 (17 Jul 2023 21:21) (18 - 18)  SpO2: 97% (17 Jul 2023 21:21) (97% - 100%)    Parameters below as of 17 Jul 2023 21:21  Patient On (Oxygen Delivery Method): room air        GENERAL: No acute distress, well-developed  HEAD:  Atraumatic, Normocephalic  EYES: EOMI, PERRLA, conjunctiva and sclera clear  NECK: Supple, no lymphadenopathy, no JVD  CHEST/LUNG: CTAB; No wheezes, rales, or rhonchi  HEART: Regular rate and rhythm; Normal s1 and s2, No murmurs, rubs, or gallops  ABDOMEN: Soft, non-tender, non-distended; normal bowel sounds, no organomegaly  EXTREMITIES:  2+ peripheral pulses b/l, No clubbing, cyanosis, or edema  NEUROLOGY: A&O x 3, no focal deficits  SKIN: No rashes or lesions          LABS:                        8.7    5.58  )-----------( 193      ( 18 Jul 2023 05:07 )             27.8     07-18    137  |  100  |  9   ----------------------------<  104<H>  3.9   |  26  |  1.58<H>    Ca    9.5      18 Jul 2023 05:07  Phos  3.7     07-18  Mg     1.80     07-18    TPro  6.2  /  Alb  3.6  /  TBili  0.3  /  DBili  x   /  AST  16  /  ALT  8   /  AlkPhos  60  07-18          Urinalysis Basic - ( 18 Jul 2023 05:07 )    Color: x / Appearance: x / SG: x / pH: x  Gluc: 104 mg/dL / Ketone: x  / Bili: x / Urobili: x   Blood: x / Protein: x / Nitrite: x   Leuk Esterase: x / RBC: x / WBC x   Sq Epi: x / Non Sq Epi: x / Bacteria: x          RADIOLOGY & ADDITIONAL TESTS:  Results Reviewed:   Imaging Personally Reviewed:  Electrocardiogram Personally Reviewed:    COORDINATION OF CARE:  Care Discussed with Consultants/Other Providers [Y/N]:  Prior or Outpatient Records Reviewed [Y/N]:   Marvel Kay MD  Emergency Medicine & Internal Medicine PGY-2    PROGRESS NOTE:    ID #:     Patient is a 68y old  Male who presents with a chief complaint of Altered mental status     (17 Jul 2023 10:42)      MAJOR INTERVAL HOSPITAL EVENTS: NAEO    SUBJECTIVE / OVERNIGHT EVENTS: No acute overnight events. Patient able to speak few words, stated his name, denied current pain.    REVIEW OF SYSTEMS:  CONSTITUTIONAL: No weakness, fevers or chills  EYES/ENT: No visual changes;  No vertigo or throat pain   NECK: No pain or stiffness  RESPIRATORY: No cough, wheezing, hemoptysis; No shortness of breath  CARDIOVASCULAR: No chest pain or palpitations  GASTROINTESTINAL: No abdominal or epigastric pain. No nausea, vomiting, or hematemesis; No diarrhea or constipation. No melena or hematochezia.  GENITOURINARY: No dysuria, frequency or hematuria  NEUROLOGICAL: No numbness or weakness  SKIN: No itching, burning, rashes, or lesions   All other review of systems is negative unless indicated above.    MEDICATIONS  (STANDING):  dextrose 5% + lactated ringers. 1000 milliLiter(s) (75 mL/Hr) IV Continuous <Continuous>  dextrose 5%. 1000 milliLiter(s) (50 mL/Hr) IV Continuous <Continuous>  dextrose 5%. 1000 milliLiter(s) (100 mL/Hr) IV Continuous <Continuous>  dextrose 50% Injectable 25 Gram(s) IV Push once  dextrose 50% Injectable 12.5 Gram(s) IV Push once  dextrose 50% Injectable 25 Gram(s) IV Push once  glucagon  Injectable 1 milliGRAM(s) IntraMuscular once  heparin   Injectable 5000 Unit(s) SubCutaneous every 12 hours  insulin lispro (ADMELOG) corrective regimen sliding scale   SubCutaneous every 6 hours  mupirocin 2% Ointment 1 Application(s) Topical two times a day  pantoprazole  Injectable 40 milliGRAM(s) IV Push two times a day  piperacillin/tazobactam IVPB.. 3.375 Gram(s) IV Intermittent every 12 hours  valproate sodium  IVPB 125 milliGRAM(s) IV Intermittent every 6 hours    MEDICATIONS  (PRN):  acetaminophen     Tablet .. 650 milliGRAM(s) Oral every 6 hours PRN Temp greater or equal to 38C (100.4F), Mild Pain (1 - 3)  aluminum hydroxide/magnesium hydroxide/simethicone Suspension 30 milliLiter(s) Oral every 4 hours PRN Dyspepsia  dextrose Oral Gel 15 Gram(s) Oral once PRN Blood Glucose LESS THAN 70 milliGRAM(s)/deciliter  melatonin 3 milliGRAM(s) Oral at bedtime PRN Insomnia      CAPILLARY BLOOD GLUCOSE      POCT Blood Glucose.: 146 mg/dL (18 Jul 2023 01:35)  POCT Blood Glucose.: 112 mg/dL (17 Jul 2023 17:52)  POCT Blood Glucose.: 114 mg/dL (17 Jul 2023 11:54)    I&O's Summary    17 Jul 2023 07:01  -  18 Jul 2023 07:00  --------------------------------------------------------  IN: 900 mL / OUT: 350 mL / NET: 550 mL        PHYSICAL EXAM:  Vital Signs Last 24 Hrs  T(C): 36.4 (17 Jul 2023 21:21), Max: 36.4 (17 Jul 2023 21:21)  T(F): 97.6 (17 Jul 2023 21:21), Max: 97.6 (17 Jul 2023 21:21)  HR: 67 (17 Jul 2023 21:21) (67 - 77)  BP: 143/81 (17 Jul 2023 21:21) (143/81 - 158/73)  BP(mean): --  RR: 18 (17 Jul 2023 21:21) (18 - 18)  SpO2: 97% (17 Jul 2023 21:21) (97% - 100%)    Parameters below as of 17 Jul 2023 21:21  Patient On (Oxygen Delivery Method): room air        GENERAL: No acute distress, cachetic  HEAD:  Atraumatic, Normocephalic  CHEST/LUNG: CTAB; No wheezes, rales, or rhonchi  HEART: Regular rate and rhythm; Normal s1 and s2, No murmurs, rubs, or gallops  ABDOMEN: Soft, non-tender, non-distended; normal bowel sounds, no organomegaly  EXTREMITIES:  UEs contracted, LEs straight and noncontracted as prior, 2+ peripheral pulses b/l, No clubbing, cyanosis, or edema  NEUROLOGY: A&O x 1 (name), spoke few words, light finger squeeze BL, no focal deficits  SKIN: No rashes or lesions            LABS:                        8.7    5.58  )-----------( 193      ( 18 Jul 2023 05:07 )             27.8     07-18    137  |  100  |  9   ----------------------------<  104<H>  3.9   |  26  |  1.58<H>    Ca    9.5      18 Jul 2023 05:07  Phos  3.7     07-18  Mg     1.80     07-18    TPro  6.2  /  Alb  3.6  /  TBili  0.3  /  DBili  x   /  AST  16  /  ALT  8   /  AlkPhos  60  07-18          Urinalysis Basic - ( 18 Jul 2023 05:07 )    Color: x / Appearance: x / SG: x / pH: x  Gluc: 104 mg/dL / Ketone: x  / Bili: x / Urobili: x   Blood: x / Protein: x / Nitrite: x   Leuk Esterase: x / RBC: x / WBC x   Sq Epi: x / Non Sq Epi: x / Bacteria: x          RADIOLOGY & ADDITIONAL TESTS:  Results Reviewed:   Imaging Personally Reviewed:  Electrocardiogram Personally Reviewed:    COORDINATION OF CARE:  Care Discussed with Consultants/Other Providers [Y/N]:  Prior or Outpatient Records Reviewed [Y/N]:

## 2023-07-18 NOTE — PROGRESS NOTE ADULT - PROBLEM SELECTOR PLAN 7
Patient w/ anemia 7.5 on presentation down to 7.1  - Pt's son denies any hematochezia or melena. No other source of bleed reported  - Possibly iso pyloric mass  - Iron studies in AM  - IV protonix  - Active T&S  - Consent in chart Patient w/ anemia 7.5 on presentation down to 7.1. Now 8.7  - Pt's son denies any hematochezia or melena. No other source of bleed reported  - Possibly iso pyloric mass  - IV protonix  - Active T&S  - Consent in chart

## 2023-07-18 NOTE — PROGRESS NOTE ADULT - PROBLEM SELECTOR PLAN 5
Patient w/ secretions in trachea on imaging w/ hx of coughing w/ liquids and pureed food at home  - NPO at this time pending formal S&S evaluation   - Goals of care w/ family regarding pleasure feeds: would be amenable to NG tube but not invasive G tube  - Aspiration precautions and head of bed elevation   - Oral meds converted to IV for now.  - Emperic Zosyn, renally dosed for now Patient w/ secretions in trachea on imaging w/ hx of coughing w/ liquids and pureed food at home  - NPO s/p S&S evaluation   - Goals of care w/ family regarding pleasure feeds: would be amenable to NG tube but not invasive G tube  - Aspiration precautions and head of bed elevation   - Oral meds converted to IV for now.  - Emperic Zosyn, renally dosed for now

## 2023-07-18 NOTE — PROGRESS NOTE ADULT - ATTENDING COMMENTS
68 year old male w/ PMH of CKD, CVA, HTN, DM, Dementia (Aox0, Non-verbal) and seizure disorder presenting with acute encephalopathy likely 2/2 to aspiration PNA, found to have pyloric mass likely adenocarcinoma w/ possible metastatic lymph nodes.      Spoke to pt with aid of Diana  using [employer]; ID [id number]. Pt is oriented to self and location. Unable to state year; states it is 1999. Believes he is in the hospital because of "diabetes."    #Gastric cancer:   -family wanted biopsy; however, now apprehensive. Per prior team's conversations with family, would not want aggressive treatment, even if biospy is consistent with malignancy. However, as per palliative conversation, family perseverates on patient obtaining surgery. Given hx of T2DM, CKD, CVA x 2, pt would be high risk candidate for high risk surgery. Will consult surgery to evaluate pt given family's wishes; however, highly doubt pt is a candidate. Pt's family is not interested in chemo. Would defer bx until surgery evaluation, as above.    #Acute encephalopathy:   -likely toxic metabolic encephalopathy in setting of infection  -improving, currently A&O x 2    #CKD:   -Cr baseline appears to be 1.3-1.4. Currently stable at 1.58. Continue to monitor. Suspect likely pre-renal; continue IVF.     Remainder of chronic problems as above.

## 2023-07-18 NOTE — PROGRESS NOTE ADULT - PROBLEM SELECTOR PLAN 1
Pt w/ hypothermia 96.5F and tachypnea >20 w/ hypothermia. Possible source Aspiration PNA  - f/u BCx, UCx.  - Cover w/ Emperic Zosyn for possible aspiration PNA  - +MRSA: vanc by level  - Monitor fever/WBC curve.  - Legionella neg: NING soto Pt w/ hypothermia 96.5F and tachypnea >20 w/ hypothermia. Possible source Aspiration PNA  - BCx/UCx  NGTD  - Cover w/ Emperic Zosyn for possible aspiration PNA  - +MRSA: vanc by level  - Monitor fever/WBC curve.  - Legionella neg: NING soto

## 2023-07-18 NOTE — CONSULT NOTE ADULT - ASSESSMENT
68M w PMHx CKD, CVA, HTN, DM, dementia (A/0x0), seizure disorder on Valproic acid who presented to [facility] 7/15 w AMS aw lethargy, dyspnea, and diarrhea. Admitted to medicine for AMS w/u. CT A/P on admission with findings cf gastric cancer. Surgical oncology consulted for evaluation.     Patient is A/Ox0 at baseline and dependent on family for all ADLs. ECOG 4. Nutritionally, patient also malnourished in appearance, consistent with hx of poor PO intake and chronic aspiration as of recently. Staging of likely gastric cancer limited at this time without biopsy. Regardless of stage, patient is overall not a surgical candidate given his poor functional status and nutritional status at this time.     Plan:   -No acute surgical oncology intervention  -Not a surgical candidate given nutritional/functional status   -Encourage [facility]   -Appreciate GI & Heme/Onc & Palliative Care input   -Care per primary team     Discussed with Attending Surgeon Dr. Emma Cisse MD PGY3  D Team, [id number]

## 2023-07-18 NOTE — PROGRESS NOTE ADULT - ASSESSMENT
68 year old male with history of HTN, DM, CKD, CVA, demenita [A/Ox0 for past several years per family], and seizure disorder who presents with worsening mental status and dyspnea with hospital course c/b aspiration pneumonia, hypotension, anemia, and gastric mass with LAD on CT A/P.    # Gastric mass with LAD on CT imaging  # Iron deficiency anemia likely secondary to above  - Currently H&H and hemodynamically stable  - No urgent indication for EGD (diagnostic purpose)  - Per discussion with daughter: (+) Concerns regarding anesthesia risk with EGD, and would like to talk to primary team and oncology team about potential prognosis and therapeutic options prior to proceeding with EGD.     Recommendations:  -trend vitals, CBC, and monitor for clinical signs of bleeding  -maintain active type and screen  -transfusion goal to maintain hemoglobin >/= 7.0 and platelets >/= 50  -avoid NSAIDs  -PPI IV BID  -Pending family decision on EGD; may keep patient NPO after midnight for tentative EGD tomorrow if wish to proceed per GOC discussion    Note incomplete until finalized by attending signature/attestation.    Virginia Grayson  GI/Hepatology Fellow    MONDAY-FRIDAY 8AM-5PM:  Pager# [phone] ([facility]) or [phone] ([facility])    NON-URGENT CONSULTS:  Please email [email or web address]  AT NIGHT AND ON WEEKENDS:  Contact on-call GI fellow via answering service ([phone]) from 5pm-8am and on weekends/holidays

## 2023-07-18 NOTE — PROGRESS NOTE ADULT - PROBLEM SELECTOR PLAN 4
Patient w/ gastric pyloric mass w/ bulky local necrotic metastatic adenopathy on CT scan c/f adenocarcinoma  - Discussed GoC with patient's daughter and son: have reservations about GI procedure and anesthesia  - GI holding procedure until onc c/s recs regarding if patient would be candidate for procedure  - C/s hemeonc if patient would be a candidate for treatment if cancer confirmed  - Continue with goals of care Patient w/ hypercarbia noted on VBG w/ pCO2=65. Pt w/ >30 pack year smoking history   - VBG in April 2023 w pCO2=48  - No wheezing noted on exam

## 2023-07-18 NOTE — PROGRESS NOTE ADULT - ATTENDING COMMENTS
68 year old male with history of HTN, DM, CKD, CVA, demenita [A/Ox0 for past several years per family], and seizure disorder who presents with worsening mental status and dyspnea with hospital course c/b aspiration pneumonia, hypotension, anemia, and gastric mass with LAD on CT A/P.    # Gastric mass with LAD on CT imaging  # Iron deficiency anemia likely secondary to above  - Currently H&H and hemodynamically stable  - No urgent indication for EGD (diagnostic purpose)  - Per discussion with daughter: (+) Concerns regarding anesthesia risk with EGD, and would like to talk to primary team and oncology team about potential prognosis and therapeutic options prior to proceeding with EGD.     Recommendations:  -trend vitals, CBC, and monitor for clinical signs of bleeding  -maintain active type and screen  -transfusion goal to maintain hemoglobin >/= 7.0 and platelets >/= 50  -avoid NSAIDs  -PPI IV BID  -Pending family decision on EGD; may keep patient NPO after midnight for tentative EGD tomorrow if wish to proceed per GOC discussion

## 2023-07-18 NOTE — PROGRESS NOTE ADULT - PROBLEM SELECTOR PLAN 3
Patient w/ hypercarbia noted on VBG w/ pCO2=65. Pt w/ >30 pack year smoking history   - VBG in April 2023 w pCO2=48  - No wheezing noted on exam  - Duoneb x1 stat w/ q6h prn  - f/u Repeat VBG in AM Patient w/ baseline dementia (AOx0) p/w decreased responsiveness and lethargy for 2 days likely iso metabolic encephalopathy d/t aspiration PNA vs hypercapnia   - CT w/ secretions in esophagus and history of coughing w/ eating c/f aspiration. Pt hypothermic and hypotensive meeting SIRS criteria s/p 2L Bolus  - f/u infectious w/u and management as below  - Pt noted w/ hypercarbia on VBG likely iso COPD given extensive smoking history although no official diagnosis per son

## 2023-07-18 NOTE — PROGRESS NOTE ADULT - PROBLEM SELECTOR PLAN 2
Patient w/ baseline dementia (AOx0) p/w decreased responsiveness and lethargy for 2 days likely iso metabolic encephalopathy d/t aspiration PNA vs hypercapnia   - CT w/ secretions in esophagus and history of coughing w/ eating c/f aspiration. Pt hypothermic and hypotensive meeting SIRS criteria s/p 2L Bolus  - f/u infectious w/u and management as below  - Pt noted w/ hypercarbia on VBG likely iso COPD given extensive smoking history although no official diagnosis per son Patient w/ gastric pyloric mass w/ bulky local necrotic metastatic adenopathy on CT scan c/f adenocarcinoma  - Discussed GoC with patient's daughter and son: have reservations about GI procedure and anesthesia  - GI holding procedure until onc c/s recs regarding if patient would be candidate for procedure  - C/s hemeonc: may not be surg/chemo candidate  - C/s surg to discuss surgical candidacy  - Continue with goals of care

## 2023-07-18 NOTE — CONSULT NOTE ADULT - SUBJECTIVE AND OBJECTIVE BOX
SURGICAL ONCOLOGY CONSULT NOTE  --------------------------------------------------------------------------------------------    Patient is a 68y old  Male who presents with a chief complaint of Altered mental status     (17 Jul 2023 10:42)      HPI: 68M w PMHx CKD, CVA, HTN, DM, dementia (A/0x0), seizure disorder on Valproic acid who presented to [facility] 7/15 w AMS aw lethargy, dyspnea, and diarrhea. Admitted to medicine for AMS w/u. CT A/P on admission with findings cf gastric cancer. Surgical oncology consulted for evaluation.     Patient is HDS on the floor. Afebrile.     Seen and examined. Hx limited by patients baseline dementia and mental status. Per chart review, patient is baseline A/Ox0 and dependent on family for all ADLs. As such, ECOG is 4. Hospital course thus far notable for aspiration pneumonia, for which patient is on Vancomycin. Gastroenterology following for possible EGD, pending [facility]. Heme/Onc following, with Tx plan pending [facility], although heme/onc states he may not be a good candidate for Chemo given his functional/nutritional status.       PAST MEDICAL & SURGICAL HISTORY:  DM (diabetes mellitus)      Seizure      CVA (cerebrovascular accident)      HLD (hyperlipidemia)      CKD (chronic kidney disease)      Dementia      No significant past surgical history        FAMILY HISTORY:  No pertinent family history in first degree relatives      [] Family history not pertinent as reviewed with the patient and family    ALLERGIES: No Known Allergies    CURRENT MEDICATIONS  MEDICATIONS (STANDING): dextrose 5% + lactated ringers. 1000 milliLiter(s) IV Continuous <Continuous>  dextrose 5%. 1000 milliLiter(s) IV Continuous <Continuous>  dextrose 5%. 1000 milliLiter(s) IV Continuous <Continuous>  dextrose 50% Injectable 25 Gram(s) IV Push once  dextrose 50% Injectable 12.5 Gram(s) IV Push once  dextrose 50% Injectable 25 Gram(s) IV Push once  glucagon  Injectable 1 milliGRAM(s) IntraMuscular once  heparin   Injectable 5000 Unit(s) SubCutaneous every 12 hours  insulin lispro (ADMELOG) corrective regimen sliding scale   SubCutaneous every 6 hours  pantoprazole  Injectable 40 milliGRAM(s) IV Push two times a day  piperacillin/tazobactam IVPB.. 3.375 Gram(s) IV Intermittent every 12 hours  valproate sodium  IVPB 125 milliGRAM(s) IV Intermittent every 6 hours  vancomycin  IVPB 750 milliGRAM(s) IV Intermittent once    MEDICATIONS (PRN):acetaminophen     Tablet .. 650 milliGRAM(s) Oral every 6 hours PRN Temp greater or equal to 38C (100.4F), Mild Pain (1 - 3)  aluminum hydroxide/magnesium hydroxide/simethicone Suspension 30 milliLiter(s) Oral every 4 hours PRN Dyspepsia  dextrose Oral Gel 15 Gram(s) Oral once PRN Blood Glucose LESS THAN 70 milliGRAM(s)/deciliter  melatonin 3 milliGRAM(s) Oral at bedtime PRN Insomnia    --------------------------------------------------------------------------------------------    Vitals:   T(C): 37 (07-18-23 @ 13:18), Max: 37 (07-18-23 @ 13:18)  HR: 75 (07-18-23 @ 13:18) (67 - 77)  BP: 161/93 (07-18-23 @ 13:18) (143/81 - 161/93)  RR: 17 (07-18-23 @ 13:18) (17 - 18)  SpO2: 100% (07-18-23 @ 13:18) (97% - 100%)  CAPILLARY BLOOD GLUCOSE      POCT Blood Glucose.: 133 mg/dL (18 Jul 2023 17:56)  POCT Blood Glucose.: 140 mg/dL (18 Jul 2023 12:18)  POCT Blood Glucose.: 142 mg/dL (18 Jul 2023 07:45)  POCT Blood Glucose.: 146 mg/dL (18 Jul 2023 01:35)    CAPILLARY BLOOD GLUCOSE      POCT Blood Glucose.: 133 mg/dL (18 Jul 2023 17:56)  POCT Blood Glucose.: 140 mg/dL (18 Jul 2023 12:18)  POCT Blood Glucose.: 142 mg/dL (18 Jul 2023 07:45)  POCT Blood Glucose.: 146 mg/dL (18 Jul 2023 01:35)      07-17 @ 07:01  -  07-18 @ 07:00  --------------------------------------------------------  IN:    dextrose 5% + lactated ringers: 900 mL  Total IN: 900 mL    OUT:    Intermittent Catheterization - Urethral (mL): 350 mL  Total OUT: 350 mL    Total NET: 550 mL      07-18 @ 07:01  -  07-18 @ 18:43  --------------------------------------------------------  IN:  Total IN: 0 mL    OUT:    Intermittent Catheterization - Urethral (mL): 500 mL  Total OUT: 500 mL    Total NET: -500 mL        Height (cm): 177.8 (07-15 @ 19:42)  Weight (kg): 59.5 (07-15 @ 19:42)  BMI (kg/m2): 18.8 (07-15 @ 19:42)  BSA (m2): 1.74 (07-15 @ 19:42)    PHYSICAL EXAM:   General: Confused, awake,   Neuro: A+Ox0  HEENT: NC/AT, no asymmetry, no scleral icterus  Neck: Soft, supple  Cardio: RRR   Resp: Airway patent, unlabored breathing  Thorax: No chest wall tenderness  GI/Abd: Soft, NT/ND, no rebound/guarding, no masses palpated  Vascular: All 4 extremities warm   Skin: Intact, no breakdown  Musculoskeletal: All 4 extremities moving spontaneously, no limitations  --------------------------------------------------------------------------------------------    LABS  CBC (07-18 @ 05:07)                              8.7<L>                         5.58    )----------------(  193        44.5  % Neutrophils, 29.2  % Lymphocytes, ANC: 2.48                                27.8<L>  CBC (07-17 @ 09:05)                              8.0<L>                         6.95    )----------------(  199        --    % Neutrophils, --    % Lymphocytes, ANC: --                                  25.8<L>    BMP (07-18 @ 05:07)             137     |  100     |  9     		Ca++ --      Ca 9.5                ---------------------------------( 104<H>		Mg 1.80               3.9     |  26      |  1.58<H>			Ph 3.7     BMP (07-17 @ 09:05)             135     |  101     |  11    		Ca++ --      Ca 9.3                ---------------------------------( 125<H>		Mg 1.50<L>             4.3     |  26      |  1.58<H>			Ph 3.3       LFTs (07-18 @ 05:07)      TPro 6.2 / Alb 3.6 / TBili 0.3 / DBili -- / AST 16 / ALT 8 / AlkPhos 60  LFTs (07-17 @ 09:05)      TPro 5.7<L> / Alb 3.1<L> / TBili 0.2 / DBili -- / AST 16 / ALT 9 / AlkPhos 59          VBG (07-18 @ 05:07)     7.31<L> / 60<H> / 33 / 30<H> / 3.1<H> / 40.7<L>%     Lactate: 1.5    --------------------------------------------------------------------------------------------    MICROBIOLOGY  Urinalysis (07-18 @ 05:07):     Color:  / Appearance:  / SG:  / pH:  / Gluc: 104<H> / Ketones:  / Bili:  / Urobili:  / Protein : / Nitrites:  / Leuk.Est:  / RBC:  / WBC:  / Sq Epi:  / Non Sq Epi:  / Bacteria        -> Clean Catch Clean Catch (Midstream) Culture (07-15 @ 02:33)     NG    NG    No growth    -> .Blood Blood-Peripheral Culture (07-15 @ 02:06)     NG    NG    No growth at 72 Hours    -> .Blood Blood-Peripheral Culture (07-15 @ 01:50)     NG    NG    No growth at 72 Hours      --------------------------------------------------------------------------------------------    IMAGING  < from: CT Abdomen and Pelvis w/ IV Cont (07.15.23 @ 11:55) >    ACC: [id number] EXAM:  CT CHEST IC   ORDERED BY: MIAN FAN     ACC: [id number] EXAM:  CT ABDOMEN AND PELVIS IC   ORDERED BY: KEISHA ANDERSON     PROCEDURE DATE:  07/15/2023          INTERPRETATION:  CLINICAL INFORMATION: Shortness of breath. Abdominal   pain.    COMPARISON: CT abdomen pelvis 4/24/2023.    CONTRAST/COMPLICATIONS:  IV Contrast: Omnipaque 350  90 cc administered   10 cc discarded  Oral Contrast: NONE  Complications: None reported at time of study completion    PROCEDURE:  CT of the Chest, Abdomen and Pelvis was performed.  Sagittal and coronal reformats were performed.    FINDINGS:  CHEST:  LUNGS AND LARGE AIRWAYS: No pulmonary nodules. Dependent nonocclusive   secretions in upper trachea. Hypoventilatory changes and respiratory   motion artifact.  PLEURA: Trace pleural effusions bilaterally.  VESSELS: Atherosclerotic changes of the aorta and coronary arteries. No   pulmonary artery filling defects seen.  HEART: Heart size is normal. No pericardial effusion.  MEDIASTINUM AND KIKI: No lymphadenopathy.  CHEST WALL AND LOWER NECK: Within normal limits.    ABDOMEN AND PELVIS:  LIVER: Within normal limits.  BILE DUCTS: Normal caliber.  GALLBLADDER: Within normal limits.  SPLEEN: Within normal limits. Peripherally calcifiednodule adjacent to   the spleen 1.2 x 1.2 cm, may represent peritoneal loose body.  PANCREAS: Within normal limits.  ADRENALS: Within normal limits.  KIDNEYS/URETERS: Within normal limits.    BLADDER: Within normal limits.  REPRODUCTIVE ORGANS: Prostate is enlarged.    BOWEL: Heterogeneously enhancing eccentric lesion in gastric pylorus 3.7   x 1.8 x 3 3.7 cm (301:70) (APxTRxCC), adjacent multiple perigastric   metastatic lymph nodes (301:68), additional bulky metastatic   lymphadenopathy described below. No gastric outlet obstruction. No bowel   obstruction.  PERITONEUM: No ascites.  VESSELS: Abundant aortoiliac atherosclerotic calcific plaque including   ostia of celiac trunk and superior mesenteric artery.  RETROPERITONEUM/LYMPH NODES: Bulky necrotic perigastric lymph nodes,   largest 4.0 x 3.8 cm (301:74), and 3.0 x 2.1 cm (301:71).  ABDOMINAL WALL: Within normal limits.  BONES: Degenerative changes. No suspicious lesion.    IMPRESSION:  Gastric pyloric mass, suspect adenocarcinoma.  Bulky local necrotic metastatic adenopathy.  No distant metastatic disease.  Retained secretions in upper trachea.  Trace bilateral pleural effusions.    --- End of Report ---          TAMMY GUTIERREZ MD; Resident Radiologist  This document has been electronically signed.  ANUP HOLLINS MD; Attending Radiologist  This document has been electronically signed. Jul 15 2023  3:47PM    < end of copied text >

## 2023-07-19 LAB
ALBUMIN SERPL ELPH-MCNC: 3.3 G/DL — SIGNIFICANT CHANGE UP (ref 3.3–5)
ALP SERPL-CCNC: 55 U/L — SIGNIFICANT CHANGE UP (ref 40–120)
ALT FLD-CCNC: 9 U/L — SIGNIFICANT CHANGE UP (ref 4–41)
ANION GAP SERPL CALC-SCNC: 10 MMOL/L — SIGNIFICANT CHANGE UP (ref 7–14)
AST SERPL-CCNC: 13 U/L — SIGNIFICANT CHANGE UP (ref 4–40)
BASE EXCESS BLDV CALC-SCNC: 3.9 MMOL/L — HIGH (ref -2–3)
BASOPHILS # BLD AUTO: 0.04 K/UL — SIGNIFICANT CHANGE UP (ref 0–0.2)
BASOPHILS NFR BLD AUTO: 0.6 % — SIGNIFICANT CHANGE UP (ref 0–2)
BILIRUB SERPL-MCNC: 0.3 MG/DL — SIGNIFICANT CHANGE UP (ref 0.2–1.2)
BLOOD GAS VENOUS COMPREHENSIVE RESULT: SIGNIFICANT CHANGE UP
BUN SERPL-MCNC: 9 MG/DL — SIGNIFICANT CHANGE UP (ref 7–23)
CALCIUM SERPL-MCNC: 9.2 MG/DL — SIGNIFICANT CHANGE UP (ref 8.4–10.5)
CHLORIDE BLDV-SCNC: 102 MMOL/L — SIGNIFICANT CHANGE UP (ref 96–108)
CHLORIDE SERPL-SCNC: 99 MMOL/L — SIGNIFICANT CHANGE UP (ref 98–107)
CO2 BLDV-SCNC: 31.1 MMOL/L — HIGH (ref 22–26)
CO2 SERPL-SCNC: 27 MMOL/L — SIGNIFICANT CHANGE UP (ref 22–31)
CREAT SERPL-MCNC: 1.8 MG/DL — HIGH (ref 0.5–1.3)
EGFR: 41 ML/MIN/1.73M2 — LOW
EOSINOPHIL # BLD AUTO: 0.76 K/UL — HIGH (ref 0–0.5)
EOSINOPHIL NFR BLD AUTO: 11.4 % — HIGH (ref 0–6)
GAS PNL BLDV: 134 MMOL/L — LOW (ref 136–145)
GLUCOSE BLDC GLUCOMTR-MCNC: 102 MG/DL — HIGH (ref 70–99)
GLUCOSE BLDC GLUCOMTR-MCNC: 116 MG/DL — HIGH (ref 70–99)
GLUCOSE BLDC GLUCOMTR-MCNC: 124 MG/DL — HIGH (ref 70–99)
GLUCOSE BLDC GLUCOMTR-MCNC: 133 MG/DL — HIGH (ref 70–99)
GLUCOSE BLDC GLUCOMTR-MCNC: 136 MG/DL — HIGH (ref 70–99)
GLUCOSE BLDV-MCNC: 104 MG/DL — HIGH (ref 70–99)
GLUCOSE SERPL-MCNC: 108 MG/DL — HIGH (ref 70–99)
HCO3 BLDV-SCNC: 30 MMOL/L — HIGH (ref 22–29)
HCT VFR BLD CALC: 26 % — LOW (ref 39–50)
HCT VFR BLDA CALC: 24 % — LOW (ref 39–51)
HGB BLD CALC-MCNC: 8.1 G/DL — LOW (ref 12.6–17.4)
HGB BLD-MCNC: 8 G/DL — LOW (ref 13–17)
IANC: 3.36 K/UL — SIGNIFICANT CHANGE UP (ref 1.8–7.4)
IMM GRANULOCYTES NFR BLD AUTO: 0.5 % — SIGNIFICANT CHANGE UP (ref 0–0.9)
LACTATE BLDV-MCNC: 0.7 MMOL/L — SIGNIFICANT CHANGE UP (ref 0.5–2)
LYMPHOCYTES # BLD AUTO: 1.79 K/UL — SIGNIFICANT CHANGE UP (ref 1–3.3)
LYMPHOCYTES # BLD AUTO: 27 % — SIGNIFICANT CHANGE UP (ref 13–44)
MAGNESIUM SERPL-MCNC: 1.7 MG/DL — SIGNIFICANT CHANGE UP (ref 1.6–2.6)
MCHC RBC-ENTMCNC: 25.6 PG — LOW (ref 27–34)
MCHC RBC-ENTMCNC: 30.8 GM/DL — LOW (ref 32–36)
MCV RBC AUTO: 83.3 FL — SIGNIFICANT CHANGE UP (ref 80–100)
MONOCYTES # BLD AUTO: 0.66 K/UL — SIGNIFICANT CHANGE UP (ref 0–0.9)
MONOCYTES NFR BLD AUTO: 9.9 % — SIGNIFICANT CHANGE UP (ref 2–14)
NEUTROPHILS # BLD AUTO: 3.36 K/UL — SIGNIFICANT CHANGE UP (ref 1.8–7.4)
NEUTROPHILS NFR BLD AUTO: 50.6 % — SIGNIFICANT CHANGE UP (ref 43–77)
NRBC # BLD: 0 /100 WBCS — SIGNIFICANT CHANGE UP (ref 0–0)
NRBC # FLD: 0 K/UL — SIGNIFICANT CHANGE UP (ref 0–0)
PCO2 BLDV: 50 MMHG — SIGNIFICANT CHANGE UP (ref 42–55)
PH BLDV: 7.38 — SIGNIFICANT CHANGE UP (ref 7.32–7.43)
PHOSPHATE SERPL-MCNC: 3.5 MG/DL — SIGNIFICANT CHANGE UP (ref 2.5–4.5)
PLATELET # BLD AUTO: 174 K/UL — SIGNIFICANT CHANGE UP (ref 150–400)
PO2 BLDV: <20 MMHG — LOW (ref 25–45)
POTASSIUM BLDV-SCNC: 4.3 MMOL/L — SIGNIFICANT CHANGE UP (ref 3.5–5.1)
POTASSIUM SERPL-MCNC: 4.1 MMOL/L — SIGNIFICANT CHANGE UP (ref 3.5–5.3)
POTASSIUM SERPL-SCNC: 4.1 MMOL/L — SIGNIFICANT CHANGE UP (ref 3.5–5.3)
PROT SERPL-MCNC: 5.9 G/DL — LOW (ref 6–8.3)
RBC # BLD: 3.12 M/UL — LOW (ref 4.2–5.8)
RBC # FLD: 16.5 % — HIGH (ref 10.3–14.5)
SAO2 % BLDV: 18.2 % — LOW (ref 67–88)
SODIUM SERPL-SCNC: 136 MMOL/L — SIGNIFICANT CHANGE UP (ref 135–145)
VANCOMYCIN TROUGH SERPL-MCNC: 15.2 UG/ML — SIGNIFICANT CHANGE UP (ref 10–20)
WBC # BLD: 6.64 K/UL — SIGNIFICANT CHANGE UP (ref 3.8–10.5)
WBC # FLD AUTO: 6.64 K/UL — SIGNIFICANT CHANGE UP (ref 3.8–10.5)

## 2023-07-19 PROCEDURE — 99232 SBSQ HOSP IP/OBS MODERATE 35: CPT

## 2023-07-19 PROCEDURE — 99497 ADVNCD CARE PLAN 30 MIN: CPT | Mod: 25

## 2023-07-19 PROCEDURE — 99232 SBSQ HOSP IP/OBS MODERATE 35: CPT | Mod: GC

## 2023-07-19 PROCEDURE — 99233 SBSQ HOSP IP/OBS HIGH 50: CPT | Mod: GC

## 2023-07-19 RX ORDER — VANCOMYCIN HCL 1 G
750 VIAL (EA) INTRAVENOUS ONCE
Refills: 0 | Status: COMPLETED | OUTPATIENT
Start: 2023-07-19 | End: 2023-07-19

## 2023-07-19 RX ORDER — SODIUM CHLORIDE 9 MG/ML
1000 INJECTION, SOLUTION INTRAVENOUS
Refills: 0 | Status: DISCONTINUED | OUTPATIENT
Start: 2023-07-19 | End: 2023-07-20

## 2023-07-19 RX ADMIN — PANTOPRAZOLE SODIUM 40 MILLIGRAM(S): 20 TABLET, DELAYED RELEASE ORAL at 17:47

## 2023-07-19 RX ADMIN — MUPIROCIN 1 APPLICATION(S): 20 OINTMENT TOPICAL at 17:48

## 2023-07-19 RX ADMIN — Medication 51.25 MILLIGRAM(S): at 22:24

## 2023-07-19 RX ADMIN — PANTOPRAZOLE SODIUM 40 MILLIGRAM(S): 20 TABLET, DELAYED RELEASE ORAL at 06:29

## 2023-07-19 RX ADMIN — HEPARIN SODIUM 5000 UNIT(S): 5000 INJECTION INTRAVENOUS; SUBCUTANEOUS at 17:47

## 2023-07-19 RX ADMIN — MUPIROCIN 1 APPLICATION(S): 20 OINTMENT TOPICAL at 06:28

## 2023-07-19 RX ADMIN — Medication 51.25 MILLIGRAM(S): at 15:20

## 2023-07-19 RX ADMIN — SODIUM CHLORIDE 75 MILLILITER(S): 9 INJECTION, SOLUTION INTRAVENOUS at 09:51

## 2023-07-19 RX ADMIN — Medication 51.25 MILLIGRAM(S): at 09:48

## 2023-07-19 RX ADMIN — PIPERACILLIN AND TAZOBACTAM 25 GRAM(S): 4; .5 INJECTION, POWDER, LYOPHILIZED, FOR SOLUTION INTRAVENOUS at 06:28

## 2023-07-19 RX ADMIN — HEPARIN SODIUM 5000 UNIT(S): 5000 INJECTION INTRAVENOUS; SUBCUTANEOUS at 06:29

## 2023-07-19 RX ADMIN — SODIUM CHLORIDE 75 MILLILITER(S): 9 INJECTION, SOLUTION INTRAVENOUS at 22:26

## 2023-07-19 RX ADMIN — PIPERACILLIN AND TAZOBACTAM 25 GRAM(S): 4; .5 INJECTION, POWDER, LYOPHILIZED, FOR SOLUTION INTRAVENOUS at 17:47

## 2023-07-19 RX ADMIN — Medication 51.25 MILLIGRAM(S): at 03:26

## 2023-07-19 RX ADMIN — Medication 250 MILLIGRAM(S): at 22:21

## 2023-07-19 NOTE — SWALLOW BEDSIDE ASSESSMENT ADULT - COMMENTS
Attempted to see patient at bedside, however, RN informed SLP that the patient is NPO pending EGD for tomorrow. This service to follow up as schedule permits when patient is cleared for PO trials. Attempted to see patient at bedside, however, RN informed SLP that the patient is NPO pending EGD for tomorrow. T6 made aware via teams: This service to follow up as schedule permits when patient is cleared for PO trials.

## 2023-07-19 NOTE — PROGRESS NOTE ADULT - SUBJECTIVE AND OBJECTIVE BOX
Vital Signs Last 24 Hrs  T(C): 36.5 (19 Jul 2023 14:38), Max: 36.6 (18 Jul 2023 22:36)  T(F): 97.7 (19 Jul 2023 14:38), Max: 97.9 (18 Jul 2023 22:36)  HR: 72 (19 Jul 2023 14:38) (69 - 78)  BP: 186/93 (19 Jul 2023 14:38) (127/79 - 186/93)  BP(mean): --  RR: 19 (19 Jul 2023 14:38) (16 - 19)  SpO2: 99% (19 Jul 2023 14:38) (99% - 100%)    Parameters below as of 19 Jul 2023 06:20  Patient On (Oxygen Delivery Method): room air        I&O's Detail    18 Jul 2023 07:01  -  19 Jul 2023 07:00  --------------------------------------------------------  IN:    dextrose 5% + lactated ringers: 900 mL    IV PiggyBack: 220 mL    IV PiggyBack: 100 mL  Total IN: 1220 mL    OUT:    Intermittent Catheterization - Urethral (mL): 900 mL    Oral Fluid: 0 mL  Total OUT: 900 mL    Total NET: 320 mL                                8.0    6.64  )-----------( 174      ( 19 Jul 2023 10:27 )             26.0       07-19    136  |  99  |  9   ----------------------------<  108<H>  4.1   |  27  |  1.80<H>    Ca    9.2      19 Jul 2023 10:27  Phos  3.5     07-19  Mg     1.70     07-19    TPro  5.9<L>  /  Alb  3.3  /  TBili  0.3  /  DBili  x   /  AST  13  /  ALT  9   /  AlkPhos  55  07-19    patient non-responsive          PLAN:  EGD to establish a diagnosis and then the family will decide on level of agressiveness

## 2023-07-19 NOTE — GOALS OF CARE CONVERSATION - ADVANCED CARE PLANNING - CONVERSATION DETAILS
Reviewed daughter Ihsan and son-in-law Mary that patient is not a surgical candidate. However, family inquiring how we are aware that patient is not a surgical candidate without doing a biopsy; reviewed with them surgery note on 7/18 which indicated that "Not a surgical candidate given nutritional/functional status ". They asked to speak to surgery team; discussed will have medical team facilitate for them to speak to surgery team.   Also reviewed oncology recommendations that patient is a poor candidate for DMT. Family again asking how team can know that he is not a good candidate for DMT without biopsy results.   Attempted to explain recommendation to forego diagnostic biopsy/ workup as patient is not a candidate for above treatments as biopsy results unlikely to add to his care if unable to provide treatment. However, family share they do not agree to make conclusions about candidacy for treatment without biopsy results first, as they feel that based on biopsy results, patient can be re-evaluated for treatment options.   They share that at this time they would like to proceed with biopsy.      Discussed concern about patient's dysphagia in setting of dementia and hx of stroke and now gastric mass. Reviewed trajectory of these illnesses. However, family does not feel his dysphagia is related to his hx of dementia/stroke. They wish for further evaluation of other etiologies that are contributing to his dysphagia. They share their hope that his dysphagia will resolve when we are able to identify the etiology, which allow for his nutritional status to improve which would then allow for him to be a candidate for cancer treatments. Reviewed daughter Ihsan and son-in-law Mary that patient is not a surgical candidate. However, family inquiring how we are aware that patient is not a surgical candidate without doing a biopsy; reviewed with them surgery note on 7/18 which indicated that "Not a surgical candidate given nutritional/functional status ". They asked to speak to surgery team; discussed will have medical team facilitate for them to speak to surgery team.   Also reviewed oncology recommendations that patient is a poor candidate for DMT. Family again asking how team can know that he is not a good candidate for DMT without biopsy results. Discussed the concern regarding his poor performance status which affects candidacy for DMT  Attempted to explain recommendation to forego diagnostic biopsy/ workup as patient is not a candidate for above treatments as biopsy results unlikely to add to his care if unable to provide treatment. However, family share they do not agree to make conclusions about candidacy for treatment without biopsy results first, as they feel that based on biopsy results, patient can be re-evaluated for treatment options.   They share that at this time they would like to proceed with biopsy.      Discussed concern about patient's dysphagia in setting of dementia and hx of stroke and now gastric mass. Reviewed trajectory of dementia illnesses. However, family does not feel his dysphagia is related to his hx of dementia/stroke. They wish for further evaluation of other etiologies that are contributing to his dysphagia. They share their hope that his dysphagia will resolve when we are able to identify the etiology, which would allow for his nutritional status to improve which would then allow for him to be a candidate for cancer treatments. Reviewed daughter Ihsan and son-in-law Mary that patient is not a surgical candidate. However, family inquiring how we are aware that patient is not a surgical candidate without doing a biopsy; reviewed with them surgery note on 7/18 which indicated that "Not a surgical candidate given nutritional/functional status ". They asked to speak to surgery team; discussed will have medical team facilitate for them to speak to surgery team.   Also reviewed oncology recommendations that patient is a poor candidate for DMT. Family again asking how team can know that he is not a good candidate for DMT without biopsy results. Discussed the concern regarding his poor performance status which affects candidacy for DMT  Attempted to explain recommendation to forego diagnostic biopsy/ workup as patient is not a candidate for above treatments as biopsy results unlikely to add to his care if unable to provide treatment. However, family share they do not agree to make conclusions about cancer diagnosis based only on imaging results or candidacy for treatment without biopsy results first, as they feel that based on biopsy results, patient can be re-evaluated for treatment options.   They share that at this time they would like to proceed with biopsy.      Discussed concern about patient's dysphagia in setting of dementia and hx of stroke and now gastric mass. Reviewed trajectory of dementia illnesses. However, family does not feel his dysphagia is related to his hx of dementia/stroke. They wish for further evaluation of other etiologies that are contributing to his dysphagia. They share their hope that his dysphagia will resolve when we are able to identify the etiology, which would allow for his nutritional status to improve which would then allow for him to be a candidate for cancer treatments.

## 2023-07-19 NOTE — PROGRESS NOTE ADULT - ASSESSMENT
68 year old male with history of HTN, DM, CKD, CVA, demenita [A/Ox0 for past several years per family], and seizure disorder who presents with worsening mental status and dyspnea with hospital course c/b aspiration pneumonia, hypotension, anemia, and gastric mass with LAD on CT A/P.    # Gastric mass with LAD on CT imaging  # Iron deficiency anemia likely secondary to above  - Currently H&H and hemodynamically stable  - No urgent indication for EGD (diagnostic purpose)  - Per discussion with daughter: (+) Concerns regarding anesthesia risk with EGD, and would like to talk to primary team and oncology team about potential prognosis and therapeutic options prior to proceeding with EGD.     Recommendations:  -trend vitals, CBC, and monitor for clinical signs of bleeding  -maintain active type and screen  -transfusion goal to maintain hemoglobin >/= 7.0 and platelets >/= 50  -avoid NSAIDs  -PPI IV BID  -Pending family decision on EGD; may keep patient NPO after midnight for tentative EGD tomorrow if wish to proceed per GOC discussion    Note incomplete until finalized by attending signature/attestation.    Virginia Grayson  GI/Hepatology Fellow    MONDAY-FRIDAY 8AM-5PM:  Pager# [phone] ([facility]) or [phone] ([facility])    NON-URGENT CONSULTS:  Please email [email or web address]  AT NIGHT AND ON WEEKENDS:  Contact on-call GI fellow via answering service ([phone]) from 5pm-8am and on weekends/holidays 68 year old male with history of HTN, DM, CKD, CVA, demenita [A/Ox0 for past several years per family], and seizure disorder who presents with worsening mental status and dyspnea with hospital course c/b aspiration pneumonia, hypotension, anemia, and gastric mass with LAD on CT A/P.    # Gastric mass with LAD on CT imaging  # Iron deficiency anemia likely secondary to above  - Currently H&H and hemodynamically stable  - No urgent indication for EGD (diagnostic purpose)  - Spoke with patient's family in regards to procedure today- they would like to proceed with EGD/bx- will consent and proceed with scope tomorrow     Recommendations:  -trend vitals, CBC, and monitor for clinical signs of bleeding  -maintain active type and screen  -transfusion goal to maintain hemoglobin >/= 7.0 and platelets >/= 50  -avoid NSAIDs  -PPI IV BID  -NPO for EGD with biopsies tomorrow     Note incomplete until finalized by attending signature/attestation.    Hannah Murillo MD  Gastroenterology/Hepatology Fellow, PGY-4  Please contact via TEAMS    NON-URGENT CONSULTS:  Please email [email or web address]  [email or web address]  AT NIGHT AND ON WEEKENDS:  Contact on-call GI fellow via answering service ([phone]) from 5pm-8am and on weekends/holidays  MONDAY-FRIDAY 8AM-5PM:  Pager# [phone]

## 2023-07-19 NOTE — PROGRESS NOTE ADULT - ATTENDING COMMENTS
68 year old male w/ PMH of CKD, CVA, HTN, DM, Dementia (Aox0, Non-verbal) and seizure disorder presenting with acute encephalopathy likely 2/2 to aspiration PNA, found to have pyloric mass likely adenocarcinoma w/ possible metastatic lymph nodes.    #Gastric cancer:   -family wanted biopsy; however, now apprehensive. Per prior team's conversations with family, would not want chemotherapy, even if biopsy is consistent with malignancy. However, as per palliative conversation, family perseverates on patient obtaining surgery. Given hx of T2DM, CKD, CVA x 2, pt would be high risk candidate for high risk surgery. Surgery evaluated pt; pt is not a candidate for surgery given poor nutritional and functional status. Team communicated with family; will need to continue conversations regarding palliative/hospice approach    #Acute encephalopathy:   -likely toxic metabolic encephalopathy in setting of infection  -improving, currently A&O x 2    #Aspiration PNA:   -plan for 5 days course of zosyn; plan to end on 7/20    #CKD:   -Cr increasing. Patient has been shown to be retaining and required straight cath x 3 times. Perez to be placed    Remainder of chronic problems as above.

## 2023-07-19 NOTE — PROGRESS NOTE ADULT - PROBLEM SELECTOR PLAN 2
- CT 7/15/23- Gastric pyloric mass, suspect adenocarcinoma. Bulky local necrotic metastatic adenopathy.  No distant metastatic disease.  - GI recommendations appreciated.

## 2023-07-19 NOTE — PROGRESS NOTE ADULT - PROBLEM SELECTOR PLAN 4
Patient w/ hypercarbia noted on VBG w/ pCO2=65. Pt w/ >30 pack year smoking history   - VBG in April 2023 w pCO2=48  - No wheezing noted on exam

## 2023-07-19 NOTE — PROGRESS NOTE ADULT - PROBLEM SELECTOR PLAN 1
Pt w/ hypothermia 96.5F and tachypnea >20 w/ hypothermia. Possible source Aspiration PNA  - BCx/UCx  NGTD  - Cover w/ Emperic zosy and vanc until 7/20  - Monitor fever/WBC curve.  - Legionella neg: NING soto

## 2023-07-19 NOTE — PROGRESS NOTE ADULT - SUBJECTIVE AND OBJECTIVE BOX
SURGERY DAILY PROGRESS NOTE    --------------- OVERNIGHT/INTERVAL---------------  - No acute events overnight  --------------- SUBJECTIVE ---------------  - Patient seen and examined at bedside; patient on-verbal    --------------- OBJECTIVE ---------------  -----VITALS-----  T(C): 36.6, Max: 36.6 (07-18)  T(F): 97.9, Max: 97.9 (07-18)  HR: 69 (69 - 78)  BP: 153/76 (127/79 - 153/76)  BP(mean): --  ABP: --  ABP(mean): --  RR: 16 (16 - 17)  SpO2: 100% (100% - 100%)  CVP(mm Hg): --  CVP(cm H2O): --  room air            -----PHYSICAL EXAM-----  GENERAL: NAD, lying in bed   NEURO: AOx0, awake alert appropriate  HEENT: NCAT, trachea midline  PULM: Respirations non-labored  ABD: Soft, non-tender, non-distended, no peritonitis/rebound tenderness  EXT: Warm, well perfused     -----INs & OUTs-----      -----MEDICATIONS-----  STANDING  dextrose 5% + lactated ringers. 1000 milliLiter(s) (75 mL/Hr) IV Continuous <Continuous>  dextrose 5%. 1000 milliLiter(s) (50 mL/Hr) IV Continuous <Continuous>  dextrose 5%. 1000 milliLiter(s) (100 mL/Hr) IV Continuous <Continuous>  dextrose 50% Injectable 25 Gram(s) IV Push once  dextrose 50% Injectable 25 Gram(s) IV Push once  dextrose 50% Injectable 12.5 Gram(s) IV Push once  glucagon  Injectable 1 milliGRAM(s) IntraMuscular once  heparin   Injectable 5000 Unit(s) SubCutaneous every 12 hours  insulin lispro (ADMELOG) corrective regimen sliding scale   SubCutaneous every 6 hours  mupirocin 2% Ointment 1 Application(s) Topical two times a day  pantoprazole  Injectable 40 milliGRAM(s) IV Push two times a day  piperacillin/tazobactam IVPB.. 3.375 Gram(s) IV Intermittent every 12 hours  valproate sodium  IVPB 125 milliGRAM(s) IV Intermittent every 6 hours    PRN  acetaminophen     Tablet .. 650 milliGRAM(s) Oral every 6 hours PRN Temp greater or equal to 38C (100.4F), Mild Pain (1 - 3)  aluminum hydroxide/magnesium hydroxide/simethicone Suspension 30 milliLiter(s) Oral every 4 hours PRN Dyspepsia  dextrose Oral Gel 15 Gram(s) Oral once PRN Blood Glucose LESS THAN 70 milliGRAM(s)/deciliter  melatonin 3 milliGRAM(s) Oral at bedtime PRN Insomnia    -----LABS-----  CBC (07-19 @ 10:27)                              8.0<L>                         6.64    )----------------(  174        50.6  % Neutrophils, 27.0  % Lymphocytes, ANC: 3.36                                26.0<L>  CBC (07-18 @ 05:07)                              8.7<L>                         5.58    )----------------(  193        44.5  % Neutrophils, 29.2  % Lymphocytes, ANC: 2.48                                27.8<L>    BMP (07-19 @ 10:27)             136     |  99      |  9     		Ca++ --      Ca 9.2                ---------------------------------( 108<H>		Mg 1.70               4.1     |  27      |  1.80<H>			Ph 3.5     BMP (07-18 @ 05:07)             137     |  100     |  9     		Ca++ --      Ca 9.5                ---------------------------------( 104<H>		Mg 1.80               3.9     |  26      |  1.58<H>			Ph 3.7       LFTs (07-19 @ 10:27)      TPro 5.9<L> / Alb 3.3 / TBili 0.3 / DBili -- / AST 13 / ALT 9 / AlkPhos 55  LFTs (07-18 @ 05:07)      TPro 6.2 / Alb 3.6 / TBili 0.3 / DBili -- / AST 16 / ALT 8 / AlkPhos 60          VBG (07-19 @ 10:27)     7.38 / 50 / <20<L> / 30<H> / 3.9<H> / 18.2<L>%     Lactate: 0.7  VBG (07-18 @ 05:07)     7.31<L> / 60<H> / 33 / 30<H> / 3.1<H> / 40.7<L>%     Lactate: 1.5    Urinalysis (07-19 @ 10:27):     Color:  / Appearance:  / SG:  / pH:  / Gluc: 108<H> / Ketones:  / Bili:  / Urobili:  / Protein : / Nitrites:  / Leuk.Est:  / RBC:  / WBC:  / Sq Epi:  / Non Sq Epi:  / Bacteria        -> Clean Catch Clean Catch (Midstream) Culture (07-15 @ 02:33)     NG    NG    No growth    -> .Blood Blood-Peripheral Culture (07-15 @ 02:06)     NG    NG    No growth at 4 days    -> .Blood Blood-Peripheral Culture (07-15 @ 01:50)     NG    NG    No growth at 4 days     SURGERY DAILY PROGRESS NOTE    --------------- OVERNIGHT/INTERVAL---------------  - No acute events overnight  --------------- SUBJECTIVE ---------------  - Patient seen and examined at bedside; patient non-verbal    --------------- OBJECTIVE ---------------  -----VITALS-----  T(C): 36.6, Max: 36.6 (07-18)  T(F): 97.9, Max: 97.9 (07-18)  HR: 69 (69 - 78)  BP: 153/76 (127/79 - 153/76)  BP(mean): --  ABP: --  ABP(mean): --  RR: 16 (16 - 17)  SpO2: 100% (100% - 100%)  CVP(mm Hg): --  CVP(cm H2O): --  room air            -----PHYSICAL EXAM-----  GENERAL: NAD, lying in bed   NEURO: AOx0, awake alert appropriate  HEENT: NCAT, trachea midline  PULM: Respirations non-labored  ABD: Soft, non-tender, non-distended, no peritonitis/rebound tenderness  EXT: Warm, well perfused     -----INs & OUTs-----      -----MEDICATIONS-----  STANDING  dextrose 5% + lactated ringers. 1000 milliLiter(s) (75 mL/Hr) IV Continuous <Continuous>  dextrose 5%. 1000 milliLiter(s) (50 mL/Hr) IV Continuous <Continuous>  dextrose 5%. 1000 milliLiter(s) (100 mL/Hr) IV Continuous <Continuous>  dextrose 50% Injectable 25 Gram(s) IV Push once  dextrose 50% Injectable 25 Gram(s) IV Push once  dextrose 50% Injectable 12.5 Gram(s) IV Push once  glucagon  Injectable 1 milliGRAM(s) IntraMuscular once  heparin   Injectable 5000 Unit(s) SubCutaneous every 12 hours  insulin lispro (ADMELOG) corrective regimen sliding scale   SubCutaneous every 6 hours  mupirocin 2% Ointment 1 Application(s) Topical two times a day  pantoprazole  Injectable 40 milliGRAM(s) IV Push two times a day  piperacillin/tazobactam IVPB.. 3.375 Gram(s) IV Intermittent every 12 hours  valproate sodium  IVPB 125 milliGRAM(s) IV Intermittent every 6 hours    PRN  acetaminophen     Tablet .. 650 milliGRAM(s) Oral every 6 hours PRN Temp greater or equal to 38C (100.4F), Mild Pain (1 - 3)  aluminum hydroxide/magnesium hydroxide/simethicone Suspension 30 milliLiter(s) Oral every 4 hours PRN Dyspepsia  dextrose Oral Gel 15 Gram(s) Oral once PRN Blood Glucose LESS THAN 70 milliGRAM(s)/deciliter  melatonin 3 milliGRAM(s) Oral at bedtime PRN Insomnia    -----LABS-----  CBC (07-19 @ 10:27)                              8.0<L>                         6.64    )----------------(  174        50.6  % Neutrophils, 27.0  % Lymphocytes, ANC: 3.36                                26.0<L>  CBC (07-18 @ 05:07)                              8.7<L>                         5.58    )----------------(  193        44.5  % Neutrophils, 29.2  % Lymphocytes, ANC: 2.48                                27.8<L>    BMP (07-19 @ 10:27)             136     |  99      |  9     		Ca++ --      Ca 9.2                ---------------------------------( 108<H>		Mg 1.70               4.1     |  27      |  1.80<H>			Ph 3.5     BMP (07-18 @ 05:07)             137     |  100     |  9     		Ca++ --      Ca 9.5                ---------------------------------( 104<H>		Mg 1.80               3.9     |  26      |  1.58<H>			Ph 3.7       LFTs (07-19 @ 10:27)      TPro 5.9<L> / Alb 3.3 / TBili 0.3 / DBili -- / AST 13 / ALT 9 / AlkPhos 55  LFTs (07-18 @ 05:07)      TPro 6.2 / Alb 3.6 / TBili 0.3 / DBili -- / AST 16 / ALT 8 / AlkPhos 60          VBG (07-19 @ 10:27)     7.38 / 50 / <20<L> / 30<H> / 3.9<H> / 18.2<L>%     Lactate: 0.7  VBG (07-18 @ 05:07)     7.31<L> / 60<H> / 33 / 30<H> / 3.1<H> / 40.7<L>%     Lactate: 1.5    Urinalysis (07-19 @ 10:27):     Color:  / Appearance:  / SG:  / pH:  / Gluc: 108<H> / Ketones:  / Bili:  / Urobili:  / Protein : / Nitrites:  / Leuk.Est:  / RBC:  / WBC:  / Sq Epi:  / Non Sq Epi:  / Bacteria        -> Clean Catch Clean Catch (Midstream) Culture (07-15 @ 02:33)     NG    NG    No growth    -> .Blood Blood-Peripheral Culture (07-15 @ 02:06)     NG    NG    No growth at 4 days    -> .Blood Blood-Peripheral Culture (07-15 @ 01:50)     NG    NG    No growth at 4 days

## 2023-07-19 NOTE — PROGRESS NOTE ADULT - SUBJECTIVE AND OBJECTIVE BOX
PROGRESS NOTE:   Authored by Dr. Hannah Murillo    Patient is a 68y old  Male who presents with a chief complaint of Altered mental status     (17 Jul 2023 10:42)      SUBJECTIVE / OVERNIGHT EVENTS:    MEDICATIONS  (STANDING):  dextrose 5% + lactated ringers. 1000 milliLiter(s) (75 mL/Hr) IV Continuous <Continuous>  dextrose 5%. 1000 milliLiter(s) (50 mL/Hr) IV Continuous <Continuous>  dextrose 5%. 1000 milliLiter(s) (100 mL/Hr) IV Continuous <Continuous>  dextrose 50% Injectable 25 Gram(s) IV Push once  dextrose 50% Injectable 25 Gram(s) IV Push once  dextrose 50% Injectable 12.5 Gram(s) IV Push once  glucagon  Injectable 1 milliGRAM(s) IntraMuscular once  heparin   Injectable 5000 Unit(s) SubCutaneous every 12 hours  insulin lispro (ADMELOG) corrective regimen sliding scale   SubCutaneous every 6 hours  mupirocin 2% Ointment 1 Application(s) Topical two times a day  pantoprazole  Injectable 40 milliGRAM(s) IV Push two times a day  piperacillin/tazobactam IVPB.. 3.375 Gram(s) IV Intermittent every 12 hours  valproate sodium  IVPB 125 milliGRAM(s) IV Intermittent every 6 hours    MEDICATIONS  (PRN):  acetaminophen     Tablet .. 650 milliGRAM(s) Oral every 6 hours PRN Temp greater or equal to 38C (100.4F), Mild Pain (1 - 3)  aluminum hydroxide/magnesium hydroxide/simethicone Suspension 30 milliLiter(s) Oral every 4 hours PRN Dyspepsia  dextrose Oral Gel 15 Gram(s) Oral once PRN Blood Glucose LESS THAN 70 milliGRAM(s)/deciliter  melatonin 3 milliGRAM(s) Oral at bedtime PRN Insomnia      CAPILLARY BLOOD GLUCOSE      POCT Blood Glucose.: 124 mg/dL (19 Jul 2023 12:55)  POCT Blood Glucose.: 102 mg/dL (19 Jul 2023 06:40)  POCT Blood Glucose.: 136 mg/dL (19 Jul 2023 00:44)  POCT Blood Glucose.: 133 mg/dL (18 Jul 2023 17:56)    I&O's Summary    18 Jul 2023 07:01  -  19 Jul 2023 07:00  --------------------------------------------------------  IN: 1220 mL / OUT: 900 mL / NET: 320 mL        PHYSICAL EXAM:  Vital Signs Last 24 Hrs  T(C): 36.5 (19 Jul 2023 14:38), Max: 36.6 (18 Jul 2023 22:36)  T(F): 97.7 (19 Jul 2023 14:38), Max: 97.9 (18 Jul 2023 22:36)  HR: 72 (19 Jul 2023 14:38) (69 - 78)  BP: 186/93 (19 Jul 2023 14:38) (127/79 - 186/93)  BP(mean): --  RR: 19 (19 Jul 2023 14:38) (16 - 19)  SpO2: 99% (19 Jul 2023 14:38) (99% - 100%)    Parameters below as of 19 Jul 2023 06:20  Patient On (Oxygen Delivery Method): room air        GENERAL: no acute distress, sleeping  NEURO: awake to voice, but not answering questions  HEENT: NCAT, no conjunctival pallor appreciated  CHEST: no respiratory distress, no accessory muscle use  CARDIAC: regular rate, +S1/S2  ABDOMEN: soft, nontender, no rebound or guarding  EXTREMITIES: warm, well perfused  SKIN: no lesions noted    LABS:                        8.0    6.64  )-----------( 174      ( 19 Jul 2023 10:27 )             26.0     07-19    136  |  99  |  9   ----------------------------<  108<H>  4.1   |  27  |  1.80<H>    Ca    9.2      19 Jul 2023 10:27  Phos  3.5     07-19  Mg     1.70     07-19    TPro  5.9<L>  /  Alb  3.3  /  TBili  0.3  /  DBili  x   /  AST  13  /  ALT  9   /  AlkPhos  55  07-19          Urinalysis Basic - ( 19 Jul 2023 10:27 )    Color: x / Appearance: x / SG: x / pH: x  Gluc: 108 mg/dL / Ketone: x  / Bili: x / Urobili: x   Blood: x / Protein: x / Nitrite: x   Leuk Esterase: x / RBC: x / WBC x   Sq Epi: x / Non Sq Epi: x / Bacteria: x          RADIOLOGY & ADDITIONAL TESTS:  Results Reviewed:   Imaging Personally Reviewed:  Electrocardiogram Personally Reviewed:    COORDINATION OF CARE:  Care Discussed with Consultants/Other Providers [Y/N]:  Prior or Outpatient Records Reviewed [Y/N]:   PROGRESS NOTE:   Authored by Dr. Hannah Murillo    Patient is a 68y old  Male who presents with a chief complaint of Altered mental status     (17 Jul 2023 10:42)      SUBJECTIVE / OVERNIGHT EVENTS: No acute events overnight. Patient nonverbal at baseline     MEDICATIONS  (STANDING):  dextrose 5% + lactated ringers. 1000 milliLiter(s) (75 mL/Hr) IV Continuous <Continuous>  dextrose 5%. 1000 milliLiter(s) (50 mL/Hr) IV Continuous <Continuous>  dextrose 5%. 1000 milliLiter(s) (100 mL/Hr) IV Continuous <Continuous>  dextrose 50% Injectable 25 Gram(s) IV Push once  dextrose 50% Injectable 25 Gram(s) IV Push once  dextrose 50% Injectable 12.5 Gram(s) IV Push once  glucagon  Injectable 1 milliGRAM(s) IntraMuscular once  heparin   Injectable 5000 Unit(s) SubCutaneous every 12 hours  insulin lispro (ADMELOG) corrective regimen sliding scale   SubCutaneous every 6 hours  mupirocin 2% Ointment 1 Application(s) Topical two times a day  pantoprazole  Injectable 40 milliGRAM(s) IV Push two times a day  piperacillin/tazobactam IVPB.. 3.375 Gram(s) IV Intermittent every 12 hours  valproate sodium  IVPB 125 milliGRAM(s) IV Intermittent every 6 hours    MEDICATIONS  (PRN):  acetaminophen     Tablet .. 650 milliGRAM(s) Oral every 6 hours PRN Temp greater or equal to 38C (100.4F), Mild Pain (1 - 3)  aluminum hydroxide/magnesium hydroxide/simethicone Suspension 30 milliLiter(s) Oral every 4 hours PRN Dyspepsia  dextrose Oral Gel 15 Gram(s) Oral once PRN Blood Glucose LESS THAN 70 milliGRAM(s)/deciliter  melatonin 3 milliGRAM(s) Oral at bedtime PRN Insomnia      CAPILLARY BLOOD GLUCOSE      POCT Blood Glucose.: 124 mg/dL (19 Jul 2023 12:55)  POCT Blood Glucose.: 102 mg/dL (19 Jul 2023 06:40)  POCT Blood Glucose.: 136 mg/dL (19 Jul 2023 00:44)  POCT Blood Glucose.: 133 mg/dL (18 Jul 2023 17:56)    I&O's Summary    18 Jul 2023 07:01  -  19 Jul 2023 07:00  --------------------------------------------------------  IN: 1220 mL / OUT: 900 mL / NET: 320 mL        PHYSICAL EXAM:  Vital Signs Last 24 Hrs  T(C): 36.5 (19 Jul 2023 14:38), Max: 36.6 (18 Jul 2023 22:36)  T(F): 97.7 (19 Jul 2023 14:38), Max: 97.9 (18 Jul 2023 22:36)  HR: 72 (19 Jul 2023 14:38) (69 - 78)  BP: 186/93 (19 Jul 2023 14:38) (127/79 - 186/93)  BP(mean): --  RR: 19 (19 Jul 2023 14:38) (16 - 19)  SpO2: 99% (19 Jul 2023 14:38) (99% - 100%)    Parameters below as of 19 Jul 2023 06:20  Patient On (Oxygen Delivery Method): room air        GENERAL: no acute distress, sleeping  NEURO: awake to voice, but not answering questions  HEENT: NCAT, no conjunctival pallor appreciated  CHEST: no respiratory distress, no accessory muscle use  CARDIAC: regular rate, +S1/S2  ABDOMEN: soft, nontender, no rebound or guarding  EXTREMITIES: warm, well perfused  SKIN: no lesions noted    LABS:                        8.0    6.64  )-----------( 174      ( 19 Jul 2023 10:27 )             26.0     07-19    136  |  99  |  9   ----------------------------<  108<H>  4.1   |  27  |  1.80<H>    Ca    9.2      19 Jul 2023 10:27  Phos  3.5     07-19  Mg     1.70     07-19    TPro  5.9<L>  /  Alb  3.3  /  TBili  0.3  /  DBili  x   /  AST  13  /  ALT  9   /  AlkPhos  55  07-19          Urinalysis Basic - ( 19 Jul 2023 10:27 )    Color: x / Appearance: x / SG: x / pH: x  Gluc: 108 mg/dL / Ketone: x  / Bili: x / Urobili: x   Blood: x / Protein: x / Nitrite: x   Leuk Esterase: x / RBC: x / WBC x   Sq Epi: x / Non Sq Epi: x / Bacteria: x          RADIOLOGY & ADDITIONAL TESTS:  Results Reviewed:   Imaging Personally Reviewed:  Electrocardiogram Personally Reviewed:    COORDINATION OF CARE:  Care Discussed with Consultants/Other Providers [Y/N]:  Prior or Outpatient Records Reviewed [Y/N]:

## 2023-07-19 NOTE — PROGRESS NOTE ADULT - PROBLEM SELECTOR PLAN 5
Patient w/ secretions in trachea on imaging w/ hx of coughing w/ liquids and pureed food at home  - NPO s/p S&S evaluation   - Goals of care w/ family regarding pleasure feeds: would be amenable to NG tube but not invasive G tube  - Aspiration precautions and head of bed elevation   - Oral meds converted to IV for now.  - Emperic Zosyn, renally dosed for now

## 2023-07-19 NOTE — PROGRESS NOTE ADULT - PROBLEM SELECTOR PLAN 7
Patient w/ anemia 7.5 on presentation down to 7.1. Now 8.7  - Pt's son denies any hematochezia or melena. No other source of bleed reported  - Possibly iso pyloric mass  - IV protonix  - Active T&S  - Consent in chart

## 2023-07-19 NOTE — PROGRESS NOTE ADULT - SUBJECTIVE AND OBJECTIVE BOX
Marvel Kay MD  Emergency Medicine & Internal Medicine PGY-2    PROGRESS NOTE:    ID #:     Patient is a 68y old  Male who presents with a chief complaint of Altered mental status     (17 Jul 2023 10:42)      MAJOR INTERVAL HOSPITAL EVENTS:     SUBJECTIVE / OVERNIGHT EVENTS: No acute overnight events.       MEDICATIONS  (STANDING):  dextrose 5% + lactated ringers. 1000 milliLiter(s) (75 mL/Hr) IV Continuous <Continuous>  dextrose 5%. 1000 milliLiter(s) (50 mL/Hr) IV Continuous <Continuous>  dextrose 5%. 1000 milliLiter(s) (100 mL/Hr) IV Continuous <Continuous>  dextrose 50% Injectable 12.5 Gram(s) IV Push once  dextrose 50% Injectable 25 Gram(s) IV Push once  dextrose 50% Injectable 25 Gram(s) IV Push once  glucagon  Injectable 1 milliGRAM(s) IntraMuscular once  heparin   Injectable 5000 Unit(s) SubCutaneous every 12 hours  insulin lispro (ADMELOG) corrective regimen sliding scale   SubCutaneous every 6 hours  mupirocin 2% Ointment 1 Application(s) Topical two times a day  pantoprazole  Injectable 40 milliGRAM(s) IV Push two times a day  piperacillin/tazobactam IVPB.. 3.375 Gram(s) IV Intermittent every 12 hours  valproate sodium  IVPB 125 milliGRAM(s) IV Intermittent every 6 hours    MEDICATIONS  (PRN):  acetaminophen     Tablet .. 650 milliGRAM(s) Oral every 6 hours PRN Temp greater or equal to 38C (100.4F), Mild Pain (1 - 3)  aluminum hydroxide/magnesium hydroxide/simethicone Suspension 30 milliLiter(s) Oral every 4 hours PRN Dyspepsia  dextrose Oral Gel 15 Gram(s) Oral once PRN Blood Glucose LESS THAN 70 milliGRAM(s)/deciliter  melatonin 3 milliGRAM(s) Oral at bedtime PRN Insomnia      CAPILLARY BLOOD GLUCOSE      POCT Blood Glucose.: 102 mg/dL (19 Jul 2023 06:40)  POCT Blood Glucose.: 136 mg/dL (19 Jul 2023 00:44)  POCT Blood Glucose.: 133 mg/dL (18 Jul 2023 17:56)  POCT Blood Glucose.: 140 mg/dL (18 Jul 2023 12:18)  POCT Blood Glucose.: 142 mg/dL (18 Jul 2023 07:45)    I&O's Summary    18 Jul 2023 07:01  -  19 Jul 2023 07:00  --------------------------------------------------------  IN: 1220 mL / OUT: 900 mL / NET: 320 mL        PHYSICAL EXAM:  Vital Signs Last 24 Hrs  T(C): 36.6 (18 Jul 2023 22:36), Max: 37 (18 Jul 2023 13:18)  T(F): 97.9 (18 Jul 2023 22:36), Max: 98.6 (18 Jul 2023 13:18)  HR: 78 (18 Jul 2023 22:36) (75 - 78)  BP: 127/79 (18 Jul 2023 22:36) (127/79 - 161/93)  BP(mean): --  RR: 17 (18 Jul 2023 22:36) (17 - 17)  SpO2: 100% (18 Jul 2023 22:36) (100% - 100%)    Parameters below as of 18 Jul 2023 22:36  Patient On (Oxygen Delivery Method): room air        GENERAL: No acute distress, well-developed  HEAD:  Atraumatic, Normocephalic  EYES: EOMI, PERRLA, conjunctiva and sclera clear  NECK: Supple, no lymphadenopathy, no JVD  CHEST/LUNG: CTAB; No wheezes, rales, or rhonchi  HEART: Regular rate and rhythm; Normal s1 and s2, No murmurs, rubs, or gallops  ABDOMEN: Soft, non-tender, non-distended; normal bowel sounds, no organomegaly  EXTREMITIES:  2+ peripheral pulses b/l, No clubbing, cyanosis, or edema  NEUROLOGY: A&O x 3, no focal deficits  SKIN: No rashes or lesions          LABS:                        8.7    5.58  )-----------( 193      ( 18 Jul 2023 05:07 )             27.8     07-18    137  |  100  |  9   ----------------------------<  104<H>  3.9   |  26  |  1.58<H>    Ca    9.5      18 Jul 2023 05:07  Phos  3.7     07-18  Mg     1.80     07-18    TPro  6.2  /  Alb  3.6  /  TBili  0.3  /  DBili  x   /  AST  16  /  ALT  8   /  AlkPhos  60  07-18          Urinalysis Basic - ( 18 Jul 2023 05:07 )    Color: x / Appearance: x / SG: x / pH: x  Gluc: 104 mg/dL / Ketone: x  / Bili: x / Urobili: x   Blood: x / Protein: x / Nitrite: x   Leuk Esterase: x / RBC: x / WBC x   Sq Epi: x / Non Sq Epi: x / Bacteria: x          RADIOLOGY & ADDITIONAL TESTS:  Results Reviewed:   Imaging Personally Reviewed:  Electrocardiogram Personally Reviewed:    COORDINATION OF CARE:  Care Discussed with Consultants/Other Providers [Y/N]:  Prior or Outpatient Records Reviewed [Y/N]:   Marvel Kay MD  Emergency Medicine & Internal Medicine PGY-2    PROGRESS NOTE:    ID #:     Patient is a 68y old  Male who presents with a chief complaint of Altered mental status     (17 Jul 2023 10:42)      MAJOR INTERVAL HOSPITAL EVENTS: NAEO    SUBJECTIVE / OVERNIGHT EVENTS: No acute overnight events. Patient states name and location and denied pain through nods. Did not respond to other questions       MEDICATIONS  (STANDING):  dextrose 5% + lactated ringers. 1000 milliLiter(s) (75 mL/Hr) IV Continuous <Continuous>  dextrose 5%. 1000 milliLiter(s) (50 mL/Hr) IV Continuous <Continuous>  dextrose 5%. 1000 milliLiter(s) (100 mL/Hr) IV Continuous <Continuous>  dextrose 50% Injectable 12.5 Gram(s) IV Push once  dextrose 50% Injectable 25 Gram(s) IV Push once  dextrose 50% Injectable 25 Gram(s) IV Push once  glucagon  Injectable 1 milliGRAM(s) IntraMuscular once  heparin   Injectable 5000 Unit(s) SubCutaneous every 12 hours  insulin lispro (ADMELOG) corrective regimen sliding scale   SubCutaneous every 6 hours  mupirocin 2% Ointment 1 Application(s) Topical two times a day  pantoprazole  Injectable 40 milliGRAM(s) IV Push two times a day  piperacillin/tazobactam IVPB.. 3.375 Gram(s) IV Intermittent every 12 hours  valproate sodium  IVPB 125 milliGRAM(s) IV Intermittent every 6 hours    MEDICATIONS  (PRN):  acetaminophen     Tablet .. 650 milliGRAM(s) Oral every 6 hours PRN Temp greater or equal to 38C (100.4F), Mild Pain (1 - 3)  aluminum hydroxide/magnesium hydroxide/simethicone Suspension 30 milliLiter(s) Oral every 4 hours PRN Dyspepsia  dextrose Oral Gel 15 Gram(s) Oral once PRN Blood Glucose LESS THAN 70 milliGRAM(s)/deciliter  melatonin 3 milliGRAM(s) Oral at bedtime PRN Insomnia      CAPILLARY BLOOD GLUCOSE      POCT Blood Glucose.: 102 mg/dL (19 Jul 2023 06:40)  POCT Blood Glucose.: 136 mg/dL (19 Jul 2023 00:44)  POCT Blood Glucose.: 133 mg/dL (18 Jul 2023 17:56)  POCT Blood Glucose.: 140 mg/dL (18 Jul 2023 12:18)  POCT Blood Glucose.: 142 mg/dL (18 Jul 2023 07:45)    I&O's Summary    18 Jul 2023 07:01  -  19 Jul 2023 07:00  --------------------------------------------------------  IN: 1220 mL / OUT: 900 mL / NET: 320 mL        PHYSICAL EXAM:  Vital Signs Last 24 Hrs  T(C): 36.6 (18 Jul 2023 22:36), Max: 37 (18 Jul 2023 13:18)  T(F): 97.9 (18 Jul 2023 22:36), Max: 98.6 (18 Jul 2023 13:18)  HR: 78 (18 Jul 2023 22:36) (75 - 78)  BP: 127/79 (18 Jul 2023 22:36) (127/79 - 161/93)  BP(mean): --  RR: 17 (18 Jul 2023 22:36) (17 - 17)  SpO2: 100% (18 Jul 2023 22:36) (100% - 100%)    Parameters below as of 18 Jul 2023 22:36  Patient On (Oxygen Delivery Method): room air        GENERAL: No acute distress, well-developed  HEAD:  Atraumatic, Normocephalic  EYES: EOMI, PERRLA, conjunctiva and sclera clear  NECK: Supple, no lymphadenopathy, no JVD  CHEST/LUNG: CTAB; No wheezes, rales, or rhonchi  HEART: Regular rate and rhythm; Normal s1 and s2, No murmurs, rubs, or gallops  ABDOMEN: Soft, non-tender, non-distended; normal bowel sounds, no organomegaly  EXTREMITIES:  2+ peripheral pulses b/l, No clubbing, cyanosis, or edema  NEUROLOGY: A&O x 3, no focal deficits  SKIN: No rashes or lesions          LABS:                        8.7    5.58  )-----------( 193      ( 18 Jul 2023 05:07 )             27.8     07-18    137  |  100  |  9   ----------------------------<  104<H>  3.9   |  26  |  1.58<H>    Ca    9.5      18 Jul 2023 05:07  Phos  3.7     07-18  Mg     1.80     07-18    TPro  6.2  /  Alb  3.6  /  TBili  0.3  /  DBili  x   /  AST  16  /  ALT  8   /  AlkPhos  60  07-18          Urinalysis Basic - ( 18 Jul 2023 05:07 )    Color: x / Appearance: x / SG: x / pH: x  Gluc: 104 mg/dL / Ketone: x  / Bili: x / Urobili: x   Blood: x / Protein: x / Nitrite: x   Leuk Esterase: x / RBC: x / WBC x   Sq Epi: x / Non Sq Epi: x / Bacteria: x          RADIOLOGY & ADDITIONAL TESTS:  Results Reviewed:   Imaging Personally Reviewed:  Electrocardiogram Personally Reviewed:    COORDINATION OF CARE:  Care Discussed with Consultants/Other Providers [Y/N]:  Prior or Outpatient Records Reviewed [Y/N]:

## 2023-07-19 NOTE — PROGRESS NOTE ADULT - PROBLEM SELECTOR PLAN 2
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

## 2023-07-19 NOTE — PROGRESS NOTE ADULT - CONVERSATION DETAILS
Discussed patient's care at bedside with patient's daughter Ihsan and son-in-law Marv. Family expressed desire for EGD and biopsy to determine etiology of gastric mass. It was explained that per onc and surgery teams, the patient is not a candidate for chemo or surgical management for a likely gastric cancer. The son-in-law did not agree with this assessment and felt that with proper nutrition, the patient would be a candidate for surgery. It was also conveyed that there is not an indication to keep the patient admitted after the biopsy is performed. The son-in-law felt that the patient should remain admitted to be continued to be monitored, until biopsy results come back, and nutrition given so patient could be ready for surgery, these points were reiterated multiple times. He expressed a wish to speak again with the various teams to get an understanding on their assessment of the patient's condition and prognosis.

## 2023-07-19 NOTE — PROGRESS NOTE ADULT - ASSESSMENT
68M w PMHx CKD, CVA, HTN, DM, dementia (A/0x0), seizure disorder on Valproic acid who presented to [facility] 7/15 w AMS aw lethargy, dyspnea, and diarrhea. Admitted to medicine for AMS w/u. CT A/P on admission with findings cf gastric cancer. Surgical oncology consulted for evaluation.     Patient is A/Ox0 at baseline and dependent on family for all ADLs. ECOG 4. Nutritionally, patient also malnourished in appearance, consistent with hx of poor PO intake and chronic aspiration as of recently. Staging of likely gastric cancer limited at this time without biopsy. Regardless of stage, patient is overall not a surgical candidate given his poor functional status and nutritional status at this time.     Plan:   -No acute surgical oncology intervention  -Not a surgical candidate given nutritional/functional status   -f/u [facility]   -Appreciate GI & Heme/Onc & Palliative Care input   -Care per primary team      D Team, [id number]

## 2023-07-19 NOTE — PROGRESS NOTE ADULT - PROBLEM SELECTOR PLAN 5
Reviewed daughter Ihsan and son-in-law Mary that patient is not a surgical candidate and oncology recommendations that patient is a poor candidate for DMT. Discussed recommendation to forego biopsy as patient is not a candidate for treatments. However, family wish to proceed with biopsy as they feel that based on biopsy results, patient can be re-evaluated for treatment options.   Discussed concern about patient's dysphagia in setting of dementia and hx of stroke and now gastric mass. Reviewed trajectory of these illnesses. However, family does not feel his dysphagia is related to his hx of dementia/stroke. They wish for further evaluation of other etiologies that contribute to his dysphagia  Please see GOC note for further details   16 minutes spent on goals of care Reviewed daughter Ihsan and son-in-law Mary that patient is not a surgical candidate and oncology recommendations that patient is a poor candidate for DMT. Discussed recommendation to forego biopsy as patient is not a candidate for treatments. However, family wish to proceed with biopsy as they feel that based on biopsy results, patient can be re-evaluated for treatment options   Discussed concern about patient's dysphagia in setting of dementia and hx of stroke and now gastric mass. Reviewed trajectory of these illnesses. However, family does not feel his dysphagia is related to his hx of dementia/stroke. They wish for further evaluation of other etiologies that contribute to his dysphagia  Please see GOC note for further details   16 minutes spent on goals of care

## 2023-07-19 NOTE — PROGRESS NOTE ADULT - SUBJECTIVE AND OBJECTIVE BOX
Date of Service  : 7/19/2023    SUBJECTIVE AND OBJECTIVE:  Diana : [id number]   Patient seen and examined at bedside. Patient AAOx2 (person, place-hospital). He states he is currently hospitalized due to his diabetes. Denies pain, dyspnea/ nausea.     INTERVAL HPI/OVERNIGHT EVENTS:  No acute overnight events.  Surgery recommendations noted -Not a surgical candidate given nutritional/functional status     Allergies    No Known Allergies    Intolerances    MEDICATIONS  (STANDING):  dextrose 5% + lactated ringers. 1000 milliLiter(s) (75 mL/Hr) IV Continuous <Continuous>  dextrose 5%. 1000 milliLiter(s) (50 mL/Hr) IV Continuous <Continuous>  dextrose 5%. 1000 milliLiter(s) (100 mL/Hr) IV Continuous <Continuous>  dextrose 50% Injectable 25 Gram(s) IV Push once  dextrose 50% Injectable 12.5 Gram(s) IV Push once  dextrose 50% Injectable 25 Gram(s) IV Push once  glucagon  Injectable 1 milliGRAM(s) IntraMuscular once  heparin   Injectable 5000 Unit(s) SubCutaneous every 12 hours  insulin lispro (ADMELOG) corrective regimen sliding scale   SubCutaneous every 6 hours  mupirocin 2% Ointment 1 Application(s) Topical two times a day  pantoprazole  Injectable 40 milliGRAM(s) IV Push two times a day  piperacillin/tazobactam IVPB.. 3.375 Gram(s) IV Intermittent every 12 hours  valproate sodium  IVPB 125 milliGRAM(s) IV Intermittent every 6 hours    MEDICATIONS  (PRN):  acetaminophen     Tablet .. 650 milliGRAM(s) Oral every 6 hours PRN Temp greater or equal to 38C (100.4F), Mild Pain (1 - 3)  aluminum hydroxide/magnesium hydroxide/simethicone Suspension 30 milliLiter(s) Oral every 4 hours PRN Dyspepsia  dextrose Oral Gel 15 Gram(s) Oral once PRN Blood Glucose LESS THAN 70 milliGRAM(s)/deciliter  melatonin 3 milliGRAM(s) Oral at bedtime PRN Insomnia      ITEMS UNCHECKED ARE NOT PRESENT    PRESENT SYMPTOMS: [ x]Unable to self-report due to altered mental status- see [ ] CPOT [x ] PAINADS [ x] RDOS  Source if other than patient:  [ ]Family   [ ]Team     Pain:  [ ]yes [ x]no  QOL impact -   Location -                    Aggravating factors -  Quality -  Radiation -  Timing-  Severity (0-10 scale):  Minimal acceptable level / Pain Goal (0-10 scale):     Dyspnea:                           [ ]Mild [ ]Moderate [ ]Severe  Anxiety:                             [ ]Mild [ ]Moderate [ ]Severe  Agitation:                          [ ]Mild [ ]Moderate [ ]Severe  Fatigue:                             [ ]Mild [ ]Moderate [ ]Severe  Nausea:                             [ ]Mild [ ]Moderate [ ]Severe  Loss of appetite:              [ ]Mild [ ]Moderate [ ]Severe  Constipation:                   [ ]Mild [ ]Moderate [ ]Severe  Diarrhea:                          [ ]Mild [ ]Moderate [ ]Severe    CPOT:    https://www.[facility].org/getattachment/[email or web address]/Critical-Care-Pain-Observation-Tool-(CPOT)    PCSSQ[Palliative Care Spiritual Screening Question]   Severity (0-10):  Score of 4 or > indicate consideration of Chaplaincy referral.  Chaplaincy Referral: [ ] yes [ ] refused [ ] following [x ] deferred    Caregiver [place]? : [ ] yes [ ] no [ ] Declined [x ] Deferred              Social work referral [ ] Patient & Family Centered Care Referral [ ]     Anticipatory Grief present?:  [ ] yes [ ] no  [ x] Deferred                  Social work referral [ ] Chaplaincy Referral[ ]    Other Symptoms:  [ ]All other review of systems negative - unable to obtain due to encephalopathy     PHYSICAL EXAM:  Vital Signs Last 24 Hrs  T(C): 36.6 (18 Jul 2023 22:36), Max: 36.6 (18 Jul 2023 22:36)  T(F): 97.9 (18 Jul 2023 22:36), Max: 97.9 (18 Jul 2023 22:36)  HR: 69 (19 Jul 2023 06:20) (69 - 78)  BP: 153/76 (19 Jul 2023 06:20) (127/79 - 153/76)  BP(mean): --  RR: 16 (19 Jul 2023 06:20) (16 - 17)  SpO2: 100% (19 Jul 2023 06:20) (100% - 100%)    Parameters below as of 19 Jul 2023 06:20  Patient On (Oxygen Delivery Method): room air         I&O's Summary    18 Jul 2023 07:01  -  19 Jul 2023 07:00  --------------------------------------------------------  IN: 1220 mL / OUT: 900 mL / NET: 320 mL       GENERAL:  [x ]Alert  [ x]Oriented x 2  [ ]Lethargic  [ ]Cachexia  [ ]Unarousable  [x ]Verbal  [ ]Non-Verbal  [x ] No Distress  Behavioral:   [ ] Anxiety  [ ] Delirium [ ] Agitation [x ] Calm  [ ] Other  HEENT:  [x ]Normal  [ ] Temporal Wasting  [ ]Dry mouth   [ ]ET Tube/Trach  [ ]Oral lesions  [ ] Mucositis  PULMONARY:   [ ]Clear [ ]Tachypnea  [ ]Audible excessive secretions   [ ]Rhonchi        [ ]Right [ ]Left [ ]Bilateral  [ ]Crackles        [ ]Right [ ]Left [ ]Bilateral  [ ]Wheezing     [ ]Right [ ]Left [ ]Bilateral  [ x]Diminished breath sounds [ ]right [ ]left [x ]bilateral  CARDIOVASCULAR:    [ x]Regular [ ]Irregular [ ]Tachy  [ ]Wang [ ]Murmur [ ]Other  GASTROINTESTINAL:  [ x]Soft  [ ]Distended   [ ]+BS  [x ]Non tender [ ]Tender  [ ]PEG [ ]OGT/ NGT  Last BM:   GENITOURINARY:  [ ]Normal [ x] Incontinent   [ ]Oliguria/Anuria   [ ]Perez  MUSCULOSKELETAL:   [ ]Normal   [ ]Weakness  [x ]Bed/Wheelchair bound [ ]Edema  [  ] amputation  [  ] contraction  NEUROLOGIC:   [x ]No focal deficits  [ x]Cognitive impairment  [x ]Dysphagia [ ]Dysarthria [ ]Paresis [ ]Other   SKIN: Incontinence Associated Dermatitis. See Nursing Skin Assessment for further details  [ ]Normal    [ ]Rash  [ ]Pressure ulcer(s)       Present on admission [ ]y [ ]n   [  ]  Wound    [  ] hyperpigmentation    CRITICAL CARE:  [ ]Shock Present  [ ]Septic [ ]Cardiogenic [ ]Neurologic [ ]Hypovolemic  [ ]Vasopressors [ ]Inotropes  [ ]Respiratory failure present [ ]Mechanical Ventilation [ ]Non-invasive ventilatory support [ ]High-Flow   [ ]Acute  [ ]Chronic [ ]Hypoxic  [ ]Hypercarbic [ ]Other  [ ]Other organ failure     LABS:  reviewed                         8.0    6.64  )-----------( 174      ( 19 Jul 2023 10:27 )             26.0   07-19    136  |  99  |  9   ----------------------------<  108<H>  4.1   |  27  |  1.80<H>    Ca    9.2      19 Jul 2023 10:27  Phos  3.5     07-19  Mg     1.70     07-19    TPro  5.9<L>  /  Alb  3.3  /  TBili  0.3  /  DBili  x   /  AST  13  /  ALT  9   /  AlkPhos  55  07-19    Urinalysis Basic - ( 19 Jul 2023 10:27 )    Color: x / Appearance: x / SG: x / pH: x  Gluc: 108 mg/dL / Ketone: x  / Bili: x / Urobili: x   Blood: x / Protein: x / Nitrite: x   Leuk Esterase: x / RBC: x / WBC x   Sq Epi: x / Non Sq Epi: x / Bacteria: x      CAPILLARY BLOOD GLUCOSE      POCT Blood Glucose.: 124 mg/dL (19 Jul 2023 12:55)  POCT Blood Glucose.: 102 mg/dL (19 Jul 2023 06:40)  POCT Blood Glucose.: 136 mg/dL (19 Jul 2023 00:44)  POCT Blood Glucose.: 133 mg/dL (18 Jul 2023 17:56)      RADIOLOGY & ADDITIONAL STUDIES: reviewed     Protein Calorie Malnutrition Present: [ ]mild [ ]moderate [ ]severe [ ]underweight [ ]morbid obesity  https://www.andeal.org/vault/2440/web/files/ONC/Table_Clinical%20Characteristics%20to%20Document%20Malnutrition-White%20JV%20et%20al%202012.pdf    Height (cm): 177.8 (07-15-23 @ 19:42), 180.3 (04-23-23 @ 22:14)  Weight (kg): 59.5 (07-15-23 @ 19:42), 68 (04-23-23 @ 22:14)  BMI (kg/m2): 18.8 (07-15-23 @ 19:42), 20.9 (04-23-23 @ 22:14)    [ ]PPSV2 < or = 30%  [ ]significant weight loss [ ]poor nutritional intake [ ]anasarca   [ ]Artificial Nutrition    REFERRALS:   [ ]Chaplaincy  [ ]Hospice  [ ]Child Life  [ ]Social Work  [ x]Case management [ ]Holistic Therapy

## 2023-07-20 LAB
ALBUMIN SERPL ELPH-MCNC: 3.1 G/DL — LOW (ref 3.3–5)
ALP SERPL-CCNC: 50 U/L — SIGNIFICANT CHANGE UP (ref 40–120)
ALT FLD-CCNC: 10 U/L — SIGNIFICANT CHANGE UP (ref 4–41)
ANION GAP SERPL CALC-SCNC: 8 MMOL/L — SIGNIFICANT CHANGE UP (ref 7–14)
APTT BLD: 30.9 SEC — SIGNIFICANT CHANGE UP (ref 27–36.3)
AST SERPL-CCNC: 19 U/L — SIGNIFICANT CHANGE UP (ref 4–40)
BASOPHILS # BLD AUTO: 0.03 K/UL — SIGNIFICANT CHANGE UP (ref 0–0.2)
BASOPHILS NFR BLD AUTO: 0.6 % — SIGNIFICANT CHANGE UP (ref 0–2)
BILIRUB SERPL-MCNC: 0.2 MG/DL — SIGNIFICANT CHANGE UP (ref 0.2–1.2)
BUN SERPL-MCNC: 8 MG/DL — SIGNIFICANT CHANGE UP (ref 7–23)
CALCIUM SERPL-MCNC: 9.3 MG/DL — SIGNIFICANT CHANGE UP (ref 8.4–10.5)
CHLORIDE SERPL-SCNC: 102 MMOL/L — SIGNIFICANT CHANGE UP (ref 98–107)
CO2 SERPL-SCNC: 25 MMOL/L — SIGNIFICANT CHANGE UP (ref 22–31)
CREAT SERPL-MCNC: 1.68 MG/DL — HIGH (ref 0.5–1.3)
CULTURE RESULTS: SIGNIFICANT CHANGE UP
CULTURE RESULTS: SIGNIFICANT CHANGE UP
EGFR: 45 ML/MIN/1.73M2 — LOW
EOSINOPHIL # BLD AUTO: 0.89 K/UL — HIGH (ref 0–0.5)
EOSINOPHIL NFR BLD AUTO: 17.1 % — HIGH (ref 0–6)
GLUCOSE BLDC GLUCOMTR-MCNC: 110 MG/DL — HIGH (ref 70–99)
GLUCOSE BLDC GLUCOMTR-MCNC: 115 MG/DL — HIGH (ref 70–99)
GLUCOSE BLDC GLUCOMTR-MCNC: 127 MG/DL — HIGH (ref 70–99)
GLUCOSE BLDC GLUCOMTR-MCNC: 86 MG/DL — SIGNIFICANT CHANGE UP (ref 70–99)
GLUCOSE SERPL-MCNC: 115 MG/DL — HIGH (ref 70–99)
HCT VFR BLD CALC: 25 % — LOW (ref 39–50)
HGB BLD-MCNC: 7.8 G/DL — LOW (ref 13–17)
IANC: 1.74 K/UL — LOW (ref 1.8–7.4)
IMM GRANULOCYTES NFR BLD AUTO: 0.4 % — SIGNIFICANT CHANGE UP (ref 0–0.9)
INR BLD: 1.12 RATIO — SIGNIFICANT CHANGE UP (ref 0.88–1.16)
LYMPHOCYTES # BLD AUTO: 1.83 K/UL — SIGNIFICANT CHANGE UP (ref 1–3.3)
LYMPHOCYTES # BLD AUTO: 35.1 % — SIGNIFICANT CHANGE UP (ref 13–44)
MAGNESIUM SERPL-MCNC: 1.6 MG/DL — SIGNIFICANT CHANGE UP (ref 1.6–2.6)
MCHC RBC-ENTMCNC: 25.5 PG — LOW (ref 27–34)
MCHC RBC-ENTMCNC: 31.2 GM/DL — LOW (ref 32–36)
MCV RBC AUTO: 81.7 FL — SIGNIFICANT CHANGE UP (ref 80–100)
MONOCYTES # BLD AUTO: 0.7 K/UL — SIGNIFICANT CHANGE UP (ref 0–0.9)
MONOCYTES NFR BLD AUTO: 13.4 % — SIGNIFICANT CHANGE UP (ref 2–14)
NEUTROPHILS # BLD AUTO: 1.74 K/UL — LOW (ref 1.8–7.4)
NEUTROPHILS NFR BLD AUTO: 33.4 % — LOW (ref 43–77)
NRBC # BLD: 0 /100 WBCS — SIGNIFICANT CHANGE UP (ref 0–0)
NRBC # FLD: 0 K/UL — SIGNIFICANT CHANGE UP (ref 0–0)
PHOSPHATE SERPL-MCNC: 3.7 MG/DL — SIGNIFICANT CHANGE UP (ref 2.5–4.5)
PLATELET # BLD AUTO: 190 K/UL — SIGNIFICANT CHANGE UP (ref 150–400)
POTASSIUM SERPL-MCNC: 4.2 MMOL/L — SIGNIFICANT CHANGE UP (ref 3.5–5.3)
POTASSIUM SERPL-SCNC: 4.2 MMOL/L — SIGNIFICANT CHANGE UP (ref 3.5–5.3)
PROT SERPL-MCNC: 5.8 G/DL — LOW (ref 6–8.3)
PROTHROM AB SERPL-ACNC: 13 SEC — SIGNIFICANT CHANGE UP (ref 10.5–13.4)
RBC # BLD: 3.06 M/UL — LOW (ref 4.2–5.8)
RBC # FLD: 16.3 % — HIGH (ref 10.3–14.5)
SODIUM SERPL-SCNC: 135 MMOL/L — SIGNIFICANT CHANGE UP (ref 135–145)
SPECIMEN SOURCE: SIGNIFICANT CHANGE UP
SPECIMEN SOURCE: SIGNIFICANT CHANGE UP
VANCOMYCIN FLD-MCNC: 18.4 UG/ML — SIGNIFICANT CHANGE UP
WBC # BLD: 5.21 K/UL — SIGNIFICANT CHANGE UP (ref 3.8–10.5)
WBC # FLD AUTO: 5.21 K/UL — SIGNIFICANT CHANGE UP (ref 3.8–10.5)

## 2023-07-20 PROCEDURE — 88305 TISSUE EXAM BY PATHOLOGIST: CPT | Mod: 26

## 2023-07-20 PROCEDURE — 88341 IMHCHEM/IMCYTCHM EA ADD ANTB: CPT | Mod: 26

## 2023-07-20 PROCEDURE — 99232 SBSQ HOSP IP/OBS MODERATE 35: CPT | Mod: GC

## 2023-07-20 PROCEDURE — 43259 EGD US EXAM DUODENUM/JEJUNUM: CPT | Mod: GC

## 2023-07-20 PROCEDURE — 88342 IMHCHEM/IMCYTCHM 1ST ANTB: CPT | Mod: 26

## 2023-07-20 PROCEDURE — 99232 SBSQ HOSP IP/OBS MODERATE 35: CPT

## 2023-07-20 PROCEDURE — 43239 EGD BIOPSY SINGLE/MULTIPLE: CPT | Mod: 59,GC

## 2023-07-20 RX ORDER — SODIUM CHLORIDE 9 MG/ML
1000 INJECTION, SOLUTION INTRAVENOUS
Refills: 0 | Status: DISCONTINUED | OUTPATIENT
Start: 2023-07-20 | End: 2023-07-23

## 2023-07-20 RX ORDER — LOPERAMIDE HCL 2 MG
1 TABLET ORAL
Refills: 0 | DISCHARGE

## 2023-07-20 RX ADMIN — Medication 51.25 MILLIGRAM(S): at 15:24

## 2023-07-20 RX ADMIN — PANTOPRAZOLE SODIUM 40 MILLIGRAM(S): 20 TABLET, DELAYED RELEASE ORAL at 05:52

## 2023-07-20 RX ADMIN — PANTOPRAZOLE SODIUM 40 MILLIGRAM(S): 20 TABLET, DELAYED RELEASE ORAL at 18:22

## 2023-07-20 RX ADMIN — Medication 51.25 MILLIGRAM(S): at 21:59

## 2023-07-20 RX ADMIN — HEPARIN SODIUM 5000 UNIT(S): 5000 INJECTION INTRAVENOUS; SUBCUTANEOUS at 05:53

## 2023-07-20 RX ADMIN — HEPARIN SODIUM 5000 UNIT(S): 5000 INJECTION INTRAVENOUS; SUBCUTANEOUS at 18:21

## 2023-07-20 RX ADMIN — MUPIROCIN 1 APPLICATION(S): 20 OINTMENT TOPICAL at 18:21

## 2023-07-20 RX ADMIN — PIPERACILLIN AND TAZOBACTAM 25 GRAM(S): 4; .5 INJECTION, POWDER, LYOPHILIZED, FOR SOLUTION INTRAVENOUS at 18:21

## 2023-07-20 RX ADMIN — Medication 51.25 MILLIGRAM(S): at 09:16

## 2023-07-20 RX ADMIN — PIPERACILLIN AND TAZOBACTAM 25 GRAM(S): 4; .5 INJECTION, POWDER, LYOPHILIZED, FOR SOLUTION INTRAVENOUS at 05:51

## 2023-07-20 RX ADMIN — SODIUM CHLORIDE 75 MILLILITER(S): 9 INJECTION, SOLUTION INTRAVENOUS at 18:20

## 2023-07-20 RX ADMIN — MUPIROCIN 1 APPLICATION(S): 20 OINTMENT TOPICAL at 05:51

## 2023-07-20 RX ADMIN — Medication 51.25 MILLIGRAM(S): at 03:55

## 2023-07-20 NOTE — PROGRESS NOTE ADULT - ATTENDING COMMENTS
68 year old male w/ PMH of CKD, CVA, HTN, DM, Dementia (Aox0, Non-verbal) and seizure disorder presenting with acute encephalopathy likely 2/2 to aspiration PNA, found to have pyloric mass likely adenocarcinoma w/ possible metastatic lymph nodes.    #Gastric cancer:   -s/p EGD with biopsy today  -does not need to remain inpatient to await results; can f/u with GI o/p    #Acute encephalopathy:   -improved, A&O x 2  -likely toxic metabolic encephalopathy in setting of infection  -repeat S&S given improvement in mental status    #Aspiration PNA:   -plan for 5 days course of zosyn; plan to end today 7/20    #CKD:   -Patient has been shown to be retaining and required straight cath x 3 times. Perez to be placed. Likely has neurogenic bladder given hx of CVA x 2. Will need o/p urology f/u. Begin flomax  -Cr now improving  -c/w IVF    #Dispo:   -plan for d/c tomorrow. Family is aware. They will f/u with outpatient oncology for second opinion regarding treatment of likely gastric malignancy    Remainder of chronic problems as above.

## 2023-07-20 NOTE — PROGRESS NOTE ADULT - SUBJECTIVE AND OBJECTIVE BOX
SURGERY DAILY PROGRESS NOTE    --------------- OVERNIGHT/INTERVAL---------------  - No acute events overnight    --------------- SUBJECTIVE ---------------  - Patient seen and examined at bedside    --------------- OBJECTIVE ---------------  -----VITALS-----  T(C): 36.6, Max: 36.7 (07-19)  T(F): 97.9, Max: 98 (07-19)  HR: 64 (64 - 72)  BP: 156/81 (156/81 - 186/93)  BP(mean): --  ABP: --  ABP(mean): --  RR: 18 (18 - 19)  SpO2: 99% (97% - 99%)  CVP(mm Hg): --  CVP(cm H2O): --  room air            -----PHYSICAL EXAM-----  GENERAL: NAD, lying in bed   NEURO: AOx0, awake alert appropriate  HEENT: NCAT, trachea midline  PULM: Respirations non-labored  ABD: Soft, non-tender, non-distended, no peritonitis/rebound tenderness  EXT: Warm, well perfused     -----INs & OUTs-----      -----MEDICATIONS-----  STANDING  dextrose 5% + lactated ringers. 1000 milliLiter(s) (75 mL/Hr) IV Continuous <Continuous>  dextrose 5%. 1000 milliLiter(s) (50 mL/Hr) IV Continuous <Continuous>  dextrose 5%. 1000 milliLiter(s) (100 mL/Hr) IV Continuous <Continuous>  dextrose 50% Injectable 25 Gram(s) IV Push once  dextrose 50% Injectable 25 Gram(s) IV Push once  dextrose 50% Injectable 12.5 Gram(s) IV Push once  glucagon  Injectable 1 milliGRAM(s) IntraMuscular once  heparin   Injectable 5000 Unit(s) SubCutaneous every 12 hours  insulin lispro (ADMELOG) corrective regimen sliding scale   SubCutaneous every 6 hours  mupirocin 2% Ointment 1 Application(s) Topical two times a day  pantoprazole  Injectable 40 milliGRAM(s) IV Push two times a day  piperacillin/tazobactam IVPB.. 3.375 Gram(s) IV Intermittent every 12 hours  valproate sodium  IVPB 125 milliGRAM(s) IV Intermittent every 6 hours    PRN  acetaminophen     Tablet .. 650 milliGRAM(s) Oral every 6 hours PRN Temp greater or equal to 38C (100.4F), Mild Pain (1 - 3)  aluminum hydroxide/magnesium hydroxide/simethicone Suspension 30 milliLiter(s) Oral every 4 hours PRN Dyspepsia  dextrose Oral Gel 15 Gram(s) Oral once PRN Blood Glucose LESS THAN 70 milliGRAM(s)/deciliter  melatonin 3 milliGRAM(s) Oral at bedtime PRN Insomnia    -----LABS-----  CBC (07-20 @ 06:30)                              7.8<L>                         5.21    )----------------(  190        33.4<L>% Neutrophils, 35.1  % Lymphocytes, ANC: 1.74<L>                              25.0<L>  CBC (07-19 @ 10:27)                              8.0<L>                         6.64    )----------------(  174        50.6  % Neutrophils, 27.0  % Lymphocytes, ANC: 3.36                                26.0<L>    BMP (07-20 @ 06:30)             135     |  102     |  8     		Ca++ --      Ca 9.3                ---------------------------------( 115<H>		Mg 1.60               4.2     |  25      |  1.68<H>			Ph 3.7     BMP (07-19 @ 10:27)             136     |  99      |  9     		Ca++ --      Ca 9.2                ---------------------------------( 108<H>		Mg 1.70               4.1     |  27      |  1.80<H>			Ph 3.5       LFTs (07-20 @ 06:30)      TPro 5.8<L> / Alb 3.1<L> / TBili 0.2 / DBili -- / AST 19 / ALT 10 / AlkPhos 50  LFTs (07-19 @ 10:27)      TPro 5.9<L> / Alb 3.3 / TBili 0.3 / DBili -- / AST 13 / ALT 9 / AlkPhos 55    Coags (07-20 @ 06:30)  aPTT 30.9 / INR 1.12 / PT 13.0        VBG (07-19 @ 10:27)     7.38 / 50 / <20<L> / 30<H> / 3.9<H> / 18.2<L>%     Lactate: 0.7    Urinalysis (07-20 @ 06:30):     Color:  / Appearance:  / SG:  / pH:  / Gluc: 115<H> / Ketones:  / Bili:  / Urobili:  / Protein : / Nitrites:  / Leuk.Est:  / RBC:  / WBC:  / Sq Epi:  / Non Sq Epi:  / Bacteria        -> Clean Catch Clean Catch (Midstream) Culture (07-15 @ 02:33)     NG    NG    No growth    -> .Blood Blood-Peripheral Culture (07-15 @ 02:06)     NG    NG    No growth at 5 days    -> .Blood Blood-Peripheral Culture (07-15 @ 01:50)     NG    NG    No growth at 5 days

## 2023-07-20 NOTE — PROGRESS NOTE ADULT - SUBJECTIVE AND OBJECTIVE BOX
Vital Signs Last 24 Hrs  T(C): 36.6 (20 Jul 2023 14:51), Max: 36.7 (19 Jul 2023 21:44)  T(F): 97.8 (20 Jul 2023 14:51), Max: 98 (19 Jul 2023 21:44)  HR: 77 (20 Jul 2023 14:51) (62 - 95)  BP: 141/78 (20 Jul 2023 14:51) (102/43 - 184/76)  BP(mean): --  RR: 18 (20 Jul 2023 14:51) (14 - 22)  SpO2: 100% (20 Jul 2023 14:51) (96% - 100%)    Parameters below as of 20 Jul 2023 14:51  Patient On (Oxygen Delivery Method): room air        I&O's Detail    19 Jul 2023 07:01  -  20 Jul 2023 07:00  --------------------------------------------------------  IN:    dextrose 5% + lactated ringers: 750 mL    IV PiggyBack: 100 mL    IV PiggyBack: 220 mL  Total IN: 1070 mL    OUT:    Intermittent Catheterization - Urethral (mL): 0 mL    Oral Fluid: 0 mL    Ureteral Catheter (mL): 775 mL  Total OUT: 775 mL    Total NET: 295 mL      20 Jul 2023 07:01  -  20 Jul 2023 20:02  --------------------------------------------------------  IN:    dextrose 5% + lactated ringers: 750 mL    IV PiggyBack: 110 mL    IV PiggyBack: 100 mL  Total IN: 960 mL    OUT:    Oral Fluid: 0 mL    Ureteral Catheter (mL): 950 mL    Voided (mL): 800 mL  Total OUT: 1750 mL    Total NET: -790 mL                                7.8    5.21  )-----------( 190      ( 20 Jul 2023 06:30 )             25.0       07-20    135  |  102  |  8   ----------------------------<  115<H>  4.2   |  25  |  1.68<H>    Ca    9.3      20 Jul 2023 06:30  Phos  3.7     07-20  Mg     1.60     07-20    TPro  5.8<L>  /  Alb  3.1<L>  /  TBili  0.2  /  DBili  x   /  AST  19  /  ALT  10  /  AlkPhos  50  07-20      PT/INR - ( 20 Jul 2023 06:30 )   PT: 13.0 sec;   INR: 1.12 ratio         PTT - ( 20 Jul 2023 06:30 )  PTT:30.9 sec    S/P EGD and EUS with node biopsy today    PLAN:  Await pathology   Discussion with family about level of agressiveness  when final pathology is back

## 2023-07-20 NOTE — PROGRESS NOTE ADULT - SUBJECTIVE AND OBJECTIVE BOX
Marvel Kay MD  Emergency Medicine & Internal Medicine PGY-2    PROGRESS NOTE:    ID #:     Patient is a 68y old  Male who presents with a chief complaint of Altered mental status     (17 Jul 2023 10:42)      MAJOR INTERVAL HOSPITAL EVENTS:     SUBJECTIVE / OVERNIGHT EVENTS: No acute overnight events.       MEDICATIONS  (STANDING):  dextrose 5% + lactated ringers. 1000 milliLiter(s) (75 mL/Hr) IV Continuous <Continuous>  dextrose 5%. 1000 milliLiter(s) (50 mL/Hr) IV Continuous <Continuous>  dextrose 5%. 1000 milliLiter(s) (100 mL/Hr) IV Continuous <Continuous>  dextrose 50% Injectable 25 Gram(s) IV Push once  dextrose 50% Injectable 12.5 Gram(s) IV Push once  dextrose 50% Injectable 25 Gram(s) IV Push once  glucagon  Injectable 1 milliGRAM(s) IntraMuscular once  heparin   Injectable 5000 Unit(s) SubCutaneous every 12 hours  insulin lispro (ADMELOG) corrective regimen sliding scale   SubCutaneous every 6 hours  mupirocin 2% Ointment 1 Application(s) Topical two times a day  pantoprazole  Injectable 40 milliGRAM(s) IV Push two times a day  piperacillin/tazobactam IVPB.. 3.375 Gram(s) IV Intermittent every 12 hours  valproate sodium  IVPB 125 milliGRAM(s) IV Intermittent every 6 hours    MEDICATIONS  (PRN):  acetaminophen     Tablet .. 650 milliGRAM(s) Oral every 6 hours PRN Temp greater or equal to 38C (100.4F), Mild Pain (1 - 3)  aluminum hydroxide/magnesium hydroxide/simethicone Suspension 30 milliLiter(s) Oral every 4 hours PRN Dyspepsia  dextrose Oral Gel 15 Gram(s) Oral once PRN Blood Glucose LESS THAN 70 milliGRAM(s)/deciliter  melatonin 3 milliGRAM(s) Oral at bedtime PRN Insomnia      CAPILLARY BLOOD GLUCOSE      POCT Blood Glucose.: 127 mg/dL (20 Jul 2023 05:26)  POCT Blood Glucose.: 133 mg/dL (19 Jul 2023 22:10)  POCT Blood Glucose.: 116 mg/dL (19 Jul 2023 17:59)  POCT Blood Glucose.: 124 mg/dL (19 Jul 2023 12:55)    I&O's Summary    19 Jul 2023 07:01  -  20 Jul 2023 07:00  --------------------------------------------------------  IN: 1070 mL / OUT: 775 mL / NET: 295 mL        PHYSICAL EXAM:  Vital Signs Last 24 Hrs  T(C): 36.6 (20 Jul 2023 05:21), Max: 36.7 (19 Jul 2023 21:44)  T(F): 97.9 (20 Jul 2023 05:21), Max: 98 (19 Jul 2023 21:44)  HR: 64 (20 Jul 2023 05:21) (64 - 72)  BP: 156/81 (20 Jul 2023 05:21) (156/81 - 186/93)  BP(mean): --  RR: 18 (20 Jul 2023 05:21) (18 - 19)  SpO2: 99% (20 Jul 2023 05:21) (97% - 99%)    Parameters below as of 20 Jul 2023 05:21  Patient On (Oxygen Delivery Method): room air        GENERAL: No acute distress, well-developed  HEAD:  Atraumatic, Normocephalic  EYES: EOMI, PERRLA, conjunctiva and sclera clear  NECK: Supple, no lymphadenopathy, no JVD  CHEST/LUNG: CTAB; No wheezes, rales, or rhonchi  HEART: Regular rate and rhythm; Normal s1 and s2, No murmurs, rubs, or gallops  ABDOMEN: Soft, non-tender, non-distended; normal bowel sounds, no organomegaly  EXTREMITIES:  2+ peripheral pulses b/l, No clubbing, cyanosis, or edema  NEUROLOGY: A&O x 3, no focal deficits  SKIN: No rashes or lesions          LABS:                        7.8    5.21  )-----------( 190      ( 20 Jul 2023 06:30 )             25.0     07-19    136  |  99  |  9   ----------------------------<  108<H>  4.1   |  27  |  1.80<H>    Ca    9.2      19 Jul 2023 10:27  Phos  3.5     07-19  Mg     1.70     07-19    TPro  5.9<L>  /  Alb  3.3  /  TBili  0.3  /  DBili  x   /  AST  13  /  ALT  9   /  AlkPhos  55  07-19    PT/INR - ( 20 Jul 2023 06:30 )   PT: 13.0 sec;   INR: 1.12 ratio         PTT - ( 20 Jul 2023 06:30 )  PTT:30.9 sec      Urinalysis Basic - ( 19 Jul 2023 10:27 )    Color: x / Appearance: x / SG: x / pH: x  Gluc: 108 mg/dL / Ketone: x  / Bili: x / Urobili: x   Blood: x / Protein: x / Nitrite: x   Leuk Esterase: x / RBC: x / WBC x   Sq Epi: x / Non Sq Epi: x / Bacteria: x          RADIOLOGY & ADDITIONAL TESTS:  Results Reviewed:   Imaging Personally Reviewed:  Electrocardiogram Personally Reviewed:    COORDINATION OF CARE:  Care Discussed with Consultants/Other Providers [Y/N]:  Prior or Outpatient Records Reviewed [Y/N]:   Marvel Kay MD  Emergency Medicine & Internal Medicine PGY-2    PROGRESS NOTE:    ID #:     Patient is a 68y old  Male who presents with a chief complaint of Altered mental status     (17 Jul 2023 10:42)      MAJOR INTERVAL HOSPITAL EVENTS: NAEO    SUBJECTIVE / OVERNIGHT EVENTS: No acute overnight events. Patient states his name when asked but reports being currently in [facility]       MEDICATIONS  (STANDING):  dextrose 5% + lactated ringers. 1000 milliLiter(s) (75 mL/Hr) IV Continuous <Continuous>  dextrose 5%. 1000 milliLiter(s) (50 mL/Hr) IV Continuous <Continuous>  dextrose 5%. 1000 milliLiter(s) (100 mL/Hr) IV Continuous <Continuous>  dextrose 50% Injectable 25 Gram(s) IV Push once  dextrose 50% Injectable 12.5 Gram(s) IV Push once  dextrose 50% Injectable 25 Gram(s) IV Push once  glucagon  Injectable 1 milliGRAM(s) IntraMuscular once  heparin   Injectable 5000 Unit(s) SubCutaneous every 12 hours  insulin lispro (ADMELOG) corrective regimen sliding scale   SubCutaneous every 6 hours  mupirocin 2% Ointment 1 Application(s) Topical two times a day  pantoprazole  Injectable 40 milliGRAM(s) IV Push two times a day  piperacillin/tazobactam IVPB.. 3.375 Gram(s) IV Intermittent every 12 hours  valproate sodium  IVPB 125 milliGRAM(s) IV Intermittent every 6 hours    MEDICATIONS  (PRN):  acetaminophen     Tablet .. 650 milliGRAM(s) Oral every 6 hours PRN Temp greater or equal to 38C (100.4F), Mild Pain (1 - 3)  aluminum hydroxide/magnesium hydroxide/simethicone Suspension 30 milliLiter(s) Oral every 4 hours PRN Dyspepsia  dextrose Oral Gel 15 Gram(s) Oral once PRN Blood Glucose LESS THAN 70 milliGRAM(s)/deciliter  melatonin 3 milliGRAM(s) Oral at bedtime PRN Insomnia      CAPILLARY BLOOD GLUCOSE      POCT Blood Glucose.: 127 mg/dL (20 Jul 2023 05:26)  POCT Blood Glucose.: 133 mg/dL (19 Jul 2023 22:10)  POCT Blood Glucose.: 116 mg/dL (19 Jul 2023 17:59)  POCT Blood Glucose.: 124 mg/dL (19 Jul 2023 12:55)    I&O's Summary    19 Jul 2023 07:01  -  20 Jul 2023 07:00  --------------------------------------------------------  IN: 1070 mL / OUT: 775 mL / NET: 295 mL        PHYSICAL EXAM:  Vital Signs Last 24 Hrs  T(C): 36.6 (20 Jul 2023 05:21), Max: 36.7 (19 Jul 2023 21:44)  T(F): 97.9 (20 Jul 2023 05:21), Max: 98 (19 Jul 2023 21:44)  HR: 64 (20 Jul 2023 05:21) (64 - 72)  BP: 156/81 (20 Jul 2023 05:21) (156/81 - 186/93)  BP(mean): --  RR: 18 (20 Jul 2023 05:21) (18 - 19)  SpO2: 99% (20 Jul 2023 05:21) (97% - 99%)    Parameters below as of 20 Jul 2023 05:21  Patient On (Oxygen Delivery Method): room air    GENERAL: No acute distress, cachetic  HEAD:  Atraumatic, Normocephalic  CHEST/LUNG: CTAB; No wheezes, rales, or rhonchi  HEART: Regular rate and rhythm; Normal s1 and s2, No murmurs, rubs, or gallops  ABDOMEN: Soft, non-tender, non-distended; normal bowel sounds, no organomegaly  EXTREMITIES:  UEs contracted, LEs straight and noncontracted as prior, 2+ peripheral pulses b/l, No clubbing, cyanosis, or edema  NEUROLOGY: A&O x 1 (name), spoke few words, light finger squeeze BL, no focal deficits  SKIN: No rashes or lesions  LABS:                        7.8    5.21  )-----------( 190      ( 20 Jul 2023 06:30 )             25.0     07-19    136  |  99  |  9   ----------------------------<  108<H>  4.1   |  27  |  1.80<H>    Ca    9.2      19 Jul 2023 10:27  Phos  3.5     07-19  Mg     1.70     07-19    TPro  5.9<L>  /  Alb  3.3  /  TBili  0.3  /  DBili  x   /  AST  13  /  ALT  9   /  AlkPhos  55  07-19    PT/INR - ( 20 Jul 2023 06:30 )   PT: 13.0 sec;   INR: 1.12 ratio         PTT - ( 20 Jul 2023 06:30 )  PTT:30.9 sec      Urinalysis Basic - ( 19 Jul 2023 10:27 )    Color: x / Appearance: x / SG: x / pH: x  Gluc: 108 mg/dL / Ketone: x  / Bili: x / Urobili: x   Blood: x / Protein: x / Nitrite: x   Leuk Esterase: x / RBC: x / WBC x   Sq Epi: x / Non Sq Epi: x / Bacteria: x          RADIOLOGY & ADDITIONAL TESTS:  Results Reviewed:   Imaging Personally Reviewed:  Electrocardiogram Personally Reviewed:    COORDINATION OF CARE:  Care Discussed with Consultants/Other Providers [Y/N]:  Prior or Outpatient Records Reviewed [Y/N]:

## 2023-07-20 NOTE — SWALLOW BEDSIDE ASSESSMENT ADULT - COMMENTS
As per Internal Medicine Progress Note: "Mr. Jones is a 68M w/ PMH CKD, CVA, HTn, DM, Dementia, seizure disorder (on VPA) who p/w acute encephalopathy found to meet SIRS criteria possibly 2/2 aspiration PNA and incidentally found to have pyloric mass c/f adenocarcinoma w/ associated lymphadenopathy indicating metastatic disease."     Orders received, charts contents noted. Attempted to see patient, however patient off unit at Endoscopy as per RN. TEAMS message sent to MD. Reconsult this service when patient is medically optimized. As per Internal Medicine Progress Note: "Mr. Joens is a 68M w/ PMH CKD, CVA, HTn, DM, Dementia, seizure disorder (on VPA) who p/w acute encephalopathy found to meet SIRS criteria possibly 2/2 aspiration PNA and incidentally found to have pyloric mass c/f adenocarcinoma w/ associated lymphadenopathy indicating metastatic disease."     Orders received, charts contents noted. Attempted to see patient, however patient off unit at Endoscopy as per RN. TEAMS message, paged #[phone] sent to MD, awaiting return. Reconsult this service when patient is medically optimized.

## 2023-07-20 NOTE — PROGRESS NOTE ADULT - ASSESSMENT
68M w PMHx CKD, CVA, HTN, DM, dementia (A/0x0), seizure disorder on Valproic acid who presented to [facility] 7/15 w AMS aw lethargy, dyspnea, and diarrhea. Admitted to medicine for AMS w/u. CT A/P on admission with findings cf gastric cancer. Surgical oncology consulted for evaluation.     Plan:   -No acute surgical oncology intervention  -Not a surgical candidate given nutritional/functional status   -f/u GOC   -f/u EGD and biopsy results  -Appreciate GI & Heme/Onc & Palliative Care input   -Care per primary team     D team  [id number]

## 2023-07-21 ENCOUNTER — TRANSCRIPTION ENCOUNTER (OUTPATIENT)
Age: 68
End: 2023-07-21

## 2023-07-21 LAB
ALBUMIN SERPL ELPH-MCNC: 2.9 G/DL — LOW (ref 3.3–5)
ALP SERPL-CCNC: 46 U/L — SIGNIFICANT CHANGE UP (ref 40–120)
ALT FLD-CCNC: 8 U/L — SIGNIFICANT CHANGE UP (ref 4–41)
ANION GAP SERPL CALC-SCNC: 10 MMOL/L — SIGNIFICANT CHANGE UP (ref 7–14)
AST SERPL-CCNC: 14 U/L — SIGNIFICANT CHANGE UP (ref 4–40)
BASOPHILS # BLD AUTO: 0.05 K/UL — SIGNIFICANT CHANGE UP (ref 0–0.2)
BASOPHILS NFR BLD AUTO: 0.8 % — SIGNIFICANT CHANGE UP (ref 0–2)
BILIRUB SERPL-MCNC: 0.2 MG/DL — SIGNIFICANT CHANGE UP (ref 0.2–1.2)
BUN SERPL-MCNC: 7 MG/DL — SIGNIFICANT CHANGE UP (ref 7–23)
CALCIUM SERPL-MCNC: 9 MG/DL — SIGNIFICANT CHANGE UP (ref 8.4–10.5)
CHLORIDE SERPL-SCNC: 101 MMOL/L — SIGNIFICANT CHANGE UP (ref 98–107)
CO2 SERPL-SCNC: 25 MMOL/L — SIGNIFICANT CHANGE UP (ref 22–31)
CREAT SERPL-MCNC: 1.66 MG/DL — HIGH (ref 0.5–1.3)
EGFR: 45 ML/MIN/1.73M2 — LOW
EOSINOPHIL # BLD AUTO: 0.99 K/UL — HIGH (ref 0–0.5)
EOSINOPHIL NFR BLD AUTO: 16.6 % — HIGH (ref 0–6)
GLUCOSE BLDC GLUCOMTR-MCNC: 117 MG/DL — HIGH (ref 70–99)
GLUCOSE BLDC GLUCOMTR-MCNC: 120 MG/DL — HIGH (ref 70–99)
GLUCOSE BLDC GLUCOMTR-MCNC: 132 MG/DL — HIGH (ref 70–99)
GLUCOSE BLDC GLUCOMTR-MCNC: 150 MG/DL — HIGH (ref 70–99)
GLUCOSE BLDC GLUCOMTR-MCNC: 165 MG/DL — HIGH (ref 70–99)
GLUCOSE SERPL-MCNC: 120 MG/DL — HIGH (ref 70–99)
HCT VFR BLD CALC: 23.8 % — LOW (ref 39–50)
HGB BLD-MCNC: 7.4 G/DL — LOW (ref 13–17)
IANC: 2.47 K/UL — SIGNIFICANT CHANGE UP (ref 1.8–7.4)
IMM GRANULOCYTES NFR BLD AUTO: 0.2 % — SIGNIFICANT CHANGE UP (ref 0–0.9)
LYMPHOCYTES # BLD AUTO: 1.81 K/UL — SIGNIFICANT CHANGE UP (ref 1–3.3)
LYMPHOCYTES # BLD AUTO: 30.3 % — SIGNIFICANT CHANGE UP (ref 13–44)
MAGNESIUM SERPL-MCNC: 1.5 MG/DL — LOW (ref 1.6–2.6)
MCHC RBC-ENTMCNC: 25.3 PG — LOW (ref 27–34)
MCHC RBC-ENTMCNC: 31.1 GM/DL — LOW (ref 32–36)
MCV RBC AUTO: 81.2 FL — SIGNIFICANT CHANGE UP (ref 80–100)
MONOCYTES # BLD AUTO: 0.64 K/UL — SIGNIFICANT CHANGE UP (ref 0–0.9)
MONOCYTES NFR BLD AUTO: 10.7 % — SIGNIFICANT CHANGE UP (ref 2–14)
NEUTROPHILS # BLD AUTO: 2.47 K/UL — SIGNIFICANT CHANGE UP (ref 1.8–7.4)
NEUTROPHILS NFR BLD AUTO: 41.4 % — LOW (ref 43–77)
NRBC # BLD: 0 /100 WBCS — SIGNIFICANT CHANGE UP (ref 0–0)
NRBC # FLD: 0 K/UL — SIGNIFICANT CHANGE UP (ref 0–0)
PHOSPHATE SERPL-MCNC: 3.2 MG/DL — SIGNIFICANT CHANGE UP (ref 2.5–4.5)
PLATELET # BLD AUTO: 155 K/UL — SIGNIFICANT CHANGE UP (ref 150–400)
POTASSIUM SERPL-MCNC: 3.7 MMOL/L — SIGNIFICANT CHANGE UP (ref 3.5–5.3)
POTASSIUM SERPL-SCNC: 3.7 MMOL/L — SIGNIFICANT CHANGE UP (ref 3.5–5.3)
PROT SERPL-MCNC: 5.4 G/DL — LOW (ref 6–8.3)
RBC # BLD: 2.93 M/UL — LOW (ref 4.2–5.8)
RBC # FLD: 16.3 % — HIGH (ref 10.3–14.5)
SODIUM SERPL-SCNC: 136 MMOL/L — SIGNIFICANT CHANGE UP (ref 135–145)
WBC # BLD: 5.97 K/UL — SIGNIFICANT CHANGE UP (ref 3.8–10.5)
WBC # FLD AUTO: 5.97 K/UL — SIGNIFICANT CHANGE UP (ref 3.8–10.5)

## 2023-07-21 PROCEDURE — 99232 SBSQ HOSP IP/OBS MODERATE 35: CPT | Mod: GC

## 2023-07-21 PROCEDURE — 99232 SBSQ HOSP IP/OBS MODERATE 35: CPT

## 2023-07-21 PROCEDURE — 99239 HOSP IP/OBS DSCHRG MGMT >30: CPT | Mod: GC

## 2023-07-21 RX ORDER — INSULIN LISPRO 100/ML
VIAL (ML) SUBCUTANEOUS
Refills: 0 | Status: DISCONTINUED | OUTPATIENT
Start: 2023-07-21 | End: 2023-07-23

## 2023-07-21 RX ORDER — INSULIN LISPRO 100/ML
VIAL (ML) SUBCUTANEOUS AT BEDTIME
Refills: 0 | Status: DISCONTINUED | OUTPATIENT
Start: 2023-07-21 | End: 2023-07-23

## 2023-07-21 RX ORDER — MAGNESIUM SULFATE 500 MG/ML
2 VIAL (ML) INJECTION ONCE
Refills: 0 | Status: COMPLETED | OUTPATIENT
Start: 2023-07-21 | End: 2023-07-21

## 2023-07-21 RX ORDER — FERROUS SULFATE 325(65) MG
5 TABLET ORAL
Qty: 75 | Refills: 3
Start: 2023-07-21 | End: 2023-11-17

## 2023-07-21 RX ADMIN — Medication 51.25 MILLIGRAM(S): at 15:57

## 2023-07-21 RX ADMIN — PANTOPRAZOLE SODIUM 40 MILLIGRAM(S): 20 TABLET, DELAYED RELEASE ORAL at 17:13

## 2023-07-21 RX ADMIN — Medication 25 GRAM(S): at 12:08

## 2023-07-21 RX ADMIN — Medication 51.25 MILLIGRAM(S): at 21:31

## 2023-07-21 RX ADMIN — Medication 51.25 MILLIGRAM(S): at 09:43

## 2023-07-21 RX ADMIN — HEPARIN SODIUM 5000 UNIT(S): 5000 INJECTION INTRAVENOUS; SUBCUTANEOUS at 17:15

## 2023-07-21 RX ADMIN — MUPIROCIN 1 APPLICATION(S): 20 OINTMENT TOPICAL at 06:51

## 2023-07-21 RX ADMIN — PANTOPRAZOLE SODIUM 40 MILLIGRAM(S): 20 TABLET, DELAYED RELEASE ORAL at 06:51

## 2023-07-21 RX ADMIN — MUPIROCIN 1 APPLICATION(S): 20 OINTMENT TOPICAL at 17:12

## 2023-07-21 RX ADMIN — HEPARIN SODIUM 5000 UNIT(S): 5000 INJECTION INTRAVENOUS; SUBCUTANEOUS at 06:51

## 2023-07-21 RX ADMIN — SODIUM CHLORIDE 75 MILLILITER(S): 9 INJECTION, SOLUTION INTRAVENOUS at 06:50

## 2023-07-21 RX ADMIN — Medication 51.25 MILLIGRAM(S): at 03:07

## 2023-07-21 NOTE — PROGRESS NOTE ADULT - TIME BILLING
Review of laboratory data, radiology results, consultants' recommendations, documentation in [place], discussion with patient/house staff and interdisciplinary staff (such as , social workers, etc). Interventions were performed as documented above.
Gastric mass
Gastric mass.
Gastric mass.
Time spent for extensive review of the physical chart, electronic health record, and documentation to obtain collateral information including but not limited to:    - Current inpatient records (ED, H&P, primary team, and consultants if applicable)   - Inpatient values/results (biomarkers, immunoassays, imaging, and microbiology results)   - Current or proposed treatment plans   - Pharmacotherapy review   Time spent for counseling and education with patient/family  Time spent discussing and coordinating care with primary team and interdisciplinary staff and floor staff
Review of laboratory data, radiology results, consultants' recommendations, documentation in [place], discussion with patient/house staff and interdisciplinary staff (such as , social workers, etc). Interventions were performed as documented above.
Review of laboratory data, radiology results, consultants' recommendations, documentation in [place], discussion with patient/house staff and interdisciplinary staff (such as , social workers, etc). Interventions were performed as documented above.
Review of laboratory data, radiology results, consultants' recommendations, documentation in [place], discussion with patient/house staff and interdisciplinary staff (such as , social workers, etc). Interventions were performed as documented above.

## 2023-07-21 NOTE — SWALLOW BEDSIDE ASSESSMENT ADULT - SWALLOW EVAL: DIAGNOSIS
1. Mild oral dysphagia for puree, moderately thick liquids, mildly thick liquids and thin liquids characterized by adequate acceptance and containment, slow anterior to posterior transport, suspect premature spillage with mildly thick liquids and thin liquids with adequate oral clearance across all consistencies. 2. Functional pharyngeal stage suspected for puree and moderately thick liquids characterized by initiation of the pharyngeal swallow and hyolaryngeal excursion upon digital palpation with no overt s/s aspiration noted. 3. Moderate pharyngeal dysphagia suspected for mildly thick liquids and thin liquids characterized by suspected delayed pharyngeal swallow initiation and hyolaryngeal excursion upon digital palpation with a throat clear noted with mildly thick liquids and cough noted with thin liquids.
PO trials of pureed and moderately thick liquids via teaspoon presentations provided and patient demonstrated 1. moderate oral dysphagia marked by reduced stripping of bolus from utensil, prolonged manipulation resulting in delayed collection and transport, and suspected posterior loss at BOT noted. 2. Moderate-severe pharyngeal dysphagia marked by suspected delayed pharyngeal swallow trigger, reduced hyolaryngeal elevation noted by digital palpation, wet vocal quality and coughing observed. 3. Recommend oral nutrition/hydration/medication is contraindicated. Consider short term means of non-oral nutrition/hydration/medication and GOC discussion with the patient's family regarding nutritional plan of care. Maintain aspiration precautions and strict oral care.

## 2023-07-21 NOTE — PROGRESS NOTE ADULT - PROBLEM SELECTOR PLAN 3
Patient w/ baseline dementia (AOx0) p/w decreased responsiveness and lethargy for 2 days likely iso metabolic encephalopathy d/t aspiration PNA vs hypercapnia   - CT w/ secretions in esophagus and history of coughing w/ eating c/f aspiration. Pt hypothermic and hypotensive meeting SIRS criteria s/p 2L Bolus  - f/u infectious w/u and management as below  - Pt noted w/ hypercarbia on VBG likely iso COPD given extensive smoking history although no official diagnosis per son Patient w/ baseline dementia (AOx0) p/w decreased responsiveness and lethargy for 2 days likely iso metabolic encephalopathy d/t aspiration PNA vs hypercapnia  - CT w/ secretions in esophagus and history of coughing w/ eating c/f aspiration. Pt hypothermic and hypotensive meeting SIRS criteria s/p 2L Bolus  - Pt noted w/ hypercarbia on VBG likely iso COPD given extensive smoking history although no official diagnosis per son

## 2023-07-21 NOTE — PROGRESS NOTE ADULT - ASSESSMENT
67 yo M PMH CKD, CVA, HTN, DM, dementia (AOx0), seizure disorder on valproic acid presenting with increased confusion for several days a/w lethargy, dyspnea, and diarrhea, found to be hypotension on admission. CT chest/A/P done for abdominal pain and SOB, shows gastric pyloric mass, suspect adenocarcinoma. Bulky local necrotic metastatic adenopathy. No distant metastatic disease. Retained secretions in upper trachea. Trace bilateral pleural effusions.     # Suspected gastric cancer  - CT chest/A&P shows gastric pyloric mass, suspect adenocarcinoma. Bulky local necrotic metastatic adenopathy. No distant metastatic disease. Retained secretions in upper trachea. Trace bilateral pleural effusions.  - EGD with biopsy done on 7/20/23, pathology pending  - If this is gastric adenoCA, depending on the stage, surgical resection may be an option however, he might not be a good candidate for surgery given that he has poor nutritional status   - Family hesitant to accept interventions so may prefer supportive care and limited interventions  - On repeat S&S testing he was found to be able to swallow thickened food.  - The family are planning to take him home as they did not find rehab helpful previously.  - Oncology will follow up on biopsy results and will call to schedule outpatient oncology consult to discuss results and treatment options    # PRACHI  - Hb 8, MCV normal with increased RDW  - Transfuse as needed to keep Hb>7

## 2023-07-21 NOTE — DISCHARGE NOTE NURSING/CASE MANAGEMENT/SOCIAL WORK - NSDCDMENUMBER_GEN_ALL_CORE_FT
Subjective   Patient ID: Demarcus Youssef is a 54 year old male.    Chief Complaint   Patient presents with   • Chest Pain     bilateral upper chest wall pain, right sided mostly started Wedmesday.     Patient     Who    Was  Hospitalized  A year  Ago  With  covid  Had  The  Chest  Pain but  Also shortness  Of   Breath   The   Pain   Started  Wednesday on the right side  Going  Around to the    Mid  Back.   Patient  Also  Has   History  Of  Angina.        Past Medical History:   Diagnosis Date   • Diabetes mellitus (CMS/HCC)    • Essential (primary) hypertension    • History of colonoscopy with polypectomy 04/2019    Repeat in 3 years (2022)   • Hyperlipidemia        MEDICATIONS:  Current Outpatient Medications   Medication Sig   • baclofen (LIORESAL) 10 MG tablet TAKE 1 TABLET BY MOUTH AT BEDTIME (MAY INCREASE TO TWICE A DAY) AS NEEDED   • amLODIPine (NORVASC) 10 MG tablet Take 1 tablet by mouth daily.   • metoPROLOL succinate (TOPROL-XL) 200 MG 24 hr tablet Take 1 tablet by mouth daily.   • dulaglutide (Trulicity) 1.5 MG/0.5ML pen-injector Inject 1.5 mg into the skin 1 day a week. USE 1 INJECTION UNDER THE SKIN ONCE A WEEK   • ferrous sulfate 325 (65 FE) MG tablet Take 1 tablet by mouth daily (with breakfast).   • meloxicam (MOBIC) 15 MG tablet Take 1 tablet by mouth daily after a meal.   • zoster vaccine recomb adjuvanted (SHINGRIX) 50 MCG/0.5ML injection Inject 0.5 mLs into the muscle 1 time. Repeat dose in 2 to 6 months (unless 1 dose already given), for a total of 2 doses.   • metformin (GLUCOPHAGE) 1000 MG tablet TAKE 1 TABLET BY MOUTH 2 TIMES DAILY (WITH MEALS).   • levothyroxine 150 MCG tablet TAKE 1 TABLET BY MOUTH EVERY DAY   • atorvastatin (LIPITOR) 20 MG tablet Take 1 tablet by mouth daily.   • lisinopril (ZESTRIL) 40 MG tablet Take 1 tablet by mouth daily.   • Accu-Chek FastClix Lancets Misc Test blood sugar 2 times daily as directed. Diagnosis: E 11.29   • blood glucose (ACCU-CHEK GUIDE) test strip Test  blood sugar 2 times daily as directed. Diagnosis: E 11.29   • triamcinolone (KENALOG) 0.5 % ointment Apply topically 2 times daily.   • amLODIPine (NORVASC) 10 MG tablet Take 1 tablet by mouth daily.   • glipiZIDE (GLUCOTROL ER) 10 MG 24 hr tablet TAKE 1 TABLET BY MOUTH TWICE A DAY   • ONETOUCH VERIO test strip CHECK DAILY OR AS NEEDED   • ONETOUCH DELICA LANCETS FINE Misc check sugar once a day   • sildenafil (VIAGRA) 50 MG tablet TAKE 1 TABLET AS NEEDED. MAX IS 1 PER DAY     No current facility-administered medications for this visit.       ALLERGIES:  ALLERGIES:  No Known Allergies    PAST SURGICAL HISTORY:  Past Surgical History:   Procedure Laterality Date   • Thyroid surgery         FAMILY HISTORY:  Family History   Problem Relation Age of Onset   • Hypertension Mother    • Hypertension Father        SOCIAL HISTORY:  Social History     Tobacco Use   • Smoking status: Never Smoker   • Smokeless tobacco: Never Used   Substance Use Topics   • Alcohol use: No   • Drug use: No         Patient's medications, allergies, past medical, surgical, social and family histories were reviewed and updated as appropriate.  Patient's medications, allergies, past medical, surgical, and social history  were reviewed and updated as appropriate.    Review of Systems   Constitutional: Negative.    HENT: Negative.    Eyes: Negative.    Respiratory: Negative.    Cardiovascular: Positive for chest pain.   Gastrointestinal: Negative.    Genitourinary: Negative.    Musculoskeletal: Positive for back pain.   Skin: Negative.    Neurological: Negative.    Psychiatric/Behavioral: Negative.    All other systems reviewed and are negative.      Objective   Physical Exam  Vitals and nursing note reviewed.   Constitutional:       General: He is not in acute distress.     Appearance: He is obese.   HENT:      Head: Normocephalic and atraumatic.      Right Ear: Tympanic membrane, ear canal and external ear normal.      Left Ear: Tympanic membrane,  ear canal and external ear normal.      Nose: Nose normal.      Mouth/Throat:      Mouth: Mucous membranes are moist.      Pharynx: No posterior oropharyngeal erythema.   Eyes:      Extraocular Movements: Extraocular movements intact.      Conjunctiva/sclera: Conjunctivae normal.      Pupils: Pupils are equal, round, and reactive to light.   Cardiovascular:      Rate and Rhythm: Normal rate and regular rhythm.   Pulmonary:      Effort: Pulmonary effort is normal. No respiratory distress.      Breath sounds: Normal breath sounds.   Chest:      Chest wall: Tenderness (tenderness  right upper  chest  wall) present.   Abdominal:      General: Bowel sounds are normal.      Palpations: Abdomen is soft.      Tenderness: There is no abdominal tenderness.   Musculoskeletal:         General: Tenderness (tenderness   right  midback) present.      Cervical back: Normal range of motion. No rigidity.   Skin:     General: Skin is warm.      Findings: No rash.   Neurological:      General: No focal deficit present.      Mental Status: He is alert and oriented to person, place, and time.   Psychiatric:         Mood and Affect: Mood normal.         Behavior: Behavior normal.       Visit Vitals  BP (!) 141/81   Pulse 84   Temp 98.2 °F (36.8 °C)   Resp 20   Ht 5' 8\" (1.727 m)   Wt 114 kg (251 lb 7 oz)   SpO2 99%   BMI 38.23 kg/m²       Assessment   Problem List Items Addressed This Visit     None      Visit Diagnoses     Chest pain, unspecified type    -  Primary    Relevant Orders    ELECTROCARDIOGRAM 12-LEAD (Completed)    POCT BLOOD SUGAR HAND HELD DEVICE (Completed)    POCT URINE DIP NON-AUTO (Completed)    XR CHEST PA AND LATERAL 2 VIEWS (Completed)    CAN'T FIND CONSULT    NURSING COMMUNICATION    History of angina        Relevant Orders    CAN'T FIND CONSULT    NURSING COMMUNICATION        EKG- sinus rate 80 bpm    This is a 54 year old year-old male who presents with chest  pain.    Instructions provided as documented in the  [facility].    Thank you for visiting [facility].  To establish care with an [facility] Provider, please call [phone] ([phone]).   [phone]  Excision Depth: adipose tissue

## 2023-07-21 NOTE — DISCHARGE NOTE NURSING/CASE MANAGEMENT/SOCIAL WORK - NSSCNAMETXT_GEN_ALL_CORE
[facility] at Home. Nurse to visit on the day following discharge. Other appropriate services to be arranged thereafter.   Please contact the home care agency at the above phone number if you have not heard from them by approximately 12 noon on the day after your hospital discharge.  [facility] [place]  a nurse will call prior to the home visit.

## 2023-07-21 NOTE — PROGRESS NOTE ADULT - SUBJECTIVE AND OBJECTIVE BOX
ANESTHESIA POSTOP CHECK    68y Male POSTOP DAY 1 S/P EGD    Vital Signs Last 24 Hrs  T(C): 36.4 (21 Jul 2023 05:50), Max: 36.7 (20 Jul 2023 10:55)  T(F): 97.5 (21 Jul 2023 05:50), Max: 98 (20 Jul 2023 10:55)  HR: 87 (21 Jul 2023 05:50) (62 - 95)  BP: 159/81 (21 Jul 2023 05:50) (102/43 - 184/76)  BP(mean): --  RR: 18 (21 Jul 2023 05:50) (14 - 22)  SpO2: 99% (21 Jul 2023 05:50) (96% - 100%)    Parameters below as of 21 Jul 2023 05:50  Patient On (Oxygen Delivery Method): room air      I&O's Summary    20 Jul 2023 07:01  -  21 Jul 2023 07:00  --------------------------------------------------------  IN: 1810 mL / OUT: 2150 mL / NET: -340 mL        [X] NO APPARENT ANESTHESIA COMPLICATIONS      Comments:

## 2023-07-21 NOTE — PROGRESS NOTE ADULT - ASSESSMENT
Mr. Jones is a 68M w/ PMH CKD, CVA, HTn, DM, Dementia, seizure disorder (on VPA) who p/w acute encephalopathy found to meet SIRS criteria possibly 2/2 aspiration PNA and incidentally found to have pyloric mass c/f adenocarcinoma w/ associated lymphadenopathy indicating metastatic disease.  Mr. Jones is a 68M w/ PMH CKD, CVA, HTn, DM, Dementia, seizure disorder (on VPA) who p/w acute encephalopathy found to meet SIRS criteria possibly 2/2 aspiration PNA and incidentally found to have pyloric mass c/f adenocarcinoma w/ associated lymphadenopathy indicating metastatic disease. Patient is not a chemo or surgical candidate

## 2023-07-21 NOTE — PROGRESS NOTE ADULT - SUBJECTIVE AND OBJECTIVE BOX
INTERVAL HPI/OVERNIGHT EVENTS:  Patient S&E at bedside. No o/n events, resting comfortably    VITAL SIGNS:  T(F): 98.1 (07-21-23 @ 14:47)  HR: 79 (07-21-23 @ 14:47)  BP: 110/72 (07-21-23 @ 14:47)  RR: 18 (07-21-23 @ 14:47)  SpO2: 100% (07-21-23 @ 14:47)  Wt(kg): --    PHYSICAL EXAM:    Constitutional: NAD  Eyes: EOMI, sclera non-icteric  Neck: supple, no masses, no JVD  Respiratory: CTA b/l, good air entry b/l  Cardiovascular: RRR, no M/R/G  Gastrointestinal: soft, NTND, no masses palpable, + BS, no hepatosplenomegaly  Extremities: no c/c/e  Neurological: AAOx3      MEDICATIONS  (STANDING):  dextrose 5% + lactated ringers. 1000 milliLiter(s) (75 mL/Hr) IV Continuous <Continuous>  dextrose 5%. 1000 milliLiter(s) (50 mL/Hr) IV Continuous <Continuous>  dextrose 5%. 1000 milliLiter(s) (100 mL/Hr) IV Continuous <Continuous>  dextrose 50% Injectable 25 Gram(s) IV Push once  dextrose 50% Injectable 25 Gram(s) IV Push once  dextrose 50% Injectable 12.5 Gram(s) IV Push once  glucagon  Injectable 1 milliGRAM(s) IntraMuscular once  heparin   Injectable 5000 Unit(s) SubCutaneous every 12 hours  insulin lispro (ADMELOG) corrective regimen sliding scale   SubCutaneous every 6 hours  mupirocin 2% Ointment 1 Application(s) Topical two times a day  pantoprazole  Injectable 40 milliGRAM(s) IV Push two times a day  valproate sodium  IVPB 125 milliGRAM(s) IV Intermittent every 6 hours    MEDICATIONS  (PRN):  acetaminophen     Tablet .. 650 milliGRAM(s) Oral every 6 hours PRN Temp greater or equal to 38C (100.4F), Mild Pain (1 - 3)  aluminum hydroxide/magnesium hydroxide/simethicone Suspension 30 milliLiter(s) Oral every 4 hours PRN Dyspepsia  dextrose Oral Gel 15 Gram(s) Oral once PRN Blood Glucose LESS THAN 70 milliGRAM(s)/deciliter  melatonin 3 milliGRAM(s) Oral at bedtime PRN Insomnia      Allergies    No Known Allergies    Intolerances        LABS:                        7.4    5.97  )-----------( 155      ( 21 Jul 2023 05:21 )             23.8     07-21    136  |  101  |  7   ----------------------------<  120<H>  3.7   |  25  |  1.66<H>    Ca    9.0      21 Jul 2023 05:21  Phos  3.2     07-21  Mg     1.50     07-21    TPro  5.4<L>  /  Alb  2.9<L>  /  TBili  0.2  /  DBili  x   /  AST  14  /  ALT  8   /  AlkPhos  46  07-21    PT/INR - ( 20 Jul 2023 06:30 )   PT: 13.0 sec;   INR: 1.12 ratio         PTT - ( 20 Jul 2023 06:30 )  PTT:30.9 sec  Urinalysis Basic - ( 21 Jul 2023 05:21 )    Color: x / Appearance: x / SG: x / pH: x  Gluc: 120 mg/dL / Ketone: x  / Bili: x / Urobili: x   Blood: x / Protein: x / Nitrite: x   Leuk Esterase: x / RBC: x / WBC x   Sq Epi: x / Non Sq Epi: x / Bacteria: x        RADIOLOGY & ADDITIONAL TESTS:  Studies reviewed.

## 2023-07-21 NOTE — PROGRESS NOTE ADULT - PROBLEM SELECTOR PLAN 1
Pt w/ hypothermia 96.5F and tachypnea >20 w/ hypothermia. Possible source Aspiration PNA  - BCx/UCx  NGTD  - Cover w/ Emperic zosy and vanc until 7/20  - Monitor fever/WBC curve.  - Legionella neg: NING soto Patient w/ gastric pyloric mass w/ bulky local necrotic metastatic adenopathy on CT scan c/f adenocarcinoma  - Discussed GoC with patient's daughter and son: have reservations about GI procedure and anesthesia  - GI planning EGD and biospy 7/20  - C/s hemeonc: may not be surg/chemo candidate  - C/s surg: not surgical candidate  - Continue with goals of care Patient w/ gastric pyloric mass w/ bulky local necrotic metastatic adenopathy on CT scan c/f adenocarcinoma  - Discussed GoC with patient's daughter and son: family expressed preference for further onc workup/intervention. It was explained that per oncology and surgery, patient is not a surgical candidate. Will give onc OP follow up upon discharge  - GI EGD and biospy 7/20: showing likely malignant gastric tumor in the gastric antrum that was biopsied. EUS notable for three lymph nodes were visualized and measured in the  perigastric region in addition to lymph node was visualized and measured in the felicia hepatis region with no signs of distant metastasis.  - C/s hemeonc: may not be surg/chemo candidate  - C/s surg: not surgical candidate  - DC to home with oncology follow up: family amenable

## 2023-07-21 NOTE — DISCHARGE NOTE NURSING/CASE MANAGEMENT/SOCIAL WORK - PATIENT PORTAL LINK FT
You can access the FollowMyHealth Patient Portal offered by [facility] by registering at the following website: [email or web address]. By joining [employer]’s FollowMyHealth portal, you will also be able to view your health information using other applications (apps) compatible with our system.

## 2023-07-21 NOTE — PROGRESS NOTE ADULT - PROBLEM SELECTOR PLAN 2
Patient w/ gastric pyloric mass w/ bulky local necrotic metastatic adenopathy on CT scan c/f adenocarcinoma  - Discussed GoC with patient's daughter and son: have reservations about GI procedure and anesthesia  - GI planning EGD and biospy 7/20  - C/s hemeonc: may not be surg/chemo candidate  - C/s surg: not surgical candidate  - Continue with goals of care Pt w/ hypothermia 96.5F and tachypnea >20 w/ hypothermia. Possible source Aspiration PNA  - BCx/UCx  NGTD  - Cover w/ Emperic zosy and vanc until 7/20  - Monitor fever/WBC curve.  - Legionella neg: NING soto Pt w/ hypothermia 96.5F and tachypnea >20 w/ hypothermia. Possible source Aspiration PNA  - BCx/UCx  NGTD  - Covered w/ Emperic zosy and vanc until 7/20  - Monitor fever/WBC curve.  - Legionella neg: DCstanislaw soto

## 2023-07-21 NOTE — PROGRESS NOTE ADULT - SUBJECTIVE AND OBJECTIVE BOX
Gastroenterology Progress Note    Interval Events:   -Underwent EGD yesterday with findings c/f gastric cancer   -Unable to obtain subjective given underlying mental status     Allergies:  No Known Allergies      Hospital Medications:  acetaminophen     Tablet 650 milliGRAM(s) Oral every 6 hours PRN  aluminum hydroxide/magnesium hydroxide/simethicone Suspension 30 milliLiter(s) Oral every 4 hours PRN  dextrose 5% + lactated ringers. 1000 milliLiter(s) IV Continuous <Continuous>  dextrose 5%. 1000 milliLiter(s) IV Continuous <Continuous>  dextrose 5%. 1000 milliLiter(s) IV Continuous <Continuous>  dextrose 50% Injectable 25 Gram(s) IV Push once  dextrose 50% Injectable 12.5 Gram(s) IV Push once  dextrose 50% Injectable 25 Gram(s) IV Push once  dextrose Oral Gel 15 Gram(s) Oral once PRN  glucagon  Injectable 1 milliGRAM(s) IntraMuscular once  heparin   Injectable 5000 Unit(s) SubCutaneous every 12 hours  insulin lispro (ADMELOG) corrective regimen sliding scale   SubCutaneous every 6 hours  magnesium sulfate  IVPB 2 Gram(s) IV Intermittent once  melatonin 3 milliGRAM(s) Oral at bedtime PRN  mupirocin 2% Ointment 1 Application(s) Topical two times a day  pantoprazole  Injectable 40 milliGRAM(s) IV Push two times a day  valproate sodium  IVPB 125 milliGRAM(s) IV Intermittent every 6 hours      ROS: 14 point ROS negative unless otherwise state in subjective    PHYSICAL EXAM:   Vital Signs:  Vital Signs Last 24 Hrs  T(C): 36.4 (21 Jul 2023 05:50), Max: 36.7 (20 Jul 2023 10:55)  T(F): 97.5 (21 Jul 2023 05:50), Max: 98 (20 Jul 2023 10:55)  HR: 87 (21 Jul 2023 05:50) (62 - 95)  BP: 159/81 (21 Jul 2023 05:50) (102/43 - 184/76)  BP(mean): --  RR: 18 (21 Jul 2023 05:50) (14 - 22)  SpO2: 99% (21 Jul 2023 05:50) (96% - 100%)    Parameters below as of 21 Jul 2023 05:50  Patient On (Oxygen Delivery Method): room air    GENERAL:  Confused, frail and cachetic   HEENT:  NCAT, no scleral icterus  CHEST: no resp distress  HEART:  RRR  ABDOMEN:  Soft, non-tender, non-distended, normoactive bowel sounds  EXTREMITIES:  No edema  NEURO:  Alert and oriented x 0    LABS:                        7.4    5.97  )-----------( 155      ( 21 Jul 2023 05:21 )             23.8     Mean Cell Volume: 81.2 fL (07-21-23 @ 05:21)    07-21    136  |  101  |  7   ----------------------------<  120<H>  3.7   |  25  |  1.66<H>    Ca    9.0      21 Jul 2023 05:21  Phos  3.2     07-21  Mg     1.50     07-21    TPro  5.4<L>  /  Alb  2.9<L>  /  TBili  0.2  /  DBili  x   /  AST  14  /  ALT  8   /  AlkPhos  46  07-21    LIVER FUNCTIONS - ( 21 Jul 2023 05:21 )  Alb: 2.9 g/dL / Pro: 5.4 g/dL / ALK PHOS: 46 U/L / ALT: 8 U/L / AST: 14 U/L / GGT: x           PT/INR - ( 20 Jul 2023 06:30 )   PT: 13.0 sec;   INR: 1.12 ratio         PTT - ( 20 Jul 2023 06:30 )  PTT:30.9 sec  Urinalysis Basic - ( 21 Jul 2023 05:21 )    Color: x / Appearance: x / SG: x / pH: x  Gluc: 120 mg/dL / Ketone: x  / Bili: x / Urobili: x   Blood: x / Protein: x / Nitrite: x   Leuk Esterase: x / RBC: x / WBC x   Sq Epi: x / Non Sq Epi: x / Bacteria: x      Imaging:  < from: Upper EUS (07.20.23 @ 12:13) >  Findings:       ENDOSCOPIC FINDING: :       The examined esophagus was normal.       A medium-sized, frond-like/villous, fungating and ulcerated, partially        circumferential mass with no bleeding and stigmata of recent bleeding        was found in the gastric antrum. Biopsies were taken with a cold forceps        for histology.       The examined duodenum was normal.       ENDOSONOGRAPHIC FINDING: :       The esophagus, stomach and adjacent structures were visualized        endosonographically.       There was no sign of significant endosonographic abnormality in the        pancreatic head.       Three lymph nodes were visualized with the ultrasound probe in the        perigastric region. The nodes were round and isoechoic.       One lymph node was visualized with the ultrasound probe in the felicia        hepatis region. The node was round.                                                                Impression:          - Normal esophagus.                       - Likely malignant gastric tumor in the gastric antrum.                        Biopsied.                       - Normal examined duodenum.                       - There was no sign of significant pathology in the                        pancreatic head.                       - Three lymph nodes were visualized and measured in the                        perigastric region.                     - One lymph node was visualized and measured in the                        felicia hepatis region.  Recommendation:      - [facility]                       - Resume previous diet today.               - Await path results and await tumor markers.                       - Further managment of likely gastric cancer per                        heme-onc team    < end of copied text >

## 2023-07-21 NOTE — PROGRESS NOTE ADULT - ATTENDING COMMENTS
68 year old male with history of HTN, DM, CKD, CVA, dementia [A/Ox0 for past several years per family], and seizure disorder who presents with worsening mental status and dyspnea with hospital course c/b aspiration pneumonia, hypotension, anemia, and gastric mass with LAD on CT A/P.    # Gastric mass with LAD on CT imaging  # Iron deficiency anemia likely secondary to above  Imaging notable for gastric mass with LAD on CT imaging. s/p EGD with EUS on 7/20/2023 notable for likely malignant gastric tumor in the gastric antrum that was biopsied. EUS notable for three lymph nodes were visualized and measured in the  perigastric region in addition to lymph node was visualized and measured in the felicia hepatis region with no signs of distant metastasis. No signs of any GOO.  No further GI interventions planned at this time. Further management of likely gastric cancer per hem-onc and surgery.     Recommendations:  -OK for diet as tolerated   -PO PPI BID   -Follow up gastric biopsy results (will post in brigid once resulted)  -Trend CBC QD and transfuse to keep hgb>7   -No further GI interventions planned at this time. Further management of likely gastric cancer per hem-onc and surgery and GOC from the family.

## 2023-07-21 NOTE — PROGRESS NOTE ADULT - ATTENDING COMMENTS
68 year old male w/ PMH of CKD, CVA, HTN, DM, Dementia (Aox0, Non-verbal) and seizure disorder presenting with acute encephalopathy likely 2/2 to aspiration PNA, found to have pyloric mass likely adenocarcinoma w/ possible metastatic lymph nodes.    #Gastric cancer:   -s/p EGD reveals likely malignant gastric tumor in the gastric antrum   -does not need to remain inpatient to await results; can f/u with GI o/p  -multiple conversations have taken place with family, oncology and surgery have already determined pt to be poor candidate for chemotherapy and surgery, respectively  -will d/c home, family will obtain second opinion outpatient    #Anemia:   -suspect occult losses in setting of gastric malignancy  -iron studies consistent with PRACHI  -will d/c home on PO iron    #Acute encephalopathy:   -improved, A&O x 2  -likely toxic metabolic encephalopathy in setting of infection  -repeat S&S given improvement in mental status    #Aspiration PNA:   -plan for 5 days course of zosyn; ended on 7/20    #CKD:   -Patient has been shown to be retaining and required straight cath x 3 times. Bangura to be placed. Likely has neurogenic bladder given hx of CVA x 2. Will need o/p urology f/u. Begin flomax  -Cr now improving  -d/c with bangura; will require urology f/u    #Dispo:   -plan for d/c tomorrow following S&S    Remainder of chronic problems as above.    Pt is medically stable for discharge. A total of 38 minutes were spent on discharge coordination. 68 year old male w/ PMH of CKD, CVA, HTN, DM, Dementia (Aox0, Non-verbal) and seizure disorder presenting with acute encephalopathy likely 2/2 to aspiration PNA, found to have pyloric mass likely adenocarcinoma w/ possible metastatic lymph nodes.    #Gastric cancer:   -s/p EGD reveals likely malignant gastric tumor in the gastric antrum   -does not need to remain inpatient to await results; can f/u with GI o/p  -multiple conversations have taken place with family, oncology and surgery have already determined pt to be poor candidate for chemotherapy and surgery, respectively  -will d/c home, family will obtain second opinion outpatient    #Anemia:   -suspect occult losses in setting of gastric malignancy  -iron studies consistent with PRACHI  -will d/c home on PO iron    #Acute encephalopathy:   -improved, A&O x 2  -likely toxic metabolic encephalopathy in setting of infection  -repeat S&S given improvement in mental status    #Aspiration PNA:   -plan for 5 days course of zosyn; ended on 7/20    #CKD:   -Patient has been shown to be retaining and required straight cath x 3 times. Bangura to be placed. Likely has neurogenic bladder given hx of CVA x 2. Will need o/p urology f/u. Begin flomax  -Cr now improving  -d/c with bangura; will require urology f/u    #Dispo:   -plan for d/c today.     Remainder of chronic problems as above.    Pt is medically stable for discharge. A total of 38 minutes were spent on discharge coordination.

## 2023-07-21 NOTE — SWALLOW BEDSIDE ASSESSMENT ADULT - ASR SWALLOW RECOMMEND DIAG
objective testing not indicated at this time due to poor secretion management and patient unable to follow low level commands; will continue to follow at bedside
Cinesophagram given chest imaging results and documented concerns for intolerance of home diet/VFSS/MBS

## 2023-07-21 NOTE — PROGRESS NOTE ADULT - ASSESSMENT
68M w PMHx CKD, CVA, HTN, DM, dementia (A/0x0), seizure disorder on Valproic acid who presented to [facility] 7/15 w AMS aw lethargy, dyspnea, and diarrhea. Admitted to medicine for AMS w/u. CT A/P on admission with findings cf gastric cancer. Surgical oncology consulted for evaluation.     Plan:   -No acute surgical oncology intervention  -Not a surgical candidate given nutritional/functional status   -f/u GOC   -f/u EGD and biopsy results  -Appreciate GI & Heme/Onc & Palliative Care input   -Care per primary team     D team  [id number]

## 2023-07-21 NOTE — SWALLOW BEDSIDE ASSESSMENT ADULT - COMMENTS
Per Internal Medicine 7/21/2023, "Mr. Jones is a 68M w/ PMH CKD, CVA, HTn, DM, Dementia, seizure disorder (on VPA) who p/w acute encephalopathy found to meet SIRS criteria possibly 2/2 aspiration PNA and incidentally found to have pyloric mass c/f adenocarcinoma w/ associated lymphadenopathy indicating metastatic disease... Patient w/ secretions in trachea on imaging w/ hx of coughing w/ liquids and pureed food at home"     Per GI Note 7/21/2023, "Recommendations:  -OK for diet as tolerated  -PO PPI BID  -Follow up gastric biopsy results (will post in [facility] once resulted) -Trend CBC QD and transfuse to keep hgb>7  -No further GI interventions planned at this time. Further management of likely gastric cancer per hem-onc and surgery and GOC from the family."     CXR 7/15: FINDINGS: ... Linear opacities at the bilateral lung bases, right greater than left, consistent with bibasilar atelectasis. There is no pneumothorax or pleural effusion.    Patient known to this service, seen for an initial bedside swallow evaluation 7/16/2023 with recommendations for "oral nutrition/hydration/medication is contraindicated. Consider short-term means of non-oral nutrition/hydration/medication and GOC discussion with the patient's family regarding nutritional plan of care. Maintain aspiration precautions and strict oral care." New order received 7/18 with attempts made to see patient on 7/19 however patient was NPO pending EGD and attempted on 7/20 however patient was off unit for Endoscopy. New order received for bedside swallow evaluation to reassess swallow function.     Patient received sleeping in bed, easily arousable to awake/ alert state given verbal cues, patient Greek speaking Language Line utilized ID #[id number], patient with inconsistent eye-closure/ opening; however, actively participated and was orally receptive to PO. Patient able to follow simple directives and make basic wants/ needs known. Patient noted with clear vocal quality, denied pain and was left as received NAD.

## 2023-07-21 NOTE — PROGRESS NOTE ADULT - ASSESSMENT
68 year old male with history of HTN, DM, CKD, CVA, demenita [A/Ox0 for past several years per family], and seizure disorder who presents with worsening mental status and dyspnea with hospital course c/b aspiration pneumonia, hypotension, anemia, and gastric mass with LAD on CT A/P.    # Gastric mass with LAD on CT imaging  # Iron deficiency anemia likely secondary to above  Imaging notable for gastric mass with LAD on CT imaging. s/p EGD with EUS on 7/20/2023 notable for likely malignant gastric tumor in the gastric antrum that was biopsied. EUS notable for three lymph nodes were visualized and measured in the  perigastric region in addition to lymph node was visualized and measured in the felicia hepatis region with no signs of distant metastasis. No signs of any GOO.  No further GI interventions planned at this time. Further management of likely gastric cancer per hem-onc and surgery.     Recommendations:  -OK for diet as tolerated   -PO PPI BID   -Follow up gastric biopsy results (will post in [facility] once resutled)  -Trend CBC QD and transfuse to keep hgb>7   -No further GI interventions planned at this time. Further management of likely gastric cancer per hem-onc and surgery.     GI will plan to sign off at this time. Please feel free to reach out to our team with any follow up questions    All recommendations are tentative until note is attested by attending.     Luis Moreira, PGY-5  Gastroenterology/Hepatology Fellow  Available on Microsoft Teams   [phone] (Long Range Pager)  [phone] (Short Range Pager [facility])    After 5pm, please contact the on-call GI fellow. [phone]

## 2023-07-21 NOTE — SWALLOW BEDSIDE ASSESSMENT ADULT - PHARYNGEAL PHASE
Throat clear post oral intake Within functional limits Delayed pharyngeal swallow/Cough post oral intake

## 2023-07-21 NOTE — CHART NOTE - NSCHARTNOTEFT_GEN_A_CORE
Elvira lift needed for transfers from bed to wheelchair/commode due to debility and deconditioning. Without the use of a lift, the patient would be bed confined. To be delivered to patient's residence: [address] [place] [address]. [place] [address]    Marvel Kay MD PGY-2 Elvira lift needed for transfers from bed to wheelchair/commode due to gastric mass leading to debility and deconditioning. Without the use of a lift, the patient would be bed confined. To be delivered to patient's residence: [address] [place] [address]. [place] [address]    Marvel Kay MD PGY-2 Elvira lift needed for transfers from bed to wheelchair/commode due to gastric mass, dementia, CVA, and seizure leading to debility and deconditioning. Without the use of a lift, the patient would be bed confined. To be delivered to patient's residence: [address] [place] [address]. [place] [address]    Marvel Kay MD PGY-2

## 2023-07-21 NOTE — SWALLOW BEDSIDE ASSESSMENT ADULT - ORAL PHASE
Decreased anterior-posterior movement of the bolus suspect premature spillage/Decreased anterior-posterior movement of the bolus

## 2023-07-21 NOTE — SWALLOW BEDSIDE ASSESSMENT ADULT - ADDITIONAL RECOMMENDATIONS
1. This service to follow up to ensure tolerance of recommended PO as schedule permits. 2. Medical team further advised to reconsult this service if patient is with a change in medical status or change in tolerance of recommended PO
This department will continue to follow the patient for PO candidacy, as schedule permits.

## 2023-07-21 NOTE — PROGRESS NOTE ADULT - SUBJECTIVE AND OBJECTIVE BOX
SURGERY DAILY PROGRESS NOTE    --------------- OVERNIGHT/INTERVAL---------------  - No acute events overnight    --------------- SUBJECTIVE ---------------  - Patient seen and examined at bedside    --------------- OBJECTIVE ---------------  -----VITALS-----  T(C): 36.4, Max: 36.7 (07-20)  T(F): 97.5, Max: 98 (07-20)  HR: 87 (62 - 95)  BP: 159/81 (102/43 - 184/76)  BP(mean): --  ABP: --  ABP(mean): --  RR: 18 (14 - 22)  SpO2: 99% (96% - 100%)  CVP(mm Hg): --  CVP(cm H2O): --  room air            -----PHYSICAL EXAM-----  GENERAL: NAD, lying in bed   NEURO: AOx0, awake alert appropriate  HEENT: NCAT, trachea midline  PULM: Respirations non-labored  ABD: Soft, non-tender, non-distended, no peritonitis/rebound tenderness  EXT: Warm, well perfused       -----INs & OUTs-----      -----MEDICATIONS-----  STANDING  dextrose 5% + lactated ringers. 1000 milliLiter(s) (75 mL/Hr) IV Continuous <Continuous>  dextrose 5%. 1000 milliLiter(s) (50 mL/Hr) IV Continuous <Continuous>  dextrose 5%. 1000 milliLiter(s) (100 mL/Hr) IV Continuous <Continuous>  dextrose 50% Injectable 25 Gram(s) IV Push once  dextrose 50% Injectable 12.5 Gram(s) IV Push once  dextrose 50% Injectable 25 Gram(s) IV Push once  glucagon  Injectable 1 milliGRAM(s) IntraMuscular once  heparin   Injectable 5000 Unit(s) SubCutaneous every 12 hours  insulin lispro (ADMELOG) corrective regimen sliding scale   SubCutaneous every 6 hours  mupirocin 2% Ointment 1 Application(s) Topical two times a day  pantoprazole  Injectable 40 milliGRAM(s) IV Push two times a day  valproate sodium  IVPB 125 milliGRAM(s) IV Intermittent every 6 hours    PRN  acetaminophen     Tablet .. 650 milliGRAM(s) Oral every 6 hours PRN Temp greater or equal to 38C (100.4F), Mild Pain (1 - 3)  aluminum hydroxide/magnesium hydroxide/simethicone Suspension 30 milliLiter(s) Oral every 4 hours PRN Dyspepsia  dextrose Oral Gel 15 Gram(s) Oral once PRN Blood Glucose LESS THAN 70 milliGRAM(s)/deciliter  melatonin 3 milliGRAM(s) Oral at bedtime PRN Insomnia    -----LABS-----  CBC (07-20 @ 06:30)                              7.8<L>                         5.21    )----------------(  190        33.4<L>% Neutrophils, 35.1  % Lymphocytes, ANC: 1.74<L>                              25.0<L>    BMP (07-20 @ 06:30)             135     |  102     |  8     		Ca++ --      Ca 9.3                ---------------------------------( 115<H>		Mg 1.60               4.2     |  25      |  1.68<H>			Ph 3.7       LFTs (07-20 @ 06:30)      TPro 5.8<L> / Alb 3.1<L> / TBili 0.2 / DBili -- / AST 19 / ALT 10 / AlkPhos 50    Coags (07-20 @ 06:30)  aPTT 30.9 / INR 1.12 / PT 13.0          Urinalysis (07-20 @ 06:30):     Color:  / Appearance:  / SG:  / pH:  / Gluc: 115<H> / Ketones:  / Bili:  / Urobili:  / Protein : / Nitrites:  / Leuk.Est:  / RBC:  / WBC:  / Sq Epi:  / Non Sq Epi:  / Bacteria        -> Clean Catch Clean Catch (Midstream) Culture (07-15 @ 02:33)     NG    NG    No growth    -> .Blood Blood-Peripheral Culture (07-15 @ 02:06)     NG    NG    No growth at 5 days    -> .Blood Blood-Peripheral Culture (07-15 @ 01:50)     NG    NG    No growth at 5 days     SURGERY DAILY PROGRESS NOTE    --------------- OVERNIGHT/INTERVAL---------------  - No acute events overnight    --------------- SUBJECTIVE ---------------  - Patient seen and examined at bedside    --------------- OBJECTIVE ---------------  -----VITALS-----  T(C): 36.4, Max: 36.7 (07-20)  T(F): 97.5, Max: 98 (07-20)  HR: 87 (69 - 95)  BP: 159/81 (102/43 - 184/76)  BP(mean): --  ABP: --  ABP(mean): --  RR: 18 (14 - 22)  SpO2: 99% (96% - 100%)  CVP(mm Hg): --  CVP(cm H2O): --  room air              -----PHYSICAL EXAM-----  GENERAL: NAD, lying in bed   NEURO: AOx0, awake alert appropriate  HEENT: NCAT, trachea midline  PULM: Respirations non-labored  ABD: Soft, non-tender, non-distended, no peritonitis/rebound tenderness  EXT: Warm, well perfused       -----INs & OUTs-----      -----MEDICATIONS-----  STANDING  dextrose 5% + lactated ringers. 1000 milliLiter(s) (75 mL/Hr) IV Continuous <Continuous>  dextrose 5%. 1000 milliLiter(s) (50 mL/Hr) IV Continuous <Continuous>  dextrose 5%. 1000 milliLiter(s) (100 mL/Hr) IV Continuous <Continuous>  dextrose 50% Injectable 25 Gram(s) IV Push once  dextrose 50% Injectable 12.5 Gram(s) IV Push once  dextrose 50% Injectable 25 Gram(s) IV Push once  glucagon  Injectable 1 milliGRAM(s) IntraMuscular once  heparin   Injectable 5000 Unit(s) SubCutaneous every 12 hours  insulin lispro (ADMELOG) corrective regimen sliding scale   SubCutaneous every 6 hours  mupirocin 2% Ointment 1 Application(s) Topical two times a day  pantoprazole  Injectable 40 milliGRAM(s) IV Push two times a day  valproate sodium  IVPB 125 milliGRAM(s) IV Intermittent every 6 hours    PRN  acetaminophen     Tablet .. 650 milliGRAM(s) Oral every 6 hours PRN Temp greater or equal to 38C (100.4F), Mild Pain (1 - 3)  aluminum hydroxide/magnesium hydroxide/simethicone Suspension 30 milliLiter(s) Oral every 4 hours PRN Dyspepsia  dextrose Oral Gel 15 Gram(s) Oral once PRN Blood Glucose LESS THAN 70 milliGRAM(s)/deciliter  melatonin 3 milliGRAM(s) Oral at bedtime PRN Insomnia    -----LABS-----                        7.4  5.97 )-----------( 155    ( 21 Jul 2023 05:21 )             23.8  136  |  101  |  7  ----------------------------<  120<H>    (07-21)  3.7   |  25  |  1.66<H>          Ca    9.0      07-21  Mg    1.50<L>  Phos  3.2        LIVER FUNCTIONS - ( 21 Jul 2023 05:21 )  Alb: 2.9 g/dL / Pro: 5.4 g/dL / ALK PHOS: 46 U/L / ALT: 8 U/L / AST: 14 U/L / GGT: x      PT/INR - ( 20 Jul 2023 06:30 )   PT: 13.0 sec;   INR: 1.12 ratio         PTT - ( 20 Jul 2023 06:30 )  PTT:30.9 sec  Urinalysis Basic - ( 21 Jul 2023 05:21 )    Color: x / Appearance: x / SG: x / pH: x  Gluc: 120 mg/dL / Ketone: x  / Bili: x / Urobili: x   Blood: x / Protein: x / Nitrite: x   Leuk Esterase: x / RBC: x / WBC x   Sq Epi: x / Non Sq Epi: x / Bacteria: x

## 2023-07-21 NOTE — PROGRESS NOTE ADULT - SUBJECTIVE AND OBJECTIVE BOX
Vital Signs Last 24 Hrs  T(C): 36.4 (21 Jul 2023 05:50), Max: 36.7 (20 Jul 2023 10:55)  T(F): 97.5 (21 Jul 2023 05:50), Max: 98 (20 Jul 2023 10:55)  HR: 87 (21 Jul 2023 05:50) (62 - 95)  BP: 159/81 (21 Jul 2023 05:50) (102/43 - 184/76)  BP(mean): --  RR: 18 (21 Jul 2023 05:50) (14 - 22)  SpO2: 99% (21 Jul 2023 05:50) (96% - 100%)    Parameters below as of 21 Jul 2023 05:50  Patient On (Oxygen Delivery Method): room air        I&O's Detail    20 Jul 2023 07:01  -  21 Jul 2023 07:00  --------------------------------------------------------  IN:    dextrose 5% + lactated ringers: 1500 mL    IV PiggyBack: 110 mL    IV PiggyBack: 200 mL  Total IN: 1810 mL    OUT:    Oral Fluid: 0 mL    Ureteral Catheter (mL): 1350 mL    Voided (mL): 800 mL  Total OUT: 2150 mL    Total NET: -340 mL                                7.4    5.97  )-----------( 155      ( 21 Jul 2023 05:21 )             23.8       07-21    136  |  101  |  7   ----------------------------<  120<H>  3.7   |  25  |  1.66<H>    Ca    9.0      21 Jul 2023 05:21  Phos  3.2     07-21  Mg     1.50     07-21    TPro  5.4<L>  /  Alb  2.9<L>  /  TBili  0.2  /  DBili  x   /  AST  14  /  ALT  8   /  AlkPhos  46  07-21      PT/INR - ( 20 Jul 2023 06:30 )   PT: 13.0 sec;   INR: 1.12 ratio         PTT - ( 20 Jul 2023 06:30 )  PTT:30.9 sec    patient not a surgical candidate    PLAN:  family will have to sign off on palliative care only  I will sign off at this point

## 2023-07-21 NOTE — SWALLOW BEDSIDE ASSESSMENT ADULT - SPECIFY REASON(S)
To reassess the swallow function
assessment of oropharyngeal swallow
to assess swallow function
to clinically assess swallow function

## 2023-07-21 NOTE — PROGRESS NOTE ADULT - SUBJECTIVE AND OBJECTIVE BOX
Marvel Kay MD  Emergency Medicine & Internal Medicine PGY-2    PROGRESS NOTE:    ID #:     Patient is a 68y old  Male who presents with a chief complaint of Altered mental status     (17 Jul 2023 10:42)      MAJOR INTERVAL HOSPITAL EVENTS:     SUBJECTIVE / OVERNIGHT EVENTS: No acute overnight events.       MEDICATIONS  (STANDING):  dextrose 5% + lactated ringers. 1000 milliLiter(s) (75 mL/Hr) IV Continuous <Continuous>  dextrose 5%. 1000 milliLiter(s) (100 mL/Hr) IV Continuous <Continuous>  dextrose 5%. 1000 milliLiter(s) (50 mL/Hr) IV Continuous <Continuous>  dextrose 50% Injectable 25 Gram(s) IV Push once  dextrose 50% Injectable 12.5 Gram(s) IV Push once  dextrose 50% Injectable 25 Gram(s) IV Push once  glucagon  Injectable 1 milliGRAM(s) IntraMuscular once  heparin   Injectable 5000 Unit(s) SubCutaneous every 12 hours  insulin lispro (ADMELOG) corrective regimen sliding scale   SubCutaneous every 6 hours  mupirocin 2% Ointment 1 Application(s) Topical two times a day  pantoprazole  Injectable 40 milliGRAM(s) IV Push two times a day  valproate sodium  IVPB 125 milliGRAM(s) IV Intermittent every 6 hours    MEDICATIONS  (PRN):  acetaminophen     Tablet .. 650 milliGRAM(s) Oral every 6 hours PRN Temp greater or equal to 38C (100.4F), Mild Pain (1 - 3)  aluminum hydroxide/magnesium hydroxide/simethicone Suspension 30 milliLiter(s) Oral every 4 hours PRN Dyspepsia  dextrose Oral Gel 15 Gram(s) Oral once PRN Blood Glucose LESS THAN 70 milliGRAM(s)/deciliter  melatonin 3 milliGRAM(s) Oral at bedtime PRN Insomnia      CAPILLARY BLOOD GLUCOSE      POCT Blood Glucose.: 132 mg/dL (21 Jul 2023 06:12)  POCT Blood Glucose.: 117 mg/dL (21 Jul 2023 00:04)  POCT Blood Glucose.: 86 mg/dL (20 Jul 2023 18:13)  POCT Blood Glucose.: 110 mg/dL (20 Jul 2023 13:28)  POCT Blood Glucose.: 115 mg/dL (20 Jul 2023 11:04)    I&O's Summary    20 Jul 2023 07:01  -  21 Jul 2023 07:00  --------------------------------------------------------  IN: 960 mL / OUT: 1750 mL / NET: -790 mL        PHYSICAL EXAM:  Vital Signs Last 24 Hrs  T(C): 36.4 (21 Jul 2023 05:50), Max: 36.7 (20 Jul 2023 10:55)  T(F): 97.5 (21 Jul 2023 05:50), Max: 98 (20 Jul 2023 10:55)  HR: 87 (21 Jul 2023 05:50) (62 - 95)  BP: 159/81 (21 Jul 2023 05:50) (102/43 - 184/76)  BP(mean): --  RR: 18 (21 Jul 2023 05:50) (14 - 22)  SpO2: 99% (21 Jul 2023 05:50) (96% - 100%)    Parameters below as of 21 Jul 2023 05:50  Patient On (Oxygen Delivery Method): room air        GENERAL: No acute distress, well-developed  HEAD:  Atraumatic, Normocephalic  EYES: EOMI, PERRLA, conjunctiva and sclera clear  NECK: Supple, no lymphadenopathy, no JVD  CHEST/LUNG: CTAB; No wheezes, rales, or rhonchi  HEART: Regular rate and rhythm; Normal s1 and s2, No murmurs, rubs, or gallops  ABDOMEN: Soft, non-tender, non-distended; normal bowel sounds, no organomegaly  EXTREMITIES:  2+ peripheral pulses b/l, No clubbing, cyanosis, or edema  NEUROLOGY: A&O x 3, no focal deficits  SKIN: No rashes or lesions          LABS:                        7.4    5.97  )-----------( 155      ( 21 Jul 2023 05:21 )             23.8     07-20    135  |  102  |  8   ----------------------------<  115<H>  4.2   |  25  |  1.68<H>    Ca    9.3      20 Jul 2023 06:30  Phos  3.7     07-20  Mg     1.60     07-20    TPro  5.8<L>  /  Alb  3.1<L>  /  TBili  0.2  /  DBili  x   /  AST  19  /  ALT  10  /  AlkPhos  50  07-20    PT/INR - ( 20 Jul 2023 06:30 )   PT: 13.0 sec;   INR: 1.12 ratio         PTT - ( 20 Jul 2023 06:30 )  PTT:30.9 sec      Urinalysis Basic - ( 20 Jul 2023 06:30 )    Color: x / Appearance: x / SG: x / pH: x  Gluc: 115 mg/dL / Ketone: x  / Bili: x / Urobili: x   Blood: x / Protein: x / Nitrite: x   Leuk Esterase: x / RBC: x / WBC x   Sq Epi: x / Non Sq Epi: x / Bacteria: x          RADIOLOGY & ADDITIONAL TESTS:  Results Reviewed:   Imaging Personally Reviewed:  Electrocardiogram Personally Reviewed:    COORDINATION OF CARE:  Care Discussed with Consultants/Other Providers [Y/N]:  Prior or Outpatient Records Reviewed [Y/N]:   Marvel Kay MD  Emergency Medicine & Internal Medicine PGY-2    PROGRESS NOTE:    ID #:     Patient is a 68y old  Male who presents with a chief complaint of Altered mental status     (17 Jul 2023 10:42)      MAJOR INTERVAL HOSPITAL EVENTS: NAEO    SUBJECTIVE / OVERNIGHT EVENTS: No acute overnight events. Patient found laying in bed, stated his name but did not answer other questions.       MEDICATIONS  (STANDING):  dextrose 5% + lactated ringers. 1000 milliLiter(s) (75 mL/Hr) IV Continuous <Continuous>  dextrose 5%. 1000 milliLiter(s) (100 mL/Hr) IV Continuous <Continuous>  dextrose 5%. 1000 milliLiter(s) (50 mL/Hr) IV Continuous <Continuous>  dextrose 50% Injectable 25 Gram(s) IV Push once  dextrose 50% Injectable 12.5 Gram(s) IV Push once  dextrose 50% Injectable 25 Gram(s) IV Push once  glucagon  Injectable 1 milliGRAM(s) IntraMuscular once  heparin   Injectable 5000 Unit(s) SubCutaneous every 12 hours  insulin lispro (ADMELOG) corrective regimen sliding scale   SubCutaneous every 6 hours  mupirocin 2% Ointment 1 Application(s) Topical two times a day  pantoprazole  Injectable 40 milliGRAM(s) IV Push two times a day  valproate sodium  IVPB 125 milliGRAM(s) IV Intermittent every 6 hours    MEDICATIONS  (PRN):  acetaminophen     Tablet .. 650 milliGRAM(s) Oral every 6 hours PRN Temp greater or equal to 38C (100.4F), Mild Pain (1 - 3)  aluminum hydroxide/magnesium hydroxide/simethicone Suspension 30 milliLiter(s) Oral every 4 hours PRN Dyspepsia  dextrose Oral Gel 15 Gram(s) Oral once PRN Blood Glucose LESS THAN 70 milliGRAM(s)/deciliter  melatonin 3 milliGRAM(s) Oral at bedtime PRN Insomnia      CAPILLARY BLOOD GLUCOSE      POCT Blood Glucose.: 132 mg/dL (21 Jul 2023 06:12)  POCT Blood Glucose.: 117 mg/dL (21 Jul 2023 00:04)  POCT Blood Glucose.: 86 mg/dL (20 Jul 2023 18:13)  POCT Blood Glucose.: 110 mg/dL (20 Jul 2023 13:28)  POCT Blood Glucose.: 115 mg/dL (20 Jul 2023 11:04)    I&O's Summary    20 Jul 2023 07:01  -  21 Jul 2023 07:00  --------------------------------------------------------  IN: 960 mL / OUT: 1750 mL / NET: -790 mL        PHYSICAL EXAM:  Vital Signs Last 24 Hrs  T(C): 36.4 (21 Jul 2023 05:50), Max: 36.7 (20 Jul 2023 10:55)  T(F): 97.5 (21 Jul 2023 05:50), Max: 98 (20 Jul 2023 10:55)  HR: 87 (21 Jul 2023 05:50) (62 - 95)  BP: 159/81 (21 Jul 2023 05:50) (102/43 - 184/76)  BP(mean): --  RR: 18 (21 Jul 2023 05:50) (14 - 22)  SpO2: 99% (21 Jul 2023 05:50) (96% - 100%)    Parameters below as of 21 Jul 2023 05:50  Patient On (Oxygen Delivery Method): room air        GENERAL: No acute distress, cachetic  HEAD:  Atraumatic, Normocephalic  CHEST/LUNG: CTAB; No wheezes, rales, or rhonchi  HEART: Regular rate and rhythm; Normal s1 and s2, No murmurs, rubs, or gallops  ABDOMEN: Soft, non-tender, non-distended; normal bowel sounds, no organomegaly  EXTREMITIES:  UEs and LEs contracted, 2+ peripheral pulses b/l, No clubbing, cyanosis, or edema  NEUROLOGY: A&O x 1 (name), spoke few words, light finger squeeze BL, no focal deficits  SKIN: No rashes or lesions  LABS:          LABS:                        7.4    5.97  )-----------( 155      ( 21 Jul 2023 05:21 )             23.8     07-20    135  |  102  |  8   ----------------------------<  115<H>  4.2   |  25  |  1.68<H>    Ca    9.3      20 Jul 2023 06:30  Phos  3.7     07-20  Mg     1.60     07-20    TPro  5.8<L>  /  Alb  3.1<L>  /  TBili  0.2  /  DBili  x   /  AST  19  /  ALT  10  /  AlkPhos  50  07-20    PT/INR - ( 20 Jul 2023 06:30 )   PT: 13.0 sec;   INR: 1.12 ratio         PTT - ( 20 Jul 2023 06:30 )  PTT:30.9 sec      Urinalysis Basic - ( 20 Jul 2023 06:30 )    Color: x / Appearance: x / SG: x / pH: x  Gluc: 115 mg/dL / Ketone: x  / Bili: x / Urobili: x   Blood: x / Protein: x / Nitrite: x   Leuk Esterase: x / RBC: x / WBC x   Sq Epi: x / Non Sq Epi: x / Bacteria: x          RADIOLOGY & ADDITIONAL TESTS:  Results Reviewed:   Imaging Personally Reviewed:  Electrocardiogram Personally Reviewed:    COORDINATION OF CARE:  Care Discussed with Consultants/Other Providers [Y/N]:  Prior or Outpatient Records Reviewed [Y/N]:

## 2023-07-21 NOTE — PROGRESS NOTE ADULT - PROBLEM SELECTOR PLAN 7
Patient w/ anemia 7.5 on presentation down to 7.1. Now 8.7  - Pt's son denies any hematochezia or melena. No other source of bleed reported  - Possibly iso pyloric mass  - IV protonix  - Active T&S  - Consent in chart Patient w/ anemia 7.5 on presentation down to 7.1. Now 8.7  - Pt's son denies any hematochezia or melena. No other source of bleed reported  - iso pyloric mass  - IV protonix  - Active T&S  - Consent in chart  - Will DC with supplemental iron

## 2023-07-21 NOTE — SWALLOW BEDSIDE ASSESSMENT ADULT - SWALLOW EVAL: RECOMMENDED DIET
oral nutrition/hydration/medication is contraindicated. Consider short-term means of non-oral nutrition/hydration/medication and GOC discussion with the patient's family regarding nutritional plan of care. Maintain aspiration precautions and strict oral care.
1. Puree with Moderately Thick Liquids
no

## 2023-07-21 NOTE — SWALLOW BEDSIDE ASSESSMENT ADULT - ASR SWALLOW REFERRAL
Patient may benefit from nutritional supplements (i.e., Ensure pudding) to meet nutritional needs/Registered Dietitian
to ensure adequate daily caloric nutritional intake/Registered Dietitian

## 2023-07-22 LAB
GLUCOSE BLDC GLUCOMTR-MCNC: 101 MG/DL — HIGH (ref 70–99)
GLUCOSE BLDC GLUCOMTR-MCNC: 132 MG/DL — HIGH (ref 70–99)
GLUCOSE BLDC GLUCOMTR-MCNC: 139 MG/DL — HIGH (ref 70–99)
GLUCOSE BLDC GLUCOMTR-MCNC: 147 MG/DL — HIGH (ref 70–99)

## 2023-07-22 PROCEDURE — 99231 SBSQ HOSP IP/OBS SF/LOW 25: CPT | Mod: GC

## 2023-07-22 RX ADMIN — Medication 51.25 MILLIGRAM(S): at 21:53

## 2023-07-22 RX ADMIN — MUPIROCIN 1 APPLICATION(S): 20 OINTMENT TOPICAL at 05:11

## 2023-07-22 RX ADMIN — MUPIROCIN 1 APPLICATION(S): 20 OINTMENT TOPICAL at 17:30

## 2023-07-22 RX ADMIN — Medication 51.25 MILLIGRAM(S): at 09:30

## 2023-07-22 RX ADMIN — PANTOPRAZOLE SODIUM 40 MILLIGRAM(S): 20 TABLET, DELAYED RELEASE ORAL at 17:30

## 2023-07-22 RX ADMIN — Medication 51.25 MILLIGRAM(S): at 02:49

## 2023-07-22 RX ADMIN — PANTOPRAZOLE SODIUM 40 MILLIGRAM(S): 20 TABLET, DELAYED RELEASE ORAL at 05:11

## 2023-07-22 RX ADMIN — HEPARIN SODIUM 5000 UNIT(S): 5000 INJECTION INTRAVENOUS; SUBCUTANEOUS at 17:30

## 2023-07-22 RX ADMIN — Medication 51.25 MILLIGRAM(S): at 15:40

## 2023-07-22 RX ADMIN — HEPARIN SODIUM 5000 UNIT(S): 5000 INJECTION INTRAVENOUS; SUBCUTANEOUS at 05:11

## 2023-07-22 NOTE — PROGRESS NOTE ADULT - PROBLEM SELECTOR PLAN 3
Patient w/ baseline dementia (AOx0) p/w decreased responsiveness and lethargy for 2 days likely iso metabolic encephalopathy d/t aspiration PNA vs hypercapnia  - CT w/ secretions in esophagus and history of coughing w/ eating c/f aspiration. Pt hypothermic and hypotensive meeting SIRS criteria s/p 2L Bolus  - Pt noted w/ hypercarbia on VBG likely iso COPD given extensive smoking history although no official diagnosis per son

## 2023-07-22 NOTE — PROGRESS NOTE ADULT - SUBJECTIVE AND OBJECTIVE BOX
PROGRESS NOTE:     Patient is a 68y old  Male who presents with a chief complaint of Altered mental status     (17 Jul 2023 10:42)      SUBJECTIVE / OVERNIGHT EVENTS:    No acute events overnight. Patient examined at bedside with no acute complaints.     Pain:  Bowel Movements:  Urination:  OOB:  PT:    REVIEW OF SYSTEMS:    CONSTITUTIONAL: No weakness, fevers or chills  EYES/ENT: No visual changes;  No vertigo or throat pain   NECK: No pain or stiffness  RESPIRATORY: No cough, wheezing, hemoptysis; No shortness of breath  CARDIOVASCULAR: No chest pain or palpitations  GASTROINTESTINAL: No abdominal or epigastric pain. No nausea, vomiting, or hematemesis; No diarrhea or constipation. No melena or hematochezia.  GENITOURINARY: No dysuria, frequency or hematuria  NEUROLOGICAL: No numbness or weakness  SKIN: No itching, rashes      MEDICATIONS  (STANDING):  dextrose 5% + lactated ringers. 1000 milliLiter(s) (75 mL/Hr) IV Continuous <Continuous>  dextrose 5%. 1000 milliLiter(s) (100 mL/Hr) IV Continuous <Continuous>  dextrose 5%. 1000 milliLiter(s) (50 mL/Hr) IV Continuous <Continuous>  dextrose 50% Injectable 25 Gram(s) IV Push once  dextrose 50% Injectable 12.5 Gram(s) IV Push once  dextrose 50% Injectable 25 Gram(s) IV Push once  glucagon  Injectable 1 milliGRAM(s) IntraMuscular once  heparin   Injectable 5000 Unit(s) SubCutaneous every 12 hours  insulin lispro (ADMELOG) corrective regimen sliding scale   SubCutaneous three times a day before meals  insulin lispro (ADMELOG) corrective regimen sliding scale   SubCutaneous at bedtime  mupirocin 2% Ointment 1 Application(s) Topical two times a day  pantoprazole  Injectable 40 milliGRAM(s) IV Push two times a day  valproate sodium  IVPB 125 milliGRAM(s) IV Intermittent every 6 hours    MEDICATIONS  (PRN):  acetaminophen     Tablet .. 650 milliGRAM(s) Oral every 6 hours PRN Temp greater or equal to 38C (100.4F), Mild Pain (1 - 3)  aluminum hydroxide/magnesium hydroxide/simethicone Suspension 30 milliLiter(s) Oral every 4 hours PRN Dyspepsia  dextrose Oral Gel 15 Gram(s) Oral once PRN Blood Glucose LESS THAN 70 milliGRAM(s)/deciliter  melatonin 3 milliGRAM(s) Oral at bedtime PRN Insomnia      CAPILLARY BLOOD GLUCOSE      POCT Blood Glucose.: 165 mg/dL (21 Jul 2023 23:14)  POCT Blood Glucose.: 120 mg/dL (21 Jul 2023 18:16)  POCT Blood Glucose.: 150 mg/dL (21 Jul 2023 13:12)    I&O's Summary    21 Jul 2023 07:01  -  22 Jul 2023 07:00  --------------------------------------------------------  IN: 0 mL / OUT: 1300 mL / NET: -1300 mL        VITALS:   T(C): 36.7 (07-22-23 @ 05:10), Max: 36.7 (07-21-23 @ 14:47)  HR: 70 (07-22-23 @ 05:10) (64 - 79)  BP: 118/80 (07-22-23 @ 05:10) (110/72 - 118/80)  RR: 17 (07-22-23 @ 05:10) (17 - 18)  SpO2: 100% (07-22-23 @ 05:10) (100% - 100%)    GENERAL: NAD, lying in bed comfortably  HEAD:  Atraumatic, normocephalic  EYES: EOMI, PERRLA, conjunctiva and sclera clear  ENT: Moist mucous membranes  NECK: Supple, no JVD  HEART: Regular rate and rhythm, no murmurs, rubs, or gallops  LUNGS: Unlabored respirations.  Clear to auscultation bilaterally, no crackles, wheezing, or rhonchi  ABDOMEN: Soft, nontender, nondistended, +BS  EXTREMITIES: 2+ peripheral pulses bilaterally. No clubbing, cyanosis, or edema  NERVOUS SYSTEM:  A&Ox3, no focal deficits   SKIN: No rashes or lesions    LABS:                        7.4    5.97  )-----------( 155      ( 21 Jul 2023 05:21 )             23.8     07-21    136  |  101  |  7   ----------------------------<  120<H>  3.7   |  25  |  1.66<H>    Ca    9.0      21 Jul 2023 05:21  Phos  3.2     07-21  Mg     1.50     07-21    TPro  5.4<L>  /  Alb  2.9<L>  /  TBili  0.2  /  DBili  x   /  AST  14  /  ALT  8   /  AlkPhos  46  07-21          Urinalysis Basic - ( 21 Jul 2023 05:21 )    Color: x / Appearance: x / SG: x / pH: x  Gluc: 120 mg/dL / Ketone: x  / Bili: x / Urobili: x   Blood: x / Protein: x / Nitrite: x   Leuk Esterase: x / RBC: x / WBC x   Sq Epi: x / Non Sq Epi: x / Bacteria: x          RADIOLOGY & ADDITIONAL TESTS:  Results Reviewed:   Imaging Personally Reviewed:  Electrocardiogram Personally Reviewed:    COORDINATION OF CARE:  Care Discussed with Consultants/Other Providers [Y/N]:  Prior or Outpatient Records Reviewed [Y/N]:   PROGRESS NOTE:     Patient is a 68y old  Male who presents with a chief complaint of Altered mental status     (17 Jul 2023 10:42)      SUBJECTIVE / OVERNIGHT EVENTS:    No acute events overnight. Patient examined at bedside with no acute complaints.     Pain: N/A     REVIEW OF SYSTEMS:    Unable to assess ROS due to baseline mental status       MEDICATIONS  (STANDING):  dextrose 5% + lactated ringers. 1000 milliLiter(s) (75 mL/Hr) IV Continuous <Continuous>  dextrose 5%. 1000 milliLiter(s) (100 mL/Hr) IV Continuous <Continuous>  dextrose 5%. 1000 milliLiter(s) (50 mL/Hr) IV Continuous <Continuous>  dextrose 50% Injectable 25 Gram(s) IV Push once  dextrose 50% Injectable 12.5 Gram(s) IV Push once  dextrose 50% Injectable 25 Gram(s) IV Push once  glucagon  Injectable 1 milliGRAM(s) IntraMuscular once  heparin   Injectable 5000 Unit(s) SubCutaneous every 12 hours  insulin lispro (ADMELOG) corrective regimen sliding scale   SubCutaneous three times a day before meals  insulin lispro (ADMELOG) corrective regimen sliding scale   SubCutaneous at bedtime  mupirocin 2% Ointment 1 Application(s) Topical two times a day  pantoprazole  Injectable 40 milliGRAM(s) IV Push two times a day  valproate sodium  IVPB 125 milliGRAM(s) IV Intermittent every 6 hours    MEDICATIONS  (PRN):  acetaminophen     Tablet .. 650 milliGRAM(s) Oral every 6 hours PRN Temp greater or equal to 38C (100.4F), Mild Pain (1 - 3)  aluminum hydroxide/magnesium hydroxide/simethicone Suspension 30 milliLiter(s) Oral every 4 hours PRN Dyspepsia  dextrose Oral Gel 15 Gram(s) Oral once PRN Blood Glucose LESS THAN 70 milliGRAM(s)/deciliter  melatonin 3 milliGRAM(s) Oral at bedtime PRN Insomnia      CAPILLARY BLOOD GLUCOSE      POCT Blood Glucose.: 165 mg/dL (21 Jul 2023 23:14)  POCT Blood Glucose.: 120 mg/dL (21 Jul 2023 18:16)  POCT Blood Glucose.: 150 mg/dL (21 Jul 2023 13:12)    I&O's Summary    21 Jul 2023 07:01  -  22 Jul 2023 07:00  --------------------------------------------------------  IN: 0 mL / OUT: 1300 mL / NET: -1300 mL        VITALS:   T(C): 36.7 (07-22-23 @ 05:10), Max: 36.7 (07-21-23 @ 14:47)  HR: 70 (07-22-23 @ 05:10) (64 - 79)  BP: 118/80 (07-22-23 @ 05:10) (110/72 - 118/80)  RR: 17 (07-22-23 @ 05:10) (17 - 18)  SpO2: 100% (07-22-23 @ 05:10) (100% - 100%)    GENERAL: NAD, lying in bed comfortably  HEAD:  Atraumatic, normocephalic  EYES: EOMI, PERRLA, conjunctiva and sclera clear  ENT: Moist mucous membranes  NECK: Supple, no JVD  HEART: Regular rate and rhythm, no murmurs, rubs, or gallops  LUNGS: Unlabored respirations.  Clear to auscultation bilaterally, no crackles, wheezing, or rhonchi  ABDOMEN: Soft, nontender, nondistended,   EXTREMITIES: 2+ peripheral pulses bilaterally. No clubbing, cyanosis, or edema  NERVOUS SYSTEM:  A&Ox0, no focal deficits   SKIN: No rashes or lesions    LABS:                        7.4    5.97  )-----------( 155      ( 21 Jul 2023 05:21 )             23.8     07-21    136  |  101  |  7   ----------------------------<  120<H>  3.7   |  25  |  1.66<H>    Ca    9.0      21 Jul 2023 05:21  Phos  3.2     07-21  Mg     1.50     07-21    TPro  5.4<L>  /  Alb  2.9<L>  /  TBili  0.2  /  DBili  x   /  AST  14  /  ALT  8   /  AlkPhos  46  07-21          Urinalysis Basic - ( 21 Jul 2023 05:21 )    Color: x / Appearance: x / SG: x / pH: x  Gluc: 120 mg/dL / Ketone: x  / Bili: x / Urobili: x   Blood: x / Protein: x / Nitrite: x   Leuk Esterase: x / RBC: x / WBC x   Sq Epi: x / Non Sq Epi: x / Bacteria: x          RADIOLOGY & ADDITIONAL TESTS:  Results Reviewed:   Imaging Personally Reviewed:  Electrocardiogram Personally Reviewed:    COORDINATION OF CARE:  Care Discussed with Consultants/Other Providers [Y/N]:  Prior or Outpatient Records Reviewed [Y/N]:

## 2023-07-22 NOTE — PROGRESS NOTE ADULT - ATTENDING COMMENTS
68 year old male w/ PMH of CKD, CVA, HTN, DM, Dementia (Aox0, Non-verbal) and seizure disorder presenting with acute encephalopathy likely 2/2 to aspiration PNA, found to have pyloric mass likely adenocarcinoma w/ possible metastatic lymph nodes.    #Gastric cancer:   -s/p EGD reveals likely malignant gastric tumor in the gastric antrum   -does not need to remain inpatient to await results; can f/u with GI o/p  -multiple conversations have taken place with family, oncology and surgery have already determined pt to be poor candidate for chemotherapy and surgery, respectively  -will d/c home, family will obtain second opinion outpatient    #Anemia:   -suspect occult losses in setting of gastric malignancy  -iron studies consistent with PRACHI  -will d/c home on PO iron    #Acute encephalopathy:   -improved, A&O x 2  -likely toxic metabolic encephalopathy in setting of infection  -repeat S&S given improvement in mental status    #Aspiration PNA:   -plan for 5 days course of zosyn; ended on 7/20    #CKD:   -Patient has been shown to be retaining and required straight cath x 3 times. Bangura to be placed. Likely has neurogenic bladder given hx of CVA x 2. Will need o/p urology f/u. Begin flomax  -Cr now improving  -d/c with bangura; will require urology f/u - bangura teaching to family, nurse visit outpt    Pt with poor prognosis. Teams has d/w family in detail. HC arranged.   Transportation did not arrive yesterday, rescheduled for today.

## 2023-07-22 NOTE — PROGRESS NOTE ADULT - PROBLEM SELECTOR PLAN 2
Pt w/ hypothermia 96.5F and tachypnea >20 w/ hypothermia. Possible source Aspiration PNA  - BCx/UCx  NGTD  - Covered w/ Emperic zosy and vanc until 7/20  - Monitor fever/WBC curve.  - Legionella neg: DCstanislaw soto Pt w/ hypothermia 96.5F and tachypnea >20 w/ hypothermia. Possible source Aspiration PNA  - BCx/UCx  NGTD  - Completed Emperic zosy and vanc 7/20  - Monitor fever/WBC curve.  - Legionella neg: DC'macario soto

## 2023-07-22 NOTE — PROGRESS NOTE ADULT - PROBLEM SELECTOR PLAN 7
Patient w/ anemia 7.5 on presentation down to 7.1. Now 8.7  - Pt's son denies any hematochezia or melena. No other source of bleed reported  - iso pyloric mass  - IV protonix  - Active T&S  - Consent in chart  - Will DC with supplemental iron

## 2023-07-22 NOTE — PROGRESS NOTE ADULT - PROBLEM SELECTOR PLAN 5
Patient w/ secretions in trachea on imaging w/ hx of coughing w/ liquids and pureed food at home  - NPO s/p S&S evaluation   - Goals of care w/ family regarding pleasure feeds: would be amenable to NG tube but not invasive G tube  - Aspiration precautions and head of bed elevation   - Oral meds converted to IV for now.  - Emperic Zosyn, renally dosed for now Patient w/ secretions in trachea on imaging w/ hx of coughing w/ liquids and pureed food at home  - NPO s/p S&S evaluation   - Goals of care w/ family regarding pleasure feeds: would be amenable to NG tube but not invasive G tube  - Aspiration precautions and head of bed elevation   - Oral meds converted to IV for now.  completed abx course

## 2023-07-22 NOTE — PROGRESS NOTE ADULT - ASSESSMENT
Mr. Jones is a 68M w/ PMH CKD, CVA, HTn, DM, Dementia, seizure disorder (on VPA) who p/w acute encephalopathy found to meet SIRS criteria possibly 2/2 aspiration PNA and incidentally found to have pyloric mass c/f adenocarcinoma w/ associated lymphadenopathy indicating metastatic disease. Patient is not a chemo or surgical candidate  68M  CKD, CVA, HTN, DM, Dementia, seizure disorder (on VPA) who p/w acute encephalopathy found to meet SIRS criteria possibly 2/2 aspiration PNA and incidentally found to have pyloric mass c/f adenocarcinoma w/ associated lymphadenopathy indicating metastatic disease. Patient is not a chemo or surgical candidate

## 2023-07-22 NOTE — PROGRESS NOTE ADULT - PROBLEM SELECTOR PLAN 1
Patient w/ gastric pyloric mass w/ bulky local necrotic metastatic adenopathy on CT scan c/f adenocarcinoma  - Discussed GoC with patient's daughter and son: family expressed preference for further onc workup/intervention. It was explained that per oncology and surgery, patient is not a surgical candidate. Will give onc OP follow up upon discharge  - GI EGD and biospy 7/20: showing likely malignant gastric tumor in the gastric antrum that was biopsied. EUS notable for three lymph nodes were visualized and measured in the  perigastric region in addition to lymph node was visualized and measured in the felicia hepatis region with no signs of distant metastasis.  - C/s hemeonc: may not be surg/chemo candidate  - C/s surg: not surgical candidate  - DC to home with oncology follow up: family amenable

## 2023-07-23 VITALS
DIASTOLIC BLOOD PRESSURE: 77 MMHG | SYSTOLIC BLOOD PRESSURE: 126 MMHG | TEMPERATURE: 98 F | OXYGEN SATURATION: 100 % | HEART RATE: 91 BPM | RESPIRATION RATE: 18 BRPM

## 2023-07-23 LAB
ANION GAP SERPL CALC-SCNC: 12 MMOL/L — SIGNIFICANT CHANGE UP (ref 7–14)
BUN SERPL-MCNC: 10 MG/DL — SIGNIFICANT CHANGE UP (ref 7–23)
CALCIUM SERPL-MCNC: 8.8 MG/DL — SIGNIFICANT CHANGE UP (ref 8.4–10.5)
CHLORIDE SERPL-SCNC: 101 MMOL/L — SIGNIFICANT CHANGE UP (ref 98–107)
CO2 SERPL-SCNC: 25 MMOL/L — SIGNIFICANT CHANGE UP (ref 22–31)
CREAT SERPL-MCNC: 1.55 MG/DL — HIGH (ref 0.5–1.3)
EGFR: 49 ML/MIN/1.73M2 — LOW
GLUCOSE BLDC GLUCOMTR-MCNC: 102 MG/DL — HIGH (ref 70–99)
GLUCOSE BLDC GLUCOMTR-MCNC: 161 MG/DL — HIGH (ref 70–99)
GLUCOSE SERPL-MCNC: 105 MG/DL — HIGH (ref 70–99)
HCT VFR BLD CALC: 26.6 % — LOW (ref 39–50)
HGB BLD-MCNC: 8.2 G/DL — LOW (ref 13–17)
MAGNESIUM SERPL-MCNC: 1.9 MG/DL — SIGNIFICANT CHANGE UP (ref 1.6–2.6)
MCHC RBC-ENTMCNC: 25.2 PG — LOW (ref 27–34)
MCHC RBC-ENTMCNC: 30.8 GM/DL — LOW (ref 32–36)
MCV RBC AUTO: 81.6 FL — SIGNIFICANT CHANGE UP (ref 80–100)
NRBC # BLD: 0 /100 WBCS — SIGNIFICANT CHANGE UP (ref 0–0)
NRBC # FLD: 0 K/UL — SIGNIFICANT CHANGE UP (ref 0–0)
PHOSPHATE SERPL-MCNC: 2.9 MG/DL — SIGNIFICANT CHANGE UP (ref 2.5–4.5)
PLATELET # BLD AUTO: 182 K/UL — SIGNIFICANT CHANGE UP (ref 150–400)
POTASSIUM SERPL-MCNC: 4.4 MMOL/L — SIGNIFICANT CHANGE UP (ref 3.5–5.3)
POTASSIUM SERPL-SCNC: 4.4 MMOL/L — SIGNIFICANT CHANGE UP (ref 3.5–5.3)
RBC # BLD: 3.26 M/UL — LOW (ref 4.2–5.8)
RBC # FLD: 16.5 % — HIGH (ref 10.3–14.5)
SODIUM SERPL-SCNC: 138 MMOL/L — SIGNIFICANT CHANGE UP (ref 135–145)
WBC # BLD: 6.45 K/UL — SIGNIFICANT CHANGE UP (ref 3.8–10.5)
WBC # FLD AUTO: 6.45 K/UL — SIGNIFICANT CHANGE UP (ref 3.8–10.5)

## 2023-07-23 PROCEDURE — 99231 SBSQ HOSP IP/OBS SF/LOW 25: CPT

## 2023-07-23 RX ADMIN — PANTOPRAZOLE SODIUM 40 MILLIGRAM(S): 20 TABLET, DELAYED RELEASE ORAL at 05:39

## 2023-07-23 RX ADMIN — Medication 51.25 MILLIGRAM(S): at 09:12

## 2023-07-23 RX ADMIN — Medication 1: at 13:19

## 2023-07-23 RX ADMIN — Medication 51.25 MILLIGRAM(S): at 03:54

## 2023-07-23 RX ADMIN — HEPARIN SODIUM 5000 UNIT(S): 5000 INJECTION INTRAVENOUS; SUBCUTANEOUS at 05:42

## 2023-07-23 RX ADMIN — MUPIROCIN 1 APPLICATION(S): 20 OINTMENT TOPICAL at 07:17

## 2023-07-23 NOTE — PROGRESS NOTE ADULT - SUBJECTIVE AND OBJECTIVE BOX
Marvel Kay MD  Emergency Medicine & Internal Medicine PGY-2    PROGRESS NOTE:    ID #:     Patient is a 68y old  Male who presents with a chief complaint of Altered mental status     (17 Jul 2023 10:42)      MAJOR INTERVAL HOSPITAL EVENTS:     SUBJECTIVE / OVERNIGHT EVENTS: No acute overnight events.       MEDICATIONS  (STANDING):  dextrose 5% + lactated ringers. 1000 milliLiter(s) (75 mL/Hr) IV Continuous <Continuous>  dextrose 5%. 1000 milliLiter(s) (100 mL/Hr) IV Continuous <Continuous>  dextrose 5%. 1000 milliLiter(s) (50 mL/Hr) IV Continuous <Continuous>  dextrose 50% Injectable 25 Gram(s) IV Push once  dextrose 50% Injectable 12.5 Gram(s) IV Push once  dextrose 50% Injectable 25 Gram(s) IV Push once  glucagon  Injectable 1 milliGRAM(s) IntraMuscular once  heparin   Injectable 5000 Unit(s) SubCutaneous every 12 hours  insulin lispro (ADMELOG) corrective regimen sliding scale   SubCutaneous three times a day before meals  insulin lispro (ADMELOG) corrective regimen sliding scale   SubCutaneous at bedtime  mupirocin 2% Ointment 1 Application(s) Topical two times a day  pantoprazole  Injectable 40 milliGRAM(s) IV Push two times a day  valproate sodium  IVPB 125 milliGRAM(s) IV Intermittent every 6 hours    MEDICATIONS  (PRN):  acetaminophen     Tablet .. 650 milliGRAM(s) Oral every 6 hours PRN Temp greater or equal to 38C (100.4F), Mild Pain (1 - 3)  aluminum hydroxide/magnesium hydroxide/simethicone Suspension 30 milliLiter(s) Oral every 4 hours PRN Dyspepsia  dextrose Oral Gel 15 Gram(s) Oral once PRN Blood Glucose LESS THAN 70 milliGRAM(s)/deciliter  melatonin 3 milliGRAM(s) Oral at bedtime PRN Insomnia      CAPILLARY BLOOD GLUCOSE      POCT Blood Glucose.: 139 mg/dL (22 Jul 2023 22:31)  POCT Blood Glucose.: 132 mg/dL (22 Jul 2023 17:32)  POCT Blood Glucose.: 147 mg/dL (22 Jul 2023 12:20)  POCT Blood Glucose.: 101 mg/dL (22 Jul 2023 08:39)    I&O's Summary    22 Jul 2023 07:01  -  23 Jul 2023 07:00  --------------------------------------------------------  IN: 0 mL / OUT: 700 mL / NET: -700 mL        PHYSICAL EXAM:  Vital Signs Last 24 Hrs  T(C): 36.5 (22 Jul 2023 21:11), Max: 36.8 (22 Jul 2023 14:00)  T(F): 97.7 (22 Jul 2023 21:11), Max: 98.2 (22 Jul 2023 14:00)  HR: 85 (22 Jul 2023 21:11) (78 - 85)  BP: 126/75 (22 Jul 2023 21:11) (126/75 - 158/61)  BP(mean): --  RR: 17 (22 Jul 2023 21:11) (17 - 17)  SpO2: 100% (22 Jul 2023 21:11) (100% - 100%)    Parameters below as of 22 Jul 2023 21:11  Patient On (Oxygen Delivery Method): room air        GENERAL: No acute distress, well-developed  HEAD:  Atraumatic, Normocephalic  EYES: EOMI, PERRLA, conjunctiva and sclera clear  NECK: Supple, no lymphadenopathy, no JVD  CHEST/LUNG: CTAB; No wheezes, rales, or rhonchi  HEART: Regular rate and rhythm; Normal s1 and s2, No murmurs, rubs, or gallops  ABDOMEN: Soft, non-tender, non-distended; normal bowel sounds, no organomegaly  EXTREMITIES:  2+ peripheral pulses b/l, No clubbing, cyanosis, or edema  NEUROLOGY: A&O x 3, no focal deficits  SKIN: No rashes or lesions          LABS:                      RADIOLOGY & ADDITIONAL TESTS:  Results Reviewed:   Imaging Personally Reviewed:  Electrocardiogram Personally Reviewed:    COORDINATION OF CARE:  Care Discussed with Consultants/Other Providers [Y/N]:  Prior or Outpatient Records Reviewed [Y/N]:   Marvel Kay MD  Emergency Medicine & Internal Medicine PGY-2    PROGRESS NOTE:    ID #:     Patient is a 68y old  Male who presents with a chief complaint of Altered mental status     (17 Jul 2023 10:42)      MAJOR INTERVAL HOSPITAL EVENTS: NAEO    SUBJECTIVE / OVERNIGHT EVENTS: No acute overnight events. Patient is wakeable to voice. States his name, denies acute complaints with a "no". Patient is pending transport for DC       MEDICATIONS  (STANDING):  dextrose 5% + lactated ringers. 1000 milliLiter(s) (75 mL/Hr) IV Continuous <Continuous>  dextrose 5%. 1000 milliLiter(s) (100 mL/Hr) IV Continuous <Continuous>  dextrose 5%. 1000 milliLiter(s) (50 mL/Hr) IV Continuous <Continuous>  dextrose 50% Injectable 25 Gram(s) IV Push once  dextrose 50% Injectable 12.5 Gram(s) IV Push once  dextrose 50% Injectable 25 Gram(s) IV Push once  glucagon  Injectable 1 milliGRAM(s) IntraMuscular once  heparin   Injectable 5000 Unit(s) SubCutaneous every 12 hours  insulin lispro (ADMELOG) corrective regimen sliding scale   SubCutaneous three times a day before meals  insulin lispro (ADMELOG) corrective regimen sliding scale   SubCutaneous at bedtime  mupirocin 2% Ointment 1 Application(s) Topical two times a day  pantoprazole  Injectable 40 milliGRAM(s) IV Push two times a day  valproate sodium  IVPB 125 milliGRAM(s) IV Intermittent every 6 hours    MEDICATIONS  (PRN):  acetaminophen     Tablet .. 650 milliGRAM(s) Oral every 6 hours PRN Temp greater or equal to 38C (100.4F), Mild Pain (1 - 3)  aluminum hydroxide/magnesium hydroxide/simethicone Suspension 30 milliLiter(s) Oral every 4 hours PRN Dyspepsia  dextrose Oral Gel 15 Gram(s) Oral once PRN Blood Glucose LESS THAN 70 milliGRAM(s)/deciliter  melatonin 3 milliGRAM(s) Oral at bedtime PRN Insomnia      CAPILLARY BLOOD GLUCOSE      POCT Blood Glucose.: 139 mg/dL (22 Jul 2023 22:31)  POCT Blood Glucose.: 132 mg/dL (22 Jul 2023 17:32)  POCT Blood Glucose.: 147 mg/dL (22 Jul 2023 12:20)  POCT Blood Glucose.: 101 mg/dL (22 Jul 2023 08:39)    I&O's Summary    22 Jul 2023 07:01  -  23 Jul 2023 07:00  --------------------------------------------------------  IN: 0 mL / OUT: 700 mL / NET: -700 mL        PHYSICAL EXAM:  Vital Signs Last 24 Hrs  T(C): 36.5 (22 Jul 2023 21:11), Max: 36.8 (22 Jul 2023 14:00)  T(F): 97.7 (22 Jul 2023 21:11), Max: 98.2 (22 Jul 2023 14:00)  HR: 85 (22 Jul 2023 21:11) (78 - 85)  BP: 126/75 (22 Jul 2023 21:11) (126/75 - 158/61)  BP(mean): --  RR: 17 (22 Jul 2023 21:11) (17 - 17)  SpO2: 100% (22 Jul 2023 21:11) (100% - 100%)    Parameters below as of 22 Jul 2023 21:11  Patient On (Oxygen Delivery Method): room air      GENERAL: No acute distress, cachetic  HEAD:  Atraumatic, Normocephalic  CHEST/LUNG: CTAB; No wheezes, rales, or rhonchi  HEART: Regular rate and rhythm; Normal s1 and s2, No murmurs, rubs, or gallops  ABDOMEN: Soft, non-tender, non-distended; normal bowel sounds, no organomegaly  EXTREMITIES:  UEs and LEs contracted, 2+ peripheral pulses b/l, No clubbing, cyanosis, or edema  NEUROLOGY: A&O x 1 (name), spoke few words, light finger squeeze BL, no focal deficits  SKIN: No rashes or lesions        LABS:                      RADIOLOGY & ADDITIONAL TESTS:  Results Reviewed:   Imaging Personally Reviewed:  Electrocardiogram Personally Reviewed:    COORDINATION OF CARE:  Care Discussed with Consultants/Other Providers [Y/N]:  Prior or Outpatient Records Reviewed [Y/N]:

## 2023-07-23 NOTE — PROGRESS NOTE ADULT - PROBLEM SELECTOR PROBLEM 9
Seizure disorder

## 2023-07-23 NOTE — PROGRESS NOTE ADULT - PROBLEM SELECTOR PROBLEM 6
Acute kidney injury superimposed on CKD
Encounter for palliative care
Acute kidney injury superimposed on CKD

## 2023-07-23 NOTE — PROGRESS NOTE ADULT - PROBLEM SELECTOR PLAN 2
Pt w/ hypothermia 96.5F and tachypnea >20 w/ hypothermia. Possible source Aspiration PNA  - BCx/UCx  NGTD  - Completed Emperic zosy and vanc 7/20  - Monitor fever/WBC curve.  - Legionella neg: DC'macario soto

## 2023-07-23 NOTE — PROGRESS NOTE ADULT - PROBLEM SELECTOR PLAN 8
Pt w/ T2DM on home Januvia 25.   - Insulin sliding scale q6h while pt NPO

## 2023-07-23 NOTE — PROGRESS NOTE ADULT - PROBLEM SELECTOR PROBLEM 5
Pneumonia, aspiration
Counseling regarding goals of care
Pneumonia, aspiration

## 2023-07-23 NOTE — PROGRESS NOTE ADULT - PROBLEM SELECTOR PLAN 9
Patient w/ seizure disorder on Valproate 500mg qd  - Transition to IV as pt NPO pending S&S eval
yes
Patient w/ seizure disorder on Valproate 500mg qd  - Transition to IV as pt NPO pending S&S eval

## 2023-07-23 NOTE — PROGRESS NOTE ADULT - PROBLEM SELECTOR PLAN 10
Patient w/ BPH on home tamsulosin 0.4 and finasteride 5mg  - Oral meds held iso aspiration risk  - Bladder scan q8h  - Monitor for urinary retention and need for st cath/bangura

## 2023-07-23 NOTE — PROGRESS NOTE ADULT - PROVIDER SPECIALTY LIST ADULT
Anesthesia
Gastroenterology
Heme/Onc
Surgery
Gastroenterology
Gastroenterology
Internal Medicine
Surgery
Surgery
Gastroenterology
Surgery
Internal Medicine
Palliative Care
Internal Medicine

## 2023-07-23 NOTE — PROGRESS NOTE ADULT - NUTRITIONAL ASSESSMENT
This patient has been assessed with a concern for Malnutrition and has been determined to have a diagnosis/diagnoses of Severe protein-calorie malnutrition and Underweight (BMI < 19).    This patient is being managed with:   Diet NPO-  Entered: Jul 15 2023 10:17PM  
This patient has been assessed with a concern for Malnutrition and has been determined to have a diagnosis/diagnoses of Severe protein-calorie malnutrition and Underweight (BMI < 19).    This patient is being managed with:   Diet Pureed-  Moderately Thick Liquids (MODTHICKLIQS)  Entered: Jul 21 2023  6:41PM  
This patient has been assessed with a concern for Malnutrition and has been determined to have a diagnosis/diagnoses of Severe protein-calorie malnutrition and Underweight (BMI < 19).    This patient is being managed with:   Diet Pureed-  Moderately Thick Liquids (MODTHICKLIQS)  Entered: Jul 21 2023  6:41PM

## 2023-07-23 NOTE — PROGRESS NOTE ADULT - PROBLEM SELECTOR PLAN 12
DVT: HSQ  Diet: NPO pending S&S  Ethics: Full code pending family discussions  Dispo: Pending clinical course

## 2023-07-23 NOTE — PROGRESS NOTE ADULT - ASSESSMENT
68M  CKD, CVA, HTN, DM, Dementia, seizure disorder (on VPA) who p/w acute encephalopathy found to meet SIRS criteria possibly 2/2 aspiration PNA and incidentally found to have pyloric mass c/f adenocarcinoma w/ associated lymphadenopathy indicating metastatic disease. Patient is not a chemo or surgical candidate  68M  CKD, CVA, HTN, DM, Dementia, seizure disorder (on VPA) who p/w acute encephalopathy found to meet SIRS criteria possibly 2/2 aspiration PNA and incidentally found to have pyloric mass c/f adenocarcinoma w/ associated lymphadenopathy indicating metastatic disease. Patient is not a chemo or surgical candidate. Now pending transport for DC

## 2023-07-23 NOTE — PROGRESS NOTE ADULT - ATTENDING COMMENTS
68 year old male w/ PMH of CKD, CVA, HTN, DM, Dementia (Aox0, Non-verbal) and seizure disorder presenting with acute encephalopathy likely 2/2 to aspiration PNA, found to have pyloric mass likely adenocarcinoma w/ possible metastatic lymph nodes.    #Gastric cancer:   -s/p EGD reveals likely malignant gastric tumor in the gastric antrum   -does not need to remain inpatient to await results; can f/u with GI o/p  -multiple conversations have taken place with family, oncology and surgery have already determined pt to be poor candidate for chemotherapy and surgery, respectively  -will d/c home, family will obtain second opinion outpatient    #Anemia:   -suspect occult losses in setting of gastric malignancy  -iron studies consistent with PRACHI  -will d/c home on PO iron    #Acute encephalopathy:   -improved, A&O x 2  -likely toxic metabolic encephalopathy in setting of infection  -repeat S&S given improvement in mental status    #Aspiration PNA:   -plan for 5 days course of zosyn; ended on 7/20    #CKD:   -Patient has been shown to be retaining and required straight cath x 3 times. Bangura to be placed. Likely has neurogenic bladder given hx of CVA x 2. Will need o/p urology f/u. Begin flomax  -Cr now improving  -d/c with bangura; will require urology f/u - RN did bangura teaching with family (daughter), nurse visit outpt    Pt with poor prognosis. Teams has d/w family in detail. HC arranged.   Transportation did not arrive 7/21 or 7/22, rescheduled for today.

## 2023-07-23 NOTE — PROGRESS NOTE ADULT - PROBLEM SELECTOR PLAN 11
Pt w/ elevated troponin on presentation to 128 w/o EKG changes likely iso Type II NSTEMI d/t demand  - Troponin peaked 106  - Unclear hx of chest pain d/t mental status  - Repeat EKG in AM to monitor.

## 2023-07-23 NOTE — PROGRESS NOTE ADULT - PROBLEM SELECTOR PLAN 6
Pt w/ CKD w/ baseline Cr ~1.3. With Cr 1.8 on presentation  - Likely pre-renal iso decreased PO intake.   - s/p 2L NC  - Maintenance 75cc/hr LR
Case reviewed with primary team.   Thank you for allowing us to participate in your patient's care. Please page [phone] for any questions/concerns.
Pt w/ CKD w/ baseline Cr ~1.3. With Cr 1.8 on presentation  - Likely pre-renal iso decreased PO intake.   - s/p 2L NC  - Maintenance 75cc/hr LR

## 2023-07-23 NOTE — PROGRESS NOTE ADULT - PROBLEM SELECTOR PLAN 5
Patient w/ secretions in trachea on imaging w/ hx of coughing w/ liquids and pureed food at home  - NPO s/p S&S evaluation   - Goals of care w/ family regarding pleasure feeds: would be amenable to NG tube but not invasive G tube  - Aspiration precautions and head of bed elevation   - Oral meds converted to IV for now.  completed abx course

## 2023-07-25 ENCOUNTER — OUTPATIENT (OUTPATIENT)
Dept: OUTPATIENT SERVICES | Facility: HOSPITAL | Age: 68
LOS: 1 days | Discharge: HOME | End: 2023-07-25

## 2023-07-25 DIAGNOSIS — C16.9 MALIGNANT NEOPLASM OF STOMACH, UNSPECIFIED: ICD-10-CM

## 2023-07-25 RX ORDER — FERROUS SULFATE 325(65) MG
5 TABLET ORAL
Qty: 75 | Refills: 3
Start: 2023-07-25 | End: 2023-11-21

## 2023-07-25 RX ORDER — FOLIC ACID 0.8 MG
1 TABLET ORAL
Refills: 0 | DISCHARGE

## 2023-07-25 RX ORDER — FAMOTIDINE 10 MG/ML
1 INJECTION INTRAVENOUS
Refills: 0 | DISCHARGE

## 2023-07-25 RX ORDER — CITALOPRAM 10 MG/1
1 TABLET, FILM COATED ORAL
Qty: 30 | Refills: 0
Start: 2023-07-25 | End: 2023-08-23

## 2023-07-25 RX ORDER — CITALOPRAM 10 MG/1
1 TABLET, FILM COATED ORAL
Refills: 0 | DISCHARGE

## 2023-07-25 RX ORDER — FINASTERIDE 5 MG/1
1 TABLET, FILM COATED ORAL
Refills: 0 | DISCHARGE

## 2023-07-25 RX ORDER — ASPIRIN/CALCIUM CARB/MAGNESIUM 324 MG
1 TABLET ORAL
Qty: 30 | Refills: 0
Start: 2023-07-25 | End: 2023-08-23

## 2023-07-25 RX ORDER — TAMSULOSIN HYDROCHLORIDE 0.4 MG/1
1 CAPSULE ORAL
Qty: 30 | Refills: 0
Start: 2023-07-25 | End: 2023-08-23

## 2023-07-25 RX ORDER — FAMOTIDINE 10 MG/ML
1 INJECTION INTRAVENOUS
Qty: 60 | Refills: 0
Start: 2023-07-25 | End: 2023-08-23

## 2023-07-25 RX ORDER — FINASTERIDE 5 MG/1
1 TABLET, FILM COATED ORAL
Qty: 30 | Refills: 0
Start: 2023-07-25 | End: 2023-08-23

## 2023-07-25 RX ORDER — ASPIRIN/CALCIUM CARB/MAGNESIUM 324 MG
1 TABLET ORAL
Refills: 0 | DISCHARGE

## 2023-07-25 RX ORDER — FOLIC ACID 0.8 MG
1 TABLET ORAL
Qty: 30 | Refills: 0
Start: 2023-07-25 | End: 2023-08-23

## 2023-07-25 RX ORDER — TAMSULOSIN HYDROCHLORIDE 0.4 MG/1
1 CAPSULE ORAL
Refills: 0 | DISCHARGE

## 2023-07-26 LAB — SURGICAL PATHOLOGY STUDY: SIGNIFICANT CHANGE UP

## 2023-07-27 ENCOUNTER — NON-APPOINTMENT (OUTPATIENT)
Age: 68
End: 2023-07-27

## 2023-07-27 PROBLEM — Z00.00 ENCOUNTER FOR PREVENTIVE HEALTH EXAMINATION: Status: ACTIVE | Noted: 2023-07-27

## 2023-08-01 ENCOUNTER — APPOINTMENT (OUTPATIENT)
Age: 68
End: 2023-08-01
Payer: MEDICAID

## 2023-08-01 DIAGNOSIS — E11.9 TYPE 2 DIABETES MELLITUS W/OUT COMPLICATIONS: ICD-10-CM

## 2023-08-01 DIAGNOSIS — Z78.9 OTHER SPECIFIED HEALTH STATUS: ICD-10-CM

## 2023-08-01 DIAGNOSIS — Z79.4 TYPE 2 DIABETES MELLITUS W/OUT COMPLICATIONS: ICD-10-CM

## 2023-08-01 DIAGNOSIS — Z86.69 PERSONAL HISTORY OF OTHER DISEASES OF THE NERVOUS SYSTEM AND SENSE ORGANS: ICD-10-CM

## 2023-08-01 DIAGNOSIS — E78.00 PURE HYPERCHOLESTEROLEMIA, UNSPECIFIED: ICD-10-CM

## 2023-08-01 PROCEDURE — 99443: CPT

## 2023-08-02 PROBLEM — Z86.69 HISTORY OF BLINDNESS: Status: RESOLVED | Noted: 2023-08-02 | Resolved: 2023-08-02

## 2023-08-02 PROBLEM — Z78.9 DENIES ALCOHOL CONSUMPTION: Status: ACTIVE | Noted: 2023-08-02

## 2023-08-02 PROBLEM — E11.9 INSULIN DEPENDENT TYPE 2 DIABETES MELLITUS: Status: ACTIVE | Noted: 2023-08-02

## 2023-08-02 PROBLEM — Z78.9 DOES NOT USE TOBACCO: Status: ACTIVE | Noted: 2023-08-02

## 2023-08-02 PROBLEM — E78.00 HYPERCHOLESTEROLEMIA: Status: ACTIVE | Noted: 2023-08-02

## 2023-08-02 RX ORDER — ACETAMINOPHEN 500 MG/1
500 TABLET ORAL
Refills: 0 | Status: ACTIVE | COMMUNITY

## 2023-08-02 RX ORDER — PANTOPRAZOLE 40 MG/1
40 TABLET, DELAYED RELEASE ORAL
Refills: 0 | Status: ACTIVE | COMMUNITY

## 2023-08-02 RX ORDER — MELATONIN 3 MG
3 CAPSULE ORAL
Refills: 0 | Status: ACTIVE | COMMUNITY

## 2023-08-02 NOTE — REVIEW OF SYSTEMS
[Patient Intake Form Reviewed] : Patient intake form was reviewed [Fatigue] : fatigue [Dysphagia] : dysphagia [Abdominal Pain] : abdominal pain [Negative] : Neurological [FreeTextEntry2] : Loss of weight, fatigue

## 2023-08-02 NOTE — CONSULT LETTER
[Consult Letter:] : I had the pleasure of evaluating your patient, [unfilled]. [Please see my note below.] : Please see my note below. [Consult Closing:] : Thank you very much for allowing me to participate in the care of this patient.  If you have any questions, please do not hesitate to contact me. [Sincerely,] : Sincerely, [FreeTextEntry3] : GENTRY IBARRA M.D. Medical Oncology [place] [address] Telephone: [phone] Fax: [phone]

## 2023-08-02 NOTE — ASSESSMENT
[FreeTextEntry1] : Mr. SCHWARTZ 's questions were answered to his satisfaction.  He  expressed his  understanding and willingness to comply with the above recommendations, and  will return to the office in  months.

## 2023-08-02 NOTE — RESULTS/DATA
[FreeTextEntry1] : Blood work results, imaging studies, and pathology reports reviewed in detail with patient .

## 2023-08-02 NOTE — HISTORY OF PRESENT ILLNESS
[Disease: _____________________] : Disease: [unfilled] [de-identified] : 68M, originally from [facility], PMHx CKD, CVA, HTN, DM, dementia, seizure disorder, referred for medical oncology consultation of gastric carcinoma.  CASE SYNOPSIS: July 2023–admitted at [facility] with lethargy, dyspnea and diarrhea. 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  Bulky local necrotic metastatic adenopathy.  No distant metastatic disease.  Retained secretion in upper trachea.  Trace bilateral pleural effusion.  Patient originally from [facility] with numerous comorbidities including DM 2, blindness, CKD, CVA, HTN, dementia, recently admitted with seizures and disorientation's.  Incidentally found a gastric pyloric mass on CT A/P.  [place]  He is , has 3 children.  Information obtained via telehealth visits from his son, Madisyn. [de-identified] : adenocarcinoma

## 2023-08-09 ENCOUNTER — EMERGENCY (EMERGENCY)
Facility: HOSPITAL | Age: 68
LOS: 1 days | Discharge: HOME | End: 2023-08-09
Attending: EMERGENCY MEDICINE
Payer: MEDICAID

## 2023-08-09 VITALS
OXYGEN SATURATION: 98 % | HEART RATE: 80 BPM | DIASTOLIC BLOOD PRESSURE: 74 MMHG | RESPIRATION RATE: 18 BRPM | SYSTOLIC BLOOD PRESSURE: 150 MMHG | HEIGHT: 70 IN | TEMPERATURE: 98 F

## 2023-08-09 VITALS
DIASTOLIC BLOOD PRESSURE: 81 MMHG | HEART RATE: 72 BPM | TEMPERATURE: 98 F | RESPIRATION RATE: 16 BRPM | OXYGEN SATURATION: 99 % | SYSTOLIC BLOOD PRESSURE: 130 MMHG

## 2023-08-09 PROCEDURE — 99283 EMERGENCY DEPT VISIT LOW MDM: CPT

## 2023-08-09 PROCEDURE — 99284 EMERGENCY DEPT VISIT MOD MDM: CPT

## 2023-08-09 NOTE — ED PROVIDER NOTE - CLINICAL SUMMARY MEDICAL DECISION MAKING FREE TEXT BOX
Mild dried blood noted to urinary meatus with intact Perez catheter.  No obvious lacerations or lesions noted.  Perez catheter is draining with clear urine output.  Perez cath and penis also evaluated by surgical house officer and agrees pt has no active complications and stable for discharge.  6:50a- Pt remains well appearing and  Perez cath properly draining.  I called left Voice message for family member to accompany pt home. Mild dried blood noted to urinary meatus with intact Perez catheter.  No obvious lacerations or lesions noted.  Perez catheter is draining with clear urine output.  Perez cath and penis also evaluated by surgical house officer and agrees pt has no active complications and stable for discharge.  6:50a- Pt remains well appearing and  Perez cath properly draining.  I called left Voice message for family member to accompany pt home.  725a- I spoke to pt's daughter, will arrange urology f/u- contact provided. Safe transport arranged for pt back home.   Pt is stable for discharge and follow up with medical doctor. Pt educated on care and need for follow up. Discussed anticipatory guidance and return precautions. Questions answered. I had a detailed discussion with the patient regarding the historical points, exam findings, and any diagnostic results supporting the discharge diagnosis.

## 2023-08-09 NOTE — ED PROVIDER NOTE - OBJECTIVE STATEMENT
68-year-old male history of CVA, has indwelling Perez catheter.  Patient transferred for evaluation of Perez catheter insertion site at penis.  Mild dried blood noted to urinary meatus with intact Perez catheter.  No obvious lacerations or lesions noted.  Perez catheter is draining with clear urine output.  Patient is otherwise in no acute distress.

## 2023-08-09 NOTE — ED ADULT NURSE NOTE - OBJECTIVE STATEMENT
As per family member, /o penile bleeding, denies pain and all other symptoms. Pt. unable to communicate. Denies all other symptoms

## 2023-08-09 NOTE — ED PROVIDER NOTE - NSFOLLOWUPCLINICS_GEN_ALL_ED_FT
[facility]  Internal Medicine  [address] [place] [address]  Phone: [phone]  Fax: [phone]    [facility]  Urology  [address] [place] [address]  Phone: [phone]  Fax: [phone]

## 2023-08-09 NOTE — ED PROVIDER NOTE - NSFOLLOWUPINSTRUCTIONS_ED_ALL_ED_FT
A Perez catheter is a sterile tube that is inserted into your bladder to drain urine. It is also called an indwelling urinary catheter.    DISCHARGE INSTRUCTIONS:    Return to the emergency department if:    Your catheter comes out.    You suddenly have material that looks like sand in the tubing or drainage bag.    No urine is draining into the bag and you have checked the system.    You have pain in your hip, back, pelvis, or lower abdomen.    You are confused or cannot think clearly.  Call your doctor or urologist if:    You have a fever.    You have bladder spasms for more than 1 day after the catheter is placed.    You see blood in the tubing or drainage bag.    You have a rash or itching where the catheter tube is secured to your skin.    Urine leaks from or around the catheter, tubing, or drainage bag.    The closed drainage system has accidently come open or apart.    You see a layer of crystals inside the tubing.    You have questions or concerns about your condition or care.  Care for your catheter and drainage bag: You can reduce your risk for infection and injury by caring for your catheter and drainage bag properly.    Wash your hands often. Wash before and after you touch your catheter, tubing, or drainage bag. Use soap and water. Wear clean disposable gloves when you care for your catheter or disconnect the drainage bag. Wash your hands before you prepare or eat food.  Handwashing      Clean your genital area 2 times every day. Clean your catheter area and anal opening after every bowel movement.  For men: Use a soapy cloth to clean the tip of your penis. Start where the catheter enters. Wipe backward making sure to pull back the foreskin. Then use a cloth with clear water in the same direction to clean away the soap.    For women: Use a soapy cloth to clean the area that the catheter enters your body. Make sure to separate your labia and wipe toward the anus. Then use a cloth with clear water and wipe in the same direction.    Secure the catheter tube so you do not pull or move the catheter. This helps prevent pain and bladder spasms. Healthcare providers will show you how to use medical tape or a strap to secure the catheter tube to your body.    Keep a closed drainage system. Your catheter should always be attached to the drainage bag to form a closed system. Do not disconnect any part of the closed system unless you need to change the bag.    Keep the drainage bag below the level of your waist. This helps stop urine from moving back up the tubing and into your bladder. Do not loop or kink the tubing. This can cause urine to back up and collect in your bladder. Do not let the drainage bag touch or lie on the floor.    Empty the drainage bag when needed. The weight of a full drainage bag can be painful. Empty the drainage bag every 3 to 6 hours or when it is ? full.    Clean and change the drainage bag as directed. Ask your healthcare provider how often you should change the drainage bag and what cleaning solution to use. Wear disposable gloves when you change the bag. Do not allow the end of the catheter or tubing to touch anything. Clean the ends with an alcohol pad before you reconnect them.  What to do if problems develop:    No urine is draining into the bag:  Check for kinks in the tubing and straighten them out.    Check the tape or strap used to secure the catheter tube to your skin. Make sure it is not blocking the tube.    Make sure you are not sitting or lying on the tubing.    Make sure the urine bag is hanging below the level of your waist.    Urine leaks from or around the catheter, tubing, or drainage bag: Check if the closed drainage system has accidently come open or apart. Clean the catheter and tubing ends with a new alcohol pad and reconnect them.  Follow up with your doctor or urologist as directed: Write down your questions so you remember to ask them during your visits.

## 2023-08-09 NOTE — ED PROVIDER NOTE - PHYSICAL EXAMINATION
:  Mild dried blood noted to urinary meatus with intact Perez catheter.  No obvious lacerations or lesions noted.  Perez catheter is draining with clear urine output

## 2023-08-09 NOTE — ED ADULT TRIAGE NOTE - CHIEF COMPLAINT QUOTE
as per pt's son pt has a penis laceration and bleeding from penis , pt has a bangura  fs 193   PMH - seizures , on valproic acid, cva x3 , HTN. hyperlipidemia , DM

## 2023-08-09 NOTE — ED PROVIDER NOTE - PATIENT PORTAL LINK FT
You can access the FollowMyHealth Patient Portal offered by [facility] by registering at the following website: [email or web address]. By joining [facility]’s FollowMyHealth portal, you will also be able to view your health information using other applications (apps) compatible with our system.

## 2023-08-10 ENCOUNTER — APPOINTMENT (OUTPATIENT)
Dept: UROLOGY | Facility: CLINIC | Age: 68
End: 2023-08-10

## 2023-08-15 ENCOUNTER — APPOINTMENT (OUTPATIENT)
Dept: SURGICAL ONCOLOGY | Facility: CLINIC | Age: 68
End: 2023-08-15
Payer: MEDICAID

## 2023-08-15 PROCEDURE — 99443: CPT

## 2023-08-17 ENCOUNTER — APPOINTMENT (OUTPATIENT)
Age: 68
End: 2023-08-17
Payer: MEDICARE

## 2023-08-17 ENCOUNTER — INPATIENT (INPATIENT)
Facility: HOSPITAL | Age: 68
LOS: 4 days | Discharge: HOSPICE | End: 2023-08-22
Attending: HOSPITALIST | Admitting: HOSPITALIST
Payer: MEDICAID

## 2023-08-17 VITALS
SYSTOLIC BLOOD PRESSURE: 159 MMHG | OXYGEN SATURATION: 95 % | RESPIRATION RATE: 18 BRPM | HEART RATE: 82 BPM | DIASTOLIC BLOOD PRESSURE: 68 MMHG | HEIGHT: 70 IN | TEMPERATURE: 97 F

## 2023-08-17 DIAGNOSIS — F03.90 UNSPECIFIED DEMENTIA, UNSPECIFIED SEVERITY, WITHOUT BEHAVIORAL DISTURBANCE, PSYCHOTIC DISTURBANCE, MOOD DISTURBANCE, AND ANXIETY: ICD-10-CM

## 2023-08-17 DIAGNOSIS — Z29.9 ENCOUNTER FOR PROPHYLACTIC MEASURES, UNSPECIFIED: ICD-10-CM

## 2023-08-17 DIAGNOSIS — R33.9 RETENTION OF URINE, UNSPECIFIED: ICD-10-CM

## 2023-08-17 DIAGNOSIS — E11.65 TYPE 2 DIABETES MELLITUS WITH HYPERGLYCEMIA: ICD-10-CM

## 2023-08-17 DIAGNOSIS — C16.9 MALIGNANT NEOPLASM OF STOMACH, UNSPECIFIED: ICD-10-CM

## 2023-08-17 DIAGNOSIS — I10 ESSENTIAL (PRIMARY) HYPERTENSION: ICD-10-CM

## 2023-08-17 DIAGNOSIS — R56.9 UNSPECIFIED CONVULSIONS: ICD-10-CM

## 2023-08-17 DIAGNOSIS — C16.3 MALIGNANT NEOPLASM OF PYLORIC ANTRUM: ICD-10-CM

## 2023-08-17 DIAGNOSIS — N18.9 CHRONIC KIDNEY DISEASE, UNSPECIFIED: ICD-10-CM

## 2023-08-17 DIAGNOSIS — Z86.73 PERSONAL HISTORY OF TRANSIENT ISCHEMIC ATTACK (TIA), AND CEREBRAL INFARCTION WITHOUT RESIDUAL DEFICITS: ICD-10-CM

## 2023-08-17 LAB
ALBUMIN SERPL ELPH-MCNC: 3.3 G/DL — SIGNIFICANT CHANGE UP (ref 3.3–5)
ALP SERPL-CCNC: 67 U/L — SIGNIFICANT CHANGE UP (ref 40–120)
ALT FLD-CCNC: 6 U/L — SIGNIFICANT CHANGE UP (ref 4–41)
ANION GAP SERPL CALC-SCNC: 7 MMOL/L — SIGNIFICANT CHANGE UP (ref 7–14)
APPEARANCE UR: CLEAR — SIGNIFICANT CHANGE UP
APTT BLD: 30.8 SEC — SIGNIFICANT CHANGE UP (ref 24.5–35.6)
AST SERPL-CCNC: 18 U/L — SIGNIFICANT CHANGE UP (ref 4–40)
BASOPHILS # BLD AUTO: 0.01 K/UL — SIGNIFICANT CHANGE UP (ref 0–0.2)
BASOPHILS NFR BLD AUTO: 0.1 % — SIGNIFICANT CHANGE UP (ref 0–2)
BILIRUB SERPL-MCNC: <0.2 MG/DL — SIGNIFICANT CHANGE UP (ref 0.2–1.2)
BILIRUB UR-MCNC: NEGATIVE — SIGNIFICANT CHANGE UP
BLOOD GAS VENOUS COMPREHENSIVE RESULT: SIGNIFICANT CHANGE UP
BUN SERPL-MCNC: 18 MG/DL — SIGNIFICANT CHANGE UP (ref 7–23)
CALCIUM SERPL-MCNC: 9 MG/DL — SIGNIFICANT CHANGE UP (ref 8.4–10.5)
CHLORIDE SERPL-SCNC: 97 MMOL/L — LOW (ref 98–107)
CO2 SERPL-SCNC: 28 MMOL/L — SIGNIFICANT CHANGE UP (ref 22–31)
COLOR SPEC: YELLOW — SIGNIFICANT CHANGE UP
CREAT SERPL-MCNC: 1.95 MG/DL — HIGH (ref 0.5–1.3)
DIFF PNL FLD: ABNORMAL
EGFR: 37 ML/MIN/1.73M2 — LOW
EOSINOPHIL # BLD AUTO: 0.35 K/UL — SIGNIFICANT CHANGE UP (ref 0–0.5)
EOSINOPHIL NFR BLD AUTO: 3.5 % — SIGNIFICANT CHANGE UP (ref 0–6)
GLUCOSE SERPL-MCNC: 125 MG/DL — HIGH (ref 70–99)
GLUCOSE UR QL: >=1000 MG/DL
HCT VFR BLD CALC: 27.9 % — LOW (ref 39–50)
HGB BLD-MCNC: 8.6 G/DL — LOW (ref 13–17)
IANC: 7.57 K/UL — HIGH (ref 1.8–7.4)
IMM GRANULOCYTES NFR BLD AUTO: 0.6 % — SIGNIFICANT CHANGE UP (ref 0–0.9)
INR BLD: 1.01 RATIO — SIGNIFICANT CHANGE UP (ref 0.85–1.18)
KETONES UR-MCNC: NEGATIVE MG/DL — SIGNIFICANT CHANGE UP
LEUKOCYTE ESTERASE UR-ACNC: NEGATIVE — SIGNIFICANT CHANGE UP
LYMPHOCYTES # BLD AUTO: 1.23 K/UL — SIGNIFICANT CHANGE UP (ref 1–3.3)
LYMPHOCYTES # BLD AUTO: 12.1 % — LOW (ref 13–44)
MAGNESIUM SERPL-MCNC: 2.2 MG/DL — SIGNIFICANT CHANGE UP (ref 1.6–2.6)
MCHC RBC-ENTMCNC: 25.7 PG — LOW (ref 27–34)
MCHC RBC-ENTMCNC: 30.8 GM/DL — LOW (ref 32–36)
MCV RBC AUTO: 83.3 FL — SIGNIFICANT CHANGE UP (ref 80–100)
MONOCYTES # BLD AUTO: 0.91 K/UL — HIGH (ref 0–0.9)
MONOCYTES NFR BLD AUTO: 9 % — SIGNIFICANT CHANGE UP (ref 2–14)
NEUTROPHILS # BLD AUTO: 7.57 K/UL — HIGH (ref 1.8–7.4)
NEUTROPHILS NFR BLD AUTO: 74.7 % — SIGNIFICANT CHANGE UP (ref 43–77)
NITRITE UR-MCNC: NEGATIVE — SIGNIFICANT CHANGE UP
NRBC # BLD: 0 /100 WBCS — SIGNIFICANT CHANGE UP (ref 0–0)
NRBC # FLD: 0 K/UL — SIGNIFICANT CHANGE UP (ref 0–0)
PH UR: 6 — SIGNIFICANT CHANGE UP (ref 5–8)
PLATELET # BLD AUTO: 199 K/UL — SIGNIFICANT CHANGE UP (ref 150–400)
POTASSIUM SERPL-MCNC: 4.5 MMOL/L — SIGNIFICANT CHANGE UP (ref 3.5–5.3)
POTASSIUM SERPL-SCNC: 4.5 MMOL/L — SIGNIFICANT CHANGE UP (ref 3.5–5.3)
PROT SERPL-MCNC: 6.3 G/DL — SIGNIFICANT CHANGE UP (ref 6–8.3)
PROT UR-MCNC: SIGNIFICANT CHANGE UP MG/DL
PROTHROM AB SERPL-ACNC: 11.4 SEC — SIGNIFICANT CHANGE UP (ref 9.5–13)
RBC # BLD: 3.35 M/UL — LOW (ref 4.2–5.8)
RBC # FLD: 17.9 % — HIGH (ref 10.3–14.5)
SODIUM SERPL-SCNC: 132 MMOL/L — LOW (ref 135–145)
SP GR SPEC: 1.02 — SIGNIFICANT CHANGE UP (ref 1–1.03)
UROBILINOGEN FLD QL: 0.2 MG/DL — SIGNIFICANT CHANGE UP (ref 0.2–1)
VALPROATE SERPL-MCNC: 54.6 UG/ML — SIGNIFICANT CHANGE UP (ref 50–100)
WBC # BLD: 10.13 K/UL — SIGNIFICANT CHANGE UP (ref 3.8–10.5)
WBC # FLD AUTO: 10.13 K/UL — SIGNIFICANT CHANGE UP (ref 3.8–10.5)

## 2023-08-17 PROCEDURE — 99285 EMERGENCY DEPT VISIT HI MDM: CPT

## 2023-08-17 PROCEDURE — 71045 X-RAY EXAM CHEST 1 VIEW: CPT | Mod: 26

## 2023-08-17 PROCEDURE — 99215 OFFICE O/P EST HI 40 MIN: CPT

## 2023-08-17 PROCEDURE — 70450 CT HEAD/BRAIN W/O DYE: CPT | Mod: 26,MA

## 2023-08-17 PROCEDURE — 99223 1ST HOSP IP/OBS HIGH 75: CPT

## 2023-08-17 PROCEDURE — 99497 ADVNCD CARE PLAN 30 MIN: CPT | Mod: 25

## 2023-08-17 RX ORDER — VALPROIC ACID (AS SODIUM SALT) 250 MG/5ML
1 SOLUTION, ORAL ORAL
Refills: 0 | DISCHARGE

## 2023-08-17 RX ORDER — SODIUM CHLORIDE 9 MG/ML
1000 INJECTION, SOLUTION INTRAVENOUS
Refills: 0 | Status: DISCONTINUED | OUTPATIENT
Start: 2023-08-17 | End: 2023-08-22

## 2023-08-17 RX ORDER — ACETAMINOPHEN 500 MG
650 TABLET ORAL EVERY 6 HOURS
Refills: 0 | Status: DISCONTINUED | OUTPATIENT
Start: 2023-08-17 | End: 2023-08-18

## 2023-08-17 RX ORDER — FAMOTIDINE 10 MG/ML
20 INJECTION INTRAVENOUS DAILY
Refills: 0 | Status: DISCONTINUED | OUTPATIENT
Start: 2023-08-17 | End: 2023-08-18

## 2023-08-17 RX ORDER — DEXTROSE 50 % IN WATER 50 %
25 SYRINGE (ML) INTRAVENOUS ONCE
Refills: 0 | Status: DISCONTINUED | OUTPATIENT
Start: 2023-08-17 | End: 2023-08-22

## 2023-08-17 RX ORDER — FOLIC ACID 0.8 MG
1 TABLET ORAL DAILY
Refills: 0 | Status: DISCONTINUED | OUTPATIENT
Start: 2023-08-17 | End: 2023-08-18

## 2023-08-17 RX ORDER — HEPARIN SODIUM 5000 [USP'U]/ML
5000 INJECTION INTRAVENOUS; SUBCUTANEOUS EVERY 12 HOURS
Refills: 0 | Status: DISCONTINUED | OUTPATIENT
Start: 2023-08-17 | End: 2023-08-22

## 2023-08-17 RX ORDER — LANOLIN ALCOHOL/MO/W.PET/CERES
3 CREAM (GRAM) TOPICAL AT BEDTIME
Refills: 0 | Status: DISCONTINUED | OUTPATIENT
Start: 2023-08-17 | End: 2023-08-18

## 2023-08-17 RX ORDER — CITALOPRAM 10 MG/1
10 TABLET, FILM COATED ORAL DAILY
Refills: 0 | Status: DISCONTINUED | OUTPATIENT
Start: 2023-08-17 | End: 2023-08-18

## 2023-08-17 RX ORDER — DIVALPROEX SODIUM 500 MG/1
500 TABLET, DELAYED RELEASE ORAL
Refills: 0 | Status: DISCONTINUED | OUTPATIENT
Start: 2023-08-17 | End: 2023-08-17

## 2023-08-17 RX ORDER — GLUCAGON INJECTION, SOLUTION 0.5 MG/.1ML
1 INJECTION, SOLUTION SUBCUTANEOUS ONCE
Refills: 0 | Status: DISCONTINUED | OUTPATIENT
Start: 2023-08-17 | End: 2023-08-22

## 2023-08-17 RX ORDER — TAMSULOSIN HYDROCHLORIDE 0.4 MG/1
0.4 CAPSULE ORAL AT BEDTIME
Refills: 0 | Status: DISCONTINUED | OUTPATIENT
Start: 2023-08-17 | End: 2023-08-18

## 2023-08-17 RX ORDER — FERROUS SULFATE 325(65) MG
300 TABLET ORAL DAILY
Refills: 0 | Status: DISCONTINUED | OUTPATIENT
Start: 2023-08-17 | End: 2023-08-18

## 2023-08-17 RX ORDER — DEXTROSE 50 % IN WATER 50 %
15 SYRINGE (ML) INTRAVENOUS ONCE
Refills: 0 | Status: DISCONTINUED | OUTPATIENT
Start: 2023-08-17 | End: 2023-08-22

## 2023-08-17 RX ORDER — FINASTERIDE 5 MG/1
5 TABLET, FILM COATED ORAL DAILY
Refills: 0 | Status: DISCONTINUED | OUTPATIENT
Start: 2023-08-17 | End: 2023-08-18

## 2023-08-17 RX ORDER — INSULIN LISPRO 100/ML
VIAL (ML) SUBCUTANEOUS EVERY 6 HOURS
Refills: 0 | Status: DISCONTINUED | OUTPATIENT
Start: 2023-08-17 | End: 2023-08-22

## 2023-08-17 RX ORDER — VALPROIC ACID (AS SODIUM SALT) 250 MG/5ML
500 SOLUTION, ORAL ORAL
Refills: 0 | Status: DISCONTINUED | OUTPATIENT
Start: 2023-08-17 | End: 2023-08-19

## 2023-08-17 RX ORDER — ASPIRIN/CALCIUM CARB/MAGNESIUM 324 MG
81 TABLET ORAL DAILY
Refills: 0 | Status: DISCONTINUED | OUTPATIENT
Start: 2023-08-17 | End: 2023-08-18

## 2023-08-17 RX ORDER — DEXTROSE 50 % IN WATER 50 %
12.5 SYRINGE (ML) INTRAVENOUS ONCE
Refills: 0 | Status: DISCONTINUED | OUTPATIENT
Start: 2023-08-17 | End: 2023-08-22

## 2023-08-17 RX ADMIN — SODIUM CHLORIDE 75 MILLILITER(S): 9 INJECTION, SOLUTION INTRAVENOUS at 23:19

## 2023-08-17 NOTE — ASSESSMENT
[FreeTextEntry1] : Locally advanced gastric adenocarcinoma. No distant metastatic disease, but significant regional adenopathy.  Poor performance status and essentially bedbound at this time, requiring signifiant assistance for ADL. Discussed treatment of locally advanced gastric cancer with the patient's daughter. He should receive neoadjuvant treatment if he can tolerate. Currently not a surgical candidate for curative resection due to poor health and locally advanced nature of disease.

## 2023-08-17 NOTE — H&P ADULT - NSICDXPASTMEDICALHX_GEN_ALL_CORE_FT
PAST MEDICAL HISTORY:  CKD (chronic kidney disease)     CVA (cerebrovascular accident)     Dementia     DM (diabetes mellitus)     Gastric cancer     HLD (hyperlipidemia)     Seizure

## 2023-08-17 NOTE — H&P ADULT - HISTORY OF PRESENT ILLNESS
68F with PMHx dementia (years), gastric cancer (not surgical or chemo candidate), HTN, HLD, DM, CVA x2 (last 2013), CKD, chronic bangura presenting with seizure-like activity from oncology office. Collateral obtained from daughters Ihsan and Gudelia as patient is demented and not answering all questions even with Croatian . He is oriented x2 and only complaints are yellow nonbloody and non-melanotic diarrhea. Denies pain. Per collateral from daughters, patient was at onc appt @1130 today and had seizure-like activity and was sent to the hospital. Per daughter Gudelia who witnessed it. patient gets seizures when he is moved from lying to sitting. While at the office he was sitting up and his eyes rolled back and bilateral arms started shaking, lasting 2 minutes. Per daughter his eyes was opened but he was not talking during the time. After 2 minutes he was more awake but trying to close his eyes and somewhat confused. No urinary/bowel incontinence or tongue biting. Usually his seizures have right hand stiffening, arm shaking and "does something with his eyes". He usually does not have urinary/bowel incontinence. Last seizure was a few days ago at home while getting PT and sitting up. He typically is unresponsive for 30 seconds or one minute. There has not been changes in AEDs recently. He takes divalproex sodium DR 500mg BID. The daughter is unsure of the last visit to neurology. He is prescribed valproic acid from [facility] and unsure of his outpatient neurologist and when last seen by him. At baseline, patient is bedbound (since March), does not ambulate, eats pureed diet and oriented x1 . Dysphagia has been happening since March admission at [facility] and aspiration PNA was suspected at this time. Has HHA services. Patient went to rehab after March admission for one month and was able to walk with two people assist. Since coming back from rehab afterwards he had difficulty ambulating on his own and sitting up.    Zoya (daughter): [phone]  Gudelia (daughter): [phone]    Home meds confirmed with Zoya   68M with PMHx dementia (years), gastric cancer (not surgical or chemo candidate), seizure d/o, HTN, HLD, DM, CVA x2 (last 2013), CKD, chronic bangura presenting with seizure-like activity from oncology office. Collateral obtained from daughters Ihsan and Gudelia as patient is demented and not answering all questions even with Yi . He is oriented x2 and only complaints are yellow nonbloody and non-melanotic diarrhea. Denies pain. Per collateral from daughters, patient was at onc appt @1130 today and had seizure-like activity and was sent to the hospital. Per daughter Gudelia who witnessed it. patient gets seizures when he is moved from lying to sitting. While at the office he was sitting up and his eyes rolled back and bilateral arms started shaking, lasting 2 minutes. Per daughter his eyes was opened but he was not talking during the time. After 2 minutes he was more awake but trying to close his eyes and somewhat confused. No urinary/bowel incontinence or tongue biting. Usually his seizures have right hand stiffening, arm shaking and "does something with his eyes". He usually does not have urinary/bowel incontinence. Last seizure was a few days ago at home while getting PT and sitting up. He typically is unresponsive for 30 seconds or one minute. There has not been changes in AEDs recently. He takes divalproex sodium DR 500mg BID. The daughter is unsure of the last visit to neurology. He is prescribed valproic acid from [facility] and unsure of his outpatient neurologist and when last seen by him. At baseline, patient is bedbound (since March), does not ambulate, eats pureed diet and oriented x1 . Dysphagia has been happening since March admission at [facility] and aspiration PNA was suspected at this time. Has HHA services. Patient went to rehab after March admission for one month and was able to walk with two people assist. Since coming back from rehab afterwards he had difficulty ambulating on his own and sitting up.    Zoya (daughter): [phone]  Gudelia (daughter): [phone]    Home meds confirmed with Zoya

## 2023-08-17 NOTE — CONSULT NOTE ADULT - ATTENDING COMMENTS
Exam:  Easily arousable to voice.  No look at examiner.   No verbal output.  No follow commands.   Pupils equal - left lens opacified with left RAPD.   Increased tone in legs>arms.    A/P  Mr. Jones is a 67 yo man with severe dementia and episodes with description and history most consistent with focal onset to bilateral tonic clonic seizures.   EMU - continuous EEG.   I agree with work up and management as above.   Thank you.

## 2023-08-17 NOTE — H&P ADULT - PROBLEM SELECTOR PLAN 4
Not reason for admission. Unable to complete full neuro exam as not following all commands even with   -resume ASA  -does not appear to be on statin  -CTH: Small basal ganglia and cerebellar are multiple infarctions

## 2023-08-17 NOTE — ED PROVIDER NOTE - ATTENDING CONTRIBUTION TO CARE
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  Pt reported to have episode when changing position in which he had seizure-like activity.  Has previously had seizures but worsening recently.  No fever or other known infectious symptoms.  Family at bedside providing history as pt is nonverbal.  On exam pt chronically-ill appearing but in NAD, heart RRR, lungs CTAB, abd NTND, extremities without swelling, strength equally decreased in all extremities and skin without rash.  Legs and arms contracted.  Pt nonverbal.

## 2023-08-17 NOTE — ED PROVIDER NOTE - ADDITIONAL HISTORY OBTAINED FROM MULTI-SELECT OPTION
Admitting Diagnosis:   Patient is a 66y old  Male who presents with a chief complaint of CAD (01 Dec 2022 22:59)      PAST MEDICAL & SURGICAL HISTORY:  Morbid obesity      Rapid palpitations      Anxiety      No significant past surgical history    Current Nutrition Order: DASH/TLC     PO Intake: Good (%) [  x ]  Fair (50-75%) [   ] Poor (<25%) [   ]    GI Issues: No nausea/vomiting documented at this time. Last documented bowel movement 11/27; ordered for miralax.     Pain: No noted pain at time of RD interview.    Skin Integrity: Generalized edema 1+. Surgical incisions per chart. Jaime score: 19.     Labs:   12-02    128<L>  |  87<L>  |  18  ----------------------------<  134<H>  3.4<L>   |  31  |  1.38<H>    Ca    9.0      02 Dec 2022 09:18  Phos  3.5     12-02  Mg     2.1     12-02    TPro  6.9  /  Alb  3.8  /  TBili  0.8  /  DBili  x   /  AST  56<H>  /  ALT  26  /  AlkPhos  77  12-02    CAPILLARY BLOOD GLUCOSE          Medications:  MEDICATIONS  (STANDING):  ALPRAZolam 0.5 milliGRAM(s) Oral at bedtime  aMIOdarone    Tablet   Oral   aMIOdarone    Tablet 400 milliGRAM(s) Oral every 8 hours  aspirin enteric coated 81 milliGRAM(s) Oral daily  atorvastatin 80 milliGRAM(s) Oral at bedtime  chlorhexidine 2% Cloths 1 Application(s) Topical daily  furosemide    Tablet 40 milliGRAM(s) Oral every 12 hours  heparin   Injectable 7500 Unit(s) SubCutaneous every 8 hours  pantoprazole    Tablet 40 milliGRAM(s) Oral daily  polyethylene glycol 3350 17 Gram(s) Oral daily  sodium chloride 0.9% lock flush 3 milliLiter(s) IV Push every 8 hours  spironolactone 25 milliGRAM(s) Oral daily    MEDICATIONS  (PRN):  oxyCODONE    IR 15 milliGRAM(s) Oral every 4 hours PRN Moderate Pain (4 - 6)    Dosing Anthropometrics:  Height for BMI (CENTIMETERS)	185.4 Centimeter(s)  Weight for BMI (lbs)	274.7 lb  Weight for BMI (kg)	124.6 kg  Body Mass Index	36.2  .3 lb; 83.6 kg      Weight Change: No new documented weights in EMR. Obtain biweekly weights to assess changes/trends.    Estimated energy needs: IBW used to calculate needs due to pt's current body weight exceeding 120% of IBW adjusted for maintenance with increased protein demands for s/p procedure  Estimated Energy Needs From (nohemy/kg) 25  Estimated Energy Needs To (nohemy/kg)	30  Estimated Energy Needs Calculated From (nohemy/kg) 2090  Estimated Energy Needs Calculated To (nohemy/kg)	2508    Estimated Protein Needs From (g/kg)	1  Estimated Protein Needs To (g/kg)	1.2  Estimated Protein Needs Calculated From (g/kg)	83.6  Estimated Protein Needs Calculated To (g/kg)	100.32    Estimated Fluid Needs From (ml/kg)	25  Estimated Fluid Needs To (ml/kg)	30  Estimated Fluid Needs Calculated From (ml/kg)	2090  Estimated Fluid Needs Calculated To (ml/kg)	2508      Subjective: 65 y/o male PMH anxiety, morbid obesity, "fast heart rate" (not on blood thinners) who c/o SOB, N&V, night sweats and  palpitations for the last few days which progressively worsened. PT was brought in by EMS to [facility] found to be in rapid atrial fibrillation -200, HTN with expiratory wheezing. Given Cardizem and albuterol which decreased to HR and wheezing. Placed on heparin drip at that time and given lasix after CXR showed pulmonary edema. Troponin found to be elevated diagnosed with NSTEMI. Subsequent cardiac cath showed 2 vessel CAD mLAD 60% mCX 50% with EF 40% and medical management recommended. Subsequent OLEG showed severe central MR. Now s/p S/P MVR and CABG x 1 (LIMA to LAD).     Pt seen at bedside for follow up assessment- on NC. Labs reviewed 12/2; electrolytes within normal limits at this time. All drips held at bedside. RD obtained subjective information: pt confirms NKFA. Reports no changes in weight and good PO intake PTA; dosing weight 274 pounds. Observed pt w/ no overt s/s of muscle or fat wasting. Based on ASPEN guidelines, pt does not meet criteria for malnutrition. Discussed in depth DASH/TLC diet; provided handouts at bedside. Pt reports wife purchased low sodium cookbooks and has been very involved in preparation for discharge on heart healthy diet. Provided pt w/ RD office contact information if pt/wife have any nutrition related questions. Made aware RD remains available. RD to follow up. See nutrition recommendations below.    Previous Nutrition Diagnosis: N/A    Active [   ]  Resolved [ x  ]    If resolved, new PES: Increased protein needs r/t physiological demand for nutrient AEB post op    Goal: Pt to meet at least 75% of nutritional needs during hospital stay consistently     Recommendations:  1. Continue with current diet order (monitor need for ONS)  2. Encourage pt to meet nutritional needs as able   3. Encourage adherence to diet education (reinforce as able)   4. Pain and bowel regimen per team   5. Will continue to assess/honor preferences as able     Education: DASH/TLC Diet    Risk Level: High [   ] Moderate [ x   ] Low [   ] Family

## 2023-08-17 NOTE — ED PROVIDER NOTE - PHYSICAL EXAMINATION
GENERAL: frail appearing  HEAD: normocephalic, atraumatic  HEENT: normal conjunctiva  CARDIAC: regular rate and rhythm, normal S1S2, no appreciable murmurs  PULM: normal breath sounds, clear to ascultation bilaterally, no rales, rhonchi, wheezing  GI: abdomen nondistended, soft, no guarding  : no suprapubic tenderness, bangura catheter in place  NEURO: opening eyes  MSK: no peripheral edema, legs contracted  SKIN: extremities warm  PSYCH: appropriate mood and affect

## 2023-08-17 NOTE — ED ADULT NURSE NOTE - NSFALLRISKINTERV_ED_ALL_ED

## 2023-08-17 NOTE — HISTORY OF PRESENT ILLNESS
[de-identified] : 68 year-old male presents for an initial consultation.  This visit was provided via telehealth using real-time 2-way audio visual technology. The patient, KERA SCHWARTZ, was located at home [address] [place] [address] at the time of the visit. This provider was located at the clinic in [place], [place] at the time of the visit. The provider, patient participated in the telehealth encounter.  Verbal consent was given Aug 15 2023  1:30PM by the patient's daughter (who is a physician).  PMH of DM, CVA (2017), HLD, CKD, seizure, Dementia, blindness.  [facility]. Of note recently discharge from [facility] after being treated for pneumonia.  Admitted for sepsis from acute enterocolitis and acute on chronic encephalopathy and NEVILLE on CKD and severe protein calorie malnutrition, FTT.  Ultimately discharged home on 5/1/23.  Presented to the ED at [facility] on 7/15/23 with worsening mental status and increasing episodes of diarrhea.  Ct scan also noted gastric mass with lymphadenopathy.  EGD/EUS 7/20/23 (Dr. Hussain Franklin)- fungating ulcerated mass w/ no bleeding found in the gastric antrum, biopsied and pathology  revealed poorly differentiated adenocarcinoma.   Three lymph nodes were visualized and measured in the  perigastric region and one lymph node was visualized in the felicia hepatis region with no signs of distant metastasis.   Palli, onc, and surgery were consulted and it was found he would not likely be a candidate for surgery or chemo.  Patient to be discharged for further management in the outpatient setting with oncology follow up.  Consulted with Dr. Juan Park from medical oncology via TELEHEALTH on 8/1/23.  Given his poor performance status, he is deemed unlikely to be a candidate for chemotherapy, however, she would like to attempt to schedule a visit in person in order to review options for systemic therapy or palliative/comfort care.

## 2023-08-17 NOTE — ED PROVIDER NOTE - CLINICAL SUMMARY MEDICAL DECISION MAKING FREE TEXT BOX
68-year-old male with a past medical history of stomach mass, dementia, HTN, HLD, DM, CVA, CKD, seizures on valproic acid presents from outpatient clinic after a seizure.  Patient now returned to baseline.  Patient has poor baseline due to stomach mass and not a candidate for surgical or medical treatment at this time.  Labs, EKG, CXR, UA/urine culture, CT head ordered.  Likely admission for failure to thrive and palliative evaluation for possible hospice care.

## 2023-08-17 NOTE — ED PROVIDER NOTE - OBJECTIVE STATEMENT
68-year-old male with a history of a stomach mass (evaluated by surgery and oncology, no surgical intervention or chemotherapy/radiation recommended at this time), Dementia, HTN, HLD, DM, CVA, CKD presents from outpatient clinic after a seizure.  Patient's family reports that every time he changes position he has exhibiting signs of a seizure.  Patient has had a seizure disorder over the last year that has been worsening over the last few weeks.  Otherwise patient's family reports he is now back to baseline which is nonverbal and not interactive.  Patient has not had any URI/infectious symptoms or fever.

## 2023-08-17 NOTE — H&P ADULT - NSHPLABSRESULTS_GEN_ALL_CORE
Personally reviewed labs:                        8.6    10.13 )-----------( 199      ( 17 Aug 2023 14:31 )             27.9     08-17-23 @ 14:31    132<L>  |  97<L>  |  18             --------------------------< 125<H>     4.5  |  28  | 1.95<H>    eGFR AA: --  eGFR N-AA: --    Calcium: 9.0  Phosphorus: --  Magnesium: 2.20    AST: 18    ALT: 6  AlkPhos: 67  Protein: 6.3  Albumin: 3.3  TBili: <0.2  D-Bili: --    VBG - ( 17 Aug 2023 14:31 )  pH: 7.30  /  pCO2: 61    /  pO2: 29    / HCO3: 30    / Base Excess: 2.7   /  SvO2: 29.0  / Lactate: 1.9              RADIOLOGY & ADDITIONAL TESTS:    EKG my independent interpretation: NSR, no STD or DANIELLE, q waves in III, AVF, V1-V4      Imaging personally reviewed:    CXR: IMPRESSION: No acute pulmonary disease.    CTH noncontrast:  IMPRESSION:    1. Small basal ganglia and cerebellar are multiple infarctions. Ischemic   white matter disease and atrophy upper range typical for age    2. No lesion strongly suspicious for metastasis. Contrast-enhanced images   required for this evaluation. No acute intracranial hemorrhage is   appreciated.

## 2023-08-17 NOTE — H&P ADULT - ASSESSMENT
68F with PMHx dementia (years), gastric cancer (not surgical or chemo candidate), HTN, HLD, DM, CVA x2 (last 2013), CKD, chronic bangura presenting with seizure-like activity from oncology office. 68M with PMHx dementia (years), gastric cancer (not surgical or chemo candidate), seizure d/o, HTN, HLD, DM, CVA x2 (last 2013), CKD, chronic bangura presenting with seizure-like activity from oncology office.

## 2023-08-17 NOTE — H&P ADULT - NSHPPHYSICALEXAM_GEN_ALL_CORE
PHYSICAL EXAM:  Vital Signs Last 24 Hrs  T(C): 36.7 (08-17-23 @ 17:08)  T(F): 98.1 (08-17-23 @ 17:08), Max: 98.1 (08-17-23 @ 17:08)  HR: 82 (08-17-23 @ 20:39) (71 - 82)  BP: 151/74 (08-17-23 @ 20:39)  BP(mean): --  RR: 20 (08-17-23 @ 20:39) (18 - 20)  SpO2: 100% (08-17-23 @ 20:39) (95% - 100%)  Wt(kg): --    Constitutional: NAD, awake and alert, frail appearing  EYES: EOMI, conjunctiva clear  ENT:  Normal Hearing, no tonsillar exudates   Neck: Soft and supple , no thyromegaly   Respiratory: Breath sounds are clear bilaterally, No wheezing, rales or rhonchi, no tachypnea, no accessory muscle use  Cardiovascular: S1 and S2, regular rate and rhythm, no Murmurs, gallops or rubs, no JVD, no leg edema  Gastrointestinal: Bowel Sounds present, soft, nontender, nondistended, no guarding, no rebound   bangura in place  Extremities: No cyanosis or clubbing; warm to touch  Neurological:   Oriented x2 (name, place)  No facial droop, EOMI  Unable to fully assess neurologic exam as not following commands. Bilateral legs contracted. L hand tremor. Spontaneously moving upper extremities but not following command to do so. Poor effort overall   Skin: No rashes, no ulcerations

## 2023-08-17 NOTE — H&P ADULT - PROBLEM SELECTOR PLAN 5
Bangura placed one month ago. Per ED RN, patient had bangura replaced in ER  -c/w bangura  - f/u as outpatient  -finasteride, flomax

## 2023-08-17 NOTE — H&P ADULT - PROBLEM SELECTOR PLAN 9
HSQ  NPO, IVF, dysphagia screen, S&S eval    Code status: full code per daughter  Pall care consult placed. Possible hospice, pending further GOC conversation

## 2023-08-17 NOTE — H&P ADULT - PROBLEM SELECTOR PLAN 2
Seen by heme-onc last admission and determined to be not a chemo candidate. Surgery also evaluated and determined not to be a surgery candidate  -onc consult emailed  -daughter would like to speak to [facility] care about hospice. Consult placed  -full code for now

## 2023-08-17 NOTE — H&P ADULT - PROBLEM SELECTOR PLAN 3
On citalopram - resume  NPO, IVF, dysphagia screen, S&S eval in AM.   Chronic dysphagia since March likely 2/2 strokes vs dementia

## 2023-08-17 NOTE — ED ADULT TRIAGE NOTE - CHIEF COMPLAINT QUOTE
Pt coming from [facility], s/p seizure approximately lasting X 2 minutes. Pt appearing post-ictal. Per EMS, patient being seen at [facility] for initial consult for gastric Ca. Per EMS, family says "is near baseline mental status". No medications given prior to arrival. Hx of seizure, DM, HLD, dementia, blind Pt coming from [facility], s/p seizure approximately lasting X 2 minutes. Pt appearing post-ictal. Per EMS, patient being seen at [facility] for initial consult for gastric Ca. Per EMS, family says "is near baseline mental status". No medications given prior to arrival. Arrives with Perez Catheter in place, per son was placed about X 1 month ago. Pt Hx of seizure, DM, HLD, dementia, blind. Fingerstick 131.

## 2023-08-17 NOTE — ED ADULT NURSE NOTE - CHIEF COMPLAINT QUOTE
Pt coming from [facility], s/p seizure approximately lasting X 2 minutes. Pt appearing post-ictal. Per EMS, patient being seen at [facility] for initial consult for gastric Ca. Per EMS, family says "is near baseline mental status". No medications given prior to arrival. Arrives with Perez Catheter in place, per son was placed about X 1 month ago. Pt Hx of seizure, DM, HLD, dementia, blind. Fingerstick 131.

## 2023-08-17 NOTE — H&P ADULT - CONVERSATION DETAILS
Goals of care was discussed at length with daughters Zoya and Gudelia as the patient is oriented x1-2 with history of dementia and not able to make medical decisions for himself. Diagnosis of likely metastatic gastric cancer and overall poor prognosis discussed as patient is not a surgery candidate (per surgery on last admission) or chemo candidate (per oncology). Biopsy results of poorly differentiated adenocarcinoma also discussed. Zoya deferred code status and further GOC decisions to Gudelia at this moment but states all the children and wife make decisions together. Gudelia understands the prognosis and diagnosis and that chemo and surgery are currently not offered. She would like to discuss further about hospice with palliative care. At this time she would want CPR and mechanical ventilation if required, pending further discussions of GOC. Patient is full code.

## 2023-08-17 NOTE — CONSULT NOTE ADULT - ASSESSMENT
INCOMPELTE    Impression:     Recommendations:  [] check orthostatics  [] 24 hr vEEG, please press red button for any events similar to above description.  [] Check AED (VPA) levels 30 mins PRIOR to AM dose with CMP  [] consider MRI brain w/w/o contrast after EEG and if within GOC  [] monitor on telemetry  [] c/w home ASA 81 mg PO daily   [] start atorvastatin 40mg PO daily (titrate to LDL < 70)   [] stroke risk factor modification and counseling: smoking cessation, exercise, and a Mediterranean style diet.   [] check HA1c, lipid panel, Utox  [] Seizure, fall and aspiration precautions.  Avoid sleep deprivation.  [] If any seizure activity noted, please note: time, if upper/lower or focal onset symptoms (e.g. head turn, eye deviation, upper/lower tonic/clonic arm initially), vital sign derangements, airway protection, urinary or bowel incontinence, duration of seizure, tongue bite, and/or post-ictal time to return of baseline.   [] Evaluate for provoking factors including UA, CMP, Mg, Utox, CBC w/ diff, EtOH level, CXR, COVID-19 test  [] Check prolactin, CK, lactate  [] Patient can follow up with outpatient neurology at [address] [place] [address] 1-2 weeks after discharge. Please instruct the patient to call [phone] to schedule this appointment.        68M [facility] speaking with PMHx dementia (years), gastric cancer (not surgical or chemo candidate), HTN, HLD, DM, CVA x2 (last 2013, L facial droop), ?seizures, CKD, chronic bangura presenting with seizure-like activity from oncology office.      Impression: multiple stereotyped episodes of unresponsiveness associated with position changes, possibly 2/2 orthostatic hypotension, r/o seizure given stereotypy     Recommendations:  [] check orthostatics  [] 24 hr vEEG, please press red button for any events similar to above description.  [] Check AED (VPA) levels 30 mins PRIOR to AM dose with CMP  [] consider MRI brain w/w/o contrast after EEG and if within GOC  [] monitor on telemetry  [] c/w home ASA 81 mg PO daily   [] start atorvastatin 40mg PO daily (titrate to LDL < 70)   [] stroke risk factor modification and counseling: smoking cessation, exercise, and a Mediterranean style diet.   [] check HA1c, lipid panel, Utox  [] Seizure, fall and aspiration precautions.  Avoid sleep deprivation.  [] If any seizure activity noted, please note: time, if upper/lower or focal onset symptoms (e.g. head turn, eye deviation, upper/lower tonic/clonic arm initially), vital sign derangements, airway protection, urinary or bowel incontinence, duration of seizure, tongue bite, and/or post-ictal time to return of baseline.   [] Evaluate for provoking factors including UA, CMP, Mg, Utox, CBC w/ diff, EtOH level, CXR, COVID-19 test  [] Check prolactin, CK, lactate  [] Patient can follow up with outpatient neurology at [address] [place] [address] 1-2 weeks after discharge. Please instruct the patient to call [phone] to schedule this appointment.     Case to be seen and discussed with attending in AM     68M [facility] speaking with PMHx dementia (years), gastric cancer (not surgical or chemo candidate), HTN, HLD, DM, CVA x2 (last 2013, L facial droop), ?seizures, CKD, chronic bangura presenting with seizure-like activity from oncology office.      Impression: multiple stereotyped episodes of unresponsiveness associated with position changes, possibly 2/2 orthostatic hypotension, r/o seizure given stereotypy     Recommendations:  [] check orthostatics  [] 24 hr vEEG, please press red button for any events similar to above description.  [] c/w home VPA 500mg BID  [] Check AED (VPA) levels 30 mins PRIOR to AM dose with CMP  [] consider MRI brain w/w/o contrast after EEG and if within GOC  [] monitor on telemetry  [] c/w home ASA 81 mg PO daily   [] start atorvastatin 40mg PO daily (titrate to LDL < 70)   [] stroke risk factor modification and counseling: smoking cessation, exercise, and a Mediterranean style diet.   [] check HA1c, lipid panel, Utox  [] Seizure, fall and aspiration precautions.  Avoid sleep deprivation.  [] If any seizure activity noted, please note: time, if upper/lower or focal onset symptoms (e.g. head turn, eye deviation, upper/lower tonic/clonic arm initially), vital sign derangements, airway protection, urinary or bowel incontinence, duration of seizure, tongue bite, and/or post-ictal time to return of baseline.   [] Evaluate for provoking factors including UA, CMP, Mg, Utox, CBC w/ diff, EtOH level, CXR, COVID-19 test  [] Check prolactin, CK, lactate  [] Patient can follow up with outpatient neurology at [address] [place] [address] 1-2 weeks after discharge. Please instruct the patient to call [phone] to schedule this appointment.     Case to be seen and discussed with attending in AM     68M  [place]ith[place] speaking with PMHx dementia (years), gastric cancer (not surgical or chemo candidate), HTN, HLD, DM, CVA x2 (last 2013, L facial droop), ?seizures, CKD, chronic bangura presenting with seizure-like activity from oncology office.      Impression: multiple stereotyped episodes of unresponsiveness associated with position changes, possibly 2/2 orthostatic hypotension, r/o seizure given stereotypy     Recommendations:  [] check orthostatics  [] 24 hr vEEG, please press red button for any events similar to above description.  [] c/w home VPA 500mg BID  [] Check AED (VPA) levels 30 mins PRIOR to AM dose with CMP  [] monitor on telemetry  [] c/w home ASA 81 mg PO daily   [] start atorvastatin 40mg PO daily (titrate to LDL < 70)   [] stroke risk factor modification and counseling: smoking cessation, exercise, and a Mediterranean style diet.   [] check HA1c, lipid panel, Utox  [] Seizure, fall and aspiration precautions.  Avoid sleep deprivation.  [] If any seizure activity noted, please note: time, if upper/lower or focal onset symptoms (e.g. head turn, eye deviation, upper/lower tonic/clonic arm initially), vital sign derangements, airway protection, urinary or bowel incontinence, duration of seizure, tongue bite, and/or post-ictal time to return of baseline.   [] Evaluate for provoking factors including UA, CMP, Mg, Utox, CBC w/ diff, EtOH level, CXR, COVID-19 test  [] Check prolactin, CK, lactate  [] Patient can follow up with outpatient neurology at [address] [place] [address] 1-2 weeks after discharge. Please instruct the patient to call [phone] to schedule this appointment.     Case to be seen and discussed with attending in AM

## 2023-08-17 NOTE — PHYSICAL EXAM
[Completely disabled. Cannot carry on any self care. Totally confined to bed or chair] : Status 4- Completely disabled. Cannot carry on any self care. Totally confined to bed or chair [Thin] : thin [Normal] : grossly intact [de-identified] : Patient in wheelchair

## 2023-08-17 NOTE — CONSULT NOTE ADULT - SUBJECTIVE AND OBJECTIVE BOX
HPI:  68M with PMHx dementia (years), gastric cancer (not surgical or chemo candidate), HTN, HLD, DM, CVA x2 (last ), ?seizures, CKD, chronic bangura presenting with seizure-like activity from oncology office. Collateral obtained from daughters Ihsan and Gudelia as patient is demented and not answering all questions even with [place] . He is oriented x2 and only complaints are yellow nonbloody and non-melanotic diarrhea. Denies pain. Per collateral from daughters, patient was at onc appt @1130 today and had seizure-like activity and was sent to the hospital. Per daughter Gudelia who witnessed it. patient gets seizures when he is moved from lying to sitting. While at the office he was sitting up and his eyes rolled back and bilateral arms started shaking, lasting 2 minutes. Per daughter his eyes was opened but he was not talking during the time. After 2 minutes he was more awake but trying to close his eyes and somewhat confused. No urinary/bowel incontinence or tongue biting. Usually his seizures have right hand stiffening, arm shaking and "does something with his eyes". He usually does not have urinary/bowel incontinence. Last seizure was a few days ago at home while getting PT and sitting up. He typically is unresponsive for 30 seconds or one minute. There has not been changes in AEDs recently. He takes divalproex sodium DR 500mg BID. The daughter is unsure of the last visit to neurology. He is prescribed valproic acid from [facility] and unsure of his outpatient neurologist and when last seen by him. At baseline, patient is bedbound (since March), does not ambulate, eats pureed diet and oriented x1 . Dysphagia has been happening since March admission at [facility] and aspiration PNA was suspected at this time. Has HHA services. Patient went to rehab after March admission for one month and was able to walk with two people assist. Since coming back from rehab afterwards he had difficulty ambulating on his own and sitting up.    Zoya (daughter): [phone]  Gudelia (daughter): [phone]     (17 Aug 2023 20:58)    REVIEW OF SYSTEMS    A 10-system ROS was performed and is negative except for those items noted above and/or in the HPI.    PAST MEDICAL & SURGICAL HISTORY:  DM (diabetes mellitus)      Seizure      CVA (cerebrovascular accident)      HLD (hyperlipidemia)      CKD (chronic kidney disease)      Dementia      Gastric cancer      No significant past surgical history        FAMILY HISTORY:  No pertinent family history in first degree relatives      SOCIAL HISTORY:    At baseline, patient is bedbound (since March), does not ambulate, eats pureed diet and oriented x1 .  Has HHA services.    MEDICATIONS (HOME):  Home Medications:  divalproex sodium 500 mg oral delayed release tablet: 1 orally 2 times a day (17 Aug 2023 21:13)  Januvia 25 mg oral tablet: 1 tab(s) orally once a day (17 Aug 2023 21:11)    MEDICATIONS  (STANDING):  aspirin  chewable 81 milliGRAM(s) Oral daily  citalopram 10 milliGRAM(s) Oral daily  dextrose 5% + sodium chloride 0.45%. 1000 milliLiter(s) (75 mL/Hr) IV Continuous <Continuous>  dextrose 5%. 1000 milliLiter(s) (100 mL/Hr) IV Continuous <Continuous>  dextrose 5%. 1000 milliLiter(s) (50 mL/Hr) IV Continuous <Continuous>  dextrose 50% Injectable 25 Gram(s) IV Push once  dextrose 50% Injectable 12.5 Gram(s) IV Push once  dextrose 50% Injectable 25 Gram(s) IV Push once  famotidine    Tablet 20 milliGRAM(s) Oral daily  ferrous    sulfate Liquid 300 milliGRAM(s) Oral daily  finasteride 5 milliGRAM(s) Oral daily  folic acid 1 milliGRAM(s) Oral daily  glucagon  Injectable 1 milliGRAM(s) IntraMuscular once  heparin   Injectable 5000 Unit(s) SubCutaneous every 12 hours  insulin lispro (ADMELOG) corrective regimen sliding scale   SubCutaneous every 6 hours  tamsulosin 0.4 milliGRAM(s) Oral at bedtime  valproate sodium  IVPB 500 milliGRAM(s) IV Intermittent two times a day    MEDICATIONS  (PRN):  acetaminophen     Tablet .. 650 milliGRAM(s) Oral every 6 hours PRN Temp greater or equal to 38C (100.4F), Mild Pain (1 - 3)  dextrose Oral Gel 15 Gram(s) Oral once PRN Blood Glucose LESS THAN 70 milliGRAM(s)/deciliter  melatonin 3 milliGRAM(s) Oral at bedtime PRN Insomnia    ALLERGIES/INTOLERANCES:  Allergies  No Known Allergies    Intolerances    VITALS & EXAMINATION:  Vital Signs Last 24 Hrs  T(C): 36.7 (17 Aug 2023 17:08), Max: 36.7 (17 Aug 2023 17:08)  T(F): 98.1 (17 Aug 2023 17:08), Max: 98.1 (17 Aug 2023 17:08)  HR: 82 (17 Aug 2023 20:39) (71 - 82)  BP: 151/74 (17 Aug 2023 20:39) (134/62 - 159/68)  BP(mean): --  RR: 20 (17 Aug 2023 20:39) (18 - 20)  SpO2: 100% (17 Aug 2023 20:39) (95% - 100%)    Parameters below as of 17 Aug 2023 12:19  Patient On (Oxygen Delivery Method): room air      General:  Constitutional: Obese Male, appears stated age, in no apparent distress including pain  Head: Normocephalic & atraumatic.  Respiratory: No increased work of breathing at rest  Extremities: No cyanosis, clubbing, or edema.  Skin: No rashes, bruising, or discoloration.    Neurological (>12):  MS: Awake, alert, oriented to person, place, situation, time. Normal affect. Follows all commands.    Language: Speech is clear, fluent with good repetition & comprehension (able to name objects___)    CNs: PERRLA (R = 3mm, L = 3mm). VFF. EOMI no nystagmus, no diplopia. V1-3 intact to LT/pinprick, well developed masseter muscles b/l. No facial asymmetry b/l, full eye closure strength b/l. Hearing grossly normal (rubbing fingers) b/l. Symmetric palate elevation in midline. Gag reflex deferred. Head turning & shoulder shrug intact b/l. Tongue midline, normal movements, no atrophy.    Motor: Normal muscle bulk & tone. No noticeable tremor or seizure. No pronator drift.              Deltoid	Biceps	Triceps	Wrist	Finger ABd	   R	5	5	5	5	5		5 	  L	5	5	5	5	5		5    	H-Flex	K-Flex	K-Ext	D-Flex	P-Flex  R	5	5	5	5	5	   L	5	5	5	5	5	    Sensation: Intact to LT/PP/Temp/Vibration/Position b/l throughout.     Cortical: Extinction on DSS (neglect): none    Reflexes:              Biceps(C5)       BR(C6)     Triceps(C7)               Patellar(L4)    Achilles(S1)    Plantar Resp  R	2	          2	             2		        2		    2		Down   L	2	          2	             2		        2		    2		Down     Coordination: intact rapid-alt movements. No dysmetria to FTN/HTS    Gait: Normal Romberg. No postural instability. Normal stance and tandem gait.     LABORATORY:  CBC                       8.6    10.13 )-----------( 199      ( 17 Aug 2023 14:31 )             27.9     Chem     132<L>  |  97<L>  |  18  ----------------------------<  125<H>  4.5   |  28  |  1.95<H>    Ca    9.0      17 Aug 2023 14:31  Mg     2.20         TPro  6.3  /  Alb  3.3  /  TBili  <0.2  /  DBili  x   /  AST  18  /  ALT  6   /  AlkPhos  67  08-17    LFTs LIVER FUNCTIONS - ( 17 Aug 2023 14:31 )  Alb: 3.3 g/dL / Pro: 6.3 g/dL / ALK PHOS: 67 U/L / ALT: 6 U/L / AST: 18 U/L / GGT: x           Coagulopathy PT/INR - ( 17 Aug 2023 14:31 )   PT: 11.4 sec;   INR: 1.01 ratio      PTT - ( 17 Aug 2023 14:31 )  PTT:30.8 sec    U/A Urinalysis Basic - ( 17 Aug 2023 14:51 )    Color: Yellow / Appearance: Clear / S.019 / pH: x  Gluc: x / Ketone: Negative mg/dL  / Bili: Negative / Urobili: 0.2 mg/dL   Blood: x / Protein: Trace mg/dL / Nitrite: Negative   Leuk Esterase: Negative / RBC: 29 /HPF / WBC 2 /HPF   Sq Epi: x / Non Sq Epi: 0 /HPF / Bacteria: Negative /HPF    STUDIES & IMAGIN2023 EEG:  EEG Classification / Summary:  Abnormal EEG study, awake / drowsy    -----------------------------------------------------------------------------------------------------    Clinical Impression:  There is mild non-specific diffuse cerebral dysfunction.  There were no epileptiform abnormalities recorded.         HPI:  68M RH [facility] speaking with PMHx dementia (years), gastric cancer (not surgical or chemo candidate), HTN, HLD, DM, CVA x2 (last , L facial droop), ?seizures, CKD, chronic bangura presenting with seizure-like activity from oncology office. Collateral obtained from daughters Ihsan and Gudelia as patient is demented and not answering all questions even with [facility] . Per H&P, he is oriented x2 and only complaints are yellow nonbloody and non-melanotic diarrhea. Denies pain. Per collateral from daughters, patient was at onc appt @1130 today and had seizure-like activity and was sent to the hospital. Per daughter Gudelia who witnessed it. Patient gets seizures when he is moved from lying to sitting, very consistently associated with position changes. While at the office he was sitting up and his eyes rolled back, eyes slightly opened, looking upwards, and bilateral arms started shaking, lasting 2 minutes. Per daughter his eyes was opened but he was not talking during the time. After 2 minutes he was more awake but trying to close his eyes and somewhat confused. No urinary/bowel incontinence or tongue biting. Pt diagnosed with seizures about 1 year ago and has been on divalproex sodium DR 500mg BID (no other ASMs previously), freuqency 2-3x/week. Usually his seizures have right hand stiffening, arm shaking and "does something with his eyes". He usually does not have urinary/bowel incontinence. Last seizure was a few days ago at home while getting PT and sitting up. He typically is unresponsive for 30 seconds or one minute. There has not been changes in AEDs recently. The daughter is unsure of the last visit to neurology. He is prescribed valproic acid from [facility] and unsure of his outpatient neurologist and when last seen by him. At baseline, patient is bedbound (since March), does not ambulate, eats pureed diet and oriented x1, speaks only 1-2 words normally. Dysphagia has been happening since March admission at [facility] and aspiration PNA was suspected at this time. Has HHA services. Patient went to rehab after March admission for one month and was able to walk with two people assist. Since coming back from rehab afterwards he had difficulty ambulating on his own and sitting up. Pt also has dementia, specified type, mostly memory loss (forgetting to eat meals, forgetting address)    Zoya (daughter): [phone]  Gudelia (daughter): [phone]    REVIEW OF SYSTEMS    A 10-system ROS was performed and is negative except for those items noted above and/or in the HPI.    PAST MEDICAL & SURGICAL HISTORY:  DM (diabetes mellitus)      Seizure      CVA (cerebrovascular accident)      HLD (hyperlipidemia)      CKD (chronic kidney disease)      Dementia      Gastric cancer      No significant past surgical history        FAMILY HISTORY:  No pertinent family history in first degree relatives      SOCIAL HISTORY:    At baseline, patient is bedbound (since March), does not ambulate, eats pureed diet and oriented x1 .  Has HHA services.    MEDICATIONS (HOME):  Home Medications:  divalproex sodium 500 mg oral delayed release tablet: 1 orally 2 times a day (17 Aug 2023 21:13)  Januvia 25 mg oral tablet: 1 tab(s) orally once a day (17 Aug 2023 21:11)    MEDICATIONS  (STANDING):  aspirin  chewable 81 milliGRAM(s) Oral daily  citalopram 10 milliGRAM(s) Oral daily  dextrose 5% + sodium chloride 0.45%. 1000 milliLiter(s) (75 mL/Hr) IV Continuous <Continuous>  dextrose 5%. 1000 milliLiter(s) (100 mL/Hr) IV Continuous <Continuous>  dextrose 5%. 1000 milliLiter(s) (50 mL/Hr) IV Continuous <Continuous>  dextrose 50% Injectable 25 Gram(s) IV Push once  dextrose 50% Injectable 12.5 Gram(s) IV Push once  dextrose 50% Injectable 25 Gram(s) IV Push once  famotidine    Tablet 20 milliGRAM(s) Oral daily  ferrous    sulfate Liquid 300 milliGRAM(s) Oral daily  finasteride 5 milliGRAM(s) Oral daily  folic acid 1 milliGRAM(s) Oral daily  glucagon  Injectable 1 milliGRAM(s) IntraMuscular once  heparin   Injectable 5000 Unit(s) SubCutaneous every 12 hours  insulin lispro (ADMELOG) corrective regimen sliding scale   SubCutaneous every 6 hours  tamsulosin 0.4 milliGRAM(s) Oral at bedtime  valproate sodium  IVPB 500 milliGRAM(s) IV Intermittent two times a day    MEDICATIONS  (PRN):  acetaminophen     Tablet .. 650 milliGRAM(s) Oral every 6 hours PRN Temp greater or equal to 38C (100.4F), Mild Pain (1 - 3)  dextrose Oral Gel 15 Gram(s) Oral once PRN Blood Glucose LESS THAN 70 milliGRAM(s)/deciliter  melatonin 3 milliGRAM(s) Oral at bedtime PRN Insomnia    ALLERGIES/INTOLERANCES:  Allergies  No Known Allergies    Intolerances    VITALS & EXAMINATION:  Vital Signs Last 24 Hrs  T(C): 36.7 (17 Aug 2023 17:08), Max: 36.7 (17 Aug 2023 17:08)  T(F): 98.1 (17 Aug 2023 17:08), Max: 98.1 (17 Aug 2023 17:08)  HR: 82 (17 Aug 2023 20:39) (71 - 82)  BP: 151/74 (17 Aug 2023 20:39) (134/62 - 159/68)  BP(mean): --  RR: 20 (17 Aug 2023 20:39) (18 - 20)  SpO2: 100% (17 Aug 2023 20:39) (95% - 100%)    Parameters below as of 17 Aug 2023 12:19  Patient On (Oxygen Delivery Method): room air      General:  Constitutional: Male, appears stated age, in no apparent distress including pain  Head: Normocephalic & atraumatic.  Respiratory: No increased work of breathing at rest  Extremities: No cyanosis, clubbing, or edema.  Skin: No rashes, bruising, or discoloration.    Neurological (>12):  MS: Awake, alert, not answering orientation questions. Flat affect. Does not follow commands    Language: No verbal output and not following commands    CNs: PERRL (R = 3mm, L = 3mm). VFF. EOMI horizontally.  No facial asymmetry  at rest. Rest of exam limited by MS    Motor: cachetic, unable to check tone due to paratonia. No noticeable tremor or seizure. Moving all extremities at least antigravity and equally b/l, equally and brisk withdraw to noxious stimuli     Sensation: Intact to noxious b/l throughout.     Reflexes: no ankle clonus b/l, b/l toes downgoing    Coordination: unable to assess due to MS    Gait: unable to assess due to MS    LABORATORY:  CBC                       8.6    10.13 )-----------( 199      ( 17 Aug 2023 14:31 )             27.9     Chem 08-17    132<L>  |  97<L>  |  18  ----------------------------<  125<H>  4.5   |  28  |  1.95<H>    Ca    9.0      17 Aug 2023 14:31  Mg     2.20         TPro  6.3  /  Alb  3.3  /  TBili  <0.2  /  DBili  x   /  AST  18  /  ALT  6   /  AlkPhos  67      LFTs LIVER FUNCTIONS - ( 17 Aug 2023 14:31 )  Alb: 3.3 g/dL / Pro: 6.3 g/dL / ALK PHOS: 67 U/L / ALT: 6 U/L / AST: 18 U/L / GGT: x           Coagulopathy PT/INR - ( 17 Aug 2023 14:31 )   PT: 11.4 sec;   INR: 1.01 ratio      PTT - ( 17 Aug 2023 14:31 )  PTT:30.8 sec    U/A Urinalysis Basic - ( 17 Aug 2023 14:51 )    Color: Yellow / Appearance: Clear / S.019 / pH: x  Gluc: x / Ketone: Negative mg/dL  / Bili: Negative / Urobili: 0.2 mg/dL   Blood: x / Protein: Trace mg/dL / Nitrite: Negative   Leuk Esterase: Negative / RBC: 29 /HPF / WBC 2 /HPF   Sq Epi: x / Non Sq Epi: 0 /HPF / Bacteria: Negative /HPF    STUDIES & IMAGIN2023 EEG:  EEG Classification / Summary:  Abnormal EEG study, awake / drowsy    -----------------------------------------------------------------------------------------------------    Clinical Impression:  There is mild non-specific diffuse cerebral dysfunction.  There were no epileptiform abnormalities recorded.         HPI:  68M RH [place] speaking with PMHx dementia (years), gastric cancer (not surgical or chemo candidate), HTN, HLD, DM, CVA x2 (last , L facial droop, L BG and ), ?seizures, CKD, chronic bangura presenting with seizure-like activity from oncology office. Collateral obtained from daughters Ihsan and Gudelia as patient is demented and not answering all questions even with Hungarian . Per H&P, he is oriented x2 and only complaints are yellow nonbloody and non-melanotic diarrhea. Denies pain. Per collateral from daughters, patient was at onc appt @1130 today and had seizure-like activity and was sent to the hospital. Per daughter Gudelia who witnessed it. Patient gets seizures when he is moved from lying to sitting, very consistently associated with position changes. While at the office he was sitting up and his eyes rolled back, eyes slightly opened, looking upwards, and bilateral arms started shaking, lasting 2 minutes. Per daughter his eyes was opened but he was not talking during the time. After 2 minutes he was more awake but trying to close his eyes and somewhat confused. No urinary/bowel incontinence or tongue biting. Pt diagnosed with seizures about 1 year ago and has been on divalproex sodium DR 500mg BID (no other ASMs previously), freuqency 2-3x/week. Usually his seizures have right hand stiffening, arm shaking and "does something with his eyes". He usually does not have urinary/bowel incontinence. Last seizure was a few days ago at home while getting PT and sitting up. He typically is unresponsive for 30 seconds or one minute. There has not been changes in AEDs recently. The daughter is unsure of the last visit to neurology. He is prescribed valproic acid from [facility] and unsure of his outpatient neurologist and when last seen by him. At baseline, patient is bedbound (since March), does not ambulate, eats pureed diet and oriented x1, speaks only 1-2 words normally. Dysphagia has been happening since March admission at [facility] and aspiration PNA was suspected at this time. Has HHA services. Patient went to rehab after March admission for one month and was able to walk with two people assist. Since coming back from rehab afterwards he had difficulty ambulating on his own and sitting up. Pt also has dementia, specified type, mostly memory loss (forgetting to eat meals, forgetting address)    Zoya (daughter): [phone]  Gudelia (daughter): [phone]    REVIEW OF SYSTEMS    A 10-system ROS was performed and is negative except for those items noted above and/or in the HPI.    PAST MEDICAL & SURGICAL HISTORY:  DM (diabetes mellitus)      Seizure      CVA (cerebrovascular accident)      HLD (hyperlipidemia)      CKD (chronic kidney disease)      Dementia      Gastric cancer      No significant past surgical history        FAMILY HISTORY:  No pertinent family history in first degree relatives      SOCIAL HISTORY:    At baseline, patient is bedbound (since March), does not ambulate, eats pureed diet and oriented x1 .  Has HHA services.    MEDICATIONS (HOME):  Home Medications:  divalproex sodium 500 mg oral delayed release tablet: 1 orally 2 times a day (17 Aug 2023 21:13)  Januvia 25 mg oral tablet: 1 tab(s) orally once a day (17 Aug 2023 21:11)    MEDICATIONS  (STANDING):  aspirin  chewable 81 milliGRAM(s) Oral daily  citalopram 10 milliGRAM(s) Oral daily  dextrose 5% + sodium chloride 0.45%. 1000 milliLiter(s) (75 mL/Hr) IV Continuous <Continuous>  dextrose 5%. 1000 milliLiter(s) (100 mL/Hr) IV Continuous <Continuous>  dextrose 5%. 1000 milliLiter(s) (50 mL/Hr) IV Continuous <Continuous>  dextrose 50% Injectable 25 Gram(s) IV Push once  dextrose 50% Injectable 12.5 Gram(s) IV Push once  dextrose 50% Injectable 25 Gram(s) IV Push once  famotidine    Tablet 20 milliGRAM(s) Oral daily  ferrous    sulfate Liquid 300 milliGRAM(s) Oral daily  finasteride 5 milliGRAM(s) Oral daily  folic acid 1 milliGRAM(s) Oral daily  glucagon  Injectable 1 milliGRAM(s) IntraMuscular once  heparin   Injectable 5000 Unit(s) SubCutaneous every 12 hours  insulin lispro (ADMELOG) corrective regimen sliding scale   SubCutaneous every 6 hours  tamsulosin 0.4 milliGRAM(s) Oral at bedtime  valproate sodium  IVPB 500 milliGRAM(s) IV Intermittent two times a day    MEDICATIONS  (PRN):  acetaminophen     Tablet .. 650 milliGRAM(s) Oral every 6 hours PRN Temp greater or equal to 38C (100.4F), Mild Pain (1 - 3)  dextrose Oral Gel 15 Gram(s) Oral once PRN Blood Glucose LESS THAN 70 milliGRAM(s)/deciliter  melatonin 3 milliGRAM(s) Oral at bedtime PRN Insomnia    ALLERGIES/INTOLERANCES:  Allergies  No Known Allergies    Intolerances    VITALS & EXAMINATION:  Vital Signs Last 24 Hrs  T(C): 36.7 (17 Aug 2023 17:08), Max: 36.7 (17 Aug 2023 17:08)  T(F): 98.1 (17 Aug 2023 17:08), Max: 98.1 (17 Aug 2023 17:08)  HR: 82 (17 Aug 2023 20:39) (71 - 82)  BP: 151/74 (17 Aug 2023 20:39) (134/62 - 159/68)  BP(mean): --  RR: 20 (17 Aug 2023 20:39) (18 - 20)  SpO2: 100% (17 Aug 2023 20:39) (95% - 100%)    Parameters below as of 17 Aug 2023 12:19  Patient On (Oxygen Delivery Method): room air      General:  Constitutional: Male, appears stated age, in no apparent distress including pain  Head: Normocephalic & atraumatic.  Respiratory: No increased work of breathing at rest  Extremities: No cyanosis, clubbing, or edema.  Skin: No rashes, bruising, or discoloration.    Neurological (>12):  MS: Awake, alert, not answering orientation questions. Flat affect. Does not follow commands    Language: No verbal output and not following commands    CNs: PERRL (R = 3mm, L = 3mm). VFF. EOMI horizontally.  No facial asymmetry  at rest. Rest of exam limited by MS    Motor: cachetic, unable to check tone due to paratonia. No noticeable tremor or seizure. Moving all extremities at least antigravity and equally b/l, equally and brisk withdraw to noxious stimuli     Sensation: Intact to noxious b/l throughout.     Reflexes: no ankle clonus b/l, b/l toes downgoing    Coordination: unable to assess due to MS    Gait: unable to assess due to MS    LABORATORY:  CBC                       8.6    10.13 )-----------( 199      ( 17 Aug 2023 14:31 )             27.9     Chem 08-17    132<L>  |  97<L>  |  18  ----------------------------<  125<H>  4.5   |  28  |  1.95<H>    Ca    9.0      17 Aug 2023 14:31  Mg     2.20         TPro  6.3  /  Alb  3.3  /  TBili  <0.2  /  DBili  x   /  AST  18  /  ALT  6   /  AlkPhos  67      LFTs LIVER FUNCTIONS - ( 17 Aug 2023 14:31 )  Alb: 3.3 g/dL / Pro: 6.3 g/dL / ALK PHOS: 67 U/L / ALT: 6 U/L / AST: 18 U/L / GGT: x           Coagulopathy PT/INR - ( 17 Aug 2023 14:31 )   PT: 11.4 sec;   INR: 1.01 ratio      PTT - ( 17 Aug 2023 14:31 )  PTT:30.8 sec    U/A Urinalysis Basic - ( 17 Aug 2023 14:51 )    Color: Yellow / Appearance: Clear / S.019 / pH: x  Gluc: x / Ketone: Negative mg/dL  / Bili: Negative / Urobili: 0.2 mg/dL   Blood: x / Protein: Trace mg/dL / Nitrite: Negative   Leuk Esterase: Negative / RBC: 29 /HPF / WBC 2 /HPF   Sq Epi: x / Non Sq Epi: 0 /HPF / Bacteria: Negative /HPF    STUDIES & IMAGIN2023 EEG:  EEG Classification / Summary:  Abnormal EEG study, awake / drowsy    -----------------------------------------------------------------------------------------------------    Clinical Impression:  There is mild non-specific diffuse cerebral dysfunction.  There were no epileptiform abnormalities recorded.         HPI:  68M RH [place]ic speaking with PMHx dementia (years), gastric cancer (not surgical or chemo candidate), HTN, HLD, DM, CVA x2 (last , L facial droop, L BG and ), ?seizures, CKD, chronic bangura presenting with seizure-like activity from oncology office. Collateral obtained from daughters Ihsan and Gudelia as patient is demented and not answering all questions even with [place]ic . Per H&P, he is oriented x2 and only complaints are yellow nonbloody and non-melanotic diarrhea. Denies pain. Per collateral from daughters, patient was at onc appt @1130 today and had seizure-like activity and was sent to the hospital. Per daughter Gudelia who witnessed it. Patient gets seizures when he is moved from lying to sitting, very consistently associated with position changes. While at the office he was sitting up and his eyes rolled back, eyes slightly opened, looking upwards, and bilateral arms started shaking, lasting 2 minutes. Per daughter his eyes was opened but he was not talking during the time. After 2 minutes he was more awake but trying to close his eyes and somewhat confused. No urinary/bowel incontinence or tongue biting. Pt diagnosed with seizures about 1 year ago and has been on divalproex sodium DR 500mg BID (no other ASMs previously), freuqency 2-3x/week. Usually his seizures have right hand stiffening, arm shaking and "does something with his eyes". He usually does not have urinary/bowel incontinence. Last seizure was a few days ago at home while getting PT and sitting up. He typically is unresponsive for 30 seconds or one minute. There has not been changes in AEDs recently. The daughter is unsure of the last visit to neurology. He is prescribed valproic acid from [facility] and unsure of his outpatient neurologist and when last seen by him. At baseline, patient is bedbound (since March), does not ambulate, eats pureed diet and oriented x1, speaks only 1-2 words normally. Dysphagia has been happening since March admission at [facility] and aspiration PNA was suspected at this time. Has HHA services. Patient went to rehab after March admission for one month and was able to walk with two people assist. Since coming back from rehab afterwards he had difficulty ambulating on his own and sitting up. Pt also has dementia, specified type, mostly memory loss (forgetting to eat meals, forgetting address)    Zoya (daughter): [phone]  Gudelia (daughter): [phone]    REVIEW OF SYSTEMS    A 10-system ROS was performed and is negative except for those items noted above and/or in the HPI.    PAST MEDICAL & SURGICAL HISTORY:  DM (diabetes mellitus)      Seizure      CVA (cerebrovascular accident)      HLD (hyperlipidemia)      CKD (chronic kidney disease)      Dementia      Gastric cancer      No significant past surgical history        FAMILY HISTORY:  No pertinent family history in first degree relatives      SOCIAL HISTORY:    At baseline, patient is bedbound (since March), does not ambulate, eats pureed diet and oriented x1 .  Has HHA services.    MEDICATIONS (HOME):  Home Medications:  divalproex sodium 500 mg oral delayed release tablet: 1 orally 2 times a day (17 Aug 2023 21:13)  Januvia 25 mg oral tablet: 1 tab(s) orally once a day (17 Aug 2023 21:11)    MEDICATIONS  (STANDING):  aspirin  chewable 81 milliGRAM(s) Oral daily  citalopram 10 milliGRAM(s) Oral daily  dextrose 5% + sodium chloride 0.45%. 1000 milliLiter(s) (75 mL/Hr) IV Continuous <Continuous>  dextrose 5%. 1000 milliLiter(s) (100 mL/Hr) IV Continuous <Continuous>  dextrose 5%. 1000 milliLiter(s) (50 mL/Hr) IV Continuous <Continuous>  dextrose 50% Injectable 25 Gram(s) IV Push once  dextrose 50% Injectable 12.5 Gram(s) IV Push once  dextrose 50% Injectable 25 Gram(s) IV Push once  famotidine    Tablet 20 milliGRAM(s) Oral daily  ferrous    sulfate Liquid 300 milliGRAM(s) Oral daily  finasteride 5 milliGRAM(s) Oral daily  folic acid 1 milliGRAM(s) Oral daily  glucagon  Injectable 1 milliGRAM(s) IntraMuscular once  heparin   Injectable 5000 Unit(s) SubCutaneous every 12 hours  insulin lispro (ADMELOG) corrective regimen sliding scale   SubCutaneous every 6 hours  tamsulosin 0.4 milliGRAM(s) Oral at bedtime  valproate sodium  IVPB 500 milliGRAM(s) IV Intermittent two times a day    MEDICATIONS  (PRN):  acetaminophen     Tablet .. 650 milliGRAM(s) Oral every 6 hours PRN Temp greater or equal to 38C (100.4F), Mild Pain (1 - 3)  dextrose Oral Gel 15 Gram(s) Oral once PRN Blood Glucose LESS THAN 70 milliGRAM(s)/deciliter  melatonin 3 milliGRAM(s) Oral at bedtime PRN Insomnia    ALLERGIES/INTOLERANCES:  Allergies  No Known Allergies    Intolerances    VITALS & EXAMINATION:  Vital Signs Last 24 Hrs  T(C): 36.7 (17 Aug 2023 17:08), Max: 36.7 (17 Aug 2023 17:08)  T(F): 98.1 (17 Aug 2023 17:08), Max: 98.1 (17 Aug 2023 17:08)  HR: 82 (17 Aug 2023 20:39) (71 - 82)  BP: 151/74 (17 Aug 2023 20:39) (134/62 - 159/68)  BP(mean): --  RR: 20 (17 Aug 2023 20:39) (18 - 20)  SpO2: 100% (17 Aug 2023 20:39) (95% - 100%)    Parameters below as of 17 Aug 2023 12:19  Patient On (Oxygen Delivery Method): room air      General:  Constitutional: Male, appears stated age, in no apparent distress including pain  Head: Normocephalic & atraumatic.  Respiratory: No increased work of breathing at rest  Extremities: No cyanosis, clubbing, or edema.  Skin: No rashes, bruising, or discoloration.    Neurological (>12):with Icelandic .  MS: Awake, alert, not answering orientation questions. Flat affect. Does not follow commands    Language: No verbal output and not following commands    CNs: PERRL (R = 3mm, L = 3mm). VFF. EOMI horizontally.  No facial asymmetry  at rest. Rest of exam limited by MS    Motor: cachetic, unable to check tone due to paratonia. No noticeable tremor or seizure. Moving all extremities at least antigravity and equally b/l, equally and brisk withdraw to noxious stimuli     Sensation: Intact to noxious b/l throughout.     Reflexes: no ankle clonus b/l, b/l toes downgoing    Coordination: unable to assess due to MS    Gait: unable to assess due to MS    LABORATORY:  CBC                       8.6    10.13 )-----------( 199      ( 17 Aug 2023 14:31 )             27.9     Chem     132<L>  |  97<L>  |  18  ----------------------------<  125<H>  4.5   |  28  |  1.95<H>    Ca    9.0      17 Aug 2023 14:31  Mg     2.20     08-    TPro  6.3  /  Alb  3.3  /  TBili  <0.2  /  DBili  x   /  AST  18  /  ALT  6   /  AlkPhos  67  08-17    LFTs LIVER FUNCTIONS - ( 17 Aug 2023 14:31 )  Alb: 3.3 g/dL / Pro: 6.3 g/dL / ALK PHOS: 67 U/L / ALT: 6 U/L / AST: 18 U/L / GGT: x           Coagulopathy PT/INR - ( 17 Aug 2023 14:31 )   PT: 11.4 sec;   INR: 1.01 ratio      PTT - ( 17 Aug 2023 14:31 )  PTT:30.8 sec    U/A Urinalysis Basic - ( 17 Aug 2023 14:51 )    Color: Yellow / Appearance: Clear / S.019 / pH: x  Gluc: x / Ketone: Negative mg/dL  / Bili: Negative / Urobili: 0.2 mg/dL   Blood: x / Protein: Trace mg/dL / Nitrite: Negative   Leuk Esterase: Negative / RBC: 29 /HPF / WBC 2 /HPF   Sq Epi: x / Non Sq Epi: 0 /HPF / Bacteria: Negative /HPF    STUDIES & IMAGIN2023 EEG:  EEG Classification / Summary:  Abnormal EEG study, awake / drowsy    -----------------------------------------------------------------------------------------------------    Clinical Impression:  There is mild non-specific diffuse cerebral dysfunction.  There were no epileptiform abnormalities recorded.

## 2023-08-17 NOTE — ASSESSMENT
[FreeTextEntry1] : Mr. SCHWARTZ 's family comfortable with the proposed plan of care; patient will be transferred to [facility] today via ambulance.

## 2023-08-17 NOTE — HISTORY OF PRESENT ILLNESS
[Disease: _____________________] : Disease: [unfilled] [de-identified] : 68M, originally from [facility], PMHx CKD, CVA, HTN, DM, dementia, seizure disorder, referred for medical oncology consultation of gastric carcinoma.  CASE SYNOPSIS: July 2023-admitted at [facility] with lethargy, dyspnea and diarrhea. 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  Bulky local necrotic metastatic adenopathy.  No distant metastatic disease.  Retained secretion in upper trachea.  Trace bilateral pleural effusion.   [de-identified] : adenocarcinoma [FreeTextEntry1] : Supportive [de-identified] : This is the first time presentation to the clinic of , first consulted on 8/1/2023 via telehealth.  Patient diagnosed with gastric pyloric mass last month with bulky local necrotic metastatic adenopathy but no distant metastases.  His daughter and son brought him today to be evaluated for systemic therapy.  Patient is extremely debilitated; had a seizure episode and vomited during today's encounter.  He is minimally verbal.  Does not require oxygen.  Appears weak, cachectic.  Family wants to know if he is a candidate for systemic therapy..

## 2023-08-17 NOTE — ED ADULT NURSE NOTE - OBJECTIVE STATEMENT
Referred from cancer center for seizure activity. Patient accompanied by two family members- pt is nonverbal, hx of DM, newly gastric cancer, CKD and seizure. Patient to ED for eval for palliative care.

## 2023-08-17 NOTE — ED PROVIDER NOTE - CONSIDERATION OF ADMISSION OBSERVATION
Consideration of Admission/Observation pt not able to be fully cared for at home currently, will require admission

## 2023-08-17 NOTE — H&P ADULT - PROBLEM SELECTOR PLAN 1
Seizure like activity described as bilateral arm shaking and eye deviation. Post-ictal confusion/drowsiness after as well  -neurology paged  -EEG on April admission without epileptiform activity  -likely EEG +/- MRI  -no cardiac complaints and has hx seizures per daughter. Not suspecting cardiac etiology at this time. EKG NSR and no CP or SOB  -orthostatics Seizure like activity described as bilateral arm shaking and eye deviation. Post-ictal confusion/drowsiness after as well  -neurology consulted and to see patient. For now recommend vEEG, VPA level in AM  -EEG on April admission without epileptiform activity  -no cardiac complaints and has hx seizures per daughter. Not suspecting cardiac etiology at this time. EKG NSR and no CP or SOB  -orthostatics Seizure like activity described as bilateral arm shaking and eye deviation. Post-ictal confusion/drowsiness after as well  -neurology consulted and to see patient. For now recommend vEEG, VPA level in AM  -EEG on April admission without epileptiform activity  -no cardiac complaints and has hx seizures per daughter. Not suspecting cardiac etiology at this time. EKG NSR and no CP or SOB  -orthostatics  -consider MRI with and without contrast after EEG if within GOC

## 2023-08-18 DIAGNOSIS — Z71.89 OTHER SPECIFIED COUNSELING: ICD-10-CM

## 2023-08-18 DIAGNOSIS — Z51.5 ENCOUNTER FOR PALLIATIVE CARE: ICD-10-CM

## 2023-08-18 DIAGNOSIS — R53.2 FUNCTIONAL QUADRIPLEGIA: ICD-10-CM

## 2023-08-18 DIAGNOSIS — G93.49 OTHER ENCEPHALOPATHY: ICD-10-CM

## 2023-08-18 DIAGNOSIS — R13.10 DYSPHAGIA, UNSPECIFIED: ICD-10-CM

## 2023-08-18 LAB
A1C WITH ESTIMATED AVERAGE GLUCOSE RESULT: 6 % — HIGH (ref 4–5.6)
ALBUMIN SERPL ELPH-MCNC: 3.4 G/DL — SIGNIFICANT CHANGE UP (ref 3.3–5)
ALP SERPL-CCNC: 78 U/L — SIGNIFICANT CHANGE UP (ref 40–120)
ALT FLD-CCNC: 7 U/L — SIGNIFICANT CHANGE UP (ref 4–41)
ANION GAP SERPL CALC-SCNC: 10 MMOL/L — SIGNIFICANT CHANGE UP (ref 7–14)
AST SERPL-CCNC: 13 U/L — SIGNIFICANT CHANGE UP (ref 4–40)
BASOPHILS # BLD AUTO: 0.02 K/UL — SIGNIFICANT CHANGE UP (ref 0–0.2)
BASOPHILS NFR BLD AUTO: 0.3 % — SIGNIFICANT CHANGE UP (ref 0–2)
BILIRUB SERPL-MCNC: <0.2 MG/DL — SIGNIFICANT CHANGE UP (ref 0.2–1.2)
BUN SERPL-MCNC: 17 MG/DL — SIGNIFICANT CHANGE UP (ref 7–23)
CALCIUM SERPL-MCNC: 9.1 MG/DL — SIGNIFICANT CHANGE UP (ref 8.4–10.5)
CHLORIDE SERPL-SCNC: 99 MMOL/L — SIGNIFICANT CHANGE UP (ref 98–107)
CHOLEST SERPL-MCNC: 117 MG/DL — SIGNIFICANT CHANGE UP
CK SERPL-CCNC: 126 U/L — SIGNIFICANT CHANGE UP (ref 30–200)
CO2 SERPL-SCNC: 27 MMOL/L — SIGNIFICANT CHANGE UP (ref 22–31)
CREAT SERPL-MCNC: 1.68 MG/DL — HIGH (ref 0.5–1.3)
CULTURE RESULTS: NO GROWTH — SIGNIFICANT CHANGE UP
EGFR: 45 ML/MIN/1.73M2 — LOW
EOSINOPHIL # BLD AUTO: 0.41 K/UL — SIGNIFICANT CHANGE UP (ref 0–0.5)
EOSINOPHIL NFR BLD AUTO: 7.1 % — HIGH (ref 0–6)
ESTIMATED AVERAGE GLUCOSE: 126 — SIGNIFICANT CHANGE UP
GLUCOSE BLDC GLUCOMTR-MCNC: 100 MG/DL — HIGH (ref 70–99)
GLUCOSE BLDC GLUCOMTR-MCNC: 101 MG/DL — HIGH (ref 70–99)
GLUCOSE BLDC GLUCOMTR-MCNC: 85 MG/DL — SIGNIFICANT CHANGE UP (ref 70–99)
GLUCOSE SERPL-MCNC: 92 MG/DL — SIGNIFICANT CHANGE UP (ref 70–99)
HCT VFR BLD CALC: 29.5 % — LOW (ref 39–50)
HDLC SERPL-MCNC: 41 MG/DL — SIGNIFICANT CHANGE UP
HGB BLD-MCNC: 9.2 G/DL — LOW (ref 13–17)
IANC: 3.3 K/UL — SIGNIFICANT CHANGE UP (ref 1.8–7.4)
IMM GRANULOCYTES NFR BLD AUTO: 0.5 % — SIGNIFICANT CHANGE UP (ref 0–0.9)
LIPID PNL WITH DIRECT LDL SERPL: 54 MG/DL — SIGNIFICANT CHANGE UP
LYMPHOCYTES # BLD AUTO: 1.35 K/UL — SIGNIFICANT CHANGE UP (ref 1–3.3)
LYMPHOCYTES # BLD AUTO: 23.5 % — SIGNIFICANT CHANGE UP (ref 13–44)
MCHC RBC-ENTMCNC: 25.5 PG — LOW (ref 27–34)
MCHC RBC-ENTMCNC: 31.2 GM/DL — LOW (ref 32–36)
MCV RBC AUTO: 81.7 FL — SIGNIFICANT CHANGE UP (ref 80–100)
MONOCYTES # BLD AUTO: 0.64 K/UL — SIGNIFICANT CHANGE UP (ref 0–0.9)
MONOCYTES NFR BLD AUTO: 11.1 % — SIGNIFICANT CHANGE UP (ref 2–14)
NEUTROPHILS # BLD AUTO: 3.3 K/UL — SIGNIFICANT CHANGE UP (ref 1.8–7.4)
NEUTROPHILS NFR BLD AUTO: 57.5 % — SIGNIFICANT CHANGE UP (ref 43–77)
NON HDL CHOLESTEROL: 76 MG/DL — SIGNIFICANT CHANGE UP
NRBC # BLD: 0 /100 WBCS — SIGNIFICANT CHANGE UP (ref 0–0)
NRBC # FLD: 0 K/UL — SIGNIFICANT CHANGE UP (ref 0–0)
PLATELET # BLD AUTO: 147 K/UL — LOW (ref 150–400)
POTASSIUM SERPL-MCNC: 4.8 MMOL/L — SIGNIFICANT CHANGE UP (ref 3.5–5.3)
POTASSIUM SERPL-SCNC: 4.8 MMOL/L — SIGNIFICANT CHANGE UP (ref 3.5–5.3)
PROT SERPL-MCNC: 6.5 G/DL — SIGNIFICANT CHANGE UP (ref 6–8.3)
RBC # BLD: 3.61 M/UL — LOW (ref 4.2–5.8)
RBC # FLD: 17.9 % — HIGH (ref 10.3–14.5)
SODIUM SERPL-SCNC: 136 MMOL/L — SIGNIFICANT CHANGE UP (ref 135–145)
SPECIMEN SOURCE: SIGNIFICANT CHANGE UP
TRIGL SERPL-MCNC: 108 MG/DL — SIGNIFICANT CHANGE UP
VALPROATE SERPL-MCNC: 76.6 UG/ML — SIGNIFICANT CHANGE UP (ref 50–100)
WBC # BLD: 5.75 K/UL — SIGNIFICANT CHANGE UP (ref 3.8–10.5)
WBC # FLD AUTO: 5.75 K/UL — SIGNIFICANT CHANGE UP (ref 3.8–10.5)

## 2023-08-18 PROCEDURE — 99223 1ST HOSP IP/OBS HIGH 75: CPT

## 2023-08-18 PROCEDURE — 99232 SBSQ HOSP IP/OBS MODERATE 35: CPT

## 2023-08-18 PROCEDURE — 99255 IP/OBS CONSLTJ NEW/EST HI 80: CPT

## 2023-08-18 PROCEDURE — 95720 EEG PHY/QHP EA INCR W/VEEG: CPT

## 2023-08-18 PROCEDURE — 99497 ADVNCD CARE PLAN 30 MIN: CPT | Mod: 25

## 2023-08-18 RX ADMIN — Medication 55 MILLIGRAM(S): at 17:05

## 2023-08-18 RX ADMIN — HEPARIN SODIUM 5000 UNIT(S): 5000 INJECTION INTRAVENOUS; SUBCUTANEOUS at 05:50

## 2023-08-18 RX ADMIN — Medication 55 MILLIGRAM(S): at 05:52

## 2023-08-18 RX ADMIN — HEPARIN SODIUM 5000 UNIT(S): 5000 INJECTION INTRAVENOUS; SUBCUTANEOUS at 17:05

## 2023-08-18 RX ADMIN — Medication 55 MILLIGRAM(S): at 00:50

## 2023-08-18 NOTE — CONSULT NOTE ADULT - PROBLEM SELECTOR RECOMMENDATION 9
- CT Head 8/17/23- 1. Small basal ganglia and cerebellar are multiple infarctions. Ischemic white matter disease and atrophy upper range typical for age 4. No lesion strongly suspicious for metastasis. No acute intracranial hemorrhage is appreciated.  - Neurology recommendations appreciated   - management as per primary team

## 2023-08-18 NOTE — PATIENT PROFILE ADULT - ..
Dr. Castro Note: pt broke rapid afib easily after metoprolol 10mg IVP, back in sinus 70s.  Given pt's history, would be more comfortable observing patient overnight for recurrence of rapid afib, consult with EP on disposition plan, likely for dc in AM if pt remains stable.  Would hold a/c for now given pt likely for dc tomorrow and can still obtain dental procedure.  No indication for emergent imaging or echo at this time. 18-Aug-2023 00:32:25

## 2023-08-18 NOTE — CHART NOTE - NSCHARTNOTEFT_GEN_A_CORE
Spoke to patient dtr Zoya (daughter): [phone], and Gudelia (daughter) who  also available with sister at 240p   Discussed goc of care. Re confirmed outpatient plan and recommendations for comfort/hospice care given patients decline in performance status- patient not a candidate for chemo/immunotherapy. Dtrs exhibiting understanding, inquiring about specifics of home hospice services, anticipating call from [facility] team. From onc standpoint, all questions and concerns addressed.

## 2023-08-18 NOTE — CONSULT NOTE ADULT - SUBJECTIVE AND OBJECTIVE BOX
Date of Service 08-18-23 @ 15:47      HPI:  68M with PMHx dementia (years), gastric cancer (not surgical or chemo candidate), seizure d/o, HTN, HLD, DM, CVA x2 (last 2013), CKD, chronic bangura presenting with seizure-like activity from oncology office. Collateral obtained from daughters Ihsan and Gudelia as patient is demented and not answering all questions even with Croatian . He is oriented x2 and only complaints are yellow nonbloody and non-melanotic diarrhea. Denies pain. Per collateral from daughters, patient was at onc appt @1130 today and had seizure-like activity and was sent to the hospital. Per daughter Gudelia who witnessed it. patient gets seizures when he is moved from lying to sitting. While at the office he was sitting up and his eyes rolled back and bilateral arms started shaking, lasting 2 minutes. Per daughter his eyes was opened but he was not talking during the time. After 2 minutes he was more awake but trying to close his eyes and somewhat confused. No urinary/bowel incontinence or tongue biting. Usually his seizures have right hand stiffening, arm shaking and "does something with his eyes". He usually does not have urinary/bowel incontinence. Last seizure was a few days ago at home while getting PT and sitting up. He typically is unresponsive for 30 seconds or one minute. There has not been changes in AEDs recently. He takes divalproex sodium DR 500mg BID. The daughter is unsure of the last visit to neurology. He is prescribed valproic acid from [facility] and unsure of his outpatient neurologist and when last seen by him. At baseline, patient is bedbound (since March), does not ambulate, eats pureed diet and oriented x1 . Dysphagia has been happening since March admission at [facility] and aspiration PNA was suspected at this time. Has HHA services. Patient went to rehab after March admission for one month and was able to walk with two people assist. Since coming back from rehab afterwards he had difficulty ambulating on his own and sitting up.    Zoya (daughter): [phone]  Gudelia (daughter): [phone]    Home meds confirmed with Zoya   (17 Aug 2023 20:58)      Interval History:   Patient lethargic, eyes open, but not tracking. On VEEG    PERTINENT PM/SXH:   DM (diabetes mellitus)  Seizure  CVA (cerebrovascular accident)  HLD (hyperlipidemia)  CKD (chronic kidney disease)  Dementia  Gastric cancer  No significant past surgical history    FAMILY HISTORY: unable to obtain due to encephalopathy     ITEMS NOT CHECKED ARE NOT PRESENT    SOCIAL HISTORY:   Significant other/partner[ ]  Children[x ]  Jainism/Spirituality:  Substance hx:  [ ]   Tobacco hx:  [ ]   Alcohol hx: [ ]   Home Opioid hx:  [ ] I-Stop Reference No:  Living Situation: [ x]Home  [ ]Long term care  [ ]Rehab [ ]Other      ADVANCE DIRECTIVES:    MOLST  [ ]  Living Will  [ ]   DECISION MAKER(s):  [ ] Health Care Proxy(s)  [x ] Surrogate(s)  [ ] Guardian           Name(s): Phone Number(s):  Severino Alejomed: [phone]  Zoya (daughter): [phone]  Gudelia (daughter): [phone]    BASELINE (I)ADL(s) (prior to admission):  Collin: [ ]Total  [ ] Moderate [x ]Dependent    Allergies    No Known Allergies    Intolerances    MEDICATIONS  (STANDING):  dextrose 5% + sodium chloride 0.45%. 1000 milliLiter(s) (75 mL/Hr) IV Continuous <Continuous>  dextrose 5%. 1000 milliLiter(s) (100 mL/Hr) IV Continuous <Continuous>  dextrose 5%. 1000 milliLiter(s) (50 mL/Hr) IV Continuous <Continuous>  dextrose 50% Injectable 25 Gram(s) IV Push once  dextrose 50% Injectable 12.5 Gram(s) IV Push once  dextrose 50% Injectable 25 Gram(s) IV Push once  glucagon  Injectable 1 milliGRAM(s) IntraMuscular once  heparin   Injectable 5000 Unit(s) SubCutaneous every 12 hours  insulin lispro (ADMELOG) corrective regimen sliding scale   SubCutaneous every 6 hours  valproate sodium  IVPB 500 milliGRAM(s) IV Intermittent two times a day    MEDICATIONS  (PRN):  dextrose Oral Gel 15 Gram(s) Oral once PRN Blood Glucose LESS THAN 70 milliGRAM(s)/deciliter        ITEMS UNCHECKED ARE NOT PRESENT     PRESENT SYMPTOMS: [x ]Unable to self-report due to altered mental status  [ ] CPOT [x ] PAINADs [ x] RDOS  Source if other than patient:  [ ]Family   [ ]Team     Pain: [ ]yes [ ]no  QOL impact -   Location -                    Aggravating factors -  Quality -  Radiation -  Timing-  Severity (0-10 scale):  Minimal acceptable level / Pain goal (0-10 scale):     CPOT:    https://www.[facility].org/getattachment/xte40p24-2r1z-5o5z-1g6t-4440y9925l8f/Critical-Care-Pain-Observation-Tool-(CPOT)    Dyspnea:                           [ ]Mild [ ]Moderate [ ]Severe  Anxiety:                             [ ]Mild [ ]Moderate [ ]Severe  Agitation:                          [ ]Mild [ ]Moderate [ ]Severe  Fatigue:                             [ ]Mild [ ]Moderate [ ]Severe  Nausea:                             [ ]Mild [ ]Moderate [ ]Severe  Loss of appetite:              [ ]Mild [ ]Moderate [ ]Severe  Constipation:                   [ ]Mild [ ]Moderate [ ]Severe  Diarrhea:                          [ ]Mild [ ]Moderate [ ]Severe      PCSSQ[Palliative Care Spiritual Screening Question]   Severity (0-10):  Score of 4 or > indicate consideration of Chaplaincy referral.  Chaplaincy Referral: [ ] yes [ ] refused [ ] following [ x] deferred    Caregiver [place]? : [ ] yes [ ] no [ ] Declined   [x ] Deferred            Social work referral [ ] Patient & Family Centered Care Referral [ ]     Anticipatory Grief present?:  [ ] yes [ ] no  [x ] Deferred                  Social work referral [ ] Chaplaincy Referral[ ]    Other Symptoms:  [ ]All other review of systems negative - unable to obtain due to encephalopathy     PHYSICAL EXAM:  Vital Signs Last 24 Hrs  T(C): 36.4 (18 Aug 2023 13:00), Max: 36.7 (17 Aug 2023 17:08)  T(F): 97.5 (18 Aug 2023 13:00), Max: 98.1 (17 Aug 2023 17:08)  HR: 72 (18 Aug 2023 13:00) (67 - 82)  BP: 174/87 (18 Aug 2023 13:00) (134/62 - 174/87)  BP(mean): --  RR: 17 (18 Aug 2023 13:00) (13 - 20)  SpO2: 100% (18 Aug 2023 13:00) (98% - 100%)    Parameters below as of 18 Aug 2023 13:00  Patient On (Oxygen Delivery Method): room air         I&O's Summary    17 Aug 2023 07:01  -  18 Aug 2023 07:00  --------------------------------------------------------  IN: 0 mL / OUT: 700 mL / NET: -700 mL        GENERAL:  [ ]Alert  [ ]Oriented x   [ x]Lethargic  [ ]Cachexia  [ ]Unarousable  [ x] No Distress  Behavioral:   [ ] Anxiety  [ ] Delirium [ ] Agitation [ ] Calm  [ x] Other- encephalopathy   HEENT:  [x ]Normal  [ ] Temporal Wasting  [ ]Dry mouth   [ ]ET Tube/Trach  [ ]Oral lesions  [ ] Mucositis  PULMONARY:   [ ]Clear [ ]Tachypnea  [ ]Audible excessive secretions   [ ]Rhonchi        [ ]Right [ ]Left [ ]Bilateral  [ ]Crackles        [ ]Right [ ]Left [ ]Bilateral  [ ]Wheezing     [ ]Right [ ]Left [ ]Bilateral  [x ]Diminished breath sounds [ ]right [ ]left [x ]bilateral  CARDIOVASCULAR:    [x ]Regular [ ]Irregular [ ]Tachy  [ ]Wang [ ]Murmur [ ]Other  GASTROINTESTINAL:  [ x]Soft  [ ]Distended   [ ]+BS  [ x]Non tender [ ]Tender  [ ]PEG [ ]OGT/ NGT  Last BM: 8/18  GENITOURINARY:  [ ]Normal [ ] Incontinent   [ ]Oliguria/Anuria   [ x]Bangura  MUSCULOSKELETAL:   [ ]Normal   [ ]Weakness  [x ]Bed/Wheelchair bound [ ]Edema  [  ] amputation  [  ] contraction  NEUROLOGIC:  moves all extremities spontaneously   [ ]No focal deficits  [ x]Cognitive impairment  [ x]Dysphagia [ ]Dysarthria [ ]Paresis [ ]Other   SKIN: See Nursing Skin Assessment for further details  [ x]Normal    [ ]Rash  [ ]Pressure ulcer(s)       Present on admission [ ]y [ ]n   [  ]  Wound    [  ] hyperpigmentation    CRITICAL CARE:  [ ] Shock Present  [ ]Septic [ ]Cardiogenic [ ]Neurologic [ ]Hypovolemic  [ ]  Vasopressors [ ]  Inotropes   [ ]Respiratory failure present [ ]Mechanical ventilation [ ]Non-invasive ventilatory support [ ]High flow    [ ]Acute  [ ]Chronic [ ]Hypoxic  [ ]Hypercarbic [ ]Other  [ ]Other organ failure     LABS:  reviewed                         9.2    5.75  )-----------( 147      ( 18 Aug 2023 06:10 )             29.5   08-18    136  |  99  |  17  ----------------------------<  92  4.8   |  27  |  1.68<H>    Ca    9.1      18 Aug 2023 06:10  Mg     2.20     08-17    TPro  6.5  /  Alb  3.4  /  TBili  <0.2  /  DBili  x   /  AST  13  /  ALT  7   /  AlkPhos  78  08-18  PT/INR - ( 17 Aug 2023 14:31 )   PT: 11.4 sec;   INR: 1.01 ratio         PTT - ( 17 Aug 2023 14:31 )  PTT:30.8 sec  Urinalysis Basic - ( 18 Aug 2023 06:10 )    Color: x / Appearance: x / SG: x / pH: x  Gluc: 92 mg/dL / Ketone: x  / Bili: x / Urobili: x   Blood: x / Protein: x / Nitrite: x   Leuk Esterase: x / RBC: x / WBC x   Sq Epi: x / Non Sq Epi: x / Bacteria: x      CAPILLARY BLOOD GLUCOSE      POCT Blood Glucose.: 101 mg/dL (18 Aug 2023 12:47)  POCT Blood Glucose.: 94 mg/dL (18 Aug 2023 05:51)  POCT Blood Glucose.: 102 mg/dL (18 Aug 2023 01:24)  POCT Blood Glucose.: 105 mg/dL (17 Aug 2023 22:23)      RADIOLOGY & ADDITIONAL STUDIES: reviewed     PROTEIN CALORIE MALNUTRITION PRESENT: [ ]mild [ ]moderate [ ]severe [ ]underweight [ ]morbid obesity  https://www.andeal.org/vault/2440/web/files/ONC/Table_Clinical%20Characteristics%20to%20Document%20Malnutrition-White%20JV%20et%20al%202012.pdf    Height (cm): 162.6 (08-17-23 @ 22:24), 177.8 (08-09-23 @ 00:04), 177.8 (07-15-23 @ 19:42)  Weight (kg): 59 (08-17-23 @ 22:24), 59.5 (07-15-23 @ 19:42), 68 (04-23-23 @ 22:14)  BMI (kg/m2): 22.3 (08-17-23 @ 22:24), 18.8 (08-09-23 @ 00:04), 18.8 (07-15-23 @ 19:42)    [x ]PPSV2 < or = to 30% [ ]significant weight loss  [ ]poor nutritional intake  [ ]anasarca [ ]Artificial Nutrition      REFERRALS:   [ ]Chaplaincy  [ ]Hospice  [ ]Child Life  [ ]Social Work  [ x]Case management [ ]Holistic Therapy

## 2023-08-18 NOTE — CONSULT NOTE ADULT - PROBLEM SELECTOR RECOMMENDATION 8
Thank you for allowing us to participate in your patient's care. Please page [phone] for any questions/concerns.

## 2023-08-18 NOTE — CONSULT NOTE ADULT - NS ATTEND AMEND GEN_ALL_CORE FT
69 y/o M with gastric cancer (not a candidate for chemo) who has dementia, seizure disorder here with seizures on EEG, is AAOX0. No role for cancer directed therapy at this time, please call oncology if additional concerns.

## 2023-08-18 NOTE — CONSULT NOTE ADULT - PROBLEM SELECTOR RECOMMENDATION 7
Called son Marv, but sister Gudelia picked up phone. Spoke to daughter Gudelia who states family is aware that patient is not a candidate for DMT at this time. Discussed hospice services/philosophy of care. She is amenable to hospice referral for further information about their services.   Discussed patient currently NPO. Recommended against feeding tube placement in setting of advanced malignancy and dementia. Recommended pleasure feeds understanding risk of aspiration. Gudelia states family would not want feeding tube.   Advanced care planning extensively discussed. Reviewed the risks and benefits of resuscitative measures including cardiopulmonary resuscitation and mechanical ventilation at the end of life in the setting of malignancy.   Encouraged Gudelia to have further discussions with family regarding advanced care planning for code status preferences and dysaphia.   Please see separate GOC note.  >16 minutes spent on advanced care planning with family.

## 2023-08-18 NOTE — CONSULT NOTE ADULT - ASSESSMENT
68M with PMHx dementia (years), gastric cancer (not surgical or chemo candidate), seizure d/o, HTN, HLD, DM, CVA x2 (last 2013), CKD, chronic bangura presenting with seizure-like activity from oncology office.   Palliative Care consulted for complex decision making  related to goals of care discussions

## 2023-08-18 NOTE — CHART NOTE - NSCHARTNOTEFT_GEN_A_CORE
EEG preliminary read (not final) on the initial recording hour(s) = Approximately 4.5 hr    No epileptiform abnormalities.    Final report to follow tomorrow morning after completion of study.    [facility] EEG Reading Room Ph#: [phone]  Epilepsy Answering Service after 5PM and before 8:30AM: Ph#: [phone]

## 2023-08-18 NOTE — CONSULT NOTE ADULT - PROBLEM SELECTOR RECOMMENDATION 2
- Patient with gastric cancer  - Oncology recommendations appreciated- not a candidate for DMT  - As patient is not a candidate for DMT, then patient would be a candidate for home hospice services.

## 2023-08-18 NOTE — PATIENT PROFILE ADULT - OVER THE PAST TWO WEEKS, HAVE YOU FELT LITTLE INTEREST OR PLEASURE IN DOING THINGS?
Area of cytotoxic cerebral edema identified when reviewing brain imaging in the territory of the L middle cerebral artery. There is not mass effect associated with it. We will continue to monitor the patients clinical exam for any worsening of symptoms which may indicate expansion of the stroke or the area of the edema resulting in the clinical change. The pattern is suggestive of ESUS vs drug abuse etiology       no

## 2023-08-18 NOTE — PATIENT PROFILE ADULT - FALL HARM RISK - HARM RISK INTERVENTIONS
Assistance with ambulation/Assistance OOB with selected safe patient handling equipment/Communicate Risk of Fall with Harm to all staff/Discuss with provider need for PT consult/Monitor gait and stability/Reinforce activity limits and safety measures with patient and family/Tailored Fall Risk Interventions/Visual Cue: Yellow wristband and red socks/Bed in lowest position, wheels locked, appropriate side rails in place/Call bell, personal items and telephone in reach/Instruct patient to call for assistance before getting out of bed or chair/Non-slip footwear when patient is out of bed/[place] to call system/Physically safe environment - no spills, clutter or unnecessary equipment/Purposeful Proactive Rounding/Room/bathroom lighting operational, light cord in reach

## 2023-08-18 NOTE — CONSULT NOTE ADULT - PROBLEM SELECTOR RECOMMENDATION 5
- in setting of recent seizures and dementia  - please coordinate care with family  -Frequent reassurance and verbal orientation   -Family members or other familiar persons by his bedside.   -Move to a room with a window   -Update the calendar date in the room.    - Monitor for constipation, urinary retention, pain, hunger, thirst, etc.    - Promote sleep wake cycle and reorientation as indicated.  - Minimize use of benzodiazepines, opioids, anticholinergics, and other deliriogenic agents whenever possible.

## 2023-08-18 NOTE — CONSULT NOTE ADULT - ASSESSMENT
68M, originally from [facility], PMHx CKD, CVA, HTN, DM, dementia, seizure disorder, and locally advanced gastric pyloric mass, suspect adenocarcinoma with surrounding bulky necrotic lymphadenopathy but no evidence of distant metastatic disease. Presents to  [facility] ED from a practice appointment at [facility] due to vomiting, one episode of seizure (witnessed by family on arrival) and lethargy.  Oncology consulted for further recommendations      CTH on 8/17  IMPRESSION:  1. Small basal ganglia and cerebellar are multiple infarctions. Ischemic   white matter disease and atrophy upper range typical for age  2. No lesion strongly suspicious for metastasis. Contrast-enhanced images   required for this evaluation. No acute intracranial hemorrhage is   appreciated.      -Neurology consulted, appreciate recs  -Patient with : Dementia c/w On citalopram  -Consider SLP consult, pt with h/o dysphagia  - Given patient's  advanced disease and poor PS, would not be a candidate for systemic therapy. Risks of disease modifying treatment would outweigh any benefit. Would recommend supportive management and/or hospice services.   Please consult Palliative Care for further GOC and symptom management.  -No plans for inpatient chemotherapy  -C/w Supportive care, pain control, Nutrition, PT, DVT ppx  -Rest of care as per primary team  -Patient to followup with  ([facility]) upon discharge  -Oncology will continue to follow with you    Case d/w oncology attending      Lisa WILKS  Oncology Physician Assistant  [facility]ely SILVA/[facility]    Pager [phone] also available on TEAMS as Lisa WILKS    If before 8am/after 5pm or on weekends please page On-call Oncology Fellow   68M, originally from [facility], PMHx CKD, CVA, HTN, DM, dementia, seizure disorder, and locally advanced gastric pyloric mass, suspect adenocarcinoma with surrounding bulky necrotic lymphadenopathy but no evidence of distant metastatic disease. Presents to  [facility] ED from a practice appointment at [facility] due to vomiting, one episode of seizure (witnessed by family on arrival) and lethargy.  Oncology consulted for further recommendations      CTH on 8/17  IMPRESSION:  1. Small basal ganglia and cerebellar are multiple infarctions. Ischemic   white matter disease and atrophy upper range typical for age  2. No lesion strongly suspicious for metastasis. Contrast-enhanced images   required for this evaluation. No acute intracranial hemorrhage is   appreciated.      -Neurology consulted, appreciate recs  -Patient with : Dementia c/w On citalopram  -Consider SLP consult, pt with h/o dysphagia  - Given patient's  advanced disease and poor PS, would not be a candidate for systemic therapy. Risks of disease modifying treatment would outweigh any benefit. Would recommend supportive management and/or hospice services.   Please consult Palliative Care for further GOC and symptom management.  -C/w Supportive care, pain control, Nutrition, PT, DVT ppx  -Rest of care as per primary team, no further oncological recs indicated at this time  -Please feel free to call oncology if any questions    Case d/w oncology attending    Note not complete until signed by ONC attending    Lisa WILKS  Oncology Physician Assistant  [facility]

## 2023-08-18 NOTE — CONSULT NOTE ADULT - SUBJECTIVE AND OBJECTIVE BOX
Patient is a 68y old  Male who presents with a chief complaint of seizure-like activity (17 Aug 2023 23:50)      HPI:  68M with PMHx dementia (years), gastric cancer (not surgical or chemo candidate), seizure d/o, HTN, HLD, DM, CVA x2 (last 2013), CKD, chronic bangura presenting with seizure-like activity from oncology office. Collateral obtained from daughters Ihsan and Gudelia as patient is demented and not answering all questions even with Uzbek . He is oriented x2 and only complaints are yellow nonbloody and non-melanotic diarrhea. Denies pain.     Per collateral from daughters, patient was at onc appt @1130 today and had seizure-like activity and was sent to the hospital. Per daughter Gudelia who witnessed it. patient gets seizures when he is moved from lying to sitting. While at the office he was sitting up and his eyes rolled back and bilateral arms started shaking, lasting 2 minutes. Per daughter his eyes was opened but he was not talking during the time. After 2 minutes he was more awake but trying to close his eyes and somewhat confused.       No urinary/bowel incontinence or tongue biting. Usually his seizures have right hand stiffening, arm shaking and "does something with his eyes". He usually does not have urinary/bowel incontinence. Last seizure was a few days ago at home while getting PT and sitting up. He typically is unresponsive for 30 seconds or one minute. There has not been changes in AEDs recently. He takes divalproex sodium DR 500mg BID. The daughter is unsure of the last visit to neurology. He is prescribed valproic acid from [facility] and unsure of his outpatient neurologist and when last seen by him.     At baseline, patient is bedbound (since March), does not ambulate, eats pureed diet and oriented x1 . Dysphagia has been happening since March admission at [facility] and aspiration PNA was suspected at this time.     Has HHA services. Patient went to rehab after March admission for one month and was able to walk with two people assist. Since coming back from rehab afterwards he had difficulty ambulating on his own and sitting up.    Zoya (daughter): [phone]  Gudelia (daughter): [phone]    Home meds confirmed with Zoya   (17 Aug 2023 20:58)       Oncologic History:       68M, originally from [facility], PMHx CKD, CVA, HTN, DM, dementia, seizure disorder, referred for medical oncology consultation of gastric carcinoma.  ?  CASE SYNOPSIS:  July 2023-admitted at [facility] with lethargy, dyspnea and diarrhea.  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 Bulky local necrotic metastatic adenopathy. No distant metastatic disease. Retained secretion in upper trachea. Trace bilateral pleural effusion.     Disease: gastric cancer    Pathology: adenocarcinoma          ROS: as above     PAST MEDICAL & SURGICAL HISTORY:  DM (diabetes mellitus)      Seizure      CVA (cerebrovascular accident)      HLD (hyperlipidemia)      CKD (chronic kidney disease)      Dementia      Gastric cancer      No significant past surgical history          SOCIAL HISTORY:    FAMILY HISTORY:  No pertinent family history in first degree relatives        MEDICATIONS  (STANDING):  dextrose 5% + sodium chloride 0.45%. 1000 milliLiter(s) (75 mL/Hr) IV Continuous <Continuous>  dextrose 5%. 1000 milliLiter(s) (100 mL/Hr) IV Continuous <Continuous>  dextrose 5%. 1000 milliLiter(s) (50 mL/Hr) IV Continuous <Continuous>  dextrose 50% Injectable 25 Gram(s) IV Push once  dextrose 50% Injectable 12.5 Gram(s) IV Push once  dextrose 50% Injectable 25 Gram(s) IV Push once  glucagon  Injectable 1 milliGRAM(s) IntraMuscular once  heparin   Injectable 5000 Unit(s) SubCutaneous every 12 hours  insulin lispro (ADMELOG) corrective regimen sliding scale   SubCutaneous every 6 hours  valproate sodium  IVPB 500 milliGRAM(s) IV Intermittent two times a day    MEDICATIONS  (PRN):  dextrose Oral Gel 15 Gram(s) Oral once PRN Blood Glucose LESS THAN 70 milliGRAM(s)/deciliter      Allergies    No Known Allergies    Intolerances        Vital Signs Last 24 Hrs  T(C): 36.3 (18 Aug 2023 06:23), Max: 36.7 (17 Aug 2023 17:08)  T(F): 97.3 (18 Aug 2023 06:23), Max: 98.1 (17 Aug 2023 17:08)  HR: 67 (18 Aug 2023 06:23) (67 - 82)  BP: 140/93 (18 Aug 2023 06:23) (134/62 - 159/79)  BP(mean): --  RR: 13 (18 Aug 2023 06:23) (13 - 20)  SpO2: 100% (18 Aug 2023 06:23) (95% - 100%)    Parameters below as of 18 Aug 2023 06:23  Patient On (Oxygen Delivery Method): room air        PHYSICAL EXAM  General: adult in NAD  HEENT: clear oropharynx, anicteric sclera, pink conjunctiva  Neck: supple  CV: normal S1/S2 with no murmur rubs or gallops  Lungs: positive air movement b/l ant lungs, clear to auscultation, no wheezes, no rales  Abdomen: soft non-tender non-distended, no hepatosplenomegaly  Ext: no clubbing cyanosis or edema  Skin: no rashes and no petechiae  Neuro: alert and oriented X 3, none focal    LABS:                          9.2    5.75  )-----------( 147      ( 18 Aug 2023 06:10 )             29.5         Mean Cell Volume : 81.7 fL  Mean Cell Hemoglobin : 25.5 pg  Mean Cell Hemoglobin Concentration : 31.2 gm/dL  Auto Neutrophil # : 3.30 K/uL  Auto Lymphocyte # : 1.35 K/uL  Auto Monocyte # : 0.64 K/uL  Auto Eosinophil # : 0.41 K/uL  Auto Basophil # : 0.02 K/uL  Auto Neutrophil % : 57.5 %  Auto Lymphocyte % : 23.5 %  Auto Monocyte % : 11.1 %  Auto Eosinophil % : 7.1 %  Auto Basophil % : 0.3 %      Serial CBC's  08-18 @ 06:10  Hct-29.5 / Hgb-9.2 / Plat-147 / RBC-3.61 / WBC-5.75  Serial CBC's  08-17 @ 14:31  Hct-27.9 / Hgb-8.6 / Plat-199 / RBC-3.35 / WBC-10.13      08-17    132<L>  |  97<L>  |  18  ----------------------------<  125<H>  4.5   |  28  |  1.95<H>    Ca    9.0      17 Aug 2023 14:31  Mg     2.20     08-17    TPro  6.3  /  Alb  3.3  /  TBili  <0.2  /  DBili  x   /  AST  18  /  ALT  6   /  AlkPhos  67  08-17      PT/INR - ( 17 Aug 2023 14:31 )   PT: 11.4 sec;   INR: 1.01 ratio         PTT - ( 17 Aug 2023 14:31 )  PTT:30.8 sec                RADIOLOGY & ADDITIONAL STUDIES:

## 2023-08-19 LAB
ANION GAP SERPL CALC-SCNC: 5 MMOL/L — LOW (ref 7–14)
BUN SERPL-MCNC: 16 MG/DL — SIGNIFICANT CHANGE UP (ref 7–23)
CALCIUM SERPL-MCNC: 9 MG/DL — SIGNIFICANT CHANGE UP (ref 8.4–10.5)
CHLORIDE SERPL-SCNC: 98 MMOL/L — SIGNIFICANT CHANGE UP (ref 98–107)
CO2 SERPL-SCNC: 29 MMOL/L — SIGNIFICANT CHANGE UP (ref 22–31)
CREAT SERPL-MCNC: 1.52 MG/DL — HIGH (ref 0.5–1.3)
EGFR: 50 ML/MIN/1.73M2 — LOW
GLUCOSE BLDC GLUCOMTR-MCNC: 103 MG/DL — HIGH (ref 70–99)
GLUCOSE BLDC GLUCOMTR-MCNC: 103 MG/DL — HIGH (ref 70–99)
GLUCOSE BLDC GLUCOMTR-MCNC: 105 MG/DL — HIGH (ref 70–99)
GLUCOSE BLDC GLUCOMTR-MCNC: 89 MG/DL — SIGNIFICANT CHANGE UP (ref 70–99)
GLUCOSE SERPL-MCNC: 97 MG/DL — SIGNIFICANT CHANGE UP (ref 70–99)
HCT VFR BLD CALC: 28.4 % — LOW (ref 39–50)
HGB BLD-MCNC: 8.8 G/DL — LOW (ref 13–17)
MAGNESIUM SERPL-MCNC: 1.9 MG/DL — SIGNIFICANT CHANGE UP (ref 1.6–2.6)
MCHC RBC-ENTMCNC: 25.3 PG — LOW (ref 27–34)
MCHC RBC-ENTMCNC: 31 GM/DL — LOW (ref 32–36)
MCV RBC AUTO: 81.6 FL — SIGNIFICANT CHANGE UP (ref 80–100)
NRBC # BLD: 0 /100 WBCS — SIGNIFICANT CHANGE UP (ref 0–0)
NRBC # FLD: 0 K/UL — SIGNIFICANT CHANGE UP (ref 0–0)
PHOSPHATE SERPL-MCNC: 3.3 MG/DL — SIGNIFICANT CHANGE UP (ref 2.5–4.5)
PLATELET # BLD AUTO: 181 K/UL — SIGNIFICANT CHANGE UP (ref 150–400)
POTASSIUM SERPL-MCNC: 4.4 MMOL/L — SIGNIFICANT CHANGE UP (ref 3.5–5.3)
POTASSIUM SERPL-SCNC: 4.4 MMOL/L — SIGNIFICANT CHANGE UP (ref 3.5–5.3)
PROCALCITONIN SERPL-MCNC: 0.05 NG/ML — SIGNIFICANT CHANGE UP (ref 0.02–0.1)
RBC # BLD: 3.48 M/UL — LOW (ref 4.2–5.8)
RBC # FLD: 18 % — HIGH (ref 10.3–14.5)
SODIUM SERPL-SCNC: 132 MMOL/L — LOW (ref 135–145)
WBC # BLD: 5.73 K/UL — SIGNIFICANT CHANGE UP (ref 3.8–10.5)
WBC # FLD AUTO: 5.73 K/UL — SIGNIFICANT CHANGE UP (ref 3.8–10.5)

## 2023-08-19 PROCEDURE — 99232 SBSQ HOSP IP/OBS MODERATE 35: CPT

## 2023-08-19 PROCEDURE — 99233 SBSQ HOSP IP/OBS HIGH 50: CPT

## 2023-08-19 PROCEDURE — 95720 EEG PHY/QHP EA INCR W/VEEG: CPT

## 2023-08-19 RX ORDER — VALPROIC ACID (AS SODIUM SALT) 250 MG/5ML
750 SOLUTION, ORAL ORAL
Refills: 0 | Status: DISCONTINUED | OUTPATIENT
Start: 2023-08-19 | End: 2023-08-21

## 2023-08-19 RX ADMIN — SODIUM CHLORIDE 75 MILLILITER(S): 9 INJECTION, SOLUTION INTRAVENOUS at 21:54

## 2023-08-19 RX ADMIN — Medication 55 MILLIGRAM(S): at 05:42

## 2023-08-19 RX ADMIN — HEPARIN SODIUM 5000 UNIT(S): 5000 INJECTION INTRAVENOUS; SUBCUTANEOUS at 05:41

## 2023-08-19 RX ADMIN — SODIUM CHLORIDE 75 MILLILITER(S): 9 INJECTION, SOLUTION INTRAVENOUS at 02:41

## 2023-08-19 RX ADMIN — Medication 57.5 MILLIGRAM(S): at 18:19

## 2023-08-19 RX ADMIN — SODIUM CHLORIDE 75 MILLILITER(S): 9 INJECTION, SOLUTION INTRAVENOUS at 18:20

## 2023-08-19 RX ADMIN — HEPARIN SODIUM 5000 UNIT(S): 5000 INJECTION INTRAVENOUS; SUBCUTANEOUS at 18:20

## 2023-08-19 NOTE — EEG REPORT - NS EEG TEXT BOX
[facility]   COMPREHENSIVE EPILEPSY CENTER   REPORT OF CONTINUOUS VIDEO EEG     [facility]: [address], [place] [address], Ph#: [phone]  [facility]: [address] [place] [address], Ph#: [phone]  [facility]: [address] [place] [address], Ph#: [phone]    Patient Name: KERA SCHWARTZ  Age and : 68y ([birth year])  MRN #: [id number]  Location: [place] [address] A  Referring Physician: Carlos Rogers    Start Time/Date: 1030AM 2023  End Time/Date: 08:00 on 23  Duration: 21H 30min    _____________________________________________________________  STUDY INFORMATION    EEG Recording Technique:  The patient underwent continuous Video-EEG monitoring, using Telemetry System hardware on the XLTek Digital System. EEG and video data were stored on a computer hard drive with important events saved in digital archive files. The material was reviewed by a physician (electroencephalographer / epileptologist) on a daily basis. Sudarshan and seizure detection algorithms were utilized and reviewed. An EEG Technician attended to the patient, and was available throughout daytime work hours.  The epilepsy center neurologist was available in person or on call 24-hours per day.    EEG Placement and Labeling of Electrodes:  The EEG was performed utilizing 20 channel referential EEG connections (coronal over temporal over parasagittal montage) using all standard 10-20 electrode placements with EKG, with additional electrodes placed in the inferior temporal region using the modified 10-10 montage electrode placements for elective admissions, or if deemed necessary. Recording was at a sampling rate of 256 samples per second per channel. Time synchronized digital video recording was done simultaneously with EEG recording. A low light infrared camera was used for low light recording.     _____________________________________________________________  HISTORY    Patient is a 68y old  Male who presents with a chief complaint of seizure-like activity (18 Aug 2023 15:43)      PERTINENT MEDICATION:  MEDICATIONS  (STANDING):  dextrose 5% + sodium chloride 0.45%. 1000 milliLiter(s) (75 mL/Hr) IV Continuous <Continuous>  dextrose 5%. 1000 milliLiter(s) (50 mL/Hr) IV Continuous <Continuous>  dextrose 5%. 1000 milliLiter(s) (100 mL/Hr) IV Continuous <Continuous>  dextrose 50% Injectable 12.5 Gram(s) IV Push once  dextrose 50% Injectable 25 Gram(s) IV Push once  dextrose 50% Injectable 25 Gram(s) IV Push once  glucagon  Injectable 1 milliGRAM(s) IntraMuscular once  heparin   Injectable 5000 Unit(s) SubCutaneous every 12 hours  insulin lispro (ADMELOG) corrective regimen sliding scale   SubCutaneous every 6 hours  valproate sodium  IVPB 500 milliGRAM(s) IV Intermittent two times a day    _____________________________________________________________  STUDY INTERPRETATION    Findings: The background was continuous, spontaneously variable and reactive. During wakefulness, the posterior dominant rhythm consisted of symmetric 8Hz activity, with amplitude to 30 uV, that attenuated to eye opening.  Low amplitude frontal beta was noted in wakefulness.    Background Slowing:  Excess diffuse polymorphic delta slowing.    Focal Slowing:   None were present.    Sleep Background:  Drowsiness was characterized by fragmentation, attenuation, and slowing of the background activity.    Sleep was characterized by the presence of vertex waves, symmetric sleep spindles and K-complexes.    Other Non-Epileptiform Findings:  None were present.    Interictal Epileptiform Activity:   None were present.    Events:  Clinical events: None recorded.  Seizures: None recorded.    Activation Procedures:   Hyperventilation was not performed.    Photic stimulation was not performed.     Artifacts:  Intermittent myogenic and movement artifacts were noted.  _____________________________________________________________  EEG SUMMARY/CLASSIFICATION    Abnormal EEG in the awake, drowsy and asleep states.  - Mild generalized slowing.    _____________________________________________________________  EEG IMPRESSION/CLINICAL CORRELATE    Abnormal EEG study.  Mild nonspecific diffuse or multifocal cerebral dysfunction.   No epileptiform pattern or seizure seen.    **In absence of additional clinical concerns, recommend consideration for discontinuation of current EEG study with reconnection in future if warranted.    Talha Silva MD  EEG/Epilepsy Attending

## 2023-08-19 NOTE — SWALLOW BEDSIDE ASSESSMENT ADULT - SWALLOW EVAL: DIAGNOSIS
Patient was given ice chips presentations onto the lips to increase oral sensory awareness and stimulation to the oral cavity. Pt. with intermittent attempts to retrieve/suck ice chip with subsequent absent lingual response. Pt. was also given moderately thick liquid coated tsp. Pt. with attempt to retrieve tsp. via opening labial seal followed by absent utensil stripping. Therefore the pharyngeal stage could not be adequately assessed due to the severity of oral deficits and pt.'s inability to maintain level of alert state at this time.

## 2023-08-19 NOTE — SWALLOW BEDSIDE ASSESSMENT ADULT - COMMENTS
Pt. asleep in bed in NAD upon SLP arrival. Pt. intermittently opening eyes upon sternum rub. Pt. in lethargic state.     Per chart:   "68M with PMHx dementia (years), gastric cancer (not surgical or chemo candidate), seizure d/o, HTN, HLD, DM, CVA x2 (last 2013), CKD, chronic bangura presenting with seizure-like activity from oncology office."    -WBC WNL per lab review   -CXR: "No acute pulmonary disease."  -CT HEAD: "1. Small basal ganglia and cerebellar are multiple infarctions. Ischemic white matter disease and atrophy upper range typical for age. 2. No lesion strongly suspicious for metastasis. Contrast-enhanced images required for this evaluation. No acute intracranial hemorrhage is appreciated."    Pt. known to service from previous admission 7/2023 - see chart for reports and recommendations.     Pt. unable to provide report of swallow function 2/2 mental status. Pt. unable to follow commands. Intermittently opening eyes with maximal verbal and tactile stimuli.

## 2023-08-19 NOTE — SWALLOW BEDSIDE ASSESSMENT ADULT - ADDITIONAL RECOMMENDATIONS
1). Reconsult when pt. becomes medically optimized to reassess for an oral diet program   2). This service to f/u as scheduling permits

## 2023-08-20 LAB
ANION GAP SERPL CALC-SCNC: 8 MMOL/L — SIGNIFICANT CHANGE UP (ref 7–14)
BUN SERPL-MCNC: 26 MG/DL — HIGH (ref 7–23)
CALCIUM SERPL-MCNC: 8.5 MG/DL — SIGNIFICANT CHANGE UP (ref 8.4–10.5)
CHLORIDE SERPL-SCNC: 100 MMOL/L — SIGNIFICANT CHANGE UP (ref 98–107)
CO2 SERPL-SCNC: 25 MMOL/L — SIGNIFICANT CHANGE UP (ref 22–31)
CREAT SERPL-MCNC: 1.58 MG/DL — HIGH (ref 0.5–1.3)
EGFR: 48 ML/MIN/1.73M2 — LOW
GLUCOSE BLDC GLUCOMTR-MCNC: 100 MG/DL — HIGH (ref 70–99)
GLUCOSE BLDC GLUCOMTR-MCNC: 101 MG/DL — HIGH (ref 70–99)
GLUCOSE BLDC GLUCOMTR-MCNC: 106 MG/DL — HIGH (ref 70–99)
GLUCOSE BLDC GLUCOMTR-MCNC: 108 MG/DL — HIGH (ref 70–99)
GLUCOSE BLDC GLUCOMTR-MCNC: 109 MG/DL — HIGH (ref 70–99)
GLUCOSE SERPL-MCNC: 89 MG/DL — SIGNIFICANT CHANGE UP (ref 70–99)
HCT VFR BLD CALC: 25.9 % — LOW (ref 39–50)
HGB BLD-MCNC: 7.9 G/DL — LOW (ref 13–17)
MAGNESIUM SERPL-MCNC: 1.8 MG/DL — SIGNIFICANT CHANGE UP (ref 1.6–2.6)
MCHC RBC-ENTMCNC: 25.6 PG — LOW (ref 27–34)
MCHC RBC-ENTMCNC: 30.5 GM/DL — LOW (ref 32–36)
MCV RBC AUTO: 83.8 FL — SIGNIFICANT CHANGE UP (ref 80–100)
NRBC # BLD: 0 /100 WBCS — SIGNIFICANT CHANGE UP (ref 0–0)
NRBC # FLD: 0 K/UL — SIGNIFICANT CHANGE UP (ref 0–0)
PHOSPHATE SERPL-MCNC: 3.2 MG/DL — SIGNIFICANT CHANGE UP (ref 2.5–4.5)
PLATELET # BLD AUTO: 177 K/UL — SIGNIFICANT CHANGE UP (ref 150–400)
POTASSIUM SERPL-MCNC: 4 MMOL/L — SIGNIFICANT CHANGE UP (ref 3.5–5.3)
POTASSIUM SERPL-SCNC: 4 MMOL/L — SIGNIFICANT CHANGE UP (ref 3.5–5.3)
RBC # BLD: 3.09 M/UL — LOW (ref 4.2–5.8)
RBC # FLD: 18 % — HIGH (ref 10.3–14.5)
SODIUM SERPL-SCNC: 133 MMOL/L — LOW (ref 135–145)
WBC # BLD: 5.04 K/UL — SIGNIFICANT CHANGE UP (ref 3.8–10.5)
WBC # FLD AUTO: 5.04 K/UL — SIGNIFICANT CHANGE UP (ref 3.8–10.5)

## 2023-08-20 PROCEDURE — 95718 EEG PHYS/QHP 2-12 HR W/VEEG: CPT

## 2023-08-20 PROCEDURE — 99232 SBSQ HOSP IP/OBS MODERATE 35: CPT

## 2023-08-20 RX ADMIN — HEPARIN SODIUM 5000 UNIT(S): 5000 INJECTION INTRAVENOUS; SUBCUTANEOUS at 17:29

## 2023-08-20 RX ADMIN — Medication 57.5 MILLIGRAM(S): at 05:29

## 2023-08-20 RX ADMIN — SODIUM CHLORIDE 75 MILLILITER(S): 9 INJECTION, SOLUTION INTRAVENOUS at 20:24

## 2023-08-20 RX ADMIN — Medication 57.5 MILLIGRAM(S): at 18:01

## 2023-08-20 RX ADMIN — HEPARIN SODIUM 5000 UNIT(S): 5000 INJECTION INTRAVENOUS; SUBCUTANEOUS at 05:29

## 2023-08-20 NOTE — EEG REPORT - NS EEG TEXT BOX
[facility]   COMPREHENSIVE EPILEPSY CENTER   REPORT OF CONTINUOUS VIDEO EEG     [facility]: [address], [place] [address], Ph#: [phone]  [facility]: [address] [place] [address], Ph#: [phone]  [facility]: [address] [place] [address], Ph#: [phone]    Patient Name: KERA SCHWARTZ  Age and : 68y (59)  MRN #: [id number]  Location: [place] [address] A  Referring Physician: Carlos Rogers    Start Time/Date: 0800 on 2023  End Time/Date: 08:00 on 23  Duration: 24H   _____________________________________________________________  STUDY INFORMATION    EEG Recording Technique:  The patient underwent continuous Video-EEG monitoring, using Telemetry System hardware on the XLTek Digital System. EEG and video data were stored on a computer hard drive with important events saved in digital archive files. The material was reviewed by a physician (electroencephalographer / epileptologist) on a daily basis. Sudarshan and seizure detection algorithms were utilized and reviewed. An EEG Technician attended to the patient, and was available throughout daytime work hours.  The epilepsy center neurologist was available in person or on call 24-hours per day.    EEG Placement and Labeling of Electrodes:  The EEG was performed utilizing 20 channel referential EEG connections (coronal over temporal over parasagittal montage) using all standard 10-20 electrode placements with EKG, with additional electrodes placed in the inferior temporal region using the modified 10-10 montage electrode placements for elective admissions, or if deemed necessary. Recording was at a sampling rate of 256 samples per second per channel. Time synchronized digital video recording was done simultaneously with EEG recording. A low light infrared camera was used for low light recording.     _____________________________________________________________  HISTORY    Patient is a 68y old  Male who presents with a chief complaint of seizure-like activity (18 Aug 2023 15:43)      PERTINENT MEDICATION:  MEDICATIONS  (STANDING):  dextrose 5% + sodium chloride 0.45%. 1000 milliLiter(s) (75 mL/Hr) IV Continuous <Continuous>  dextrose 5%. 1000 milliLiter(s) (50 mL/Hr) IV Continuous <Continuous>  dextrose 5%. 1000 milliLiter(s) (100 mL/Hr) IV Continuous <Continuous>  dextrose 50% Injectable 12.5 Gram(s) IV Push once  dextrose 50% Injectable 25 Gram(s) IV Push once  dextrose 50% Injectable 25 Gram(s) IV Push once  glucagon  Injectable 1 milliGRAM(s) IntraMuscular once  heparin   Injectable 5000 Unit(s) SubCutaneous every 12 hours  insulin lispro (ADMELOG) corrective regimen sliding scale   SubCutaneous every 6 hours  valproate sodium  IVPB 500 milliGRAM(s) IV Intermittent two times a day    _____________________________________________________________  STUDY INTERPRETATION    Findings: The background was continuous, spontaneously variable and reactive. During wakefulness, the posterior dominant rhythm consisted of symmetric 8Hz activity, with amplitude to 30 uV, that attenuated to eye opening.  Low amplitude frontal beta was noted in wakefulness.    Background Slowing:  Excess diffuse polymorphic delta slowing.    Focal Slowing:   None were present.    Sleep Background:  Drowsiness was characterized by fragmentation, attenuation, and slowing of the background activity.    Sleep was characterized by the presence of vertex waves, symmetric sleep spindles and K-complexes.    Other Non-Epileptiform Findings:  None were present.    Interictal Epileptiform Activity:   None were present.    Events:  Clinical events: None recorded.  Seizures: None recorded.    Activation Procedures:   Hyperventilation was not performed.    Photic stimulation was not performed.     Artifacts:  Intermittent myogenic and movement artifacts were noted.  _____________________________________________________________  EEG SUMMARY/CLASSIFICATION    Abnormal EEG in the awake, drowsy and asleep states.  - Mild generalized slowing.    _____________________________________________________________  EEG IMPRESSION/CLINICAL CORRELATE    Abnormal EEG study.  Mild nonspecific diffuse or multifocal cerebral dysfunction.   No epileptiform pattern or seizure seen.    **In absence of additional clinical concerns, recommend consideration for discontinuation of current EEG study with reconnection in future if warranted.    Talha Silva MD  EEG/Epilepsy Attending  [facility]   COMPREHENSIVE EPILEPSY CENTER   REPORT OF CONTINUOUS VIDEO EEG     [facility]: [address], [place] [address], Ph#: [phone]  [facility]: [address] [place] [address], Ph#: [phone]  [facility]: [address] [place] [address], Ph#: [phone]    Patient Name: KERA SCHWARTZ  Age and : 68y (59)  MRN #: [id number]  Location: [place] [address] A  Referring Physician: Carlos Rogers    Start Time/Date: 0800 on 2023  End Time/Date: 12:00 on 23  Duration: 28H   _____________________________________________________________  STUDY INFORMATION    EEG Recording Technique:  The patient underwent continuous Video-EEG monitoring, using Telemetry System hardware on the XLTek Digital System. EEG and video data were stored on a computer hard drive with important events saved in digital archive files. The material was reviewed by a physician (electroencephalographer / epileptologist) on a daily basis. Sudarshan and seizure detection algorithms were utilized and reviewed. An EEG Technician attended to the patient, and was available throughout daytime work hours.  The epilepsy center neurologist was available in person or on call 24-hours per day.    EEG Placement and Labeling of Electrodes:  The EEG was performed utilizing 20 channel referential EEG connections (coronal over temporal over parasagittal montage) using all standard 10-20 electrode placements with EKG, with additional electrodes placed in the inferior temporal region using the modified 10-10 montage electrode placements for elective admissions, or if deemed necessary. Recording was at a sampling rate of 256 samples per second per channel. Time synchronized digital video recording was done simultaneously with EEG recording. A low light infrared camera was used for low light recording.     _____________________________________________________________  HISTORY    Patient is a 68y old  Male who presents with a chief complaint of seizure-like activity (18 Aug 2023 15:43)      PERTINENT MEDICATION:  MEDICATIONS  (STANDING):  dextrose 5% + sodium chloride 0.45%. 1000 milliLiter(s) (75 mL/Hr) IV Continuous <Continuous>  dextrose 5%. 1000 milliLiter(s) (50 mL/Hr) IV Continuous <Continuous>  dextrose 5%. 1000 milliLiter(s) (100 mL/Hr) IV Continuous <Continuous>  dextrose 50% Injectable 12.5 Gram(s) IV Push once  dextrose 50% Injectable 25 Gram(s) IV Push once  dextrose 50% Injectable 25 Gram(s) IV Push once  glucagon  Injectable 1 milliGRAM(s) IntraMuscular once  heparin   Injectable 5000 Unit(s) SubCutaneous every 12 hours  insulin lispro (ADMELOG) corrective regimen sliding scale   SubCutaneous every 6 hours  valproate sodium  IVPB 500 milliGRAM(s) IV Intermittent two times a day    _____________________________________________________________  STUDY INTERPRETATION    Findings: The background was continuous, spontaneously variable and reactive. During wakefulness, the posterior dominant rhythm consisted of symmetric 8Hz activity, with amplitude to 30 uV, that attenuated to eye opening.  Low amplitude frontal beta was noted in wakefulness.    Background Slowing:  Excess diffuse polymorphic delta slowing.    Focal Slowing:   None were present.    Sleep Background:  Drowsiness was characterized by fragmentation, attenuation, and slowing of the background activity.    Sleep was characterized by the presence of vertex waves, symmetric sleep spindles and K-complexes.    Other Non-Epileptiform Findings:  None were present.    Interictal Epileptiform Activity:   None were present.    Events:  Clinical events: None recorded.  Seizures: None recorded.    Activation Procedures:   Hyperventilation was not performed.    Photic stimulation was not performed.     Artifacts:  Intermittent myogenic and movement artifacts were noted.  _____________________________________________________________  EEG SUMMARY/CLASSIFICATION    Abnormal EEG in the awake, drowsy and asleep states.  - Mild generalized slowing.    _____________________________________________________________  EEG IMPRESSION/CLINICAL CORRELATE    Abnormal EEG study.  Mild nonspecific diffuse or multifocal cerebral dysfunction.   No epileptiform pattern or seizure seen.    **In absence of additional clinical concerns, recommend consideration for discontinuation of current EEG study with reconnection in future if warranted.    Talha Silva MD  EEG/Epilepsy Attending

## 2023-08-21 LAB
ANION GAP SERPL CALC-SCNC: 5 MMOL/L — LOW (ref 7–14)
BASOPHILS # BLD AUTO: 0.02 K/UL — SIGNIFICANT CHANGE UP (ref 0–0.2)
BASOPHILS NFR BLD AUTO: 0.4 % — SIGNIFICANT CHANGE UP (ref 0–2)
BLD GP AB SCN SERPL QL: NEGATIVE — SIGNIFICANT CHANGE UP
BUN SERPL-MCNC: 27 MG/DL — HIGH (ref 7–23)
CALCIUM SERPL-MCNC: 8.6 MG/DL — SIGNIFICANT CHANGE UP (ref 8.4–10.5)
CHLORIDE SERPL-SCNC: 102 MMOL/L — SIGNIFICANT CHANGE UP (ref 98–107)
CO2 SERPL-SCNC: 26 MMOL/L — SIGNIFICANT CHANGE UP (ref 22–31)
CREAT SERPL-MCNC: 1.61 MG/DL — HIGH (ref 0.5–1.3)
EGFR: 47 ML/MIN/1.73M2 — LOW
EOSINOPHIL # BLD AUTO: 0.18 K/UL — SIGNIFICANT CHANGE UP (ref 0–0.5)
EOSINOPHIL NFR BLD AUTO: 3.5 % — SIGNIFICANT CHANGE UP (ref 0–6)
GLUCOSE BLDC GLUCOMTR-MCNC: 128 MG/DL — HIGH (ref 70–99)
GLUCOSE BLDC GLUCOMTR-MCNC: 183 MG/DL — HIGH (ref 70–99)
GLUCOSE BLDC GLUCOMTR-MCNC: 92 MG/DL — SIGNIFICANT CHANGE UP (ref 70–99)
GLUCOSE BLDC GLUCOMTR-MCNC: 97 MG/DL — SIGNIFICANT CHANGE UP (ref 70–99)
GLUCOSE SERPL-MCNC: 92 MG/DL — SIGNIFICANT CHANGE UP (ref 70–99)
HCT VFR BLD CALC: 22.3 % — LOW (ref 39–50)
HCT VFR BLD CALC: 23 % — LOW (ref 39–50)
HGB BLD-MCNC: 6.9 G/DL — CRITICAL LOW (ref 13–17)
HGB BLD-MCNC: 7.3 G/DL — LOW (ref 13–17)
IANC: 2.6 K/UL — SIGNIFICANT CHANGE UP (ref 1.8–7.4)
IMM GRANULOCYTES NFR BLD AUTO: 0.6 % — SIGNIFICANT CHANGE UP (ref 0–0.9)
LYMPHOCYTES # BLD AUTO: 1.68 K/UL — SIGNIFICANT CHANGE UP (ref 1–3.3)
LYMPHOCYTES # BLD AUTO: 32.7 % — SIGNIFICANT CHANGE UP (ref 13–44)
MAGNESIUM SERPL-MCNC: 1.7 MG/DL — SIGNIFICANT CHANGE UP (ref 1.6–2.6)
MCHC RBC-ENTMCNC: 25.8 PG — LOW (ref 27–34)
MCHC RBC-ENTMCNC: 26.4 PG — LOW (ref 27–34)
MCHC RBC-ENTMCNC: 30.9 GM/DL — LOW (ref 32–36)
MCHC RBC-ENTMCNC: 31.7 GM/DL — LOW (ref 32–36)
MCV RBC AUTO: 83.3 FL — SIGNIFICANT CHANGE UP (ref 80–100)
MCV RBC AUTO: 83.5 FL — SIGNIFICANT CHANGE UP (ref 80–100)
MONOCYTES # BLD AUTO: 0.62 K/UL — SIGNIFICANT CHANGE UP (ref 0–0.9)
MONOCYTES NFR BLD AUTO: 12.1 % — SIGNIFICANT CHANGE UP (ref 2–14)
NEUTROPHILS # BLD AUTO: 2.6 K/UL — SIGNIFICANT CHANGE UP (ref 1.8–7.4)
NEUTROPHILS NFR BLD AUTO: 50.7 % — SIGNIFICANT CHANGE UP (ref 43–77)
NRBC # BLD: 0 /100 WBCS — SIGNIFICANT CHANGE UP (ref 0–0)
NRBC # BLD: 0 /100 WBCS — SIGNIFICANT CHANGE UP (ref 0–0)
NRBC # FLD: 0 K/UL — SIGNIFICANT CHANGE UP (ref 0–0)
NRBC # FLD: 0 K/UL — SIGNIFICANT CHANGE UP (ref 0–0)
PHOSPHATE SERPL-MCNC: 3 MG/DL — SIGNIFICANT CHANGE UP (ref 2.5–4.5)
PLATELET # BLD AUTO: 150 K/UL — SIGNIFICANT CHANGE UP (ref 150–400)
PLATELET # BLD AUTO: 162 K/UL — SIGNIFICANT CHANGE UP (ref 150–400)
POTASSIUM SERPL-MCNC: 4.1 MMOL/L — SIGNIFICANT CHANGE UP (ref 3.5–5.3)
POTASSIUM SERPL-SCNC: 4.1 MMOL/L — SIGNIFICANT CHANGE UP (ref 3.5–5.3)
RBC # BLD: 2.67 M/UL — LOW (ref 4.2–5.8)
RBC # BLD: 2.76 M/UL — LOW (ref 4.2–5.8)
RBC # FLD: 17.9 % — HIGH (ref 10.3–14.5)
RBC # FLD: 18.1 % — HIGH (ref 10.3–14.5)
RH IG SCN BLD-IMP: NEGATIVE — SIGNIFICANT CHANGE UP
SODIUM SERPL-SCNC: 133 MMOL/L — LOW (ref 135–145)
WBC # BLD: 5.13 K/UL — SIGNIFICANT CHANGE UP (ref 3.8–10.5)
WBC # BLD: 5.55 K/UL — SIGNIFICANT CHANGE UP (ref 3.8–10.5)
WBC # FLD AUTO: 5.13 K/UL — SIGNIFICANT CHANGE UP (ref 3.8–10.5)
WBC # FLD AUTO: 5.55 K/UL — SIGNIFICANT CHANGE UP (ref 3.8–10.5)

## 2023-08-21 PROCEDURE — 99233 SBSQ HOSP IP/OBS HIGH 50: CPT

## 2023-08-21 PROCEDURE — 99232 SBSQ HOSP IP/OBS MODERATE 35: CPT

## 2023-08-21 PROCEDURE — 99497 ADVNCD CARE PLAN 30 MIN: CPT | Mod: 25

## 2023-08-21 RX ORDER — HALOPERIDOL DECANOATE 100 MG/ML
1 INJECTION INTRAMUSCULAR
Qty: 12 | Refills: 0
Start: 2023-08-21 | End: 2023-08-23

## 2023-08-21 RX ORDER — TAMSULOSIN HYDROCHLORIDE 0.4 MG/1
0.4 CAPSULE ORAL AT BEDTIME
Refills: 0 | Status: DISCONTINUED | OUTPATIENT
Start: 2023-08-21 | End: 2023-08-22

## 2023-08-21 RX ORDER — PROCHLORPERAZINE MALEATE 5 MG
1 TABLET ORAL
Qty: 6 | Refills: 0
Start: 2023-08-21 | End: 2023-08-23

## 2023-08-21 RX ORDER — FAMOTIDINE 10 MG/ML
20 INJECTION INTRAVENOUS DAILY
Refills: 0 | Status: DISCONTINUED | OUTPATIENT
Start: 2023-08-21 | End: 2023-08-22

## 2023-08-21 RX ORDER — FERROUS SULFATE 325(65) MG
300 TABLET ORAL DAILY
Refills: 0 | Status: DISCONTINUED | OUTPATIENT
Start: 2023-08-21 | End: 2023-08-22

## 2023-08-21 RX ORDER — ASPIRIN/CALCIUM CARB/MAGNESIUM 324 MG
81 TABLET ORAL DAILY
Refills: 0 | Status: DISCONTINUED | OUTPATIENT
Start: 2023-08-21 | End: 2023-08-22

## 2023-08-21 RX ORDER — ACETAMINOPHEN 500 MG
1 TABLET ORAL
Qty: 12 | Refills: 0
Start: 2023-08-21 | End: 2023-08-23

## 2023-08-21 RX ORDER — DIVALPROEX SODIUM 500 MG/1
750 TABLET, DELAYED RELEASE ORAL
Refills: 0 | Status: DISCONTINUED | OUTPATIENT
Start: 2023-08-21 | End: 2023-08-22

## 2023-08-21 RX ORDER — MORPHINE SULFATE 50 MG/1
0.25 CAPSULE, EXTENDED RELEASE ORAL
Qty: 3 | Refills: 0
Start: 2023-08-21 | End: 2023-08-23

## 2023-08-21 RX ORDER — HYOSCYAMINE SULFATE 0.13 MG
1 TABLET ORAL
Qty: 12 | Refills: 0
Start: 2023-08-21 | End: 2023-08-23

## 2023-08-21 RX ORDER — CHLORHEXIDINE GLUCONATE 213 G/1000ML
1 SOLUTION TOPICAL DAILY
Refills: 0 | Status: DISCONTINUED | OUTPATIENT
Start: 2023-08-21 | End: 2023-08-22

## 2023-08-21 RX ORDER — FINASTERIDE 5 MG/1
5 TABLET, FILM COATED ORAL DAILY
Refills: 0 | Status: DISCONTINUED | OUTPATIENT
Start: 2023-08-21 | End: 2023-08-22

## 2023-08-21 RX ORDER — LANOLIN ALCOHOL/MO/W.PET/CERES
3 CREAM (GRAM) TOPICAL AT BEDTIME
Refills: 0 | Status: DISCONTINUED | OUTPATIENT
Start: 2023-08-21 | End: 2023-08-22

## 2023-08-21 RX ORDER — CITALOPRAM 10 MG/1
10 TABLET, FILM COATED ORAL DAILY
Refills: 0 | Status: DISCONTINUED | OUTPATIENT
Start: 2023-08-21 | End: 2023-08-22

## 2023-08-21 RX ADMIN — HEPARIN SODIUM 5000 UNIT(S): 5000 INJECTION INTRAVENOUS; SUBCUTANEOUS at 17:58

## 2023-08-21 RX ADMIN — HEPARIN SODIUM 5000 UNIT(S): 5000 INJECTION INTRAVENOUS; SUBCUTANEOUS at 05:13

## 2023-08-21 RX ADMIN — DIVALPROEX SODIUM 750 MILLIGRAM(S): 500 TABLET, DELAYED RELEASE ORAL at 16:15

## 2023-08-21 RX ADMIN — Medication 57.5 MILLIGRAM(S): at 05:22

## 2023-08-21 RX ADMIN — SODIUM CHLORIDE 75 MILLILITER(S): 9 INJECTION, SOLUTION INTRAVENOUS at 21:33

## 2023-08-21 RX ADMIN — TAMSULOSIN HYDROCHLORIDE 0.4 MILLIGRAM(S): 0.4 CAPSULE ORAL at 21:33

## 2023-08-21 RX ADMIN — SODIUM CHLORIDE 75 MILLILITER(S): 9 INJECTION, SOLUTION INTRAVENOUS at 05:13

## 2023-08-21 RX ADMIN — Medication 1: at 17:57

## 2023-08-21 NOTE — GOALS OF CARE CONVERSATION - ADVANCED CARE PLANNING - CONVERSATION DETAILS
Called son Marv, but sister Gudelia picked up phone.     Spoke to daughter Gudelia who states family is aware that patient is not a candidate for DMT at this time given his performance status and co-morbidities. She understands the recommendation for hospice at this time.   Educated family on the philosophy of hospice care and explained the various settings and criteria in which hospice can be delivered (home vs. nursing home vs. inpatient hospice). Discussed the services provided under hospice services emphasizing the goal of elevating patient's quality of life and optimizing symptom management and avoiding unnecessary future hospitalizations. In addition to symptom management expertise, hospice provides supportive counseling services, nutritional support and chaplaincy services.  Daughter is amenable to hospice referral for further information about their services.     Discussed patient currently NPO. Daughter shares patient previously tolerating puree diet at home. Discussed concerns of dysphagia given dementia and malignancy. Recommended against feeding tube placement in setting of advanced malignancy and dementia. Recommended pleasure feeds understanding risk of aspiration. Gudelia states family would not want feeding tube.     Advanced care planning extensively discussed. Reviewed the risks and benefits of resuscitative measures including cardiopulmonary resuscitation and mechanical ventilation at the end of life in the setting of malignancy.     Encouraged Gudelia to have further discussions with family regarding advanced care planning for code status preferences and dysaphia.
Patient failed S&S has been NPO. Palliative following and spoke with daughter on Friday who at that time did not want a feeding tube however did not want to decide about pleasure feeds until the formal S&s evaluation. Spoke with daughter today (Chas [phone]) who is aware that patient failed formal S&s evaluation and now decided on pleasure feeds knowing the risk of possible aspiration. Diet changed to Pureed. Depakote changed to sprinkles (confirmed with pharmacy this is a 1:1 conversion from IV). Will need to ensure pt is able to take the sprinkles prior to home hospice accepting patient.
Spoke to daughter Ihsan at bedside. Ihsan confirms earlier discussion that family defer feeding tube and prefer pleasure feeds understanding risk of aspiration.     Advanced care planning extensively discussed given malignancy and dementia. Reviewed the risks and benefits of resuscitative measures including cardiopulmonary resuscitation and mechanical ventilation at the end of life in the setting of advanced malignancy/illness. Discussed that resuscitative measure interventions may pose more burden than benefit    Ihsan states family has had discussions about advanced directives and family wishes for patient to remain FULL CODE.  Family is arranging for him to be home with hospice services tomorrow.

## 2023-08-21 NOTE — CHART NOTE - NSCHARTNOTEFT_GEN_A_CORE
[facility] NIGHT MEDICINE COVERAGE    Pt seen and examined for anemia. Labs personally reviewed, showing a Hgb of 6.9. VSS with no active signs of bleeding. Pt NAD and a+ox0, abdomen soft, NT, ND, no CVA tenderness and slightly hypoactive bowel sounds. Will order STAT CBC to confirm and T&S if need for blood transfusion. RN made aware. Will continue to monitor.     Tereso Chinchilla PA-C  Medicine [id number] [facility] NIGHT MEDICINE COVERAGE    Pt seen and examined for anemia. Labs personally reviewed, showing a Hgb of 6.9. VSS with no active signs of bleeding. Pt NAD and a+ox0, abdomen soft, NT, ND, no CVA tenderness and slightly hypoactive bowel sounds. Will order STAT CBC to confirm and T&S if need for blood transfusion. RN made aware. Will continue to monitor.     Tereso Chinchilla PA-C  Medicine [phone]    Addendum:  Repeat Hgb 7.3. Will hold off on blood transfusion and continue to monitor.

## 2023-08-22 ENCOUNTER — TRANSCRIPTION ENCOUNTER (OUTPATIENT)
Age: 68
End: 2023-08-22

## 2023-08-22 VITALS
DIASTOLIC BLOOD PRESSURE: 67 MMHG | HEART RATE: 65 BPM | OXYGEN SATURATION: 99 % | TEMPERATURE: 97 F | SYSTOLIC BLOOD PRESSURE: 111 MMHG | RESPIRATION RATE: 16 BRPM

## 2023-08-22 LAB
ANION GAP SERPL CALC-SCNC: 6 MMOL/L — LOW (ref 7–14)
BUN SERPL-MCNC: 21 MG/DL — SIGNIFICANT CHANGE UP (ref 7–23)
CALCIUM SERPL-MCNC: 8.5 MG/DL — SIGNIFICANT CHANGE UP (ref 8.4–10.5)
CHLORIDE SERPL-SCNC: 101 MMOL/L — SIGNIFICANT CHANGE UP (ref 98–107)
CO2 SERPL-SCNC: 26 MMOL/L — SIGNIFICANT CHANGE UP (ref 22–31)
CREAT SERPL-MCNC: 1.39 MG/DL — HIGH (ref 0.5–1.3)
EGFR: 56 ML/MIN/1.73M2 — LOW
GLUCOSE BLDC GLUCOMTR-MCNC: 102 MG/DL — HIGH (ref 70–99)
GLUCOSE BLDC GLUCOMTR-MCNC: 118 MG/DL — HIGH (ref 70–99)
GLUCOSE SERPL-MCNC: 100 MG/DL — HIGH (ref 70–99)
HCT VFR BLD CALC: 23.2 % — LOW (ref 39–50)
HGB BLD-MCNC: 7.2 G/DL — LOW (ref 13–17)
MAGNESIUM SERPL-MCNC: 1.7 MG/DL — SIGNIFICANT CHANGE UP (ref 1.6–2.6)
MCHC RBC-ENTMCNC: 25.8 PG — LOW (ref 27–34)
MCHC RBC-ENTMCNC: 31 GM/DL — LOW (ref 32–36)
MCV RBC AUTO: 83.2 FL — SIGNIFICANT CHANGE UP (ref 80–100)
MRSA PCR RESULT.: SIGNIFICANT CHANGE UP
NRBC # BLD: 0 /100 WBCS — SIGNIFICANT CHANGE UP (ref 0–0)
NRBC # FLD: 0 K/UL — SIGNIFICANT CHANGE UP (ref 0–0)
PHOSPHATE SERPL-MCNC: 3.4 MG/DL — SIGNIFICANT CHANGE UP (ref 2.5–4.5)
PLATELET # BLD AUTO: 154 K/UL — SIGNIFICANT CHANGE UP (ref 150–400)
POTASSIUM SERPL-MCNC: 3.9 MMOL/L — SIGNIFICANT CHANGE UP (ref 3.5–5.3)
POTASSIUM SERPL-SCNC: 3.9 MMOL/L — SIGNIFICANT CHANGE UP (ref 3.5–5.3)
RBC # BLD: 2.79 M/UL — LOW (ref 4.2–5.8)
RBC # FLD: 18.1 % — HIGH (ref 10.3–14.5)
S AUREUS DNA NOSE QL NAA+PROBE: SIGNIFICANT CHANGE UP
SODIUM SERPL-SCNC: 133 MMOL/L — LOW (ref 135–145)
WBC # BLD: 5.37 K/UL — SIGNIFICANT CHANGE UP (ref 3.8–10.5)
WBC # FLD AUTO: 5.37 K/UL — SIGNIFICANT CHANGE UP (ref 3.8–10.5)

## 2023-08-22 PROCEDURE — 99239 HOSP IP/OBS DSCHRG MGMT >30: CPT

## 2023-08-22 RX ORDER — SITAGLIPTIN 50 MG/1
1 TABLET, FILM COATED ORAL
Refills: 0 | DISCHARGE

## 2023-08-22 RX ORDER — DIVALPROEX SODIUM 500 MG/1
6 TABLET, DELAYED RELEASE ORAL
Qty: 360 | Refills: 0
Start: 2023-08-22 | End: 2023-09-20

## 2023-08-22 RX ORDER — DIVALPROEX SODIUM 500 MG/1
1 TABLET, DELAYED RELEASE ORAL
Refills: 0 | DISCHARGE

## 2023-08-22 RX ORDER — DIVALPROEX SODIUM 500 MG/1
1.5 TABLET, DELAYED RELEASE ORAL
Qty: 90 | Refills: 0
Start: 2023-08-22 | End: 2023-09-20

## 2023-08-22 RX ADMIN — HEPARIN SODIUM 5000 UNIT(S): 5000 INJECTION INTRAVENOUS; SUBCUTANEOUS at 05:13

## 2023-08-22 RX ADMIN — SODIUM CHLORIDE 75 MILLILITER(S): 9 INJECTION, SOLUTION INTRAVENOUS at 03:51

## 2023-08-22 RX ADMIN — DIVALPROEX SODIUM 750 MILLIGRAM(S): 500 TABLET, DELAYED RELEASE ORAL at 05:13

## 2023-08-22 NOTE — DISCHARGE NOTE PROVIDER - CARE PROVIDERS DIRECT ADDRESSES
,[email or web address],[email or web address],DirectAddress_Unknown

## 2023-08-22 NOTE — PROGRESS NOTE ADULT - REASON FOR ADMISSION
seizure-like activity

## 2023-08-22 NOTE — PROGRESS NOTE ADULT - PROBLEM SELECTOR PLAN 9
HSQ  NPO, IVF, dysphagia screen, S&S eval    Code status: full code per daughter  Pall care consult placed. Possible hospice, pending further GOC conversation
HSQ  NPO, IVF, failed swallow eval. Need GOC discussion with family since will be difficult to give seizure meds given mental status. Also since no surgical or chemo candidate for gastric CA so will recommend hospice. Palliative care consulted    Code status: full code per daughter
HSQ  NPO, IVF, dysphagia screen, S&S eval    Code status: full code per daughter  Pall care consult placed. Possible hospice, pending further GOC conversation
HSQ  NPO, IVF, dysphagia screen, S&S eval    Code status: full code per daughter  Pall care consult placed. Possible hospice, pending further GOC conversation
HSQ  Code status: full code per daughter  Dispo: home with home hospice and pleasure feeds.

## 2023-08-22 NOTE — PROGRESS NOTE ADULT - SUBJECTIVE AND OBJECTIVE BOX
No changes neurologically.     Vital Signs Last 24 Hrs  T(C): 36.9 (20 Aug 2023 16:00), Max: 36.9 (20 Aug 2023 16:00)  T(F): 98.4 (20 Aug 2023 16:00), Max: 98.4 (20 Aug 2023 16:00)  HR: 75 (20 Aug 2023 16:00) (67 - 75)  BP: 130/75 (20 Aug 2023 16:00) (108/60 - 149/80)  BP(mean): --  RR: 18 (20 Aug 2023 16:00) (17 - 18)  SpO2: 100% (20 Aug 2023 16:00) (100% - 100%)    Parameters below as of 20 Aug 2023 16:00  Patient On (Oxygen Delivery Method): room air      Exam:  Easily arousable to voice.  No look at examiner.   No verbal output.  No follow commands.   Pupils equal - left lens opacified with left RAPD.   Increased tone in legs>arms.  Moving all extremities symmetrically.      EEG  Start Time/Date: 0800 on 8-  End Time/Date: 12:00 on 08-20-23  Duration: 28H EEG SUMMARY/CLASSIFICATION    Abnormal EEG in the awake, drowsy and asleep states.  - Mild generalized slowing.    _____________________________________________________________  EEG IMPRESSION/CLINICAL CORRELATE    Abnormal EEG study.  Mild nonspecific diffuse or multifocal cerebral dysfunction.   No epileptiform pattern or seizure seen.  
No changes clinically.      Vital Signs Last 24 Hrs  T(C): 36.3 (19 Aug 2023 12:30), Max: 36.6 (18 Aug 2023 20:58)  T(F): 97.4 (19 Aug 2023 12:30), Max: 97.9 (18 Aug 2023 20:58)  HR: 74 (19 Aug 2023 12:30) (66 - 80)  BP: 144/84 (19 Aug 2023 12:30) (141/86 - 174/87)  BP(mean): --  RR: 16 (19 Aug 2023 12:30) (15 - 18)  SpO2: 100% (19 Aug 2023 12:30) (96% - 100%)    Parameters below as of 19 Aug 2023 12:30  Patient On (Oxygen Delivery Method): room air    Exam:  Easily arousable to voice.  No look at examiner.   No verbal output.  No follow commands.   Pupils equal - left lens opacified with left RAPD.   Increased tone in legs>arms.  Moving all extremities symmetrically.    Start Time/Date: 1030AM 8-  End Time/Date: 08:00 on 08-19-23  Duration: 21H 30min    EEG SUMMARY/CLASSIFICATION    Abnormal EEG in the awake, drowsy and asleep states.  - Mild generalized slowing.    _____________________________________________________________  EEG IMPRESSION/CLINICAL CORRELATE    Abnormal EEG study.  Mild nonspecific diffuse or multifocal cerebral dysfunction.   No epileptiform pattern or seizure seen.    Valproic Acid Level, Serum: 76.60 ug/mL (08.18.23 @ 06:10)   Valproic Acid Level, Serum: 54.60 ug/mL (08.17.23 @ 15:07)   Valproic Acid Level, Serum: 40.30 ug/mL (07.16.23 @ 06:11)   Valproic Acid Level, Serum: 54.60 ug/mL (07.15.23 @ 04:20)   Albumin: 3.4 g/dL (08.18.23 @ 06:10) 
PROGRESS NOTE:   Authoted by Dr. Yehuda Small MD, DEIDRA  Pager [phone] [facility], Available via Microsoft Teams     Patient is a 68y old  Male who presents with a chief complaint of seizure-like activity (19 Aug 2023 12:45)    SUBJECTIVE / OVERNIGHT EVENTS:  No acute events overnight   pt non verbal     MEDICATIONS  (STANDING):  dextrose 5% + sodium chloride 0.45%. 1000 milliLiter(s) (75 mL/Hr) IV Continuous <Continuous>  dextrose 5%. 1000 milliLiter(s) (50 mL/Hr) IV Continuous <Continuous>  dextrose 5%. 1000 milliLiter(s) (100 mL/Hr) IV Continuous <Continuous>  dextrose 50% Injectable 12.5 Gram(s) IV Push once  dextrose 50% Injectable 25 Gram(s) IV Push once  dextrose 50% Injectable 25 Gram(s) IV Push once  glucagon  Injectable 1 milliGRAM(s) IntraMuscular once  heparin   Injectable 5000 Unit(s) SubCutaneous every 12 hours  insulin lispro (ADMELOG) corrective regimen sliding scale   SubCutaneous every 6 hours  valproate sodium  IVPB 750 milliGRAM(s) IV Intermittent two times a day    MEDICATIONS  (PRN):  dextrose Oral Gel 15 Gram(s) Oral once PRN Blood Glucose LESS THAN 70 milliGRAM(s)/deciliter    OBJECTIVE:  Vital Signs Last 24 Hrs  T(C): 36.3 (19 Aug 2023 12:30), Max: 36.6 (18 Aug 2023 20:58)  T(F): 97.4 (19 Aug 2023 12:30), Max: 97.9 (18 Aug 2023 20:58)  HR: 74 (19 Aug 2023 12:30) (66 - 80)  BP: 144/84 (19 Aug 2023 12:30) (141/86 - 169/67)  RR: 16 (19 Aug 2023 12:30) (15 - 18)  SpO2: 100% (19 Aug 2023 12:30) (96% - 100%)    Parameters below as of 19 Aug 2023 12:30  Patient On (Oxygen Delivery Method): room air    I&O's Summary    18 Aug 2023 07:01  -  19 Aug 2023 07:00  --------------------------------------------------------  IN: 950 mL / OUT: 1700 mL / NET: -750 mL    CONSTITUTIONAL: NAD  HEAD:  Atraumatic, Normocephalic  EYES: EOMI, conjunctiva and sclera clear  ENMT: No tonsillar erythema, exudates, or enlargement; Moist mucous membranes  NECK: Supple, No JVD  NERVOUS SYSTEM: AOX0, motor and sensation grossly intact in b/l UE and b/l LE  PSYCHIATRIC: Appropriate affect and mood  CHEST/LUNG: Clear to auscultation bilaterally; No rales, rhonchi, wheezing, or rubs  HEART: Regular rate and rhythm; No murmurs. No LE edema  ABDOMEN: Soft, Nontender, Nondistended; Bowel sounds present  EXTREMITIES:  2+ Peripheral Pulses, No clubbing, cyanosis  SKIN: No rashes or lesions    LABS:                        8.8    5.73  )-----------( 181      ( 19 Aug 2023 05:50 )             28.4     08-19    132<L>  |  98  |  16  ----------------------------<  97  4.4   |  29  |  1.52<H>    Ca    9.0      19 Aug 2023 05:50  Phos  3.3     08-19  Mg     1.90     08-19    TPro  6.5  /  Alb  3.4  /  TBili  <0.2  /  DBili  x   /  AST  13  /  ALT  7   /  AlkPhos  78  08-18    CARDIAC MARKERS ( 18 Aug 2023 06:10 )  x     / x     / 126 U/L / x     / x        Urinalysis Basic - ( 19 Aug 2023 05:50 )    Color: x / Appearance: x / SG: x / pH: x  Gluc: 97 mg/dL / Ketone: x  / Bili: x / Urobili: x   Blood: x / Protein: x / Nitrite: x   Leuk Esterase: x / RBC: x / WBC x   Sq Epi: x / Non Sq Epi: x / Bacteria: x    Culture - Urine (collected 17 Aug 2023 15:43)  Source: Clean Catch Clean Catch (Midstream)  Final Report (18 Aug 2023 14:33):    No growth    CAPILLARY BLOOD GLUCOSE  POCT Blood Glucose.: 89 mg/dL (19 Aug 2023 12:49)  POCT Blood Glucose.: 103 mg/dL (19 Aug 2023 05:27)  POCT Blood Glucose.: 100 mg/dL (18 Aug 2023 23:56)  POCT Blood Glucose.: 85 mg/dL (18 Aug 2023 17:56)
Patient is a 68y old  Male who presents with a chief complaint of seizure-like activity (20 Aug 2023 18:06)      SUBJECTIVE / OVERNIGHT EVENTS:  Patient opens eyes but AAO x 0. Unable to obtain ROS due to mental condition.     MEDICATIONS  (STANDING):  dextrose 5% + sodium chloride 0.45%. 1000 milliLiter(s) (75 mL/Hr) IV Continuous <Continuous>  dextrose 5%. 1000 milliLiter(s) (100 mL/Hr) IV Continuous <Continuous>  dextrose 5%. 1000 milliLiter(s) (50 mL/Hr) IV Continuous <Continuous>  dextrose 50% Injectable 25 Gram(s) IV Push once  dextrose 50% Injectable 12.5 Gram(s) IV Push once  dextrose 50% Injectable 25 Gram(s) IV Push once  glucagon  Injectable 1 milliGRAM(s) IntraMuscular once  heparin   Injectable 5000 Unit(s) SubCutaneous every 12 hours  insulin lispro (ADMELOG) corrective regimen sliding scale   SubCutaneous every 6 hours  valproate sodium  IVPB 750 milliGRAM(s) IV Intermittent two times a day    MEDICATIONS  (PRN):  dextrose Oral Gel 15 Gram(s) Oral once PRN Blood Glucose LESS THAN 70 milliGRAM(s)/deciliter      Vital Signs Last 24 Hrs  T(C): 36.8 (21 Aug 2023 08:00), Max: 36.9 (20 Aug 2023 16:00)  T(F): 98.3 (21 Aug 2023 08:00), Max: 98.4 (20 Aug 2023 16:00)  HR: 75 (21 Aug 2023 08:00) (70 - 78)  BP: 120/60 (21 Aug 2023 08:00) (101/54 - 130/75)  BP(mean): --  RR: 18 (21 Aug 2023 08:00) (17 - 18)  SpO2: 100% (21 Aug 2023 08:00) (97% - 100%)    Parameters below as of 21 Aug 2023 08:00  Patient On (Oxygen Delivery Method): room air      CAPILLARY BLOOD GLUCOSE      POCT Blood Glucose.: 92 mg/dL (21 Aug 2023 05:56)  POCT Blood Glucose.: 101 mg/dL (20 Aug 2023 23:52)  POCT Blood Glucose.: 108 mg/dL (20 Aug 2023 17:54)  POCT Blood Glucose.: 100 mg/dL (20 Aug 2023 12:20)    I&O's Summary    20 Aug 2023 07:01  -  21 Aug 2023 07:00  --------------------------------------------------------  IN: 0 mL / OUT: 500 mL / NET: -500 mL        PHYSICAL EXAM:   GENERAL: NAD, well-developed  HEAD:  Atraumatic, Normocephalic  EYES: EOMI, PERRLA, conjunctiva and sclera clear  NECK: Supple, No JVD  CHEST/LUNG: Clear to auscultation bilaterally; No wheeze  HEART: Regular rate and rhythm; No murmurs, rubs, or gallops  ABDOMEN: Soft, Nontender, Nondistended; Bowel sounds present  EXTREMITIES:  2+ Peripheral Pulses, No clubbing, cyanosis, or edema  PSYCH: AAO x 0   NEUROLOGY: non-focal  SKIN: No rashes or lesions    LABS:                        7.3    5.13  )-----------( 162      ( 21 Aug 2023 05:45 )             23.0     08-21    133<L>  |  102  |  27<H>  ----------------------------<  92  4.1   |  26  |  1.61<H>    Ca    8.6      21 Aug 2023 04:36  Phos  3.0     08-21  Mg     1.70     08-21            Urinalysis Basic - ( 21 Aug 2023 04:36 )    Color: x / Appearance: x / SG: x / pH: x  Gluc: 92 mg/dL / Ketone: x  / Bili: x / Urobili: x   Blood: x / Protein: x / Nitrite: x   Leuk Esterase: x / RBC: x / WBC x   Sq Epi: x / Non Sq Epi: x / Bacteria: x        RADIOLOGY & ADDITIONAL TESTS:    Imaging Personally Reviewed:    Consultant(s) Notes Reviewed:      Care Discussed with Consultants/Other Providers:  
Patient is a 68y old  Male who presents with a chief complaint of seizure-like activity (22 Aug 2023 08:25)      SUBJECTIVE / OVERNIGHT EVENTS:  No active issues overnight. Patient lethargic. Unable to obtain ROS due to medical condition.     MEDICATIONS  (STANDING):  aspirin  chewable 81 milliGRAM(s) Oral daily  chlorhexidine 2% Cloths 1 Application(s) Topical daily  citalopram 10 milliGRAM(s) Oral daily  dextrose 5% + sodium chloride 0.45%. 1000 milliLiter(s) (75 mL/Hr) IV Continuous <Continuous>  dextrose 5%. 1000 milliLiter(s) (100 mL/Hr) IV Continuous <Continuous>  dextrose 5%. 1000 milliLiter(s) (50 mL/Hr) IV Continuous <Continuous>  dextrose 50% Injectable 12.5 Gram(s) IV Push once  dextrose 50% Injectable 25 Gram(s) IV Push once  dextrose 50% Injectable 25 Gram(s) IV Push once  divalproex Sprinkle 750 milliGRAM(s) Oral two times a day  famotidine    Tablet 20 milliGRAM(s) Oral daily  ferrous    sulfate Liquid 300 milliGRAM(s) Oral daily  finasteride 5 milliGRAM(s) Oral daily  glucagon  Injectable 1 milliGRAM(s) IntraMuscular once  heparin   Injectable 5000 Unit(s) SubCutaneous every 12 hours  insulin lispro (ADMELOG) corrective regimen sliding scale   SubCutaneous every 6 hours  tamsulosin 0.4 milliGRAM(s) Oral at bedtime    MEDICATIONS  (PRN):  dextrose Oral Gel 15 Gram(s) Oral once PRN Blood Glucose LESS THAN 70 milliGRAM(s)/deciliter  melatonin 3 milliGRAM(s) Oral at bedtime PRN Insomnia      Vital Signs Last 24 Hrs  T(C): 36.2 (22 Aug 2023 08:30), Max: 37.1 (21 Aug 2023 16:00)  T(F): 97.2 (22 Aug 2023 08:30), Max: 98.7 (21 Aug 2023 16:00)  HR: 65 (22 Aug 2023 08:30) (61 - 68)  BP: 111/67 (22 Aug 2023 08:30) (111/67 - 144/68)  BP(mean): --  RR: 16 (22 Aug 2023 08:30) (16 - 18)  SpO2: 99% (22 Aug 2023 08:30) (99% - 100%)    Parameters below as of 22 Aug 2023 08:30  Patient On (Oxygen Delivery Method): room air      CAPILLARY BLOOD GLUCOSE      POCT Blood Glucose.: 118 mg/dL (22 Aug 2023 08:23)  POCT Blood Glucose.: 102 mg/dL (22 Aug 2023 05:32)  POCT Blood Glucose.: 128 mg/dL (21 Aug 2023 23:50)  POCT Blood Glucose.: 183 mg/dL (21 Aug 2023 17:35)  POCT Blood Glucose.: 97 mg/dL (21 Aug 2023 12:10)    I&O's Summary    21 Aug 2023 07:01  -  22 Aug 2023 07:00  --------------------------------------------------------  IN: 200 mL / OUT: 1050 mL / NET: -850 mL        PHYSICAL EXAM:   GENERAL: NAD, well-developed  HEAD:  Atraumatic, Normocephalic  EYES: EOMI, PERRLA, conjunctiva and sclera clear  NECK: Supple, No JVD  CHEST/LUNG: Clear to auscultation bilaterally; No wheeze  HEART: Regular rate and rhythm; No murmurs, rubs, or gallops  ABDOMEN: Soft, Nontender, Nondistended; Bowel sounds present  EXTREMITIES:  2+ Peripheral Pulses, No clubbing, cyanosis, or edema  PSYCH: AAO x 0   NEUROLOGY: non-focal  SKIN: No rashes or lesions    LABS:                        7.2    5.37  )-----------( 154      ( 22 Aug 2023 04:55 )             23.2     08-22    133<L>  |  101  |  21  ----------------------------<  100<H>  3.9   |  26  |  1.39<H>    Ca    8.5      22 Aug 2023 04:55  Phos  3.4     08-22  Mg     1.70     08-22            Urinalysis Basic - ( 22 Aug 2023 04:55 )    Color: x / Appearance: x / SG: x / pH: x  Gluc: 100 mg/dL / Ketone: x  / Bili: x / Urobili: x   Blood: x / Protein: x / Nitrite: x   Leuk Esterase: x / RBC: x / WBC x   Sq Epi: x / Non Sq Epi: x / Bacteria: x        RADIOLOGY & ADDITIONAL TESTS:    Imaging Personally Reviewed:    Consultant(s) Notes Reviewed:      Care Discussed with Consultants/Other Providers:  
[facility]  Carlos RogersDO  Available via MS Teams  In house pager [phone]    SUBJECTIVE / OVERNIGHT EVENTS:  Admitted overnight.  Patient does not offer complaints.  Tried calling his son by number in chart, could not reach.    ADDITIONAL REVIEW OF SYSTEMS:    MEDICATIONS  (STANDING):  dextrose 5% + sodium chloride 0.45%. 1000 milliLiter(s) (75 mL/Hr) IV Continuous <Continuous>  dextrose 5%. 1000 milliLiter(s) (100 mL/Hr) IV Continuous <Continuous>  dextrose 5%. 1000 milliLiter(s) (50 mL/Hr) IV Continuous <Continuous>  dextrose 50% Injectable 25 Gram(s) IV Push once  dextrose 50% Injectable 12.5 Gram(s) IV Push once  dextrose 50% Injectable 25 Gram(s) IV Push once  glucagon  Injectable 1 milliGRAM(s) IntraMuscular once  heparin   Injectable 5000 Unit(s) SubCutaneous every 12 hours  insulin lispro (ADMELOG) corrective regimen sliding scale   SubCutaneous every 6 hours  valproate sodium  IVPB 500 milliGRAM(s) IV Intermittent two times a day    MEDICATIONS  (PRN):  dextrose Oral Gel 15 Gram(s) Oral once PRN Blood Glucose LESS THAN 70 milliGRAM(s)/deciliter      I&O's Summary    17 Aug 2023 07:01  -  18 Aug 2023 07:00  --------------------------------------------------------  IN: 0 mL / OUT: 700 mL / NET: -700 mL        PHYSICAL EXAM:  Vital Signs Last 24 Hrs  T(C): 36.6 (18 Aug 2023 10:30), Max: 36.7 (17 Aug 2023 17:08)  T(F): 97.8 (18 Aug 2023 10:30), Max: 98.1 (17 Aug 2023 17:08)  HR: 78 (18 Aug 2023 10:30) (67 - 82)  BP: 144/80 (18 Aug 2023 10:30) (134/62 - 159/79)  BP(mean): --  RR: 17 (18 Aug 2023 10:30) (13 - 20)  SpO2: 98% (18 Aug 2023 10:30) (98% - 100%)    Parameters below as of 18 Aug 2023 10:30  Patient On (Oxygen Delivery Method): room air      CONSTITUTIONAL: NAD, well-groomed; on video EEG  EYES: PERRLA; conjunctiva and sclera clear  ENMT: Moist oral mucosa, no pharyngeal injection or exudates  NECK: Supple, no palpable masses; no thyromegaly  RESPIRATORY: Normal respiratory effort; lungs are clear to auscultation bilaterally  CARDIOVASCULAR: Regular rate and rhythm, normal S1 and S2, no murmur/rub/gallop; No lower extremity edema; Peripheral pulses are 2+ bilaterally  ABDOMEN: Nontender to palpation, normoactive bowel sounds, no rebound/guarding; No hepatosplenomegaly  MUSCULOSKELETAL:  no clubbing or cyanosis of digits; no joint swelling or tenderness to palpation  PSYCH: offers his name only  NEUROLOGY: arousable to touch, answering some questions, following simple commands, but not cooperative with rest of neurologic exam  SKIN: No rashes; no palpable lesions    LABS:                        9.2    5.75  )-----------( 147      ( 18 Aug 2023 06:10 )             29.5     08-18    136  |  99  |  17  ----------------------------<  92  4.8   |  27  |  1.68<H>    Ca    9.1      18 Aug 2023 06:10  Mg     2.20     08-17    TPro  6.5  /  Alb  3.4  /  TBili  <0.2  /  DBili  x   /  AST  13  /  ALT  7   /  AlkPhos  78  08-18    PT/INR - ( 17 Aug 2023 14:31 )   PT: 11.4 sec;   INR: 1.01 ratio         PTT - ( 17 Aug 2023 14:31 )  PTT:30.8 sec  CARDIAC MARKERS ( 18 Aug 2023 06:10 )  x     / x     / 126 U/L / x     / x          Urinalysis Basic - ( 18 Aug 2023 06:10 )    Color: x / Appearance: x / SG: x / pH: x  Gluc: 92 mg/dL / Ketone: x  / Bili: x / Urobili: x   Blood: x / Protein: x / Nitrite: x   Leuk Esterase: x / RBC: x / WBC x   Sq Epi: x / Non Sq Epi: x / Bacteria: x        SARS-CoV-2: NotDetec (23 Apr 2023 22:52)    
PROGRESS NOTE:   Authoted by Dr. Yehuda Small MD, DEIDRA  Pager [phone] [facility], Available via Microsoft Teams     Patient is a 68y old  Male who presents with a chief complaint of seizure-like activity (19 Aug 2023 16:08)    SUBJECTIVE / OVERNIGHT EVENTS:  No acute events overnight   No subjective complaints    MEDICATIONS  (STANDING):  dextrose 5% + sodium chloride 0.45%. 1000 milliLiter(s) (75 mL/Hr) IV Continuous <Continuous>  dextrose 5%. 1000 milliLiter(s) (50 mL/Hr) IV Continuous <Continuous>  dextrose 5%. 1000 milliLiter(s) (100 mL/Hr) IV Continuous <Continuous>  dextrose 50% Injectable 12.5 Gram(s) IV Push once  dextrose 50% Injectable 25 Gram(s) IV Push once  dextrose 50% Injectable 25 Gram(s) IV Push once  glucagon  Injectable 1 milliGRAM(s) IntraMuscular once  heparin   Injectable 5000 Unit(s) SubCutaneous every 12 hours  insulin lispro (ADMELOG) corrective regimen sliding scale   SubCutaneous every 6 hours  valproate sodium  IVPB 750 milliGRAM(s) IV Intermittent two times a day    MEDICATIONS  (PRN):  dextrose Oral Gel 15 Gram(s) Oral once PRN Blood Glucose LESS THAN 70 milliGRAM(s)/deciliter    OBJECTIVE:  Vital Signs Last 24 Hrs  T(C): 36.6 (20 Aug 2023 12:00), Max: 36.8 (19 Aug 2023 16:50)  T(F): 97.9 (20 Aug 2023 12:00), Max: 98.3 (19 Aug 2023 16:50)  HR: 70 (20 Aug 2023 12:00) (67 - 73)  BP: 120/64 (20 Aug 2023 12:00) (103/60 - 149/80)  RR: 18 (20 Aug 2023 12:00) (17 - 18)  SpO2: 100% (20 Aug 2023 12:00) (94% - 100%)    Parameters below as of 20 Aug 2023 12:00  Patient On (Oxygen Delivery Method): room air    I&O's Summary    19 Aug 2023 07:01  -  20 Aug 2023 07:00  --------------------------------------------------------  IN: 0 mL / OUT: 500 mL / NET: -500 mL    CONSTITUTIONAL: NAD  HEAD:  Atraumatic, Normocephalic  EYES: EOMI, conjunctiva and sclera clear  ENMT: No tonsillar erythema, exudates, or enlargement; Moist mucous membranes  NECK: Supple, No JVD  NERVOUS SYSTEM: AOX0, motor and sensation grossly intact in b/l UE and b/l LE  PSYCHIATRIC: Appropriate affect and mood  CHEST/LUNG: Clear to auscultation bilaterally; No rales, rhonchi, wheezing, or rubs  HEART: Regular rate and rhythm; No murmurs. No LE edema  ABDOMEN: Soft, Nontender, Nondistended; Bowel sounds present  EXTREMITIES:  2+ Peripheral Pulses, No clubbing, cyanosis  SKIN: No rashes or lesions    LABS:                        7.9    5.04  )-----------( 177      ( 20 Aug 2023 06:20 )             25.9     08-20    133<L>  |  100  |  26<H>  ----------------------------<  89  4.0   |  25  |  1.58<H>    Ca    8.5      20 Aug 2023 06:20  Phos  3.2     08-20  Mg     1.80     08-20    Urinalysis Basic - ( 20 Aug 2023 06:20 )  Color: x / Appearance: x / SG: x / pH: x  Gluc: 89 mg/dL / Ketone: x  / Bili: x / Urobili: x   Blood: x / Protein: x / Nitrite: x   Leuk Esterase: x / RBC: x / WBC x   Sq Epi: x / Non Sq Epi: x / Bacteria: x    CAPILLARY BLOOD GLUCOSE  POCT Blood Glucose.: 100 mg/dL (20 Aug 2023 12:20)  POCT Blood Glucose.: 106 mg/dL (20 Aug 2023 05:17)  POCT Blood Glucose.: 109 mg/dL (20 Aug 2023 01:04)  POCT Blood Glucose.: 105 mg/dL (19 Aug 2023 21:47)  POCT Blood Glucose.: 103 mg/dL (19 Aug 2023 17:56)  
Date of Service  : 8/21/2023     SUBJECTIVE AND OBJECTIVE:  Patient seen and examined at bedside.  Patient lethargic, eyes open but then falls back asleep     INTERVAL HPI/OVERNIGHT EVENTS:  No acute overnight events     Allergies    No Known Allergies    Intolerances    MEDICATIONS  (STANDING):  dextrose 5% + sodium chloride 0.45%. 1000 milliLiter(s) (75 mL/Hr) IV Continuous <Continuous>  dextrose 5%. 1000 milliLiter(s) (100 mL/Hr) IV Continuous <Continuous>  dextrose 5%. 1000 milliLiter(s) (50 mL/Hr) IV Continuous <Continuous>  dextrose 50% Injectable 25 Gram(s) IV Push once  dextrose 50% Injectable 12.5 Gram(s) IV Push once  dextrose 50% Injectable 25 Gram(s) IV Push once  divalproex Sprinkle 750 milliGRAM(s) Oral two times a day  glucagon  Injectable 1 milliGRAM(s) IntraMuscular once  heparin   Injectable 5000 Unit(s) SubCutaneous every 12 hours  insulin lispro (ADMELOG) corrective regimen sliding scale   SubCutaneous every 6 hours    MEDICATIONS  (PRN):  dextrose Oral Gel 15 Gram(s) Oral once PRN Blood Glucose LESS THAN 70 milliGRAM(s)/deciliter      ITEMS UNCHECKED ARE NOT PRESENT    PRESENT SYMPTOMS: [x ]Unable to self-report due to altered mental status- see [ ] CPOT [ x] PAINADS [ x] RDOS  Source if other than patient:  [ ]Family   [ ]Team     Pain:  [ ]yes [ ]no  QOL impact -   Location -                    Aggravating factors -  Quality -  Radiation -  Timing-  Severity (0-10 scale):  Minimal acceptable level / Pain Goal (0-10 scale):     Dyspnea:                           [ ]Mild [ ]Moderate [ ]Severe  Anxiety:                             [ ]Mild [ ]Moderate [ ]Severe  Agitation:                          [ ]Mild [ ]Moderate [ ]Severe  Fatigue:                             [ ]Mild [ ]Moderate [ ]Severe  Nausea:                             [ ]Mild [ ]Moderate [ ]Severe  Loss of appetite:              [ ]Mild [ ]Moderate [ ]Severe  Constipation:                   [ ]Mild [ ]Moderate [ ]Severe  Diarrhea:                          [ ]Mild [ ]Moderate [ ]Severe    CPOT:    [email or web address]/Critical-Care-[email or web address]-Tool-(CPOT)    PCSSQ[Palliative Care Spiritual Screening Question]   Severity (0-10):  Score of 4 or > indicate consideration of Chaplaincy referral.  Chaplaincy Referral: [ ] yes [ ] refused [ ] following [x ] deferred    Caregiver Holderness? : [ ] yes [ ] no [ ] Declined [x ] Deferred              Social work referral [ ] Patient & Family Centered Care Referral [ ]     Anticipatory Grief present?:  [ ] yes [ ] no  [ x] Deferred                  Social work referral [ ] Chaplaincy Referral[ ]    Other Symptoms:  [ ]All other review of systems negative - unable to obtain due to encephalopathy     PHYSICAL EXAM:  Vital Signs Last 24 Hrs  T(C): 37 (21 Aug 2023 12:00), Max: 37 (21 Aug 2023 12:00)  T(F): 98.6 (21 Aug 2023 12:00), Max: 98.6 (21 Aug 2023 12:00)  HR: 61 (21 Aug 2023 12:00) (61 - 78)  BP: 144/68 (21 Aug 2023 12:00) (101/54 - 144/68)  BP(mean): --  RR: 18 (21 Aug 2023 12:00) (17 - 18)  SpO2: 100% (21 Aug 2023 12:00) (97% - 100%)    Parameters below as of 21 Aug 2023 12:00  Patient On (Oxygen Delivery Method): room air         I&O's Summary    20 Aug 2023 07:01  -  21 Aug 2023 07:00  --------------------------------------------------------  IN: 0 mL / OUT: 500 mL / NET: -500 mL    GENERAL:  [ ]Alert  [ ]Oriented x   [ x]Lethargic  [ ]Cachexia  [ ]Unarousable  [ x] No Distress  Behavioral:   [ ] Anxiety  [ ] Delirium [ ] Agitation [ ] Calm  [ x] Other- encephalopathy   HEENT:  [ ]Normal  [ ] Temporal Wasting  [x ]Dry mouth   [ ]ET Tube/Trach  [ ]Oral lesions  [ ] Mucositis  PULMONARY:   [ ]Clear [ ]Tachypnea  [ ]Audible excessive secretions   [ ]Rhonchi        [ ]Right [ ]Left [ ]Bilateral  [ ]Crackles        [ ]Right [ ]Left [ ]Bilateral  [ ]Wheezing     [ ]Right [ ]Left [ ]Bilateral  [x ]Diminished breath sounds [ ]right [ ]left [x ]bilateral  CARDIOVASCULAR:    [x ]Regular [ ]Irregular [ ]Tachy  [ ]Wang [ ]Murmur [ ]Other  GASTROINTESTINAL:  [ x]Soft  [ ]Distended   [ ]+BS  [ x]Non tender [ ]Tender  [ ]PEG [ ]OGT/ NGT  Last BM: 8/18  GENITOURINARY:  [ ]Normal [ ] Incontinent   [ ]Oliguria/Anuria   [ x]Perez  MUSCULOSKELETAL:   [ ]Normal   [ ]Weakness  [x ]Bed/Wheelchair bound [ ]Edema  [  ] amputation  [  ] contraction  NEUROLOGIC:  moves all extremities spontaneously   [ ]No focal deficits  [ x]Cognitive impairment  [ x]Dysphagia [ ]Dysarthria [ ]Paresis [ ]Other   SKIN: See Nursing Skin Assessment for further details  [ x]Normal    [ ]Rash  [ ]Pressure ulcer(s)       Present on admission [ ]y [ ]n   [  ]  Wound    [  ] hyperpigmentation    CRITICAL CARE:  [ ]Shock Present  [ ]Septic [ ]Cardiogenic [ ]Neurologic [ ]Hypovolemic  [ ]Vasopressors [ ]Inotropes  [ ]Respiratory failure present [ ]Mechanical Ventilation [ ]Non-invasive ventilatory support [ ]High-Flow   [ ]Acute  [ ]Chronic [ ]Hypoxic  [ ]Hypercarbic [ ]Other  [ ]Other organ failure     LABS:  reviewed                         7.3    5.13  )-----------( 162      ( 21 Aug 2023 05:45 )             23.0   08-21    133<L>  |  102  |  27<H>  ----------------------------<  92  4.1   |  26  |  1.61<H>    Ca    8.6      21 Aug 2023 04:36  Phos  3.0     08-21  Mg     1.70     08-21      Urinalysis Basic - ( 21 Aug 2023 04:36 )    Color: x / Appearance: x / SG: x / pH: x  Gluc: 92 mg/dL / Ketone: x  / Bili: x / Urobili: x   Blood: x / Protein: x / Nitrite: x   Leuk Esterase: x / RBC: x / WBC x   Sq Epi: x / Non Sq Epi: x / Bacteria: x      CAPILLARY BLOOD GLUCOSE      POCT Blood Glucose.: 97 mg/dL (21 Aug 2023 12:10)  POCT Blood Glucose.: 92 mg/dL (21 Aug 2023 05:56)  POCT Blood Glucose.: 101 mg/dL (20 Aug 2023 23:52)  POCT Blood Glucose.: 108 mg/dL (20 Aug 2023 17:54)          RADIOLOGY & ADDITIONAL STUDIES: reviewed     Protein Calorie Malnutrition Present: [ ]mild [ ]moderate [ x]severe [ ]underweight [ ]morbid obesity  https://www.andeal.org/vault/2440/web/files/ONC/Table_Clinical%20Characteristics%20to%20Document%20Malnutrition-White%20JV%20et%20al%365810.pdf    Height (cm): 162.6 (08-17-23 @ 22:24), 177.8 (08-09-23 @ 00:04), 177.8 (07-15-23 @ 19:42)  Weight (kg): 59 (08-17-23 @ 22:24), 59.5 (07-15-23 @ 19:42), 68 (04-23-23 @ 22:14)  BMI (kg/m2): 22.3 (08-17-23 @ 22:24), 18.8 (08-09-23 @ 00:04), 18.8 (07-15-23 @ 19:42)    [x ]PPSV2 < or = 30%  [ ]significant weight loss [ ]poor nutritional intake [ ]anasarca   [ ]Artificial Nutrition    REFERRALS:   [ ]Chaplaincy  [ ]Hospice  [ ]Child Life  [ ]Social Work  [ x]Case management [ ]Holistic Therapy

## 2023-08-22 NOTE — DISCHARGE NOTE NURSING/CASE MANAGEMENT/SOCIAL WORK - PATIENT PORTAL LINK FT
You can access the FollowMyHealth Patient Portal offered by [facility] by registering at the following website: [email or web address]. By joining [facility]’s FollowMyHealth portal, you will also be able to view your health information using other applications (apps) compatible with our system.

## 2023-08-22 NOTE — PROGRESS NOTE ADULT - PROBLEM SELECTOR PLAN 8
On januvia - hold  -ISS
On januvia - hold  -ISS, add on A1C
Case reviewed with primary team   Thank you for allowing us to participate in your patient's care. Please page [phone] for any questions/concerns.
On januvia - hold  -ISS

## 2023-08-22 NOTE — PROGRESS NOTE ADULT - TIME BILLING
Reviewed lab data, radiology results, consultants' recommendations, documentation in [place], discussed with family, ACP, interdisciplinary staff and/or intervention were performed.
Reviewed lab data, radiology results, consultants' recommendations, documentation in [place], discussed with family, ACP, interdisciplinary staff and/or intervention were performed.
Time spent for counseling and education with patient/family  Time spent discussing and coordinating care with primary team and interdisciplinary staff and floor staff
Time-based billing (NON-critical care).     35 minutes spent on total encounter; more than 50% of the visit was spent counseling and / or coordinating care by the attending physician.  The necessity of the time spent during the encounter on this date of service was due to:     review of laboratory data, radiology results, consultants' recommendations, documentation in [place], discussion with patient/ACP and interdisciplinary staff (such as , social workers, etc). Interventions were performed as documented above.

## 2023-08-22 NOTE — PROGRESS NOTE ADULT - PROBLEM SELECTOR PROBLEM 7
Chronic kidney disease, unspecified CKD stage
Counseling regarding advance directives and goals of care
Chronic kidney disease, unspecified CKD stage

## 2023-08-22 NOTE — PROGRESS NOTE ADULT - PROBLEM SELECTOR PROBLEM 4
History of CVA (cerebrovascular accident)
Functional quadriplegia
History of CVA (cerebrovascular accident)

## 2023-08-22 NOTE — PROGRESS NOTE ADULT - PROBLEM SELECTOR PLAN 6
BP stable, not on BP meds
BP stable, not on BP meds
Pleasure feeds as per goc   Aspiration precautions.
BP stable, not on BP meds

## 2023-08-22 NOTE — DISCHARGE NOTE NURSING/CASE MANAGEMENT/SOCIAL WORK - NSDCPEFALRISK_GEN_ALL_CORE
For information on Fall & Injury Prevention, visit: [email or web address] OR  [email or web address] OR  https://www.cdc.gov/steadi/patient.html

## 2023-08-22 NOTE — PROGRESS NOTE ADULT - PROBLEM SELECTOR PLAN 7
Creatinine 1.9. Baseline 1.5-1.8  - c/w bangura  - daily BMP
Creatinine 1.9. Baseline 1.5-1.8  - c/w bangura  - daily BMP
Spoke to daughter Ihsan at bedside. Ihsan confirms earlier discussion that family defer feeding tube and prefer pleasure feeds understanding risk of aspiration. Advanced care planning extensively discussed given malignancy and dementia. Ihsan states family has discussions and family wishes for patient to remain FULL CODE.  16 minutes spent on goals of care
Creatinine 1.9. Baseline 1.5-1.8  - c/w bangura  - daily BMP

## 2023-08-22 NOTE — PROGRESS NOTE ADULT - PROBLEM SELECTOR PLAN 1
Seizure like activity described as bilateral arm shaking and eye deviation. Post-ictal confusion/drowsiness after as well  - neurology following  - EEG on April admission without epileptiform activity  - d/c vEEG, no epileptiform activity  - Depakote increased per neurology recs
Seizure like activity described as bilateral arm shaking and eye deviation. Post-ictal confusion/drowsiness after as well  - neurology following  - EEG on April admission without epileptiform activity  - d/c vEEG, no epileptiform activity  - Depakote increased per neurology recs  -c/w Depakote ER 750mg bid  -no need to check VPA levels anymore.   c/w ASA 81mg qdaily  - no need for statin due to overall prognosis
- CT Head 8/17/23- 1. Small basal ganglia and cerebellar are multiple infarctions. Ischemic white matter disease and atrophy upper range typical for age 4. No lesion strongly suspicious for metastasis. No acute intracranial hemorrhage is appreciated.  - EEG- Abnormal EEG study. Mild nonspecific diffuse or multifocal cerebral dysfunction.  No epileptiform pattern or seizure seen.  - Neurology recommendations appreciated   - management as per primary team.
Seizure like activity described as bilateral arm shaking and eye deviation. Post-ictal confusion/drowsiness after as well  - neurology following  - EEG on April admission without epileptiform activity  - d/c vEEG, no epileptiform activity  - Depakote increased per neurology recs  -c/w Depakote ER 750mg bid  -no need to check VPA levels anymore.   c/w ASA 81mg qdaily  - no need for statin due to overall prognosis
Seizure like activity described as bilateral arm shaking and eye deviation. Post-ictal confusion/drowsiness after as well  - neurology following  - EEG on April admission without epileptiform activity  - obtain orthostatic vital signs if possible  - c/w vEEG  - consider MRI with and without contrast after EEG if within GOC
Seizure like activity described as bilateral arm shaking and eye deviation. Post-ictal confusion/drowsiness after as well  - neurology following  - EEG on April admission without epileptiform activity  - d/c vEEG, no epileptiform activity  - Depakote increased per neurology recs

## 2023-08-22 NOTE — DISCHARGE NOTE PROVIDER - NSDCFUADDAPPT_GEN_ALL_CORE_FT
Follow up with your Urologist.    Followup with  ([facility]) upon discharge    Follow up with outpatient neurology at [address] [place] [address] 1-2 weeks after discharge. Please instruct the patient to call [phone] to schedule this appointment.

## 2023-08-22 NOTE — PROGRESS NOTE ADULT - PROBLEM SELECTOR PLAN 4
Not reason for admission. Unable to complete full neuro exam as not following all commands even with   - resume ASA  - statin deferred per neurology   - CTH: Small basal ganglia and cerebellar are multiple infarctions
Patient requires total support for all ADL's.   Please assist with ADLs  Skin care as per protocol.
Not reason for admission. Unable to complete full neuro exam as not following all commands even with   - resume ASA  - does not appear to be on statin  - CTH: Small basal ganglia and cerebellar are multiple infarctions
Not reason for admission. Unable to complete full neuro exam as not following all commands even with   - resume ASA  - statin deferred per neurology   - CTH: Small basal ganglia and cerebellar are multiple infarctions
Not reason for admission. Unable to complete full neuro exam as not following all commands even with   - resume ASA  - statin deferred per neurology   - CTH: Small basal ganglia and cerebellar are multiple infarctions
Not reason for admission. Unable to complete full neuro exam as not following all commands even with   - resume ASA  - does not appear to be on statin  - CTH: Small basal ganglia and cerebellar are multiple infarctions

## 2023-08-22 NOTE — PROGRESS NOTE ADULT - ASSESSMENT
68M with PMHx dementia (years), gastric cancer (not surgical or chemo candidate), seizure d/o, HTN, HLD, DM, CVA x2 (last 2013), CKD, chronic bangura presenting with seizure-like activity from oncology office.   Palliative Care consulted for complex decision making  related to goals of care discussions 
Mr. Jones is a 69 yo man with severe dementia and episodes with description and history most consistent with focal onset to bilateral tonic clonic seizures.   Can D/C EEG and Tx out of EMU.  Increase home VPA to  mg BID  No need to check any more VPA levels.   Continue home home ASA 81 mg PO daily   No need for statin due to overall prognosis.   Thank you. 
68M with PMHx dementia (years), gastric cancer (not surgical or chemo candidate), seizure d/o, HTN, HLD, DM, CVA x2 (last 2013), CKD, chronic bangura presenting with seizure-like activity from oncology office. 
Mr. Jones is a 69 yo man with severe dementia and episodes with description and history most consistent with focal onset to bilateral tonic clonic seizures.   Can D/C EEG and Tx out of EMU.  Continue VPA  mg BID  No need to check any more VPA levels.   Continue ASA 81 mg PO daily   No need for statin due to overall prognosis.   Neurology signing off. Please reconsult PRN or call [phone] with any questions.  Thank you.   
68 y.o. Male w/ Hx dementia (years), gastric cancer (not surgical or chemo candidate), seizure d/o, HTN, HLD, DM, CVA x2 (last 2013), CKD, chronic bangura presenting with seizure-like activity from oncology office. Need GOC discussion with family since will be difficult to give seizure meds given mental status. Also since no surgical or chemo candidate for gastric CA. So patient for home with hospice after GOC discussion with palliative care. 
68 y.o. Male w/ Hx dementia (years), gastric cancer (not surgical or chemo candidate), seizure d/o, HTN, HLD, DM, CVA x2 (last 2013), CKD, chronic bangura presenting with seizure-like activity from oncology office. Need GOC discussion with family since will be difficult to give seizure meds given mental status. Also since no surgical or chemo candidate for gastric CA so will recommend hospice. Palliative care consulted
68M with PMHx dementia (years), gastric cancer (not surgical or chemo candidate), seizure d/o, HTN, HLD, DM, CVA x2 (last 2013), CKD, chronic bangura presenting with seizure-like activity from oncology office. 
68M with PMHx dementia (years), gastric cancer (not surgical or chemo candidate), seizure d/o, HTN, HLD, DM, CVA x2 (last 2013), CKD, chronic bangura presenting with seizure-like activity from oncology office.

## 2023-08-22 NOTE — PROGRESS NOTE ADULT - PROBLEM SELECTOR PLAN 5
Bangura placed one month ago. Per ED RN, patient had bangura replaced in ER  - c/w bangura  -  f/u as outpatient  - finasteride, flomax
Bangura placed one month ago. Per ED RN, patient had bangura replaced in ER  - c/w bangura  -  f/u as outpatient  - finasteride, flomax
- in setting of recent seizures and dementia  - please coordinate care with family  -Frequent reassurance and verbal orientation   -Family members or other familiar persons by his bedside.   -Move to a room with a window   -Update the calendar date in the room.    - Monitor for constipation, urinary retention, pain, hunger, thirst, etc.    - Promote sleep wake cycle and reorientation as indicated.  - Minimize use of benzodiazepines, opioids, anticholinergics, and other deliriogenic agents whenever possible.
Bangura placed one month ago. Per ED RN, patient had bangura replaced in ER  - c/w bangura  -  f/u as outpatient  - finasteride, flomax

## 2023-08-22 NOTE — PROGRESS NOTE ADULT - PROBLEM SELECTOR PLAN 2
Seen by heme-onc last admission and determined to be not a chemo candidate. Surgery also evaluated and determined not to be a surgery candidate  - oncology following  - palliative care consulted on family request  - full code for now
- Patient with gastric cancer  - Oncology recommendations appreciated- not a candidate for DMT  - As patient is not a candidate for DMT, then patient would be a candidate for home hospice services.
Seen by heme-onc last admission and determined to be not a chemo candidate. Surgery also evaluated and determined not to be a surgery candidate  - oncology following  - palliative care consulted on family request  - full code for now
Seen by heme-onc last admission and determined to be not a chemo candidate. Surgery also evaluated and determined not to be a surgery candidate  - oncology following  - palliative care consulted on family request  - full code for now
Seen by heme-onc last admission and determined to be not a chemo candidate. Surgery also evaluated and determined not to be a surgery candidate  - oncology following and not a chemo candidate.   - palliative care consulted on family request  - full code for now
Seen by heme-onc last admission and determined to be not a chemo candidate. Surgery also evaluated and determined not to be a surgery candidate  - oncology following and not a chemo candidate.   - palliative care consulted on family request  - full code for now

## 2023-08-22 NOTE — DISCHARGE NOTE PROVIDER - PROVIDER TOKENS
PROVIDER:[TOKEN:[[id number]]],PROVIDER:[TOKEN:[[id number]]],PROVIDER:[TOKEN:[[id number]]]

## 2023-08-22 NOTE — DISCHARGE NOTE PROVIDER - NSDCCPCAREPLAN_GEN_ALL_CORE_FT
PRINCIPAL DISCHARGE DIAGNOSIS  Diagnosis: Seizures  Assessment and Plan of Treatment: Your Depakote was increased from 500mg twice a day to 750mg twice a day.      SECONDARY DISCHARGE DIAGNOSES  Diagnosis: Adult failure to thrive  Assessment and Plan of Treatment: You were seen by palliative team for poor prognosis due to your stomach cancer - your family decided on home hospice with pleasure feeds as tolerated.  Follow up with your Oncologist if within goals of care.    Diagnosis: Urinary retention  Assessment and Plan of Treatment: Continue with chronic bangura. Exchanged in ED while admitted. Flush bangura with 30cc normal saline daily. Follow up with your Urologist.    Diagnosis: Type 2 diabetes mellitus with hyperglycemia, without long-term current use of insulin  Assessment and Plan of Treatment: Your Hemoglobin A1C is 6.0. Target goal for hemoglobin A1C is <7.   Stop Januvia due to poor oral intake.  Monitor blood glucose levels throughout the day before meals and at bedtime. Record blood sugars and bring to outpatient providers appointment in order to be reviewed by your doctor for management modifications. If your sugars are more than 400 or less than 70 you should contact your PCP immediately. Monitor for signs/symptoms of low blood glucose, such as, dizziness, altered mental status, or cool/clammy skin. In addition, monitor for signs/symptoms of high blood glucose, such as, feeling hot, dry, fatigued, or with increased thirst/urination. Make regular podiatry appointments in order to have feet checked for wounds and uncontrolled toe nail growth to prevent infections, as well as, appointments with an ophthalmologist to monitor your vision.    Diagnosis: Gastric cancer  Assessment and Plan of Treatment: You were seen by palliative team for poor prognosis due to your stomach cancer - your family decided on home hospice with pleasure feeds as tolerated.  Follow up with your Oncologist if within goals of care.

## 2023-08-22 NOTE — DISCHARGE NOTE PROVIDER - NSDCMRMEDTOKEN_GEN_ALL_CORE_FT
acetaminophen 650 mg oral tablet, extended release: 1 tab(s) orally every 6 hours as needed for  mild pain  aspirin 81 mg oral tablet, chewable: 1 tab(s) chewed once a day  citalopram 10 mg oral tablet: 1 tab(s) orally once a day  divalproex sodium 125 mg oral delayed release capsule: 6 cap(s) orally 2 times a day  divalproex sodium 500 mg oral delayed release tablet: 1 orally 2 times a day  famotidine 40 mg oral tablet: 1 tab(s) orally 2 times a day  ferrous sulfate 300 mg/5 mL (60 mg/5 mL elemental iron) oral liquid: 5 milliliter(s) orally every other day  finasteride 5 mg oral tablet: 1 tab(s) orally once a day  haloperidol 1 mg oral tablet: 1 tab(s) orally every 6 hours as needed for  agitation as needed  hyoscyamine 0.125 mg oral tablet: 1 tab(s) orally every 6 hours as needed for  congestion 1 tab(s) orally by mouth or under the tongue every 6 hours, As Needed for terminal/excessive secretions. MDD: 4 tabs  LORazepam 0.5 mg oral tablet: 1 tab(s) orally every 6 hours as needed for -for anxiety MDD: 4 tablets  LORazepam 2 mg/mL oral concentrate: 0.5 milliliter(s) orally every 6 hours as needed for  anxiety Take 0.5 milliliter(s) orally every 6 hours, As Needed -for anxiety MDD: 2 mL  morphine 20 mg/mL oral concentrate: 0.25 milliliter(s) sublingual every 6 hours as needed for  shortness of breath and/or wheezing Place 0.25 milliliter(s) under tongue every 6 hours, As Needed for pain or shortness of breath MDD: 1 mL  prochlorperazine 10 mg oral tablet: 1 tab(s) orally every 12 hours  tamsulosin 0.4 mg oral capsule: 1 tab(s) orally once a day

## 2023-08-22 NOTE — PROGRESS NOTE ADULT - PROBLEM SELECTOR PROBLEM 5
Other encephalopathy
Urinary retention

## 2023-08-22 NOTE — PROGRESS NOTE ADULT - PROBLEM SELECTOR PROBLEM 6
HTN (hypertension)
HTN (hypertension)
Dysphagia
HTN (hypertension)

## 2023-08-22 NOTE — PROGRESS NOTE ADULT - PROBLEM SELECTOR PLAN 3
c/w home citalopram if tolerated po  NPO, IVF, dysphagia screen - f/u SLP (asked to reconsult when patient workup is complete)  Chronic dysphagia since March likely 2/2 strokes vs dementia
c/w home citalopram if tolerated po  NPO, IVF, dysphagia screen - f/u SLP  Chronic dysphagia since March likely 2/2 strokes vs dementia
c/w home citalopram if tolerated po  Chronic dysphagia since March likely 2/2 strokes vs dementia  failed dysphagia screen but family agreed to please feeds.
Patient with advanced dementia.  Supportive Care.
c/w home citalopram if tolerated po  NPO, IVF, dysphagia screen - f/u SLP (asked to reconsult when patient workup is complete)  Chronic dysphagia since March likely 2/2 strokes vs dementia
c/w home citalopram if tolerated po  NPO, IVF, dysphagia screen - f/u SLP (asked to reconsult when patient workup is complete)  Chronic dysphagia since March likely 2/2 strokes vs dementia

## 2023-08-22 NOTE — PROGRESS NOTE ADULT - PROBLEM SELECTOR PROBLEM 8
Encounter for palliative care
Type 2 diabetes mellitus with hyperglycemia, without long-term current use of insulin

## 2023-08-22 NOTE — PROGRESS NOTE ADULT - PROBLEM/PLAN-3
DISPLAY PLAN FREE TEXT
761
DISPLAY PLAN FREE TEXT

## 2023-08-27 ENCOUNTER — INPATIENT (INPATIENT)
Facility: HOSPITAL | Age: 68
LOS: 33 days | Discharge: HOME | DRG: 871 | End: 2023-09-30
Attending: HOSPITALIST | Admitting: HOSPITALIST
Payer: MEDICAID

## 2023-08-27 VITALS
WEIGHT: 134.48 LBS | HEIGHT: 64 IN | RESPIRATION RATE: 20 BRPM | SYSTOLIC BLOOD PRESSURE: 141 MMHG | DIASTOLIC BLOOD PRESSURE: 99 MMHG | OXYGEN SATURATION: 97 % | HEART RATE: 90 BPM

## 2023-08-27 DIAGNOSIS — R41.82 ALTERED MENTAL STATUS, UNSPECIFIED: ICD-10-CM

## 2023-08-27 LAB
ALBUMIN SERPL ELPH-MCNC: 2.3 G/DL — LOW (ref 3.5–5)
ALP SERPL-CCNC: 57 U/L — SIGNIFICANT CHANGE UP (ref 40–120)
ALT FLD-CCNC: 10 U/L DA — SIGNIFICANT CHANGE UP (ref 10–60)
AMMONIA BLD-MCNC: 31 UMOL/L — SIGNIFICANT CHANGE UP (ref 11–32)
ANION GAP SERPL CALC-SCNC: 3 MMOL/L — LOW (ref 5–17)
ANISOCYTOSIS BLD QL: SLIGHT — SIGNIFICANT CHANGE UP
APAP SERPL-MCNC: 2 UG/ML — LOW (ref 10–30)
APPEARANCE UR: CLEAR — SIGNIFICANT CHANGE UP
AST SERPL-CCNC: 10 U/L — SIGNIFICANT CHANGE UP (ref 10–40)
BACTERIA # UR AUTO: ABNORMAL /HPF
BASE EXCESS BLDA CALC-SCNC: 4.2 MMOL/L — HIGH (ref -2–3)
BASE EXCESS BLDV CALC-SCNC: 4.1 MMOL/L — SIGNIFICANT CHANGE UP
BASOPHILS # BLD AUTO: 0.01 K/UL — SIGNIFICANT CHANGE UP (ref 0–0.2)
BASOPHILS NFR BLD AUTO: 0.1 % — SIGNIFICANT CHANGE UP (ref 0–2)
BILIRUB SERPL-MCNC: 0.2 MG/DL — SIGNIFICANT CHANGE UP (ref 0.2–1.2)
BILIRUB UR-MCNC: NEGATIVE — SIGNIFICANT CHANGE UP
BLOOD GAS COMMENTS ARTERIAL: SIGNIFICANT CHANGE UP
BLOOD GAS COMMENTS, VENOUS: SIGNIFICANT CHANGE UP
BUN SERPL-MCNC: 22 MG/DL — HIGH (ref 7–18)
CA-I SERPL-SCNC: SIGNIFICANT CHANGE UP MMOL/L (ref 1.15–1.33)
CALCIUM SERPL-MCNC: 8.9 MG/DL — SIGNIFICANT CHANGE UP (ref 8.4–10.5)
CHLORIDE SERPL-SCNC: 99 MMOL/L — SIGNIFICANT CHANGE UP (ref 96–108)
CO2 SERPL-SCNC: 30 MMOL/L — SIGNIFICANT CHANGE UP (ref 22–31)
COLOR SPEC: YELLOW — SIGNIFICANT CHANGE UP
CREAT SERPL-MCNC: 1.84 MG/DL — HIGH (ref 0.5–1.3)
DIFF PNL FLD: ABNORMAL
EGFR: 40 ML/MIN/1.73M2 — LOW
EOSINOPHIL # BLD AUTO: 0.24 K/UL — SIGNIFICANT CHANGE UP (ref 0–0.5)
EOSINOPHIL NFR BLD AUTO: 3 % — SIGNIFICANT CHANGE UP (ref 0–6)
ETHANOL SERPL-MCNC: <3 MG/DL — SIGNIFICANT CHANGE UP (ref 0–10)
FLUAV AG NPH QL: SIGNIFICANT CHANGE UP
FLUBV AG NPH QL: SIGNIFICANT CHANGE UP
GAS PNL BLDV: 129 MMOL/L — LOW (ref 136–145)
GAS PNL BLDV: SIGNIFICANT CHANGE UP
GLUCOSE SERPL-MCNC: 127 MG/DL — HIGH (ref 70–99)
GLUCOSE UR QL: 250 MG/DL
HCO3 BLDA-SCNC: 29 MMOL/L — HIGH (ref 21–28)
HCO3 BLDV-SCNC: 30 MMOL/L — HIGH (ref 22–29)
HCT VFR BLD CALC: 25 % — LOW (ref 39–50)
HGB BLD-MCNC: 7.9 G/DL — LOW (ref 13–17)
HYPOCHROMIA BLD QL: SLIGHT — SIGNIFICANT CHANGE UP
IMM GRANULOCYTES NFR BLD AUTO: 0.9 % — SIGNIFICANT CHANGE UP (ref 0–0.9)
KETONES UR-MCNC: NEGATIVE MG/DL — SIGNIFICANT CHANGE UP
LACTATE BLDV-MCNC: 1.6 MMOL/L — SIGNIFICANT CHANGE UP (ref 0.5–2)
LACTATE SERPL-SCNC: 1.5 MMOL/L — SIGNIFICANT CHANGE UP (ref 0.7–2)
LEUKOCYTE ESTERASE UR-ACNC: NEGATIVE — SIGNIFICANT CHANGE UP
LIDOCAIN IGE QN: 27 U/L — SIGNIFICANT CHANGE UP (ref 13–75)
LYMPHOCYTES # BLD AUTO: 1.71 K/UL — SIGNIFICANT CHANGE UP (ref 1–3.3)
LYMPHOCYTES # BLD AUTO: 21.3 % — SIGNIFICANT CHANGE UP (ref 13–44)
MAGNESIUM SERPL-MCNC: 2 MG/DL — SIGNIFICANT CHANGE UP (ref 1.6–2.6)
MANUAL SMEAR VERIFICATION: SIGNIFICANT CHANGE UP
MCHC RBC-ENTMCNC: 26.9 PG — LOW (ref 27–34)
MCHC RBC-ENTMCNC: 31.6 GM/DL — LOW (ref 32–36)
MCV RBC AUTO: 85 FL — SIGNIFICANT CHANGE UP (ref 80–100)
MONOCYTES # BLD AUTO: 1.11 K/UL — HIGH (ref 0–0.9)
MONOCYTES NFR BLD AUTO: 13.8 % — SIGNIFICANT CHANGE UP (ref 2–14)
NEUTROPHILS # BLD AUTO: 4.89 K/UL — SIGNIFICANT CHANGE UP (ref 1.8–7.4)
NEUTROPHILS NFR BLD AUTO: 60.9 % — SIGNIFICANT CHANGE UP (ref 43–77)
NITRITE UR-MCNC: NEGATIVE — SIGNIFICANT CHANGE UP
NRBC # BLD: 0 /100 WBCS — SIGNIFICANT CHANGE UP (ref 0–0)
OVALOCYTES BLD QL SMEAR: SLIGHT — SIGNIFICANT CHANGE UP
PCO2 BLDA: 45 MMHG — SIGNIFICANT CHANGE UP (ref 35–48)
PCO2 BLDV: 49 MMHG — SIGNIFICANT CHANGE UP (ref 42–55)
PH BLDA: 7.42 — SIGNIFICANT CHANGE UP (ref 7.35–7.45)
PH BLDV: 7.39 — SIGNIFICANT CHANGE UP (ref 7.32–7.43)
PH UR: 6 — SIGNIFICANT CHANGE UP (ref 5–8)
PLAT MORPH BLD: NORMAL — SIGNIFICANT CHANGE UP
PLATELET # BLD AUTO: 117 K/UL — LOW (ref 150–400)
PO2 BLDA: 89 MMHG — SIGNIFICANT CHANGE UP (ref 83–108)
PO2 BLDV: 28 MMHG — SIGNIFICANT CHANGE UP
POIKILOCYTOSIS BLD QL AUTO: SLIGHT — SIGNIFICANT CHANGE UP
POLYCHROMASIA BLD QL SMEAR: SLIGHT — SIGNIFICANT CHANGE UP
POTASSIUM BLDV-SCNC: 5.1 MMOL/L — SIGNIFICANT CHANGE UP (ref 3.5–5.1)
POTASSIUM SERPL-MCNC: 4.8 MMOL/L — SIGNIFICANT CHANGE UP (ref 3.5–5.3)
POTASSIUM SERPL-SCNC: 4.8 MMOL/L — SIGNIFICANT CHANGE UP (ref 3.5–5.3)
PROT SERPL-MCNC: 5.9 G/DL — LOW (ref 6–8.3)
PROT UR-MCNC: NEGATIVE MG/DL — SIGNIFICANT CHANGE UP
RBC # BLD: 2.94 M/UL — LOW (ref 4.2–5.8)
RBC # FLD: 20.7 % — HIGH (ref 10.3–14.5)
RBC BLD AUTO: ABNORMAL
RBC CASTS # UR COMP ASSIST: 6 /HPF — HIGH (ref 0–4)
SALICYLATES SERPL-MCNC: <1.7 MG/DL — LOW (ref 2.8–20)
SAO2 % BLDA: 97 % — SIGNIFICANT CHANGE UP
SAO2 % BLDV: 38.9 % — SIGNIFICANT CHANGE UP
SARS-COV-2 RNA SPEC QL NAA+PROBE: SIGNIFICANT CHANGE UP
SODIUM SERPL-SCNC: 132 MMOL/L — LOW (ref 135–145)
SP GR SPEC: 1.01 — SIGNIFICANT CHANGE UP (ref 1–1.03)
TROPONIN I, HIGH SENSITIVITY RESULT: 9.8 NG/L — SIGNIFICANT CHANGE UP
UROBILINOGEN FLD QL: 0.2 MG/DL — SIGNIFICANT CHANGE UP (ref 0.2–1)
VALPROATE SERPL-MCNC: 70 UG/ML — SIGNIFICANT CHANGE UP (ref 50–100)
WBC # BLD: 8.03 K/UL — SIGNIFICANT CHANGE UP (ref 3.8–10.5)
WBC # FLD AUTO: 8.03 K/UL — SIGNIFICANT CHANGE UP (ref 3.8–10.5)
WBC UR QL: 5 /HPF — SIGNIFICANT CHANGE UP (ref 0–5)

## 2023-08-27 PROCEDURE — 99285 EMERGENCY DEPT VISIT HI MDM: CPT

## 2023-08-27 PROCEDURE — 99223 1ST HOSP IP/OBS HIGH 75: CPT

## 2023-08-27 PROCEDURE — 93010 ELECTROCARDIOGRAM REPORT: CPT

## 2023-08-27 PROCEDURE — 71045 X-RAY EXAM CHEST 1 VIEW: CPT | Mod: 26

## 2023-08-27 PROCEDURE — 70450 CT HEAD/BRAIN W/O DYE: CPT | Mod: 26,MA

## 2023-08-27 RX ORDER — SODIUM CHLORIDE 9 MG/ML
2000 INJECTION, SOLUTION INTRAVENOUS ONCE
Refills: 0 | Status: COMPLETED | OUTPATIENT
Start: 2023-08-27 | End: 2023-08-27

## 2023-08-27 RX ORDER — SODIUM CHLORIDE 9 MG/ML
1000 INJECTION, SOLUTION INTRAVENOUS ONCE
Refills: 0 | Status: COMPLETED | OUTPATIENT
Start: 2023-08-27 | End: 2023-08-27

## 2023-08-27 RX ORDER — CEFEPIME 1 G/1
1000 INJECTION, POWDER, FOR SOLUTION INTRAMUSCULAR; INTRAVENOUS ONCE
Refills: 0 | Status: COMPLETED | OUTPATIENT
Start: 2023-08-27 | End: 2023-08-27

## 2023-08-27 RX ORDER — ACETAMINOPHEN 500 MG
650 TABLET ORAL ONCE
Refills: 0 | Status: COMPLETED | OUTPATIENT
Start: 2023-08-27 | End: 2023-08-27

## 2023-08-27 RX ORDER — VANCOMYCIN HCL 1 G
1000 VIAL (EA) INTRAVENOUS ONCE
Refills: 0 | Status: COMPLETED | OUTPATIENT
Start: 2023-08-27 | End: 2023-08-27

## 2023-08-27 RX ADMIN — SODIUM CHLORIDE 1000 MILLILITER(S): 9 INJECTION, SOLUTION INTRAVENOUS at 23:17

## 2023-08-27 RX ADMIN — SODIUM CHLORIDE 2000 MILLILITER(S): 9 INJECTION, SOLUTION INTRAVENOUS at 18:20

## 2023-08-27 RX ADMIN — Medication 650 MILLIGRAM(S): at 18:23

## 2023-08-27 RX ADMIN — SODIUM CHLORIDE 2000 MILLILITER(S): 9 INJECTION, SOLUTION INTRAVENOUS at 19:20

## 2023-08-27 RX ADMIN — Medication 250 MILLIGRAM(S): at 17:54

## 2023-08-27 RX ADMIN — CEFEPIME 1000 MILLIGRAM(S): 1 INJECTION, POWDER, FOR SOLUTION INTRAMUSCULAR; INTRAVENOUS at 17:53

## 2023-08-27 RX ADMIN — Medication 650 MILLIGRAM(S): at 17:23

## 2023-08-27 RX ADMIN — CEFEPIME 100 MILLIGRAM(S): 1 INJECTION, POWDER, FOR SOLUTION INTRAMUSCULAR; INTRAVENOUS at 17:21

## 2023-08-27 RX ADMIN — Medication 1000 MILLIGRAM(S): at 18:54

## 2023-08-27 NOTE — ED PROVIDER NOTE - PHYSICAL EXAMINATION
Gen:  Sleeping, mild dist, chronically ill appearing, NCAT  Eyes:  PERRL, EOMI, no icterus, normal lids/lashes, normal conjunctivae.  ENT:  External inspection normal, pink/moist membranes.   CV:  S1S2, regular rate and rhythm, no murmur/gallops/rubs, no JVD, 2+ pulses b/l, no edema/cords/homans, warm/well-perfused.  Resp:  Mild tachypnea, no respiratory distress, lungs clear to auscultation b/l, no wheezing/rales/rhonchi, no retractions, no stridor.  Abd:  Soft abdomen, no tender/distended/guarding/rebound, no pulsatile mass, no CVA tender.   Musculoskeletal:  N/V intact, FROM all 4 extremities, normal motor tone  Neck:  FROM neck, supple, trachea midline, no meningismus.   Skin:  Color normal for race, warm and dry, no rash.  Neuro:  Sleeping but responsive to noxious stimuli, CN 2-12 intact (grossly), withdraws all 4 ext equally to noxious stimuli  Psych:  Limited due to mental status

## 2023-08-27 NOTE — H&P ADULT - PROBLEM SELECTOR PLAN 7
- heparin for dvt ppx - hx of seizures, on increased dose form recent [facility] discharge  - c/w IV valproic acid

## 2023-08-27 NOTE — ED ADULT NURSE NOTE - NSFALLRISKINTERV_ED_ALL_ED

## 2023-08-27 NOTE — ED PROVIDER NOTE - CLINICAL SUMMARY MEDICAL DECISION MAKING FREE TEXT BOX
68 male with dementia, gastric cancer but not on chemotherapy, prior CVAs, DM, HTN, HLD, CKD, & seizures presenting to the ED with decreased p.o. intake for the last 3 days and gradual worsening of mental status acutely worse today.  Hypoxia in route via EMS improving with supplemental O2 at bedside.  Protecting airway currently.  Patient currently on opioids at home for hospice care but no evidence of respiratory depression or opioid overdose at this time.  No indication for naloxone at this time.  Chronically ill-appearing.  Rectal low-grade temperature concerning for possible sepsis.    Plan to obtain:    -Labs, EKG, CXR, CT head R/O intracranial hemorrhage, IV fluids, antibiotics, admit    ED chart 8/17/2023; patient admitted to the hospital after having a seizure.    Independent interpretation of the EKG: sinus @ 89, L axis, normal interval, nonspecific st/t changes limited by motion artifact    Independent interpretation of XR: R base consolidation, no effusion/pntx    Lab values with anemia unchanged.  Elevated creatinine concerning for NEVILLE from dehydration.  Blood gas showing no evidence of hypercapnic respiratory failure.  UA negative.    Antibiotics given.  Patient comfortable with supplemental O2.  No respiratory distress in the ED need for intubation.    Patient requires inpatient admission for further care & stabilization.  Care signed out to inpatient team.

## 2023-08-27 NOTE — H&P ADULT - PROBLEM SELECTOR PLAN 1
- BP labile, however fluid responsive 80s-100s/50s-70s, w/ RR >20, meets SIRS criteria with likely source PNA   - Labs: 7.41/44/28/254 (ABG), lac wnl, BUN 22/Cr 1.84, trop 9.8 , U/A neg.   - CXR concerning for RLL infiltrate.  - c/w cefepime and azithro for asp pna coverage, and atypical pna coverage   - c/w fluid resuscitation, s/p 3 L bolus   - f/u UCx and BCx, legionella and strep  - continue to monitor on medicine floor

## 2023-08-27 NOTE — H&P ADULT - PROBLEM SELECTOR PLAN 2
- Discharged from [facility] about a week ago after management for suspected breakthrough seizure with home VPA dose increased to 750mg BID.   - At [facility], family decided on pleasure feeds and home hospice however still wants pt to be full code. Pt was brought in today from home hospice with acute alteration from his baseline mental status with decreased responsiveness associated with decreased oral intake.   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 Bulky local necrotic metastatic adenopathy.   - palliative care consult in AM for clarification on GOC  - continue to monitor vitals and mental status on medicine floor , full code  - hold on comfort meds in med rec (ativan, morphine) for now, due to risk of respiratory depression - BP labile, however fluid responsive 80s-100s/50s-70s, w/ RR >20, meets SIRS criteria with likely source PNA   - Labs: 7.41/44/28/254 (ABG), lac wnl, BUN 22/Cr 1.84, trop 9.8 , U/A neg.   - CXR concerning for RLL infiltrate.  - c/w cefepime and azithro for asp pna coverage, and atypical pna coverage   - c/w fluid resuscitation, s/p 3 L bolus   - f/u UCx and BCx, legionella and strep  - continue to monitor on medicine floor

## 2023-08-27 NOTE — ED PROVIDER NOTE - NSFOLLOWUPINSTRUCTIONS_ED_ALL_ED_FT
sinus @ 89, L axis, normal interval, nonspecific st/t changes limited by motion artifact 68 male with dementia, gastric cancer but not on chemotherapy, prior CVAs, DM, HTN, HLD, CKD, & seizures presenting to the ED with decreased p.o. intake for the last 3 days and gradual worsening of mental status acutely worse today.  Hypoxia in route via EMS improving with supplemental O2 at bedside.  Protecting airway currently.  Patient currently on opioids at home for hospice care but no evidence of respiratory depression or opioid overdose at this time.  No indication for naloxone at this time.  Chronically ill-appearing.  Rectal low-grade temperature concerning for possible sepsis.    Plan to obtain:    -Labs, EKG, CXR, CT head R/O intracranial hemorrhage, IV fluids, antibiotics, admit    ED chart 8/17/2023; patient admitted to the hospital after having a seizure.    Independent interpretation of the EKG: sinus @ 89, L axis, normal interval, nonspecific st/t changes limited by motion artifact    Independent interpretation of XR: R base consolidation, no effusion/pntx

## 2023-08-27 NOTE — ED ADULT TRIAGE NOTE - CHIEF COMPLAINT QUOTE
Notification- unresponsive x 3 hrs. Not eating x 3 days. On hospice for Gastric CA SINCE 8/22/23 PER FAMILY

## 2023-08-27 NOTE — H&P ADULT - PROBLEM SELECTOR PLAN 4
- hx of seizures, on increased dose form recent [facility] discharge  - c/w IV valproic acid - BP labile, however fluid responsive 80s-100s/50s-70s, w/ RR >20, meets SIRS criteria with likely source PNA   - Labs: 7.41/44/28/254 (ABG), lac wnl, BUN 22/Cr 1.84, trop 9.8 , U/A neg.   - CXR concerning for RLL infiltrate.  - c/w cefepime and azithro for asp pna coverage, and atypical pna coverage   - c/w fluid resuscitation, s/p 3 L bolus   - f/u UCx and BCx, legionella and strep  - continue to monitor on medicine floor

## 2023-08-27 NOTE — H&P ADULT - ATTENDING COMMENTS
68 year old man with advanced dementia, gastric cancer , multiple cardiovascular diseases - DM, CVA, HTN, HLD, CKD, hx of chronic indwelling catheter of unclear cause.  Discharged from [facility] about a week ago after management for suspected breakthrough seizure with home VPA dose increased to 750mg BID.    Pt was brought in today from ?home hospice with acute alteration from his baseline mental status with decreased responsiveness associated with decreased oral intake. No fevers, no emesis or diarrhea.   ROS not possible with mental status.    Vital Signs Last 24 Hrs  T(C): 36.4 (27 Aug 2023 19:02), Max: 37.9 (27 Aug 2023 17:01)  T(F): 97.5 (27 Aug 2023 19:02), Max: 100.2 (27 Aug 2023 17:01)  HR: 105 (27 Aug 2023 21:00) (90 - 105)  BP: 105/61 (27 Aug 2023 21:00) (105/61 - 141/99)  RR: 26 (27 Aug 2023 21:00) (20 - 26)  SpO2: 100% (27 Aug 2023 21:00) (97% - 100%)    Parameters below as of 27 Aug 2023 21:00  Patient On (Oxygen Delivery Method): nasal cannula  O2 Flow (L/min): 2    cbc   -unchanged anemia  drop in PLT  Na - 132  BUN/Cr  acute worsening  22/1.84  ABG - unremarkable     UA - unremarkable    CT head -unremarkable     CXR - independent assessment   Increased prominence of RLL ( perihilar) not seen on prior CXR    Impression   - Acute respiratory failure with hypoxia  - Suspected acute bacterial pneumonia - RLL likely from   aspiration   - NEVILLE on CKD    Plan   Admit to Medicine  Sepsis work up   Empiric IV antibiotics with broad spectrum gram negative coverage for LIONEL  IVF hydration   Fall and aspiration precaution  Palliative/Hospice care consult   Code status - remains full code 68 year old man with advanced dementia, gastric cancer , multiple cardiovascular diseases - DM, CVA, HTN, HLD, CKD, hx of chronic indwelling catheter of unclear cause.  Discharged from [facility] about a week ago after management for suspected breakthrough seizure with home VPA dose increased to 750mg BID.    Pt was brought in today from ?home hospice with acute alteration from his baseline mental status with decreased responsiveness associated with decreased oral intake. No fevers, no emesis or diarrhea.   ROS not possible with mental status.    Vital Signs Last 24 Hrs  T(C): 36.4 (27 Aug 2023 19:02), Max: 37.9 (27 Aug 2023 17:01)  T(F): 97.5 (27 Aug 2023 19:02), Max: 100.2 (27 Aug 2023 17:01)  HR: 105 (27 Aug 2023 21:00) (90 - 105)  BP: 105/61 (27 Aug 2023 21:00) (105/61 - 141/99)  RR: 26 (27 Aug 2023 21:00) (20 - 26)  SpO2: 100% (27 Aug 2023 21:00) (97% - 100%)    Parameters below as of 27 Aug 2023 21:00  Patient On (Oxygen Delivery Method): nasal cannula  O2 Flow (L/min): 2    cbc   -unchanged anemia  drop in PLT  Na - 132  BUN/Cr  acute worsening  22/1.84  ABG - unremarkable     UA - unremarkable    CT head -unremarkable     CXR - independent assessment   Increased prominence of RLL ( perihilar) not seen on prior CXR    Impression   - Acute respiratory failure with hypoxia  - Suspected acute bacterial pneumonia - RLL likely from   aspiration   - NEVILLE on CKD    Plan   Admit to Medicine  Sepsis work up   Supplemental oxygen to keep SaO2 > 96%  Empiric IV antibiotics with broad spectrum gram negative coverage for LIONEL; continue cefepime and azithromycin for atypical organism coverage  IVF hydration   Fall and aspiration precaution  Medication reconciliation  Palliative/Hospice care consult   Code status - remains full code 68 year old man with advanced dementia, gastric cancer , multiple cardiovascular diseases - DM, CVA, HTN, HLD, CKD, hx of chronic indwelling catheter of unclear cause.  Discharged from [facility] about a week ago after management for suspected breakthrough seizure with home VPA dose increased to 750mg BID.    Pt was brought in today from ?home hospice with acute alteration from his baseline mental status with decreased responsiveness associated with decreased oral intake. No fevers, no emesis or diarrhea.   ROS not possible with mental status.    Vital Signs Last 24 Hrs  T(C): 36.4 (27 Aug 2023 19:02), Max: 37.9 (27 Aug 2023 17:01)  T(F): 97.5 (27 Aug 2023 19:02), Max: 100.2 (27 Aug 2023 17:01)  HR: 105 (27 Aug 2023 21:00) (90 - 105)  BP: 105/61 (27 Aug 2023 21:00) (105/61 - 141/99)  RR: 26 (27 Aug 2023 21:00) (20 - 26)  SpO2: 100% (27 Aug 2023 21:00) (97% - 100%)    Parameters below as of 27 Aug 2023 21:00  Patient On (Oxygen Delivery Method): nasal cannula  O2 Flow (L/min): 2    cbc   -unchanged anemia  drop in PLT  Na - 132  BUN/Cr  acute worsening  22/1.84  ABG - unremarkable     UA - unremarkable    CT head -unremarkable     CXR - independent assessment   Increased prominence of RLL ( perihilar) not seen on prior CXR    Impression   - Acute respiratory failure with hypoxia  -Acute encephalopathy   - Suspected acute bacterial pneumonia - RLL likely from   aspiration   - Fluid responsive septic shock   - NEVILLE on CKD    Plan   Admit to Medicine  Sepsis work up   Supplemental oxygen to keep SaO2 > 96%  Empiric IV antibiotics with broad spectrum gram negative coverage for LIONEL; continue cefepime and azithromycin for atypical organism coverage  IVF hydration   Fall and aspiration precaution  Medication reconciliation  Keep NPO  Palliative/Hospice care consult   Code status - remains full code; threshold for ICU consult is low

## 2023-08-27 NOTE — H&P ADULT - HISTORY OF PRESENT ILLNESS
68 year old man with advanced dementia (years), gastric cancer w/ local metastatic lymph nodes (not surgical or chemo candidate), seizure disorder, HTN, HLD, DM, CVA x2 (last 2013), CKD (Baseline Cr 1.5-1.8), chronic bangura (exchanged in ED 8/17/23). Discharged from [facility] about a week ago after management for suspected breakthrough seizure with home VPA dose increased to 750mg BID. At [facility], susan decided on pleasure feeds and home hospice however still wants pt to be full code. Pt was brought in today from home hospice with acute alteration from his baseline mental status with decreased responsiveness associated with decreased oral intake. No fevers, no emesis or diarrhea. ROS not possible with mental status. Spoke with family at length about patient's prognosis, however, family still wants pt to be full code.     Vitals: BP labile, however fluid responsive 80s-100s/50s-70s, afebrile   Labs: 7.41/44/28/254 (ABG), lac wnl, BUN 22/Cr 1.84, trop 9.8 , U/A neg   CXR concerning for RLL infiltrate   CTH wnl , recent CT Ab/P imaging showed - Gastric pyloric mass, suspect adenocarcinoma. Bulky local necrotic metastatic adenopathy.     68 year old man with advanced dementia (years), gastric cancer w/ local metastatic lymph nodes (not surgical or chemo candidate), seizure disorder, HTN, HLD, DM, CVA x2 (last 2013), CKD (Baseline Cr 1.5-1.8), chronic bangura (exchanged in ED 8/17/23). Discharged from [facility] about a week ago after management for suspected breakthrough seizure with home VPA dose increased to 750mg BID. At [facility], susan decided on pleasure feeds and home hospice however still wants pt to be full code. Pt was brought in today from home hospice with acute alteration from his baseline mental status with decreased responsiveness associated with decreased oral intake. No fevers, no emesis or diarrhea. ROS not possible with mental status. Spoke with family at length about patient's prognosis, however, family still wants pt to be full code.     Vitals: BP labile, however fluid responsive 80s-100s/50s-70s, afebrile   Labs: 7.41/44/28/254 (ABG), lac wnl, BUN 22/Cr 1.84, trop 9.8 , U/A neg   CXR concerning for RLL infiltrate   CTH wnl , recent CT Ab/P imaging showed - Gastric pyloric mass, suspect adenocarcinoma. Bulky local necrotic metastatic adenopathy.

## 2023-08-27 NOTE — H&P ADULT - PROBLEM SELECTOR PLAN 6
- hx of advanced dementia on citalopram  - c.w meds when off NPO - hx of CKD, currently with NEVILLE BUN/Cr 22/1.84 (baseline b/w 1.5-1.8)  - continue to monitor Cr after fluid resuscitation, likely pre-renal, dehydration  - bangura in place due to AMS   - f/u urine electrolytes, renal US

## 2023-08-27 NOTE — ED PROVIDER NOTE - OBJECTIVE STATEMENT
68 male with hx of dementia, gastric cancer not currently on chemo, seizures on VPA, HTN, HLD, DM, CKD, & prior CVA.   Pt presenting to the ED w EMS reporting 3 days of decreased p.o. intake with progressive worsening of mental status for the past few days and acutely worse today.  No trauma.   EMS reports that patient was having decreased O2 percent in route but no witnessed aspiration.  History limited due to altered mental state.  History assisted by EMS bedside & son.  Patient is full code

## 2023-08-27 NOTE — ED ADULT NURSE NOTE - OBJECTIVE STATEMENT
Pt BIBA unresponsive. EMS reports pt not eating x 3 days and hx of gastric CA. Perez 14 Fr in place. O2 @ 3L viz NC in place.

## 2023-08-27 NOTE — H&P ADULT - PROBLEM SELECTOR PLAN 3
- hx of CKD, currently with NEVILLE BUN/Cr 22/1.84 (baseline b/w 1.5-1.8)  - continue to monitor Cr after fluid resuscitation, likely pre-renal, dehydration  - bangura in place due to AMS   - f/u urine electrolytes, renal US - BP labile, however fluid responsive 80s-100s/50s-70s, w/ RR >20, meets SIRS criteria with likely source PNA   - Labs: 7.41/44/28/254 (ABG), lac wnl, BUN 22/Cr 1.84, trop 9.8 , U/A neg.   - CXR concerning for RLL infiltrate.  - c/w cefepime and azithro for asp pna coverage, and atypical pna coverage   - c/w fluid resuscitation, s/p 3 L bolus   - f/u UCx and BCx, legionella and strep  - continue to monitor on medicine floor

## 2023-08-27 NOTE — H&P ADULT - NSHPPHYSICALEXAM_GEN_ALL_CORE
T(C): 36.9 (08-28-23 @ 02:33), Max: 37.9 (08-27-23 @ 17:01)  HR: 87 (08-28-23 @ 04:10) (84 - 106)  BP: 122/67 (08-28-23 @ 04:10) (76/44 - 141/99)  RR: 18 (08-28-23 @ 04:10) (18 - 30)  SpO2: 97% (08-28-23 @ 04:10) (85% - 100%)    CONSTITUTIONAL: non-responsive to verbal commands, responsive to painful stimuli, A&Ox0  EYES: PERRLA and symmetric, EOMI, No conjunctival or scleral injection, non-icteric  ENMT: Oral mucosa with dry membranes.   RESP: (+) mild respiratory distress, no use of accessory muscles; CTA b/l, no WRR  CV: RRR, +S1S2, no MRG; no JVD; no peripheral edema  GI: Soft, NT, ND, no rebound, no guarding; no palpable masses; no hepatosplenomegaly; no hernia palpated  SKIN: No rashes or ulcers noted; no subcutaneous nodules or induration palpable  NEURO: CN II-XII intact; normal reflexes in upper and lower extremities, sensation intact in upper and lower extremities b/l to light touch

## 2023-08-27 NOTE — ED PROVIDER NOTE - CARE PLAN
1 Principal Discharge DX:	Altered mental status  Secondary Diagnosis:	NEVILLE (acute kidney injury)  Secondary Diagnosis:	Dehydration  Secondary Diagnosis:	Pneumonia

## 2023-08-27 NOTE — H&P ADULT - ASSESSMENT
68 year old man with advanced dementia (years), gastric cancer w/ local metastatic lymph nodes (not surgical or chemo candidate), seizure disorder, HTN, HLD, DM, CVA x2 (last 2013), CKD (Baseline Cr 1.5-1.8), chronic bangura (exchanged in ED 8/17/23). Discharged from [facility] about a week ago after management for suspected breakthrough seizure with home VPA dose increased to 750mg BID. At [facility], family decided on pleasure feeds and home hospice however still wants pt to be full code. Pt was brought in today from home hospice with acute alteration from his baseline mental status with decreased responsiveness associated with decreased oral intake. BP labile, however fluid responsive 80s-100s/50s-70s, afebrile. Labs: 7.41/44/28/254 (ABG), lac wnl, BUN 22/Cr 1.84, trop 9.8 , U/A neg. CXR concerning for RLL infiltrate. CTH wnl , recent CT Ab/P imaging showed - Gastric pyloric mass, suspect adenocarcinoma. Bulky local necrotic metastatic adenopathy. Admitted to medicine for AHRF 2/2 asp. pna, NEVILLE on CKD, and worsening mental status in the setting of advanced gastric cancer.

## 2023-08-27 NOTE — H&P ADULT - PROBLEM SELECTOR PLAN 5
- hx of DM not on any home meds  - FS q6 while NPO - Discharged from [facility] about a week ago after management for suspected breakthrough seizure with home VPA dose increased to 750mg BID.   - At [facility], family decided on pleasure feeds and home hospice however still wants pt to be full code. Pt was brought in today from home hospice with acute alteration from his baseline mental status with decreased responsiveness associated with decreased oral intake.   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 Bulky local necrotic metastatic adenopathy.   - palliative care consult in AM for clarification on GOC  - continue to monitor vitals and mental status on medicine floor , full code  - hold on comfort meds in med rec (ativan, morphine) for now, due to risk of respiratory depression

## 2023-08-28 DIAGNOSIS — R56.9 UNSPECIFIED CONVULSIONS: ICD-10-CM

## 2023-08-28 DIAGNOSIS — F03.90 UNSPECIFIED DEMENTIA, UNSPECIFIED SEVERITY, WITHOUT BEHAVIORAL DISTURBANCE, PSYCHOTIC DISTURBANCE, MOOD DISTURBANCE, AND ANXIETY: ICD-10-CM

## 2023-08-28 DIAGNOSIS — J96.01 ACUTE RESPIRATORY FAILURE WITH HYPOXIA: ICD-10-CM

## 2023-08-28 DIAGNOSIS — G40.909 EPILEPSY, UNSPECIFIED, NOT INTRACTABLE, WITHOUT STATUS EPILEPTICUS: ICD-10-CM

## 2023-08-28 DIAGNOSIS — A41.9 SEPSIS, UNSPECIFIED ORGANISM: ICD-10-CM

## 2023-08-28 DIAGNOSIS — J69.0 PNEUMONITIS DUE TO INHALATION OF FOOD AND VOMIT: ICD-10-CM

## 2023-08-28 DIAGNOSIS — N18.9 CHRONIC KIDNEY DISEASE, UNSPECIFIED: ICD-10-CM

## 2023-08-28 DIAGNOSIS — E11.9 TYPE 2 DIABETES MELLITUS WITHOUT COMPLICATIONS: ICD-10-CM

## 2023-08-28 DIAGNOSIS — N17.9 ACUTE KIDNEY FAILURE, UNSPECIFIED: ICD-10-CM

## 2023-08-28 DIAGNOSIS — E78.5 HYPERLIPIDEMIA, UNSPECIFIED: ICD-10-CM

## 2023-08-28 DIAGNOSIS — C16.9 MALIGNANT NEOPLASM OF STOMACH, UNSPECIFIED: ICD-10-CM

## 2023-08-28 DIAGNOSIS — Z51.5 ENCOUNTER FOR PALLIATIVE CARE: ICD-10-CM

## 2023-08-28 DIAGNOSIS — J18.9 PNEUMONIA, UNSPECIFIED ORGANISM: ICD-10-CM

## 2023-08-28 DIAGNOSIS — G93.40 ENCEPHALOPATHY, UNSPECIFIED: ICD-10-CM

## 2023-08-28 DIAGNOSIS — Z29.9 ENCOUNTER FOR PROPHYLACTIC MEASURES, UNSPECIFIED: ICD-10-CM

## 2023-08-28 LAB
ANION GAP SERPL CALC-SCNC: 6 MMOL/L — SIGNIFICANT CHANGE UP (ref 5–17)
BASE EXCESS BLDA CALC-SCNC: 3 MMOL/L — SIGNIFICANT CHANGE UP (ref -2–3)
BLOOD GAS COMMENTS ARTERIAL: SIGNIFICANT CHANGE UP
BUN SERPL-MCNC: 21 MG/DL — HIGH (ref 7–18)
CALCIUM SERPL-MCNC: 8.2 MG/DL — LOW (ref 8.4–10.5)
CHLORIDE SERPL-SCNC: 102 MMOL/L — SIGNIFICANT CHANGE UP (ref 96–108)
CO2 SERPL-SCNC: 26 MMOL/L — SIGNIFICANT CHANGE UP (ref 22–31)
CREAT ?TM UR-MCNC: 20 MG/DL — SIGNIFICANT CHANGE UP
CREAT SERPL-MCNC: 1.53 MG/DL — HIGH (ref 0.5–1.3)
CULTURE RESULTS: NO GROWTH — SIGNIFICANT CHANGE UP
EGFR: 50 ML/MIN/1.73M2 — LOW
GLUCOSE BLDC GLUCOMTR-MCNC: 127 MG/DL — HIGH (ref 70–99)
GLUCOSE BLDC GLUCOMTR-MCNC: 164 MG/DL — HIGH (ref 70–99)
GLUCOSE BLDC GLUCOMTR-MCNC: 170 MG/DL — HIGH (ref 70–99)
GLUCOSE SERPL-MCNC: 153 MG/DL — HIGH (ref 70–99)
HCO3 BLDA-SCNC: 28 MMOL/L — SIGNIFICANT CHANGE UP (ref 21–28)
HCT VFR BLD CALC: 25.9 % — LOW (ref 39–50)
HGB BLD-MCNC: 8 G/DL — LOW (ref 13–17)
HOROWITZ INDEX BLDA+IHG-RTO: 100 — SIGNIFICANT CHANGE UP
LACTATE SERPL-SCNC: 2.6 MMOL/L — HIGH (ref 0.7–2)
LACTATE SERPL-SCNC: 3.2 MMOL/L — HIGH (ref 0.7–2)
LEGIONELLA AG UR QL: NEGATIVE — SIGNIFICANT CHANGE UP
MAGNESIUM SERPL-MCNC: 1.7 MG/DL — SIGNIFICANT CHANGE UP (ref 1.6–2.6)
MCHC RBC-ENTMCNC: 26.6 PG — LOW (ref 27–34)
MCHC RBC-ENTMCNC: 30.9 GM/DL — LOW (ref 32–36)
MCV RBC AUTO: 86 FL — SIGNIFICANT CHANGE UP (ref 80–100)
NRBC # BLD: 0 /100 WBCS — SIGNIFICANT CHANGE UP (ref 0–0)
OSMOLALITY UR: 168 MOS/KG — SIGNIFICANT CHANGE UP (ref 50–1200)
PCO2 BLDA: 44 MMHG — SIGNIFICANT CHANGE UP (ref 35–48)
PH BLDA: 7.41 — SIGNIFICANT CHANGE UP (ref 7.35–7.45)
PHOSPHATE SERPL-MCNC: 3.4 MG/DL — SIGNIFICANT CHANGE UP (ref 2.5–4.5)
PLATELET # BLD AUTO: 115 K/UL — LOW (ref 150–400)
PO2 BLDA: 254 MMHG — HIGH (ref 83–108)
POTASSIUM SERPL-MCNC: 5 MMOL/L — SIGNIFICANT CHANGE UP (ref 3.5–5.3)
POTASSIUM SERPL-SCNC: 5 MMOL/L — SIGNIFICANT CHANGE UP (ref 3.5–5.3)
RBC # BLD: 3.01 M/UL — LOW (ref 4.2–5.8)
RBC # FLD: 21.2 % — HIGH (ref 10.3–14.5)
S PNEUM AG UR QL: NEGATIVE — SIGNIFICANT CHANGE UP
SAO2 % BLDA: 100 % — SIGNIFICANT CHANGE UP
SODIUM SERPL-SCNC: 134 MMOL/L — LOW (ref 135–145)
SODIUM UR-SCNC: 23 MMOL/L — SIGNIFICANT CHANGE UP
SPECIMEN SOURCE: SIGNIFICANT CHANGE UP
WBC # BLD: 16.07 K/UL — HIGH (ref 3.8–10.5)
WBC # FLD AUTO: 16.07 K/UL — HIGH (ref 3.8–10.5)

## 2023-08-28 PROCEDURE — 99497 ADVNCD CARE PLAN 30 MIN: CPT | Mod: GC,25

## 2023-08-28 PROCEDURE — 99233 SBSQ HOSP IP/OBS HIGH 50: CPT | Mod: GC

## 2023-08-28 PROCEDURE — 99223 1ST HOSP IP/OBS HIGH 75: CPT | Mod: GC

## 2023-08-28 RX ORDER — CHLORHEXIDINE GLUCONATE 213 G/1000ML
1 SOLUTION TOPICAL
Refills: 0 | Status: DISCONTINUED | OUTPATIENT
Start: 2023-08-28 | End: 2023-09-30

## 2023-08-28 RX ORDER — DEXAMETHASONE 0.5 MG/5ML
4 ELIXIR ORAL ONCE
Refills: 0 | Status: COMPLETED | OUTPATIENT
Start: 2023-08-28 | End: 2023-08-28

## 2023-08-28 RX ORDER — CEFEPIME 1 G/1
1000 INJECTION, POWDER, FOR SOLUTION INTRAMUSCULAR; INTRAVENOUS EVERY 12 HOURS
Refills: 0 | Status: DISCONTINUED | OUTPATIENT
Start: 2023-08-28 | End: 2023-08-28

## 2023-08-28 RX ORDER — CEFEPIME 1 G/1
2000 INJECTION, POWDER, FOR SOLUTION INTRAMUSCULAR; INTRAVENOUS EVERY 12 HOURS
Refills: 0 | Status: DISCONTINUED | OUTPATIENT
Start: 2023-08-28 | End: 2023-09-02

## 2023-08-28 RX ORDER — SODIUM CHLORIDE 9 MG/ML
1000 INJECTION, SOLUTION INTRAVENOUS
Refills: 0 | Status: DISCONTINUED | OUTPATIENT
Start: 2023-08-28 | End: 2023-08-30

## 2023-08-28 RX ORDER — SODIUM CHLORIDE 9 MG/ML
1000 INJECTION, SOLUTION INTRAVENOUS
Refills: 0 | Status: DISCONTINUED | OUTPATIENT
Start: 2023-08-28 | End: 2023-08-28

## 2023-08-28 RX ORDER — DEXAMETHASONE 0.5 MG/5ML
4 ELIXIR ORAL EVERY 6 HOURS
Refills: 0 | Status: DISCONTINUED | OUTPATIENT
Start: 2023-08-28 | End: 2023-08-28

## 2023-08-28 RX ORDER — VANCOMYCIN HCL 1 G
1000 VIAL (EA) INTRAVENOUS EVERY 12 HOURS
Refills: 0 | Status: DISCONTINUED | OUTPATIENT
Start: 2023-08-28 | End: 2023-08-28

## 2023-08-28 RX ORDER — SODIUM CHLORIDE 9 MG/ML
1000 INJECTION, SOLUTION INTRAVENOUS ONCE
Refills: 0 | Status: DISCONTINUED | OUTPATIENT
Start: 2023-08-28 | End: 2023-08-28

## 2023-08-28 RX ORDER — SODIUM CHLORIDE 9 MG/ML
1000 INJECTION, SOLUTION INTRAVENOUS ONCE
Refills: 0 | Status: COMPLETED | OUTPATIENT
Start: 2023-08-28 | End: 2023-08-28

## 2023-08-28 RX ORDER — VALPROIC ACID (AS SODIUM SALT) 250 MG/5ML
750 SOLUTION, ORAL ORAL
Refills: 0 | Status: DISCONTINUED | OUTPATIENT
Start: 2023-08-28 | End: 2023-09-07

## 2023-08-28 RX ORDER — AZITHROMYCIN 500 MG/1
500 TABLET, FILM COATED ORAL EVERY 24 HOURS
Refills: 0 | Status: DISCONTINUED | OUTPATIENT
Start: 2023-08-28 | End: 2023-09-01

## 2023-08-28 RX ORDER — HEPARIN SODIUM 5000 [USP'U]/ML
5000 INJECTION INTRAVENOUS; SUBCUTANEOUS EVERY 12 HOURS
Refills: 0 | Status: DISCONTINUED | OUTPATIENT
Start: 2023-08-28 | End: 2023-09-30

## 2023-08-28 RX ORDER — ROBINUL 0.2 MG/ML
0.2 INJECTION INTRAMUSCULAR; INTRAVENOUS EVERY 6 HOURS
Refills: 0 | Status: DISCONTINUED | OUTPATIENT
Start: 2023-08-28 | End: 2023-08-30

## 2023-08-28 RX ORDER — AMLODIPINE BESYLATE 2.5 MG/1
1 TABLET ORAL
Refills: 0 | DISCHARGE

## 2023-08-28 RX ADMIN — ROBINUL 0.2 MILLIGRAM(S): 0.2 INJECTION INTRAMUSCULAR; INTRAVENOUS at 17:08

## 2023-08-28 RX ADMIN — Medication 57.5 MILLIGRAM(S): at 18:01

## 2023-08-28 RX ADMIN — CHLORHEXIDINE GLUCONATE 1 APPLICATION(S): 213 SOLUTION TOPICAL at 18:01

## 2023-08-28 RX ADMIN — Medication 250 MILLIGRAM(S): at 06:55

## 2023-08-28 RX ADMIN — Medication 57.5 MILLIGRAM(S): at 06:16

## 2023-08-28 RX ADMIN — AZITHROMYCIN 255 MILLIGRAM(S): 500 TABLET, FILM COATED ORAL at 13:29

## 2023-08-28 RX ADMIN — SODIUM CHLORIDE 125 MILLILITER(S): 9 INJECTION, SOLUTION INTRAVENOUS at 02:10

## 2023-08-28 RX ADMIN — Medication 4 MILLIGRAM(S): at 11:50

## 2023-08-28 RX ADMIN — SODIUM CHLORIDE 1000 MILLILITER(S): 9 INJECTION, SOLUTION INTRAVENOUS at 03:37

## 2023-08-28 RX ADMIN — CEFEPIME 100 MILLIGRAM(S): 1 INJECTION, POWDER, FOR SOLUTION INTRAMUSCULAR; INTRAVENOUS at 17:09

## 2023-08-28 RX ADMIN — SODIUM CHLORIDE 100 MILLILITER(S): 9 INJECTION, SOLUTION INTRAVENOUS at 17:08

## 2023-08-28 RX ADMIN — HEPARIN SODIUM 5000 UNIT(S): 5000 INJECTION INTRAVENOUS; SUBCUTANEOUS at 05:43

## 2023-08-28 RX ADMIN — HEPARIN SODIUM 5000 UNIT(S): 5000 INJECTION INTRAVENOUS; SUBCUTANEOUS at 17:09

## 2023-08-28 RX ADMIN — CEFEPIME 100 MILLIGRAM(S): 1 INJECTION, POWDER, FOR SOLUTION INTRAMUSCULAR; INTRAVENOUS at 05:43

## 2023-08-28 NOTE — PATIENT PROFILE ADULT - FALL HARM RISK - HARM RISK INTERVENTIONS
Reinforce activity limits and safety measures with patient and family/Tailored Fall Risk Interventions/Use of alarms - bed, chair and/or voice tab/Visual Cue: Yellow wristband and red socks/Bed in lowest position, wheels locked, appropriate side rails in place/Physically safe environment - no spills, clutter or unnecessary equipment/Purposeful Proactive Rounding/Room/bathroom lighting operational, light cord in reach/Unable to comprehend

## 2023-08-28 NOTE — CONSULT NOTE ADULT - NS ATTEND AMEND GEN_ALL_CORE FT
68 year old man with advanced dementia (years), gastric cancer w/ local metastatic lymph nodes (not surgical or chemo candidate), seizure disorder, HTN, HLD, DM, CVA x2 (last 2013), CKD (Baseline Cr 1.5-1.8), chronic bangura     Active Issues:  - Acute Metabolic/Toxic Encephalopathy on Chronic Dementia  - Aspiration Pneumonia  - Acute on Chronic Hypoxic Respiratory Failure  - Gastric Cancer with Metastasis  - Seizures  - CKD  - History of CVAs  - DM    Consult Plan & Recommendations:  - Extensive goals of care conversation and shared decision making with entire family (see above goals of care not). Code status changed to DNR/DNI, but would still like to continue medical care. MOLST completed and placed in chart. Family agreed to palliative care consultation and evaluation. Recommend resuming hospice care (inpatient vs outpatient) however at this time patient's family not ready to make that transitions. Palliative care following.   - Follow-up stat CBC to eval for worsening anemia if contributory to respiratory distress.   - Midline placed to RUE for medication administration and lab draw  - Signs of Pneumonia likely to aspiration on chest Xray and POCUS; continue abx therapy, aspiration precautions, and suctioning.   - Consider collaborative conversation with family to start opioid therapy for air hunger or respiratory distress.   - Consider switching glycopyrrolate to standing for copious secretions.    - Continue medical treatment as per primary team    Dispo:  - Patient to remain on Medicine Floor at this time. Plan discussed extensively with Dr. Madera, Dr. Hung, and nursing staff.

## 2023-08-28 NOTE — CONSULT NOTE ADULT - SUBJECTIVE AND OBJECTIVE BOX
68 year old man with advanced dementia (years), gastric cancer w/ local metastatic lymph nodes (not surgical or chemo candidate), seizure disorder, HTN, HLD, DM, CVA x2 (last 2013), CKD (Baseline Cr 1.5-1.8), chronic bangura (exchanged in ED 8/17/23).  Patient  was [facility]  and  d/t pt's h/o dementia , poor performance status, multiple comorbid conditions pt was not  considered to be surgical candidate or  candidate for  palliative chemotx  Patient was 7/2023 at [facility] d/t MS changes, seen by med onc team, again was   considered to be poor candidate for  palliative tx , offered comfort/supportive care. Patient was seen by palliative care team,  refereed to home hospice ,  with pleasure feeds   with full code status. Pt was brought in from home hospice with acute alteration from his baseline mental status, decreased responsiveness and oral intake.  we were called for further evaluation     PMH/PSH as above     PAST MEDICAL HISTORY:  CKD (chronic kidney disease)     CVA (cerebrovascular accident)     Dementia     DM (diabetes mellitus)     Gastric cancer     HLD (hyperlipidemia)     Seizure.     PAST SURGICAL HISTORY:  No significant past surgical history.  Family h/o and sSocial h/o see adm noted   ROS unable to obtained d/t mental status         PE  Vital Signs Last 24 Hrs  T(C): 37.1 (28 Aug 2023 13:48), Max: 37.2 (27 Aug 2023 23:08)  T(F): 98.7 (28 Aug 2023 13:48), Max: 99 (27 Aug 2023 23:08)  HR: 83 (28 Aug 2023 13:48) (83 - 106)  BP: 114/72 (28 Aug 2023 13:48) (76/44 - 135/66)  BP(mean): --  RR: 18 (28 Aug 2023 13:48) (18 - 30)  SpO2: 99% (28 Aug 2023 13:48) (85% - 100%)    Parameters below as of 28 Aug 2023 13:48  Patient On (Oxygen Delivery Method): mask, nonrebreather  O2 Flow (L/min): 5        PE lethargic   elderly M, appears ill, on suppl 02   HEENT nc/at  anicteric     Lungs cta  Cor s1s2 rrr  Abd soft, NT, +BS  extr c/c/e  neuro lethargic, oriented x 0     skin  no rashes, warm'     LABS                        7.9    8.03  )-----------( 117      ( 27 Aug 2023 16:45 )             25.0       08-28    134<L>  |  102  |  21<H>  ----------------------------<  153<H>  5.0   |  26  |  1.53<H>    Ca    8.2<L>      28 Aug 2023 04:20  Phos  3.4     08-28  Mg     1.7     08-28    TPro  5.9<L>  /  Alb  2.3<L>  /  TBili  0.2  /  DBili  x   /  AST  10  /  ALT  10  /  AlkPhos  57  08-27      < from: CT Head No Cont (08.27.23 @ 18:53) >    ACC: [id number] EXAM:  CT BRAIN   ORDERED BY:  BEATRIZ LEVY     PROCEDURE DATE:  08/27/2023          INTERPRETATION:  CLINICAL INDICATION: Altered mental status    TECHNIQUE: Axial CT scanning of the brain was obtained from the skull   base to the vertex without the administration of intravenous contrast.   Reformatted coronal and sagittal images were subsequently obtained and   reviewed.    COMPARISON: 8/17/2023    FINDINGS:  There is no CT evidence of acute transcortical infarct. Age-related   involutional changes and chronic microvascular ischemic changes.   Redemonstration of multiple chronic lacunar infarcts as previously   described.    There is no hydrocephalus, mass effect, or acute intracranial hemorrhage.   No extra-axial collection. Basal cisterns are patent.    The visualized paranasal sinuses and mastoid air cells are clear.    The calvarium is intact.    IMPRESSION:  No evidence of acute transcortical infarct, acute intracranial   hemorrhage, or mass effect.    --- Endof Report ---      < end of copied text >

## 2023-08-28 NOTE — PROGRESS NOTE ADULT - ASSESSMENT
68 year old man with advanced dementia (years), gastric cancer w/ local metastatic lymph nodes (not surgical or chemo candidate), seizure disorder, HTN, HLD, DM, CVA x2 (last 2013), CKD (Baseline Cr 1.5-1.8), chronic bangura (exchanged in ED 8/17/23) admitted to medicine for AHRF 2/2 asp. pna, NEVILLE on CKD, and worsening mental status in the setting of advanced gastric cancer.

## 2023-08-28 NOTE — CONSULT NOTE ADULT - CONVERSATION DETAILS
Mr. Jones has severe dementia, CVAs, and metastatic gastric cancer (not a candidate for chemotherapy or surgery), and was admitted to  4 [facility] medical floor from home hospice for worsening AMS. During Mr. Jones's care, he remained poorly responsive, only to loud verbal or tactile stimuli (a decrease from his baseline of awake but A+Ox0), and with intermittent episodes of respiratory distress. Despite being on home hospice, Mr. Jones was full code, and ICU was contacted by the primary medical team (Dr. Madera) on 8/28 due to concern for worsening AMS and respiratory distress.     We went to the bedside and spoke with Marv (son, was at bedside in person), and his sisters Gudelia and Ihsan (via phone), who were collectively identified as Mr. Jones's health care surrogates, and we had an extensive conversation about Mr. Jones's care and current condition. We specifically spoke about our concern that Mr. Jones was suffering from an acute decline on top of his already severe chronic illness, for which he was already on home hospice. When assessing the family's understanding of his condition, they informed the medical team they understood Mr. Jones was in a dying process and at the end of his life. When discussing goals of his care, including the goal for this hospital stay, the family expressed they wanted him as "full code," because they felt as though they wanted to "do everything," and were concerned the medical team would stop all treatments (IV fluids, antibiotics, nutrition, etc.) if his code status was changed. We provided extensive education on the concepts of palliative care, code status, and how MOLST/advanced directives function. After this extensive conversation,     Discussed overall goals of care including advanced directives with ____ . During this dicussion we reviewed the current diagnosis, treatment plan, and likely prognosis. An explanination of advanced directives and MOLST was provided. ____ identifies ____ as one of the most important goals to ____.  After this conversation decision was made by ___ to continue full code status / change status to DNR/Allow Natural Death. MOLST form completed, signed, and placed in chart.     Above was reviewed with MICU attending physician  ____ .     Paxton Roman NP Mr. Jones has severe dementia, CVAs, and metastatic gastric cancer (not a candidate for chemotherapy or surgery), and was admitted to [address] floor from home hospice for worsening AMS. During Mr. Jones's care, he remained poorly responsive, only to loud verbal or tactile stimuli (a decrease from his baseline of awake but A+Ox0), and with intermittent episodes of respiratory distress. Despite being on home hospice, Mr. Jones was full code, and ICU was contacted by the primary medical team (Dr. Madera) on 8/28 due to concern for worsening AMS and respiratory distress.     We went to the bedside and spoke with Marv (son, was at bedside in person), and his sisters Gudelia and Ihsan (via phone), who were collectively identified as Mr. Jones's health care surrogates, and we had an extensive conversation about Mr. Jones's care and current condition. We specifically spoke about our concern that Mr. Jones was suffering from an acute decline on top of his already severe chronic illness, for which he was already on home hospice. When assessing the family's understanding of his condition, they informed the medical team they understood Mr. Jones was in a dying process and at the end of his life. When discussing goals of his care, including the goal for this hospital stay, the family expressed they wanted him as "full code," because they felt as though they wanted to "do everything," and were concerned the medical team would stop all treatments (IV fluids, antibiotics, nutrition, etc.) if his code status was changed. We provided extensive education on the concepts of palliative care, code status, and how MOLST/advanced directives function. After this extensive conversation, the family collectively expressed an understanding of advanced directives, and agreed their father would not want CPR nor intubation/ventilation. They explained they would want to continue other medical therapies such as diagnostics, (labs, etc.) IV therapies, nutrition, and any other non-or-minimally invasive treatments which could extend life. They agreed he would be unlikely to benefit from invasive procedures, and would discuss and a family and with the medical team prior to consenting to anything invasive. We discussed the concepts of quality vs. quantity of life, and how continued medical treatments such as antibiotics, etc. would not change his overall outcome and high chance of dying during this hospital stay. The family expressed an understanding about the limited utility of these therapies, but feels as though they need to continue them, and would not feel comfortable stopping the and transitioning to full comfort care. They explained this was more difficult as they never discussed end of life with Mr. Jones while he was cognizant and they are uncertain what his wishes would be. They agreed to keep the conversation regarding his status open and to speak with the palliative care team.     After this conversation decision was made by the entire family collectively to change status to DNR/Allow Natural Death. MOLST form completed, signed, and placed in chart. They agreed he would not benefit from critical care or ICU therapies at this time, and to remain on the medical floor.     Above was performed in collaboration with ICU team including attending Dr. Fofana.

## 2023-08-28 NOTE — PROGRESS NOTE ADULT - PROBLEM SELECTOR PLAN 5
home hospice however still wants pt to be full code.  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 Bulky local necrotic metastatic adenopathy.   - palliative care consult in AM for clarification on GOC  - hold on comfort meds in med rec (ativan, morphine) for now, due to risk of respiratory depression - CTH wnl no metastatic changes , recent CT Ab/P imaging showed - Gastric pyloric mass, suspect adenocarcinoma. Bulky local necrotic metastatic adenopathy.   - palliative care consult in AM for clarification on GOC: family wants everything but chest compressions.   - hold on comfort meds in med rec (ativan, morphine) for now, due to risk of respiratory depression

## 2023-08-28 NOTE — CONSULT NOTE ADULT - ATTENDING COMMENTS
endstage gastric cancer with extensive metastatic disease, seizure disorder, now with progressive functional, clinical, cognitive decline. Was on home hospice but son panicked and daughter out of country, still hoping to extend his life longer as they are struggling with patient's prognosis. Wants continued trial of IV hydration and antibiotics, no invasive procedures or resuscitative measures as they would not change prognosis. Agreed that DNR/DNI and no feeding tube (clinically not possible). family revoked hospice on this admission.    Will wait for daughter to arrive to [place] for further conversations on hospice likely in home setting - not a candidate for inpatient hospice at this time

## 2023-08-28 NOTE — CONSULT NOTE ADULT - SUBJECTIVE AND OBJECTIVE BOX
Admission HPI:          Antimicrobial:  azithromycin  IVPB 500 milliGRAM(s) IV Intermittent every 24 hours  cefepime   IVPB 2000 milliGRAM(s) IV Intermittent every 12 hours    Cardiovascular:    Pulmonary:    Hematalogic:  heparin   Injectable 5000 Unit(s) SubCutaneous every 12 hours    Other:  chlorhexidine 2% Cloths 1 Application(s) Topical <User Schedule>  glycopyrrolate Injectable 0.2 milliGRAM(s) IV Push every 6 hours PRN  lactated ringers. 1000 milliLiter(s) IV Continuous <Continuous>  valproate sodium  IVPB 750 milliGRAM(s) IV Intermittent two times a day    azithromycin  IVPB 500 milliGRAM(s) IV Intermittent every 24 hours  cefepime   IVPB 2000 milliGRAM(s) IV Intermittent every 12 hours  chlorhexidine 2% Cloths 1 Application(s) Topical <User Schedule>  glycopyrrolate Injectable 0.2 milliGRAM(s) IV Push every 6 hours PRN  heparin   Injectable 5000 Unit(s) SubCutaneous every 12 hours  lactated ringers. 1000 milliLiter(s) IV Continuous <Continuous>  valproate sodium  IVPB 750 milliGRAM(s) IV Intermittent two times a day    Drug Dosing Weight  Height (cm): 162.6 (27 Aug 2023 16:43)  Weight (kg): 61 (27 Aug 2023 16:43)  BMI (kg/m2): 23.1 (27 Aug 2023 16:43)  BSA (m2): 1.65 (27 Aug 2023 16:43)    PHYSICAL EXAM:  GENERAL: NAD, ill appearing   HEAD:  Atraumatic, Normocephalic  EYES:  conjunctiva and sclera clear  CHEST/LUNG: short shallow breath   HEART: Regular rate and rhythm; No murmurs, rubs, or gallops  ABDOMEN: Soft, Nontender, Nondistended; Bowel sounds present  NERVOUS SYSTEM: Lethargic, awakes to loud verbal or tactile stimuli and is alert & oriented X0  EXTREMITIES:  2+ Peripheral Pulses, No clubbing, cyanosis, or edema  SKIN: warm dry    ICU Vital Signs Last 24 Hrs  T(C): 37.1 (28 Aug 2023 13:48), Max: 37.9 (27 Aug 2023 17:01)  T(F): 98.7 (28 Aug 2023 13:48), Max: 100.2 (27 Aug 2023 17:01)  HR: 83 (28 Aug 2023 13:48) (83 - 106)  BP: 114/72 (28 Aug 2023 13:48) (76/44 - 135/66)  BP(mean): --  ABP: --  ABP(mean): --  RR: 18 (28 Aug 2023 13:48) (18 - 30)  SpO2: 99% (28 Aug 2023 13:48) (85% - 100%)    O2 Parameters below as of 28 Aug 2023 13:48  Patient On (Oxygen Delivery Method): mask, nonrebreather  O2 Flow (L/min): 5          ABG - ( 28 Aug 2023 03:44 )  pH, Arterial: 7.41  pH, Blood: x     /  pCO2: 44    /  pO2: 254   / HCO3: 28    / Base Excess: 3.0   /  SaO2: 100                   08-27 @ 07:01  -  08-28 @ 07:00  --------------------------------------------------------  IN: 0 mL / OUT: 1100 mL / NET: -1100 mL              LABS:  CBC Full  -  ( 27 Aug 2023 16:45 )  WBC Count : 8.03 K/uL  RBC Count : 2.94 M/uL  Hemoglobin : 7.9 g/dL  Hematocrit : 25.0 %  Platelet Count - Automated : 117 K/uL  Mean Cell Volume : 85.0 fl  Mean Cell Hemoglobin : 26.9 pg  Mean Cell Hemoglobin Concentration : 31.6 gm/dL  Auto Neutrophil # : 4.89 K/uL  Auto Lymphocyte # : 1.71 K/uL  Auto Monocyte # : 1.11 K/uL  Auto Eosinophil # : 0.24 K/uL  Auto Basophil # : 0.01 K/uL  Auto Neutrophil % : 60.9 %  Auto Lymphocyte % : 21.3 %  Auto Monocyte % : 13.8 %  Auto Eosinophil % : 3.0 %  Auto Basophil % : 0.1 %    08-28    134<L>  |  102  |  21<H>  ----------------------------<  153<H>  5.0   |  26  |  1.53<H>    Ca    8.2<L>      28 Aug 2023 04:20  Phos  3.4     08-28  Mg     1.7     08-28    TPro  5.9<L>  /  Alb  2.3<L>  /  TBili  0.2  /  DBili  x   /  AST  10  /  ALT  10  /  AlkPhos  57  08-27      Urinalysis Basic - ( 28 Aug 2023 04:20 )    Color: x / Appearance: x / SG: x / pH: x  Gluc: 153 mg/dL / Ketone: x  / Bili: x / Urobili: x   Blood: x / Protein: x / Nitrite: x   Leuk Esterase: x / RBC: x / WBC x   Sq Epi: x / Non Sq Epi: x / Bacteria: x         Admission HPI:  68 year old man with advanced dementia (years), gastric cancer w/ local metastatic lymph nodes (not surgical or chemo candidate), seizure disorder, HTN, HLD, DM, CVA x2 (last 2013), CKD (Baseline Cr 1.5-1.8), chronic bangura (exchanged in ED 8/17/23). Discharged from [facility] about a week ago after management for suspected breakthrough seizure with home VPA dose increased to 750mg BID. At [facility], susan decided on pleasure feeds and home hospice however still wants pt to be full code. Pt was brought in today from home hospice with acute alteration from his baseline mental status with decreased responsiveness associated with decreased oral intake. No fevers, no emesis or diarrhea. ROS not possible with mental status.         Antimicrobial:  azithromycin  IVPB 500 milliGRAM(s) IV Intermittent every 24 hours  cefepime   IVPB 2000 milliGRAM(s) IV Intermittent every 12 hours    Cardiovascular:    Pulmonary:    Hematalogic:  heparin   Injectable 5000 Unit(s) SubCutaneous every 12 hours    Other:  chlorhexidine 2% Cloths 1 Application(s) Topical <User Schedule>  glycopyrrolate Injectable 0.2 milliGRAM(s) IV Push every 6 hours PRN  lactated ringers. 1000 milliLiter(s) IV Continuous <Continuous>  valproate sodium  IVPB 750 milliGRAM(s) IV Intermittent two times a day    azithromycin  IVPB 500 milliGRAM(s) IV Intermittent every 24 hours  cefepime   IVPB 2000 milliGRAM(s) IV Intermittent every 12 hours  chlorhexidine 2% Cloths 1 Application(s) Topical <User Schedule>  glycopyrrolate Injectable 0.2 milliGRAM(s) IV Push every 6 hours PRN  heparin   Injectable 5000 Unit(s) SubCutaneous every 12 hours  lactated ringers. 1000 milliLiter(s) IV Continuous <Continuous>  valproate sodium  IVPB 750 milliGRAM(s) IV Intermittent two times a day    Drug Dosing Weight  Height (cm): 162.6 (27 Aug 2023 16:43)  Weight (kg): 61 (27 Aug 2023 16:43)  BMI (kg/m2): 23.1 (27 Aug 2023 16:43)  BSA (m2): 1.65 (27 Aug 2023 16:43)    PHYSICAL EXAM:  GENERAL: NAD, ill appearing   HEAD:  Atraumatic, Normocephalic  EYES:  conjunctiva and sclera clear  CHEST/LUNG: short shallow breath   HEART: Regular rate and rhythm; No murmurs, rubs, or gallops  ABDOMEN: Soft, Nontender, Nondistended; Bowel sounds present  NERVOUS SYSTEM: Lethargic, awakes to loud verbal or tactile stimuli and is alert & oriented X0  EXTREMITIES:  2+ Peripheral Pulses, No clubbing, cyanosis, or edema  SKIN: warm dry    ICU Vital Signs Last 24 Hrs  T(C): 37.1 (28 Aug 2023 13:48), Max: 37.9 (27 Aug 2023 17:01)  T(F): 98.7 (28 Aug 2023 13:48), Max: 100.2 (27 Aug 2023 17:01)  HR: 83 (28 Aug 2023 13:48) (83 - 106)  BP: 114/72 (28 Aug 2023 13:48) (76/44 - 135/66)  BP(mean): --  ABP: --  ABP(mean): --  RR: 18 (28 Aug 2023 13:48) (18 - 30)  SpO2: 99% (28 Aug 2023 13:48) (85% - 100%)    O2 Parameters below as of 28 Aug 2023 13:48  Patient On (Oxygen Delivery Method): mask, nonrebreather  O2 Flow (L/min): 5          ABG - ( 28 Aug 2023 03:44 )  pH, Arterial: 7.41  pH, Blood: x     /  pCO2: 44    /  pO2: 254   / HCO3: 28    / Base Excess: 3.0   /  SaO2: 100                   08-27 @ 07:01  -  08-28 @ 07:00  --------------------------------------------------------  IN: 0 mL / OUT: 1100 mL / NET: -1100 mL              LABS:  CBC Full  -  ( 27 Aug 2023 16:45 )  WBC Count : 8.03 K/uL  RBC Count : 2.94 M/uL  Hemoglobin : 7.9 g/dL  Hematocrit : 25.0 %  Platelet Count - Automated : 117 K/uL  Mean Cell Volume : 85.0 fl  Mean Cell Hemoglobin : 26.9 pg  Mean Cell Hemoglobin Concentration : 31.6 gm/dL  Auto Neutrophil # : 4.89 K/uL  Auto Lymphocyte # : 1.71 K/uL  Auto Monocyte # : 1.11 K/uL  Auto Eosinophil # : 0.24 K/uL  Auto Basophil # : 0.01 K/uL  Auto Neutrophil % : 60.9 %  Auto Lymphocyte % : 21.3 %  Auto Monocyte % : 13.8 %  Auto Eosinophil % : 3.0 %  Auto Basophil % : 0.1 %    08-28    134<L>  |  102  |  21<H>  ----------------------------<  153<H>  5.0   |  26  |  1.53<H>    Ca    8.2<L>      28 Aug 2023 04:20  Phos  3.4     08-28  Mg     1.7     08-28    TPro  5.9<L>  /  Alb  2.3<L>  /  TBili  0.2  /  DBili  x   /  AST  10  /  ALT  10  /  AlkPhos  57  08-27      Urinalysis Basic - ( 28 Aug 2023 04:20 )    Color: x / Appearance: x / SG: x / pH: x  Gluc: 153 mg/dL / Ketone: x  / Bili: x / Urobili: x   Blood: x / Protein: x / Nitrite: x   Leuk Esterase: x / RBC: x / WBC x   Sq Epi: x / Non Sq Epi: x / Bacteria: x         Admission HPI:  68 year old man with advanced dementia (years), gastric cancer w/ local metastatic lymph nodes (not surgical or chemo candidate), seizure disorder, HTN, HLD, DM, CVA x2 (last 2013), CKD (Baseline Cr 1.5-1.8), chronic bangura (exchanged in ED 8/17/23).  During recent [facility] admission family decided on pleasure feeds and home hospice with full code status. Pt was brought in from home hospice with acute alteration from his baseline mental status, decreased responsiveness and oral intake. Admitted to medicine floor when ICU was consulted for non-improving mental status.       Antimicrobial:  azithromycin  IVPB 500 milliGRAM(s) IV Intermittent every 24 hours  cefepime   IVPB 2000 milliGRAM(s) IV Intermittent every 12 hours      Hematalogic:  heparin   Injectable 5000 Unit(s) SubCutaneous every 12 hours    Other:  chlorhexidine 2% Cloths 1 Application(s) Topical <User Schedule>  glycopyrrolate Injectable 0.2 milliGRAM(s) IV Push every 6 hours PRN  lactated ringers. 1000 milliLiter(s) IV Continuous <Continuous>  valproate sodium  IVPB 750 milliGRAM(s) IV Intermittent two times a day    azithromycin  IVPB 500 milliGRAM(s) IV Intermittent every 24 hours  cefepime   IVPB 2000 milliGRAM(s) IV Intermittent every 12 hours  chlorhexidine 2% Cloths 1 Application(s) Topical <User Schedule>  glycopyrrolate Injectable 0.2 milliGRAM(s) IV Push every 6 hours PRN  heparin   Injectable 5000 Unit(s) SubCutaneous every 12 hours  lactated ringers. 1000 milliLiter(s) IV Continuous <Continuous>  valproate sodium  IVPB 750 milliGRAM(s) IV Intermittent two times a day    Drug Dosing Weight  Height (cm): 162.6 (27 Aug 2023 16:43)  Weight (kg): 61 (27 Aug 2023 16:43)  BMI (kg/m2): 23.1 (27 Aug 2023 16:43)  BSA (m2): 1.65 (27 Aug 2023 16:43)    PHYSICAL EXAM:  GENERAL: NAD, ill appearing   HEAD: Atraumatic, Normocephalic  EYES: Pale Conjunctiva and clear sclera  CHEST/LUNG: Clear lung sounds bilaterally with shallow inspiration.   HEART: Regular rate and rhythm; No murmurs, rubs, or gallops  ABDOMEN: Soft, Nontender, Nondistended; Bowel sounds present  NERVOUS SYSTEM: Lethargic, awakes to loud verbal or tactile stimuli and is alert & oriented X0  EXTREMITIES:  2+ Peripheral Pulses, No clubbing, cyanosis, or edema  SKIN: warm dry    ICU Vital Signs Last 24 Hrs  T(C): 37.1 (28 Aug 2023 13:48), Max: 37.9 (27 Aug 2023 17:01)  T(F): 98.7 (28 Aug 2023 13:48), Max: 100.2 (27 Aug 2023 17:01)  HR: 83 (28 Aug 2023 13:48) (83 - 106)  BP: 114/72 (28 Aug 2023 13:48) (76/44 - 135/66)  BP(mean): --  ABP: --  ABP(mean): --  RR: 18 (28 Aug 2023 13:48) (18 - 30)  SpO2: 99% (28 Aug 2023 13:48) (85% - 100%)    O2 Parameters below as of 28 Aug 2023 13:48  Patient On (Oxygen Delivery Method): mask, nonrebreather  O2 Flow (L/min): 5      ABG - ( 28 Aug 2023 03:44 )  pH, Arterial: 7.41  pH, Blood: x     /  pCO2: 44    /  pO2: 254   / HCO3: 28    / Base Excess: 3.0   /  SaO2: 100           08-27 @ 07:01  -  08-28 @ 07:00  --------------------------------------------------------  IN: 0 mL / OUT: 1100 mL / NET: -1100 mL      LABS:  CBC Full  -  ( 27 Aug 2023 16:45 )  WBC Count : 8.03 K/uL  RBC Count : 2.94 M/uL  Hemoglobin : 7.9 g/dL  Hematocrit : 25.0 %  Platelet Count - Automated : 117 K/uL  Mean Cell Volume : 85.0 fl  Mean Cell Hemoglobin : 26.9 pg  Mean Cell Hemoglobin Concentration : 31.6 gm/dL  Auto Neutrophil # : 4.89 K/uL  Auto Lymphocyte # : 1.71 K/uL  Auto Monocyte # : 1.11 K/uL  Auto Eosinophil # : 0.24 K/uL  Auto Basophil # : 0.01 K/uL  Auto Neutrophil % : 60.9 %  Auto Lymphocyte % : 21.3 %  Auto Monocyte % : 13.8 %  Auto Eosinophil % : 3.0 %  Auto Basophil % : 0.1 %    08-28    134<L>  |  102  |  21<H>  ----------------------------<  153<H>  5.0   |  26  |  1.53<H>    Ca    8.2<L>      28 Aug 2023 04:20  Phos  3.4     08-28  Mg     1.7     08-28    TPro  5.9<L>  /  Alb  2.3<L>  /  TBili  0.2  /  DBili  x   /  AST  10  /  ALT  10  /  AlkPhos  57  08-27      Urinalysis Basic - ( 28 Aug 2023 04:20 )    Color: x / Appearance: x / SG: x / pH: x  Gluc: 153 mg/dL / Ketone: x  / Bili: x / Urobili: x   Blood: x / Protein: x / Nitrite: x   Leuk Esterase: x / RBC: x / WBC x   Sq Epi: x / Non Sq Epi: x / Bacteria: x

## 2023-08-28 NOTE — PROGRESS NOTE ADULT - ATTENDING COMMENTS
68 year old man admitted from home hospice because of altered mental status and dehydration.  Patient was on home hospice, but family has asked for more aggressive treatment. No fevers, no emesis or diarrhea.   PMH advanced dementia, gastric cancer; Oncology assessed that chemotherapy would not benefit because of overall poor functional status.           multiple cardiovascular diseases - DM, CVA, HTN, HLD, CKD, hx of chronic indwelling catheter.           Discharged from [facility] about a week ago after management for suspected breakthrough seizure with home VPA dose increased to 750mg BID.    ROS not possible with mental status.    Tachypneic, minimally responsive man, stiff neck, decorticate posture  Vital Signs Last 24 Hrs  T(C): 37.1 (28 Aug 2023 13:48), Max: 37.2 (27 Aug 2023 23:08)  T(F): 98.7 (28 Aug 2023 13:48), Max: 99 (27 Aug 2023 23:08)  HR: 83 (28 Aug 2023 13:48) (83 - 106)  BP: 114/72 (28 Aug 2023 13:48) (76/44 - 135/66)  BP(mean): --  RR: 18 (28 Aug 2023 13:48) (18 - 30)  SpO2: 99% (28 Aug 2023 13:48) (85% - 100%)    Parameters below as of 28 Aug 2023 13:48  Patient On (Oxygen Delivery Method): mask, nonrebreather  O2 Flow (L/min): 5  Lungs, bilateral air entry  Cor, RRR  Abdomen, soft  Neurological, minimally responsive, decorticate posture                          8.0    16.07 )-----------( 115      ( 28 Aug 2023 16:49 )             25.9   08-28    134<L>  |  102  |  21<H>  ----------------------------<  153<H>  5.0   |  26  |  1.53<H>    Ca    8.2<L>      28 Aug 2023 04:20  Phos  3.4     08-28  Mg     1.7     08-28    TPro  5.9<L>  /  Alb  2.3<L>  /  TBili  0.2  /  DBili  x   /  AST  10  /  ALT  10  /  AlkPhos  57  08-27    pO2, Arterial: 254 mmHg (08.28.23 @ 03:44) pH, Arterial: 7.41 (08.28.23 @ 03:44) pCO2, Arterial: 44 mmHg (08.28.23 @ 03:44)   ABG - unremarkable     CT head -unremarkable     < from: Xray Chest 1 View- PORTABLE-Urgent (08.27.23 @ 17:19) >      Patchy right lower lung opacities projecting over the right hemidiaphragm   which could be due to atelectasis or infection.    < end of copied text >    Impression   - Acute respiratory failure with hypoxia, atelectasis vs PE vs aspiration  - Acute encephalopathy   - Fluid responsive septic shock   - NEVILLE on CKD  - anemia, chronic diseasse  - gastric cancer. possible CNS metastases as the cause of altered mentatl status.   - s/p CVA  - hx seizure    Plan   According to family's wishes will treat for sepsis, pneumonia  Steroids in case altered mental status is secondary to CNS metastases  Supplemental oxygen to keep SaO2 > 96%  Empiric IV antibiotics with broad spectrum gram negative coverage for LIONEL; continue cefepime and azithromycin for atypical organism coverage  IVF hydration   Fall and aspiration precaution  Keep NPO  Palliative/Hospice care consult, appreciated  MICU consultation, appreciated    Discussed with family, residents, RN's, MICU, and Palliative care

## 2023-08-28 NOTE — CONSULT NOTE ADULT - CONVERSATION/DISCUSSION
Diagnosis/Prognosis/MOLST Discussed/Treatment Options/Palliative Care Referral
Diagnosis/Prognosis/MOLST Discussed/Treatment Options

## 2023-08-28 NOTE — PROGRESS NOTE ADULT - PROBLEM SELECTOR PLAN 3
- BP labile, however fluid responsive 80s-100s/50s-70s, w/ RR >20, meets SIRS criteria with likely source PNA   - Labs: 7.41/44/28/254 (ABG), lac wnl, BUN 22/Cr 1.84, trop 9.8 , U/A neg.   f/u blood cultures   - see above acute respiratory failure - BP labile, however fluid responsive 80s-100s/50s-70s, w/ RR >20, meets SIRS criteria with likely source PNA   - Labs: 7.41/44/28/254 (ABG), lac wnl, BUN 22/Cr 1.84, trop 9.8 , U/A neg.   f/u blood cultures   - see above acute respiratory failure  - ICU consulted

## 2023-08-28 NOTE — CONSULT NOTE ADULT - FAMILY/CHILD(REN)
Marv (son, at bedside), Gudelia (daughter, via phone), and Ihsan (daughter, via phone)
Marv (Son) and Daughters (via phone)

## 2023-08-28 NOTE — CONSULT NOTE ADULT - CONVERSATION DETAILS
Goals of care discussion held with family; ICU team, Oncology, and Palliative at bedside  Initially family understood that patient's status is deteriorating; however initially concerned that changing code status would mean withdrawal of care. Reiterated that care would be given; that ICU level care with chest compressions/ventilation would not change trajectory or quality of life in patient. Son and daughters(via phone on speaker) acknowledged this and agreed that the intubation/chest compressions would not change patient's current state. Patient made DNR/DNI; however still full medical care for now with IV, oxygen, fluids, antibiotics, and anti-seizure medication.  Updated MOLST in chart Goals of care discussion held with family; ICU team, Oncology, and Palliative at bedside  Initially family understood that patient's status is deteriorating; however initially concerned that changing code status would mean withdrawal of care. Reiterated that care would be given; that ICU level care with chest compressions/ventilation would not change trajectory or quality of life in patient. Son and daughters(via phone on speaker) acknowledged this and agreed that the intubation/chest compressions would not change patient's current state. Patient made DNR/DNI; however still full medical care for now with IV, oxygen, fluids, antibiotics, and anti-seizure medication. Given patient's current disease state; patient is eligible for hospice. Updated MOLST in chart Goals of care discussion held with family; ICU team, Oncology, and Palliative at bedside  Initially family understood that patient's status is deteriorating; however initially concerned that changing code status would mean withdrawal of care. Reiterated that reasonable care would be given;   that ICU level care with chest compressions/ventilation would not change trajectory or quality of life in patient.   Explained in length the risks and benefits of cardiopulmonary resuscitative measures including CPR, shock, pressors and invasive ventilation relating to artificial life support. Also explained the difference between artificial life prolonging measures (including artificial nutrition) to extend life and the burden/suffering/complications vs natural death and supportive/conservative interventions to maximize quality and quantity of life without causing significant burden and suffering to the patient and caregivers. This is according to the MOLST form.     Son and daughters(via phone on speaker) acknowledged this and agreed for DNR/DNI; however still would like reasonable medical care for now with IV, oxygen, fluids, antibiotics, and anti-seizure medication. Given patient's current disease state; patient is eligible for hospice. Updated MOLST in chart

## 2023-08-28 NOTE — CONSULT NOTE ADULT - PROBLEM SELECTOR RECOMMENDATION 3
Levy arboleda discharged from [facility] about a week ago after management for suspected breakthrough seizure with home VPA dose increased to 750mg BID.   Patient presented from home hospice with acute alteration from his baseline mental status with decreased responsiveness associated with decreased oral intake.   EEG at [facility]  Mild nonspecific diffuse or multifocal cerebral dysfunction.   No epileptiform pattern or seizure seen. Patient was discharged from [facility] about a week ago after management for suspected breakthrough seizure with home VPA dose increased to 750mg BID.   Patient presented from home hospice with acute alteration from his baseline mental status with decreased responsiveness associated with decreased oral intake.   EEG at [facility]  Mild nonspecific diffuse or multifocal cerebral dysfunction.   No epileptiform pattern or seizure seen. 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 Bulky local necrotic metastatic adenopathy.   CT showed no evidence of metastatic disease/change  Per heme/onc: Given patient's  advanced disease and poor PS, would not be a candidate for systemic therapy.   Risks of disease modifying treatment would outweigh any benefit.   Patient poor candidate for tube feeds given mental status' gastric cancer with mass Patient has Gastric pyloric mass, suspect adenocarcinoma. Bulky local necrotic metastatic adenopathy.   CT showed no evidence of metastatic disease/change  Per heme/onc: Given patient's  advanced disease and poor PS, would not be a candidate for systemic therapy.   Risks of disease modifying treatment would outweigh any benefit.   Patient poor candidate for tube feeds given mental status' gastric cancer with mass  Prognosis is poor; patient eligible for hospice

## 2023-08-28 NOTE — CONSULT NOTE ADULT - SURROGATE NAME
Gudelia Fair, and Ihsan are the patient's children and identify themselves as the primary decision makers.

## 2023-08-28 NOTE — ADVANCED PRACTICE NURSE CONSULT - ASSESSMENT
This is a 68yr old male patient admitted for Altered Mental Status, presenting with a Stage 1 Pressure Injury to the Coccyx and Bilateral Heels, as evident by non-blanchable erythema

## 2023-08-28 NOTE — CONSULT NOTE ADULT - ASSESSMENT
Assessment:      Active Issues:  - Acute Metabolic/Toxic Encephalopathy on Chronic Dementia  - Aspiration Pneumonia  - Acute on Chronic Hypoxic Respiratory Failure  - Gastric Cancer with Metastasis  - Seizures  - CKD  - History of CVAs  - DM    Consult Plan & Recommendations:   Assessment:  68 year old man with advanced dementia (years), gastric cancer w/ local metastatic lymph nodes (not surgical or chemo candidate), seizure disorder, HTN, HLD, DM, CVA x2 (last 2013), CKD (Baseline Cr 1.5-1.8), chronic bangura     Active Issues:  - Acute Metabolic/Toxic Encephalopathy on Chronic Dementia  - Aspiration Pneumonia  - Acute on Chronic Hypoxic Respiratory Failure  - Gastric Cancer with Metastasis  - Seizures  - CKD  - History of CVAs  - DM    Consult Plan & Recommendations:  - Extensive goals of care conversation and shared decision making with entire family (see above goals of care not). Code status changed to DNR/DNI, but would still like to continue medical care. MOLST completed and placed in chart. Family agreed to palliative care consultation and evaluation.   -  Assessment:  68 year old man with advanced dementia (years), gastric cancer w/ local metastatic lymph nodes (not surgical or chemo candidate), seizure disorder, HTN, HLD, DM, CVA x2 (last 2013), CKD (Baseline Cr 1.5-1.8), chronic bangura     Active Issues:  - Acute Metabolic/Toxic Encephalopathy on Chronic Dementia  - Aspiration Pneumonia  - Acute on Chronic Hypoxic Respiratory Failure  - Gastric Cancer with Metastasis  - Seizures  - CKD  - History of CVAs  - DM    Consult Plan & Recommendations:  - Extensive goals of care conversation and shared decision making with entire family (see above goals of care not). Code status changed to DNR/DNI, but would still like to continue medical care. MOLST completed and placed in chart. Family agreed to palliative care consultation and evaluation. Recommend resuming hospice care (inpatient vs outpatient) however at this time patient's family not ready to make that transitions. Palliative care following.   - Follow-up stat CBC to eval for worsening anemia if contributory to respiratory distress.   - Midline placed to RUE for medication administration and lab draw  - Signs of Pneumonia likely to aspiration on chest Xray and POCUS; continue abx therapy, aspiration precautions, and suctioning.   - Consider collaborative conversation with family to start opioid therapy for air hunger or respiratory distress.   - Consider switching glycopyrrolate to standing for copious secretions.    - Continue medical treatment as per primary team    Dispo:  - Patient to remain on Medicine Floor at this time. Plan discussed extensively with Dr. Madera, Dr. Hung, and nursing staff.

## 2023-08-28 NOTE — PROGRESS NOTE ADULT - PROBLEM SELECTOR PLAN 1
- pt. is only responsive to sternal rub. Vitals are stable but he has shallow breaths. Was on 10 L of nonrebreather this morning but tapered down for 5L   - CXR concerning for RLL infiltrate.  - c/w cefepime and azithro for asp pna coverage, and atypical pna coverage   - c/w fluid resuscitation, s/p 3 L bolus   - f/u UCx and BCx, legionella and strep  - lactate was 2.6, repeat lactate. - pt. is only responsive to sternal rub. Vitals are stable but he has shallow breaths. Was on 10 L of nonrebreather this morning but tapered down for 5L . ICU consulted.   - CXR concerning for RLL infiltrate.  - c/w cefepime and azithro for asp pna coverage, and atypical pna coverage   - c/w fluid resuscitation  - f/u UCx and BCx, legionella and strep

## 2023-08-28 NOTE — CONSULT NOTE ADULT - ASSESSMENT
68 year old man with advanced dementia (years), gastric cancer w/ local metastatic lymph nodes (not surgical or chemo candidate), seizure disorder, HTN, HLD, DM, CVA x2 (last 2013), CKD (Baseline Cr 1.5-1.8), chronic bangura (exchanged in ED 8/17/23) admitted to medicine for AHRF 2/2 asp. pna, NEVILLE on CKD, and worsening mental status in the setting of advanced gastric cancer. Palliative care consulted for goals of care status 68 year old man with advanced dementia (years), gastric cancer w/ local metastatic lymph nodes (not surgical or chemo candidate), seizure disorder, HTN, HLD, DM, CVA x2 (last 2013), CKD (Baseline Cr 1.5-1.8), chronic bangura (exchanged in ED 8/17/23) admitted to medicine for AHRF 2/2 asp. pna, NEVILLE on CKD, and worsening mental status in the setting of advanced gastric cancer. Palliative care consulted for goals of care status; patient made DNR/DNI with full continuation of medical care. 68 year old man with advanced dementia (years), gastric cancer w/ local metastatic lymph nodes (not surgical or chemo candidate), seizure disorder, HTN, HLD, DM, CVA x2 (last 2013), CKD (Baseline Cr 1.5-1.8), chronic Perez (exchanged in ED 8/17/23) admitted to medicine for AHRF 2/2 asp. pna, NEVILLE on CKD, and worsening mental status in the setting of advanced gastric cancer. Palliative care consulted for goals of care status; patient made DNR/DNI with full continuation of medical care. 68 year old man with advanced dementia (years), gastric cancer w/ local metastatic lymph nodes (not surgical or chemo candidate), seizure disorder, HTN, HLD, DM, CVA x2 (last 2013), CKD (Baseline Cr 1.5-1.8), chronic Perez (exchanged in ED 8/17/23) admitted to medicine for AHRF 2/2 asp. pna, NEVILLE on CKD, and worsening mental status in the setting of advanced gastric cancer. Palliative care consulted for goals of care status; patient made DNR/DNI with continued treatment of possible aspiration PNA and seizures.

## 2023-08-28 NOTE — PROGRESS NOTE ADULT - PROBLEM SELECTOR PLAN 6
- hx of CKD  - continue to monitor Cr after fluid resuscitation  - bangura in place due to AMS   - urine lytes came back normal   - renal u/s canceled.

## 2023-08-28 NOTE — CONSULT NOTE ADULT - SUBJECTIVE AND OBJECTIVE BOX
Consult to: Discuss complex medical decision making related to goals of care    [facility] Geriatric and Palliative Consult Service:  Janey Guadalupe DO: cell ([phone])  Alfredo Kohli MD: cell ([phone])  Tan Millan NP: cell ([phone])   Marvel Morris LMSW: cell ([phone])   Angela Barber NP: via [employer] Teams    Can contact any Palliative Team member via [employer] Teams for consults and questions    HPI:  68 year old man with advanced dementia (years), gastric cancer w/ local metastatic lymph nodes (not surgical or chemo candidate), seizure disorder, HTN, HLD, DM, CVA x2 (last 2013), CKD (Baseline Cr 1.5-1.8), chronic bangura (exchanged in ED 8/17/23). Discharged from [facility] about a week ago after management for suspected breakthrough seizure with home VPA dose increased to 750mg BID. At [facility], susan decided on pleasure feeds and home hospice however still wants pt to be full code. Pt was brought in today from home hospice with acute alteration from his baseline mental status with decreased responsiveness associated with decreased oral intake. No fevers, no emesis or diarrhea. ROS not possible with mental status. Spoke with family at length about patient's prognosis, however, family still wants pt to be full code.     Vitals: BP labile, however fluid responsive 80s-100s/50s-70s, afebrile   Labs: 7.41/44/28/254 (ABG), lac wnl, BUN 22/Cr 1.84, trop 9.8 , U/A neg   CXR concerning for RLL infiltrate   CTH wnl , recent CT Ab/P imaging showed - Gastric pyloric mass, suspect adenocarcinoma. Bulky local necrotic metastatic adenopathy.     (27 Aug 2023 21:59)      PAST MEDICAL & SURGICAL HISTORY:  DM (diabetes mellitus)      Seizure      CVA (cerebrovascular accident)      HLD (hyperlipidemia)      CKD (chronic kidney disease)      Dementia      Gastric cancer      No significant past surgical history          SOCIAL HISTORY:    Admitted from:  Hospice      FAMILY HISTORY:   unable to obtain from patient due to poor mentation, family unable to give information, see H&P for history  Baseline ADLs (prior to admission):    Allergies    No Known Allergies    Intolerances      Present Symptoms:   ROS: Unable to obtain due to poor mentation]    CPOT:    https://[email or web address]/getattachment/[email or web address]/Critical-Care-Pain-Observation-Tool-(CPOT)  PAIN AD Score:   http://[email or web address]/painad.pdf (press ctrl +  left click to view)      MEDICATIONS  (STANDING):  azithromycin  IVPB 500 milliGRAM(s) IV Intermittent every 24 hours  cefepime   IVPB 2000 milliGRAM(s) IV Intermittent every 12 hours  chlorhexidine 2% Cloths 1 Application(s) Topical <User Schedule>  heparin   Injectable 5000 Unit(s) SubCutaneous every 12 hours  valproate sodium  IVPB 750 milliGRAM(s) IV Intermittent two times a day    MEDICATIONS  (PRN):  glycopyrrolate Injectable 0.2 milliGRAM(s) IV Push every 6 hours PRN secretions      PHYSICAL EXAM:  Vital Signs Last 24 Hrs  T(C): 37.1 (28 Aug 2023 13:48), Max: 37.9 (27 Aug 2023 17:01)  T(F): 98.7 (28 Aug 2023 13:48), Max: 100.2 (27 Aug 2023 17:01)  HR: 83 (28 Aug 2023 13:48) (83 - 106)  BP: 114/72 (28 Aug 2023 13:48) (76/44 - 141/99)  BP(mean): --  RR: 18 (28 Aug 2023 13:48) (18 - 30)  SpO2: 99% (28 Aug 2023 13:48) (85% - 100%)    Parameters below as of 28 Aug 2023 13:48  Patient On (Oxygen Delivery Method): mask, nonrebreather  O2 Flow (L/min): 5      General: unarousable    HEENT:  Lungs: audible excessive secretions  CV: RRR, S1S2, tachycardia  GI: soft non distended non tender  incontinent  Neuro: Obtunded  Oral intake ability: unable/only mouth care, minimal moderate full capability    LABS:                        7.9    8.03  )-----------( 117      ( 27 Aug 2023 16:45 )             25.0     08-28    134<L>  |  102  |  21<H>  ----------------------------<  153<H>  5.0   |  26  |  1.53<H>    Ca    8.2<L>      28 Aug 2023 04:20  Phos  3.4     08-28  Mg     1.7     08-28    TPro  5.9<L>  /  Alb  2.3<L>  /  TBili  0.2  /  DBili  x   /  AST  10  /  ALT  10  /  AlkPhos  57  08-27    Urinalysis Basic - ( 28 Aug 2023 04:20 )    Color: x / Appearance: x / SG: x / pH: x  Gluc: 153 mg/dL / Ketone: x  / Bili: x / Urobili: x   Blood: x / Protein: x / Nitrite: x   Leuk Esterase: x / RBC: x / WBC x   Sq Epi: x / Non Sq Epi: x / Bacteria: x        RADIOLOGY & ADDITIONAL STUDIES:     Consult to: Discuss complex medical decision making related to goals of care    [facility] Geriatric and Palliative Consult Service:  Janey Guadalupe DO: cell ([phone])  Alfredo Kohli MD: cell ([phone])  Tan Millan NP: cell ([phone])   Marvel Morris LMSW: cell ([phone])   Angela Barber NP: via [employer] Teams    Can contact any Palliative Team member via [employer] Teams for consults and questions    HPI:  68 year old man with advanced dementia (years), gastric cancer w/ local metastatic lymph nodes (not surgical or chemo candidate), seizure disorder, HTN, HLD, DM, CVA x2 (last 2013), CKD (Baseline Cr 1.5-1.8), chronic bangura (exchanged in ED 8/17/23). Discharged from [facility] about a week ago after management for suspected breakthrough seizure with home VPA dose increased to 750mg BID. At [facility], susan decided on pleasure feeds and home hospice however still wants pt to be full code. Pt was brought in today from home hospice with acute alteration from his baseline mental status with decreased responsiveness associated with decreased oral intake. No fevers, no emesis or diarrhea. ROS not possible with mental status. Spoke with family at length about patient's prognosis, however, family still wants pt to be full code.     Vitals: BP labile, however fluid responsive 80s-100s/50s-70s, afebrile   Labs: 7.41/44/28/254 (ABG), lac wnl, BUN 22/Cr 1.84, trop 9.8 , U/A neg   CXR concerning for RLL infiltrate   CTH wnl , recent CT Ab/P imaging showed - Gastric pyloric mass, suspect adenocarcinoma. Bulky local necrotic metastatic adenopathy.     (27 Aug 2023 21:59)      PAST MEDICAL & SURGICAL HISTORY:  DM (diabetes mellitus)      Seizure      CVA (cerebrovascular accident)      HLD (hyperlipidemia)      CKD (chronic kidney disease)      Dementia      Gastric cancer      No significant past surgical history          SOCIAL HISTORY:    Admitted from:  Hospice      FAMILY HISTORY:   unable to obtain from patient due to poor mentation, family unable to give information, see H&P for history  Baseline ADLs (prior to admission): fully independent    Allergies    No Known Allergies    Intolerances      Present Symptoms:   ROS: Unable to obtain due to poor mentation]    CPOT:    https://www.[email or web address]/getattachment/[email or web address]/Critical-Care-Pain-Observation-Tool-(CPOT)  PAIN AD Score:   http://geriatrictoolkit.[email or web address]/cog/painad.pdf (press ctrl +  left click to view)      MEDICATIONS  (STANDING):  azithromycin  IVPB 500 milliGRAM(s) IV Intermittent every 24 hours  cefepime   IVPB 2000 milliGRAM(s) IV Intermittent every 12 hours  chlorhexidine 2% Cloths 1 Application(s) Topical <User Schedule>  heparin   Injectable 5000 Unit(s) SubCutaneous every 12 hours  valproate sodium  IVPB 750 milliGRAM(s) IV Intermittent two times a day    MEDICATIONS  (PRN):  glycopyrrolate Injectable 0.2 milliGRAM(s) IV Push every 6 hours PRN secretions      PHYSICAL EXAM:  Vital Signs Last 24 Hrs  T(C): 37.1 (28 Aug 2023 13:48), Max: 37.9 (27 Aug 2023 17:01)  T(F): 98.7 (28 Aug 2023 13:48), Max: 100.2 (27 Aug 2023 17:01)  HR: 83 (28 Aug 2023 13:48) (83 - 106)  BP: 114/72 (28 Aug 2023 13:48) (76/44 - 141/99)  BP(mean): --  RR: 18 (28 Aug 2023 13:48) (18 - 30)  SpO2: 99% (28 Aug 2023 13:48) (85% - 100%)    Parameters below as of 28 Aug 2023 13:48  Patient On (Oxygen Delivery Method): mask, nonrebreather  O2 Flow (L/min): 5      General: unarousable    HEENT:  Lungs: audible excessive secretions  CV: RRR, S1S2, tachycardia  GI: soft non distended non tender  incontinent  Neuro: Obtunded  Oral intake ability: unable/only mouth care, minimal moderate full capability    LABS:                        7.9    8.03  )-----------( 117      ( 27 Aug 2023 16:45 )             25.0     08-28    134<L>  |  102  |  21<H>  ----------------------------<  153<H>  5.0   |  26  |  1.53<H>    Ca    8.2<L>      28 Aug 2023 04:20  Phos  3.4     08-28  Mg     1.7     08-28    TPro  5.9<L>  /  Alb  2.3<L>  /  TBili  0.2  /  DBili  x   /  AST  10  /  ALT  10  /  AlkPhos  57  08-27    Urinalysis Basic - ( 28 Aug 2023 04:20 )    Color: x / Appearance: x / SG: x / pH: x  Gluc: 153 mg/dL / Ketone: x  / Bili: x / Urobili: x   Blood: x / Protein: x / Nitrite: x   Leuk Esterase: x / RBC: x / WBC x   Sq Epi: x / Non Sq Epi: x / Bacteria: x        RADIOLOGY & ADDITIONAL STUDIES:  reviewed

## 2023-08-28 NOTE — CONSULT NOTE ADULT - PROBLEM SELECTOR RECOMMENDATION 2
p/w with altered mental status pt. is only responsive to sternal rub.   Was on 10 L of nonrebreather this morning but tapered down for 5L on NC .   ICU consulted: recommended patient remain on unit as ICU care would offer no benefit for patient  On admission, CXR concerning for RLL infiltrate patient was started cefepime and azithro for asp pna coverage, and atypical pna coverage c/w fluid resuscitation  Rattles heard on auscultation; glycopyrrolate started p/w with acute mental status change; lethargic, unresponsive  Patient was discharged from [facility] about a week ago after management for suspected breakthrough seizure with home VPA dose increased to 750mg BID.   Patient presented from home hospice with acute alteration from his baseline mental status with decreased responsiveness associated with decreased oral intake.   EEG at [facility]  Mild nonspecific diffuse or multifocal cerebral dysfunction.   No epileptiform pattern or seizure seen.  Likely in the setting of aspiration PNA/advancing cancer -p/w with acute mental status change; lethargic, unresponsive  -Patient was discharged from [facility] about a week ago after management for suspected breakthrough seizure with home VPA dose increased to 750mg BID.   -Patient presented from home hospice with acute alteration from his baseline mental status with decreased responsiveness associated with decreased oral intake.  EEG at [facility]  Mild nonspecific diffuse or multifocal cerebral dysfunction.  No epileptiform pattern or seizure seen.  -encephalopathy likely due infectious vs worsening seizure vs worsening gastric cancer

## 2023-08-28 NOTE — CONSULT NOTE ADULT - ASSESSMENT
68 year old man with advanced dementia (years), gastric cancer w/ local  bulky metastatic lymph nodes (not surgical or chemo candidate), seizure disorder, HTN, HLD, DM, CVA x2 (last 2013), anemia, CKD (Baseline Cr 1.5-1.8), chronic bangura (exchanged in ED 8/17/23) admitted to medicine for AHRF 2/2 asp. pna, NEVILLE on CKD, and worsening mental status in the setting of advanced gastric cancer.     #    Poorly differentiated  gastric  adenocarcinoma with bulky LAD  HER 2 2+  CPS=8   MSI intact   diagnosed 7/2023  pt with multiple comorbid conditions, poor performance status, not a candidate for sx or  palliative chemotx , was eval  at [facility] , subsequently recommended palliative comfort cvare and   d/c home  with [facility] hosp[facility]  full code  Now pt is readmitted with MS changes  CT head neg for  mets   Patient is seen by palliative care, GOC discussion is ongoing  We had a  discussion with family at bedside and primary team  pt is not a candidate for  active anti neoplastic tx   recommend comfort care/supportiove care/Hopsice  ? if inpt hospice is an option   call with questions [phone]     Thank you for the consult

## 2023-08-28 NOTE — PROGRESS NOTE ADULT - TIME BILLING
History, reviewed, patient, examined.    Goals of Care, discussed with family members.   Discussed with family, residents, RN's, MICU, and Palliative care, and Oncology.

## 2023-08-28 NOTE — CONSULT NOTE ADULT - PROBLEM SELECTOR RECOMMENDATION 4
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 Bulky local necrotic metastatic adenopathy.   CT showed no evidence of metastatic disease/change  Per heme/onc: Given patient's  advanced disease and poor PS, would not be a candidate for systemic therapy.   Risks of disease modifying treatment would outweigh any benefit.   Patient poor candidate for tube feeds given mental status' gastric cancer with mass Goals of care discussion held with family; ICU team, Oncology, and Palliative at bedside  Initially family understood that patient's status is deteriorating; however initially concerned that changing code status would mean withdrawal of care. Reiterated that care would be given; that ICU level care with chest compressions/ventilation would not change trajectory or quality of life in patient.   Patient made DNR/DNI; however still full medical care per daughters/son  MOLST in chart Goals of care discussion held with family; ICU team, Oncology, and Palliative at bedside. Initially family understood that patient's status is deteriorating; however initially concerned that changing code status would mean withdrawal of care. Reiterated that care would be given; that ICU level care with chest compressions/ventilation would not change trajectory or quality of life in patient. Son and daughters(via phone on speaker) acknowledged this and agreed that the intubation/chest compressions would not change patient's current state. Patient made DNR/DNI; however still full medical care for now with IV, oxygen, fluids, antibiotics, and anti-seizure medication. Given patient's current disease state; patient is eligible for hospice. Updated MOLST in chart Goals of care discussion held with family; ICU team, Oncology, and Palliative at bedside. Initially family understood that patient's status is deteriorating; however initially concerned that changing code status would mean withdrawal of care. Reiterated that care would be given; that ICU level care with chest compressions/ventilation would not change trajectory or quality of life in patient. Son and daughters(via phone on speaker) acknowledged this and agreed that the intubation/chest compressions would not change patient's current state. Patient made DNR/DNI;  Given patient's current disease state; patient is still eligible for hospice, however not IPU. Updated MOLST in chart

## 2023-08-28 NOTE — PROGRESS NOTE ADULT - SUBJECTIVE AND OBJECTIVE BOX
PGY-1 Progress Note discussed with attending    PAGER #: [[phone]] TILL 5:00 PM  PLEASE CONTACT ON CALL TEAM:  - On Call Team (Please refer to Art) FROM 5:00 PM - 8:30PM  - Nightfloat Team FROM 8:30 -7:30 AM    CHIEF COMPLAINT & BRIEF HOSPITAL COURSE: pt. is anox0, he is only responsive to sternal rub. Vitals are stable but has shallow breath. Was on 10 L of nonrebreather mask this AM. tapered down to 5 L.     INTERVAL HPI/OVERNIGHT EVENTS:   MEDICATIONS  (STANDING):  azithromycin  IVPB 500 milliGRAM(s) IV Intermittent every 24 hours  cefepime   IVPB 2000 milliGRAM(s) IV Intermittent every 12 hours  heparin   Injectable 5000 Unit(s) SubCutaneous every 12 hours  lactated ringers. 1000 milliLiter(s) (125 mL/Hr) IV Continuous <Continuous>  valproate sodium  IVPB 750 milliGRAM(s) IV Intermittent two times a day    MEDICATIONS  (PRN):  glycopyrrolate Injectable 0.2 milliGRAM(s) IV Push every 6 hours PRN secretions      Vital Signs Last 24 Hrs  T(C): 36.4 (28 Aug 2023 05:27), Max: 37.9 (27 Aug 2023 17:01)  T(F): 97.5 (28 Aug 2023 05:27), Max: 100.2 (27 Aug 2023 17:01)  HR: 84 (28 Aug 2023 06:20) (83 - 106)  BP: 114/73 (28 Aug 2023 06:20) (76/44 - 141/99)  BP(mean): --  RR: 18 (28 Aug 2023 06:20) (18 - 30)  SpO2: 93% (28 Aug 2023 06:20) (85% - 100%)    Parameters below as of 28 Aug 2023 06:20  Patient On (Oxygen Delivery Method): mask, nonrebreather  O2 Flow (L/min): 5      PHYSICAL EXAMINATION:  GENERAL: NAD, well built  HEAD:  Atraumatic, Normocephalic  EYES:  conjunctiva and sclera clear  CHEST/LUNG: Clear to auscultation. No rales, rhonchi, wheezing, or rubs  HEART: Regular rate and rhythm; No murmurs, rubs, or gallops  ABDOMEN: Soft, Nontender, Nondistended; Bowel sounds present  NERVOUS SYSTEM:  Alert & Oriented X0  EXTREMITIES:  2+ Peripheral Pulses, No clubbing, cyanosis, or edema  SKIN: warm dry                          7.9    8.03  )-----------( 117      ( 27 Aug 2023 16:45 )             25.0     08-28    134<L>  |  102  |  21<H>  ----------------------------<  153<H>  5.0   |  26  |  1.53<H>    Ca    8.2<L>      28 Aug 2023 04:20  Phos  3.4     08-28  Mg     1.7     08-28    TPro  5.9<L>  /  Alb  2.3<L>  /  TBili  0.2  /  DBili  x   /  AST  10  /  ALT  10  /  AlkPhos  57  08-27    LIVER FUNCTIONS - ( 27 Aug 2023 16:45 )  Alb: 2.3 g/dL / Pro: 5.9 g/dL / ALK PHOS: 57 U/L / ALT: 10 U/L DA / AST: 10 U/L / GGT: x                   CAPILLARY BLOOD GLUCOSE      RADIOLOGY & ADDITIONAL TESTS:                   PGY-1 Progress Note discussed with attending    PAGER #: [[phone]] TILL 5:00 PM  PLEASE CONTACT ON CALL TEAM:  - On Call Team (Please refer to Art) FROM 5:00 PM - 8:30PM  - Nightfloat Team FROM 8:30 -7:30 AM    CHIEF COMPLAINT & BRIEF HOSPITAL COURSE: pt. is anox0, he is only responsive to sternal rub. Vitals are stable but has shallow breath. Was on 10 L of nonrebreather mask this AM. tapered down to 5 L.     INTERVAL HPI/OVERNIGHT EVENTS:   MEDICATIONS  (STANDING):  azithromycin  IVPB 500 milliGRAM(s) IV Intermittent every 24 hours  cefepime   IVPB 2000 milliGRAM(s) IV Intermittent every 12 hours  heparin   Injectable 5000 Unit(s) SubCutaneous every 12 hours  lactated ringers. 1000 milliLiter(s) (125 mL/Hr) IV Continuous <Continuous>  valproate sodium  IVPB 750 milliGRAM(s) IV Intermittent two times a day    MEDICATIONS  (PRN):  glycopyrrolate Injectable 0.2 milliGRAM(s) IV Push every 6 hours PRN secretions      Vital Signs Last 24 Hrs  T(C): 36.4 (28 Aug 2023 05:27), Max: 37.9 (27 Aug 2023 17:01)  T(F): 97.5 (28 Aug 2023 05:27), Max: 100.2 (27 Aug 2023 17:01)  HR: 84 (28 Aug 2023 06:20) (83 - 106)  BP: 114/73 (28 Aug 2023 06:20) (76/44 - 141/99)  BP(mean): --  RR: 18 (28 Aug 2023 06:20) (18 - 30)  SpO2: 93% (28 Aug 2023 06:20) (85% - 100%)    Parameters below as of 28 Aug 2023 06:20  Patient On (Oxygen Delivery Method): mask, nonrebreather  O2 Flow (L/min): 5      PHYSICAL EXAMINATION:  GENERAL: NAD, well built  HEAD:  Atraumatic, Normocephalic  EYES:  conjunctiva and sclera clear  CHEST/LUNG: short shallow breath   HEART: Regular rate and rhythm; No murmurs, rubs, or gallops  ABDOMEN: Soft, Nontender, Nondistended; Bowel sounds present  NERVOUS SYSTEM:  Alert & Oriented X0  EXTREMITIES:  2+ Peripheral Pulses, No clubbing, cyanosis, or edema  SKIN: warm dry                          7.9    8.03  )-----------( 117      ( 27 Aug 2023 16:45 )             25.0     08-28    134<L>  |  102  |  21<H>  ----------------------------<  153<H>  5.0   |  26  |  1.53<H>    Ca    8.2<L>      28 Aug 2023 04:20  Phos  3.4     08-28  Mg     1.7     08-28    TPro  5.9<L>  /  Alb  2.3<L>  /  TBili  0.2  /  DBili  x   /  AST  10  /  ALT  10  /  AlkPhos  57  08-27    LIVER FUNCTIONS - ( 27 Aug 2023 16:45 )  Alb: 2.3 g/dL / Pro: 5.9 g/dL / ALK PHOS: 57 U/L / ALT: 10 U/L DA / AST: 10 U/L / GGT: x                   CAPILLARY BLOOD GLUCOSE      RADIOLOGY & ADDITIONAL TESTS:

## 2023-08-28 NOTE — CONSULT NOTE ADULT - WHAT MATTERS MOST
Family does not want patient to suffer; wants continued medical treatment, however understands prognosis is poor; discussion is ongoing

## 2023-08-28 NOTE — CONSULT NOTE ADULT - PROBLEM SELECTOR RECOMMENDATION 9
Goals of care discussion held with family; ICU team, Oncology, and Palliative at bedside  Initially family understood that patient's status is deteriorating; however initially concerned that changing code status would mean withdrawal of care. Reiterated that care would be given; that ICU level care with chest compressions/ventilation would not change trajectory or quality of life in patient.   Patient made DNR/DNI; however still full medical care per daughters/son  MOLST in chart -p/w with altered mental status pt. is only responsive to sternal rub  -was on 10 L of nonrebreather this morning but tapered down for 5L on NC  -CXR concerning for RLL infiltrate patient was started cefepime and azithro for asp pna coverage, and atypical pna coverage c/w fluid resuscitation  -ICU consulted: recommended patient remain on unit as ICU care would offer no benefit for patient  -Patient still requiring 5LNC -p/w with altered mental status pt. is only responsive to sternal rub  -was on 10 L of nonrebreather this morning but tapered down for 5L on NC  -CXR concerning for RLL infiltrate concerning for aspiration PNA   - patient was started cefepime and azithro for aspiration PNA coverage, and atypical pna coverage c/w fluid resuscitation  -ICU consulted: recommended patient remain on unit as ICU care would offer no benefit for patient  -Patient still requiring 5LNC

## 2023-08-29 DIAGNOSIS — R06.03 ACUTE RESPIRATORY DISTRESS: ICD-10-CM

## 2023-08-29 DIAGNOSIS — D64.9 ANEMIA, UNSPECIFIED: ICD-10-CM

## 2023-08-29 LAB
ABO RH CONFIRMATION: SIGNIFICANT CHANGE UP
ALBUMIN SERPL ELPH-MCNC: 1.6 G/DL — LOW (ref 3.5–5)
ALP SERPL-CCNC: 42 U/L — SIGNIFICANT CHANGE UP (ref 40–120)
ALT FLD-CCNC: 8 U/L DA — LOW (ref 10–60)
ANION GAP SERPL CALC-SCNC: 4 MMOL/L — LOW (ref 5–17)
ANISOCYTOSIS BLD QL: SLIGHT — SIGNIFICANT CHANGE UP
AST SERPL-CCNC: 11 U/L — SIGNIFICANT CHANGE UP (ref 10–40)
BILIRUB SERPL-MCNC: 0.2 MG/DL — SIGNIFICANT CHANGE UP (ref 0.2–1.2)
BLD GP AB SCN SERPL QL: SIGNIFICANT CHANGE UP
BUN SERPL-MCNC: 21 MG/DL — HIGH (ref 7–18)
CALCIUM SERPL-MCNC: 9 MG/DL — SIGNIFICANT CHANGE UP (ref 8.4–10.5)
CHLORIDE SERPL-SCNC: 104 MMOL/L — SIGNIFICANT CHANGE UP (ref 96–108)
CO2 SERPL-SCNC: 29 MMOL/L — SIGNIFICANT CHANGE UP (ref 22–31)
CREAT SERPL-MCNC: 1.3 MG/DL — SIGNIFICANT CHANGE UP (ref 0.5–1.3)
EGFR: 61 ML/MIN/1.73M2 — SIGNIFICANT CHANGE UP
GLUCOSE BLDC GLUCOMTR-MCNC: 133 MG/DL — HIGH (ref 70–99)
GLUCOSE BLDC GLUCOMTR-MCNC: 148 MG/DL — HIGH (ref 70–99)
GLUCOSE SERPL-MCNC: 123 MG/DL — HIGH (ref 70–99)
HCT VFR BLD CALC: 21.4 % — LOW (ref 39–50)
HCT VFR BLD CALC: 23.1 % — LOW (ref 39–50)
HGB BLD-MCNC: 6.8 G/DL — CRITICAL LOW (ref 13–17)
HGB BLD-MCNC: 7.1 G/DL — LOW (ref 13–17)
HYPOCHROMIA BLD QL: SLIGHT — SIGNIFICANT CHANGE UP
MAGNESIUM SERPL-MCNC: 1.8 MG/DL — SIGNIFICANT CHANGE UP (ref 1.6–2.6)
MANUAL SMEAR VERIFICATION: SIGNIFICANT CHANGE UP
MCHC RBC-ENTMCNC: 26.6 PG — LOW (ref 27–34)
MCHC RBC-ENTMCNC: 26.9 PG — LOW (ref 27–34)
MCHC RBC-ENTMCNC: 30.7 GM/DL — LOW (ref 32–36)
MCHC RBC-ENTMCNC: 31.8 GM/DL — LOW (ref 32–36)
MCV RBC AUTO: 84.6 FL — SIGNIFICANT CHANGE UP (ref 80–100)
MCV RBC AUTO: 86.5 FL — SIGNIFICANT CHANGE UP (ref 80–100)
NRBC # BLD: 0 /100 WBCS — SIGNIFICANT CHANGE UP (ref 0–0)
NRBC # BLD: 0 /100 WBCS — SIGNIFICANT CHANGE UP (ref 0–0)
PHOSPHATE SERPL-MCNC: 2.7 MG/DL — SIGNIFICANT CHANGE UP (ref 2.5–4.5)
PLAT MORPH BLD: NORMAL — SIGNIFICANT CHANGE UP
PLATELET # BLD AUTO: 107 K/UL — LOW (ref 150–400)
PLATELET # BLD AUTO: 115 K/UL — LOW (ref 150–400)
PLATELET COUNT - ESTIMATE: ABNORMAL
POIKILOCYTOSIS BLD QL AUTO: SLIGHT — SIGNIFICANT CHANGE UP
POTASSIUM SERPL-MCNC: 5 MMOL/L — SIGNIFICANT CHANGE UP (ref 3.5–5.3)
POTASSIUM SERPL-SCNC: 5 MMOL/L — SIGNIFICANT CHANGE UP (ref 3.5–5.3)
PROT SERPL-MCNC: 4.9 G/DL — LOW (ref 6–8.3)
RBC # BLD: 2.53 M/UL — LOW (ref 4.2–5.8)
RBC # BLD: 2.67 M/UL — LOW (ref 4.2–5.8)
RBC # FLD: 21 % — HIGH (ref 10.3–14.5)
RBC # FLD: 21.1 % — HIGH (ref 10.3–14.5)
RBC BLD AUTO: ABNORMAL
SODIUM SERPL-SCNC: 137 MMOL/L — SIGNIFICANT CHANGE UP (ref 135–145)
WBC # BLD: 18.14 K/UL — HIGH (ref 3.8–10.5)
WBC # BLD: 19.56 K/UL — HIGH (ref 3.8–10.5)
WBC # FLD AUTO: 18.14 K/UL — HIGH (ref 3.8–10.5)
WBC # FLD AUTO: 19.56 K/UL — HIGH (ref 3.8–10.5)

## 2023-08-29 PROCEDURE — 99233 SBSQ HOSP IP/OBS HIGH 50: CPT | Mod: GC

## 2023-08-29 PROCEDURE — 99497 ADVNCD CARE PLAN 30 MIN: CPT | Mod: GC,25

## 2023-08-29 RX ADMIN — SODIUM CHLORIDE 100 MILLILITER(S): 9 INJECTION, SOLUTION INTRAVENOUS at 00:04

## 2023-08-29 RX ADMIN — ROBINUL 0.2 MILLIGRAM(S): 0.2 INJECTION INTRAMUSCULAR; INTRAVENOUS at 00:04

## 2023-08-29 RX ADMIN — CEFEPIME 100 MILLIGRAM(S): 1 INJECTION, POWDER, FOR SOLUTION INTRAMUSCULAR; INTRAVENOUS at 17:45

## 2023-08-29 RX ADMIN — Medication 57.5 MILLIGRAM(S): at 06:13

## 2023-08-29 RX ADMIN — CHLORHEXIDINE GLUCONATE 1 APPLICATION(S): 213 SOLUTION TOPICAL at 05:39

## 2023-08-29 RX ADMIN — ROBINUL 0.2 MILLIGRAM(S): 0.2 INJECTION INTRAMUSCULAR; INTRAVENOUS at 14:52

## 2023-08-29 RX ADMIN — AZITHROMYCIN 255 MILLIGRAM(S): 500 TABLET, FILM COATED ORAL at 14:52

## 2023-08-29 RX ADMIN — CEFEPIME 100 MILLIGRAM(S): 1 INJECTION, POWDER, FOR SOLUTION INTRAMUSCULAR; INTRAVENOUS at 05:34

## 2023-08-29 RX ADMIN — ROBINUL 0.2 MILLIGRAM(S): 0.2 INJECTION INTRAMUSCULAR; INTRAVENOUS at 21:46

## 2023-08-29 RX ADMIN — Medication 57.5 MILLIGRAM(S): at 17:45

## 2023-08-29 RX ADMIN — HEPARIN SODIUM 5000 UNIT(S): 5000 INJECTION INTRAVENOUS; SUBCUTANEOUS at 05:35

## 2023-08-29 RX ADMIN — SODIUM CHLORIDE 100 MILLILITER(S): 9 INJECTION, SOLUTION INTRAVENOUS at 05:34

## 2023-08-29 RX ADMIN — HEPARIN SODIUM 5000 UNIT(S): 5000 INJECTION INTRAVENOUS; SUBCUTANEOUS at 17:46

## 2023-08-29 NOTE — PROGRESS NOTE ADULT - SUBJECTIVE AND OBJECTIVE BOX
PGY-1 Progress Note discussed with attending    PAGER #: [[phone]] TILL 5:00 PM  PLEASE CONTACT ON CALL TEAM:  - On Call Team (Please refer to Art) FROM 5:00 PM - 8:30PM  - Nightfloat Team FROM 8:30 -7:30 AM    CHIEF COMPLAINT & BRIEF HOSPITAL COURSE: no acute overnight events. Pt is transferred to NC. More responsive today.    INTERVAL HPI/OVERNIGHT EVENTS:   MEDICATIONS  (STANDING):  azithromycin  IVPB 500 milliGRAM(s) IV Intermittent every 24 hours  cefepime   IVPB 2000 milliGRAM(s) IV Intermittent every 12 hours  chlorhexidine 2% Cloths 1 Application(s) Topical <User Schedule>  heparin   Injectable 5000 Unit(s) SubCutaneous every 12 hours  lactated ringers. 1000 milliLiter(s) (100 mL/Hr) IV Continuous <Continuous>  valproate sodium  IVPB 750 milliGRAM(s) IV Intermittent two times a day    MEDICATIONS  (PRN):  glycopyrrolate Injectable 0.2 milliGRAM(s) IV Push every 6 hours PRN secretions      Vital Signs Last 24 Hrs  T(C): 36.3 (29 Aug 2023 04:42), Max: 37.1 (28 Aug 2023 13:48)  T(F): 97.3 (29 Aug 2023 04:42), Max: 98.7 (28 Aug 2023 13:48)  HR: 78 (29 Aug 2023 04:42) (78 - 90)  BP: 141/78 (29 Aug 2023 04:42) (85/53 - 141/78)  BP(mean): 99 (29 Aug 2023 04:42) (68 - 99)  RR: 17 (29 Aug 2023 04:42) (17 - 18)  SpO2: 96% (29 Aug 2023 08:51) (88% - 99%)    Parameters below as of 29 Aug 2023 08:51  Patient On (Oxygen Delivery Method): nasal cannula  O2 Flow (L/min): 5      PHYSICAL EXAMINATION:  GENERAL: NAD, well built  HEAD:  Atraumatic, Normocephalic  EYES:  conjunctiva and sclera clear  CHEST/LUNG:  Short shallow breaths   HEART: Regular rate and rhythm; No murmurs, rubs, or gallops  ABDOMEN: Soft, Nontender, Nondistended; Bowel sounds present  NERVOUS SYSTEM:  Alert & Oriented X3,    EXTREMITIES:  2+ Peripheral Pulses, No clubbing, cyanosis, or edema  SKIN: warm dry                          8.0    16.07 )-----------( 115      ( 28 Aug 2023 16:49 )             25.9     08-28    134<L>  |  102  |  21<H>  ----------------------------<  153<H>  5.0   |  26  |  1.53<H>    Ca    8.2<L>      28 Aug 2023 04:20  Phos  3.4     08-28  Mg     1.7     08-28    TPro  5.9<L>  /  Alb  2.3<L>  /  TBili  0.2  /  DBili  x   /  AST  10  /  ALT  10  /  AlkPhos  57  08-27    LIVER FUNCTIONS - ( 27 Aug 2023 16:45 )  Alb: 2.3 g/dL / Pro: 5.9 g/dL / ALK PHOS: 57 U/L / ALT: 10 U/L DA / AST: 10 U/L / GGT: x                   CAPILLARY BLOOD GLUCOSE      RADIOLOGY & ADDITIONAL TESTS:

## 2023-08-29 NOTE — PROGRESS NOTE ADULT - ATTENDING COMMENTS
barely arousable, but family wants to try to feed patient, MEWS exemption, aspiration precautions - counseling given to only feed patient when fully alert and can safely swallow  education on why artificial nutrition through feeding tube (NG and PEG) is not possible due to extensive gastric cancer - family understood and agreed for only trial of IVF.   stage 4 gastric cancer likely with prognosis of days, continue with trial iv fluids, abx.     extensive discussion on hospice with daughter in [place] (who just left [place] after long visit with patient) and son at bedside - acute hospice vs home hospice. Patient is not yet eligible for inpatient hospice. Family lost trust in hospice as they did not discuss trial of abx/ivf as options on hospice. Would consider inpt hospice if patient clinically declines while hospitalized. Ok with going home when abx completed and safe for discharged. Will consider house calls physician program.     ACP time spent face to face with son - 30 minutes

## 2023-08-29 NOTE — PROGRESS NOTE ADULT - PROBLEM SELECTOR PLAN 3
- BP labile, however fluid responsive 80s-100s/50s-70s, w/ RR >20, meets SIRS criteria with likely source PNA   - Labs: 7.41/44/28/254 (ABG), lac wnl, BUN 22/Cr 1.84, trop 9.8 , U/A neg.   f/u blood cultures   - see above acute respiratory failure see above

## 2023-08-29 NOTE — PROGRESS NOTE ADULT - PROBLEM SELECTOR PLAN 1
Recommendation: -p/w with altered mental status pt. is only responsive to sternal rub  -was on 10 L of nonrebreather this morning but tapered down for 5L on NC  -CXR concerning for RLL infiltrate concerning for aspiration PNA   - patient was started cefepime and azithro for aspiration PNA coverage, and atypical pna coverage c/w fluid resuscitation  -ICU consulted: recommended patient remain on unit as ICU care would offer no benefit for patient  -Patient still requiring 5LNC. Recommendation: -p/w with altered mental status pt. is only responsive to sternal rub  -was on 10 L of nonrebreather this morning but tapered down for 5L on NC  -CXR concerning for RLL infiltrate concerning for aspiration PNA   - patient was started cefepime and azithro for aspiration PNA coverage, and atypical pna coverage c/w fluid resuscitation  -ICU consulted: recommended patient remain on unit as ICU care would offer no benefit for patient  -Patient still requiring 5LNC.  -secretions audible on exam; glycopyrrolate 0.4mg PRN q4 Recommendation: -p/w with altered mental status pt. is only responsive to sternal rub  -was on 10 L of nonrebreather this morning but tapered down for 5L on NC  -CXR RLL infiltrate concerning for aspiration PNA   - patient was started cefepime and azithro for aspiration PNA coverage, and atypical pna coverage c/w fluid resuscitation  -Patient still requiring 5LNC.  -secretions audible on exam; glycopyrrolate 0.4mg PRN q4 Recommendation: -p/w with altered mental status, barely arousable  -was on 10 L of nonrebreather this morning but tapered down for 5L on NC  -CXR RLL infiltrate concerning for aspiration PNA   - patient was started cefepime and azithro for aspiration PNA coverage, and atypical pna coverage c/w fluid resuscitation  -Patient still requiring 5LNC.  -secretions audible on exam; glycopyrrolate 0.4mg PRN q4

## 2023-08-29 NOTE — PROGRESS NOTE ADULT - PROBLEM SELECTOR PLAN 1
On NC 5 L  - CXR concerning for RLL infiltrate.  - c/w cefepime and azithro for asp pna coverage, and atypical pna coverage   - c/w fluid resuscitation  - f/u UCx and BCx, legionella and strep

## 2023-08-29 NOTE — PROGRESS NOTE ADULT - PROBLEM SELECTOR PLAN 4
see above hgb was 6.8   type and screen done  pt. give 1 unit of packed red blood cells.   follow up CBC at 7 pm hgb was 6.8   type and screen done  repeat cbc to see trend  asx

## 2023-08-29 NOTE — PROGRESS NOTE ADULT - PROBLEM SELECTOR PLAN 4
Goals of care discussion held with family; ICU team, Oncology, and Palliative at bedside. Initially family understood that patient's status is deteriorating; however initially concerned that changing code status would mean withdrawal of care. Reiterated that care would be given; that ICU level care with chest compressions/ventilation would not change trajectory or quality of life in patient. Son and daughters(via phone on speaker) acknowledged this and agreed that the intubation/chest compressions would not change patient's current state. Patient made DNR/DNI; however still full medical care for now with IV, oxygen, fluids, antibiotics, and anti-seizure medication. Given patient's current disease state; patient is eligible for hospice. Updated MOLST in chart. Goals of care discussion held with family; ICU team, Oncology, and Palliative at bedside. Initially family understood that patient's status is deteriorating; however initially concerned that changing code status would mean withdrawal of care. Reiterated that care would be given; that ICU level care with chest compressions/ventilation would not change trajectory or quality of life in patient. Son and daughters(via phone on speaker) acknowledged this and agreed that the intubation/chest compressions would not change patient's current state. Patient made DNR/DNI;. Prognosis is poor; patient eligible for hospice; however not IPU given patient is not requiring IV pain medication; not requiring continuous infusion of antiseizure medications, not agitated Updated MOLST in chart. see GOC note - not yet eligible for acute hospice

## 2023-08-29 NOTE — PROGRESS NOTE ADULT - PROBLEM SELECTOR PLAN 2
-p/w with acute mental status change; lethargic, unresponsive  -Patient was discharged from [facility] about a week ago after management for suspected breakthrough seizure with home VPA dose increased to 750mg BID.   -Patient presented from home hospice with acute alteration from his baseline mental status with decreased responsiveness associated with decreased oral intake.  EEG at [facility]  Mild nonspecific diffuse or multifocal cerebral dysfunction.  No epileptiform pattern or seizure seen.  -encephalopathy likely due infectious vs worsening seizure vs worsening gastric cancer. -p/w with acute mental status change; lethargic, unresponsive  -Patient was discharged from [facility] about a week ago after management for suspected breakthrough seizure with home VPA dose increased to 750mg BID.   -Patient presented from home hospice with acute alteration from his baseline mental status with decreased responsiveness associated with decreased oral intake.  EEG at [facility]  Mild nonspecific diffuse or multifocal cerebral dysfunction.  No epileptiform pattern or seizure seen.  -encephalopathy likely due infectious vs worsening seizure vs worsening gastric cancer

## 2023-08-29 NOTE — PROGRESS NOTE ADULT - SUBJECTIVE AND OBJECTIVE BOX
follow up on:  complex medical decision making related to goals of care    [facility] Geriatric and Palliative Consult Service:  Janey Guadalupe DO: cell ([phone])  Alfredo Kohli MD: cell ([phone])  Tan Millan NP: cell ([phone])   Marvel Morris LMSW: cell ([phone])   Angela Barber NP: via [employer] Teams    Can contact any Palliative Team member via [employer] Teams for consults and questions    OVERNIGHT EVENTS:    Present Symptoms: Mild, Moderate, Severe  Pain:             Location -                               Aggravating factors -             Quality -             Radiation -             Timing-             Severity (0-10 scale):             Minimal acceptable level (0-10 scale):  Fatigue:  Nausea:  Lack of Appetite:   SOB:  Depression:  Anxiety:  Review of Systems: [All others negative or Unable to obtain due to poor mentation]    CPOT:    https://www.[email or web address]/getattachment/[email or web address]/Critical-Care-Pain-Observation-Tool-(CPOT)  PAIN AD Score:   http://geriatrictoolkit.[facility]/cog/painad.pdf (press ctrl +  left click to view)MEDICATIONS  (STANDING):  azithromycin  IVPB 500 milliGRAM(s) IV Intermittent every 24 hours  cefepime   IVPB 2000 milliGRAM(s) IV Intermittent every 12 hours  chlorhexidine 2% Cloths 1 Application(s) Topical <User Schedule>  heparin   Injectable 5000 Unit(s) SubCutaneous every 12 hours  lactated ringers. 1000 milliLiter(s) (100 mL/Hr) IV Continuous <Continuous>  valproate sodium  IVPB 750 milliGRAM(s) IV Intermittent two times a day    MEDICATIONS  (PRN):  glycopyrrolate Injectable 0.2 milliGRAM(s) IV Push every 6 hours PRN secretions      PHYSICAL EXAM:  Vital Signs Last 24 Hrs  T(C): 36.3 (29 Aug 2023 13:25), Max: 36.3 (28 Aug 2023 22:04)  T(F): 97.3 (29 Aug 2023 13:25), Max: 97.4 (28 Aug 2023 22:04)  HR: 85 (29 Aug 2023 13:25) (78 - 90)  BP: 174/84 (29 Aug 2023 13:25) (85/53 - 174/84)  BP(mean): 99 (29 Aug 2023 04:42) (68 - 99)  RR: 17 (29 Aug 2023 13:25) (17 - 17)  SpO2: 96% (29 Aug 2023 13:25) (88% - 97%)    Parameters below as of 29 Aug 2023 13:25  Patient On (Oxygen Delivery Method): nasal cannula  O2 Flow (L/min): 5      General: alert  oriented x ____    lethargic distressed cachexia  verbal nonverbal  unarousable     Palliative Performance Scale/Karnofsky Score:  http://[facility].org/files/news/palliative_performance_scale_ppsv2.pdf    HEENT: no abnormal lesion, dry mouth  ET tube/trach oral lesions:  Lungs: tachypnea/labored breathing, audible excessive secretions  CV: RRR, S1S2, tachycardia  GI: soft non distended non tender  incontinent               PEG/NG/OG tube  constipation  last BM:   : incontinent  oliguria/anuria  bangura  Musculoskeletal: weakness x4 edema x4    ambulatory with assistance   mostly/fully bedbound/wheelchair bound  Skin: no abnormal skin lesions, poor skin turgor, pressure ulcers stage:   Neuro: no deficits, mild cognitive impairment dsyphagia/dysarthria paresis  Oral intake ability: unable/only mouth care, minimal moderate full capability    LABS:                          7.1    19.56 )-----------( 115      ( 29 Aug 2023 14:49 )             23.1     08-29    137  |  104  |  21<H>  ----------------------------<  123<H>  5.0   |  29  |  1.30    Ca    9.0      29 Aug 2023 10:24  Phos  2.7     08-29  Mg     1.8     08-29    TPro  4.9<L>  /  Alb  1.6<L>  /  TBili  0.2  /  DBili  x   /  AST  11  /  ALT  8<L>  /  AlkPhos  42  08-29    Urinalysis Basic - ( 29 Aug 2023 10:24 )    Color: x / Appearance: x / SG: x / pH: x  Gluc: 123 mg/dL / Ketone: x  / Bili: x / Urobili: x   Blood: x / Protein: x / Nitrite: x   Leuk Esterase: x / RBC: x / WBC x   Sq Epi: x / Non Sq Epi: x / Bacteria: x        RADIOLOGY & ADDITIONAL STUDIES: follow up on:  complex medical decision making related to goals of care    [facility] Geriatric and Palliative Consult Service:  Janey Guadalupe DO: cell ([phone])  Alfredo Kohli MD: cell [phone])  Tan Millan NP: cell ([phone])   Marvel Morris LMSW: cell ([phone])   Angela Barber NP: via [employer] Teams    Can contact any Palliative Team member via [employer] Teams for consults and questions    OVERNIGHT EVENTS:  Review of Systems: [Unable to obtain due to poor mentation]    CPOT:    https://www.[email or web address]/getattachment/[email or web address]/Critical-Care-Pain-Observation-Tool-(CPOT)  PAIN AD Score:   http://geriatrictoolkit.[facility]/cog/painad.pdf (press ctrl +  left click to view)MEDICATIONS  (STANDING):  azithromycin  IVPB 500 milliGRAM(s) IV Intermittent every 24 hours  cefepime   IVPB 2000 milliGRAM(s) IV Intermittent every 12 hours  chlorhexidine 2% Cloths 1 Application(s) Topical <User Schedule>  heparin   Injectable 5000 Unit(s) SubCutaneous every 12 hours  lactated ringers. 1000 milliLiter(s) (100 mL/Hr) IV Continuous <Continuous>  valproate sodium  IVPB 750 milliGRAM(s) IV Intermittent two times a day    MEDICATIONS  (PRN):  glycopyrrolate Injectable 0.2 milliGRAM(s) IV Push every 6 hours PRN secretions      PHYSICAL EXAM:  Vital Signs Last 24 Hrs  T(C): 36.3 (29 Aug 2023 13:25), Max: 36.3 (28 Aug 2023 22:04)  T(F): 97.3 (29 Aug 2023 13:25), Max: 97.4 (28 Aug 2023 22:04)  HR: 85 (29 Aug 2023 13:25) (78 - 90)  BP: 174/84 (29 Aug 2023 13:25) (85/53 - 174/84)  BP(mean): 99 (29 Aug 2023 04:42) (68 - 99)  RR: 17 (29 Aug 2023 13:25) (17 - 17)  SpO2: 96% (29 Aug 2023 13:25) (88% - 97%)    Parameters below as of 29 Aug 2023 13:25  Patient On (Oxygen Delivery Method): nasal cannula  O2 Flow (L/min): 5      General: lethargic unarousable     Palliative Performance Scale/Karnofsky Score:  http://[facility].org/files/news/palliative_performance_scale_ppsv2.pdf    Lungs: audible excessive secretions  CV: RRR, S1S2, tachycardia  GI: soft non distended non tender  incontinent               PEG/NG/OG tube  constipation  last BM:   : incontinent  oliguria/anuria  bangura  Musculoskeletal: weakness x4 edema x4    ambulatory with assistance   mostly/fully bedbound/wheelchair bound  Skin: no abnormal skin lesions, poor skin turgor, pressure ulcers stage:   Neuro: no deficits, mild cognitive impairment dsyphagia/dysarthria paresis  Oral intake ability: unable/only mouth care, minimal moderate full capability    LABS:                        7.1    19.56 )-----------( 115      ( 29 Aug 2023 14:49 )             23.1     08-29    137  |  104  |  21<H>  ----------------------------<  123<H>  5.0   |  29  |  1.30    Ca    9.0      29 Aug 2023 10:24  Phos  2.7     08-29  Mg     1.8     08-29    TPro  4.9<L>  /  Alb  1.6<L>  /  TBili  0.2  /  DBili  x   /  AST  11  /  ALT  8<L>  /  AlkPhos  42  08-29    Urinalysis Basic - ( 29 Aug 2023 10:24 )    Color: x / Appearance: x / SG: x / pH: x  Gluc: 123 mg/dL / Ketone: x  / Bili: x / Urobili: x   Blood: x / Protein: x / Nitrite: x   Leuk Esterase: x / RBC: x / WBC x   Sq Epi: x / Non Sq Epi: x / Bacteria: x        RADIOLOGY & ADDITIONAL STUDIES: follow up on:  complex medical decision making related to goals of care    [facility] Geriatric and Palliative Consult Service:  Janey uGadalupe DO: cell ([phone])  Alfredo Kohli MD: cell ([phone])  Tan Millan NP: cell ([phone])   Marvel Morris LMSW: cell ([phone])   Angela Barber NP: via [employer] Teams    Can contact any Palliative Team member via [employer] Teams for consults and questions    OVERNIGHT EVENTS:  Review of Systems: [Unable to obtain due to poor mentation]    CPOT:    https://www.[facility].org/getattachment/[email or web address]/Critical-Care-Pain-Observation-Tool-(CPOT)  PAIN AD Score:   http://geriatrictoolkit.[facility]/cog/painad.pdf (press ctrl +  left click to view)MEDICATIONS  (STANDING):  azithromycin  IVPB 500 milliGRAM(s) IV Intermittent every 24 hours  cefepime   IVPB 2000 milliGRAM(s) IV Intermittent every 12 hours  chlorhexidine 2% Cloths 1 Application(s) Topical <User Schedule>  heparin   Injectable 5000 Unit(s) SubCutaneous every 12 hours  lactated ringers. 1000 milliLiter(s) (100 mL/Hr) IV Continuous <Continuous>  valproate sodium  IVPB 750 milliGRAM(s) IV Intermittent two times a day    MEDICATIONS  (PRN):  glycopyrrolate Injectable 0.2 milliGRAM(s) IV Push every 6 hours PRN secretions      PHYSICAL EXAM:  Vital Signs Last 24 Hrs  T(C): 36.3 (29 Aug 2023 13:25), Max: 36.3 (28 Aug 2023 22:04)  T(F): 97.3 (29 Aug 2023 13:25), Max: 97.4 (28 Aug 2023 22:04)  HR: 85 (29 Aug 2023 13:25) (78 - 90)  BP: 174/84 (29 Aug 2023 13:25) (85/53 - 174/84)  BP(mean): 99 (29 Aug 2023 04:42) (68 - 99)  RR: 17 (29 Aug 2023 13:25) (17 - 17)  SpO2: 96% (29 Aug 2023 13:25) (88% - 97%)    Parameters below as of 29 Aug 2023 13:25  Patient On (Oxygen Delivery Method): nasal cannula  O2 Flow (L/min): 5      General: lethargic unarousable     Palliative Performance Scale/Karnofsky Score: 20%  http://[facility].org/files/news/palliative_performance_scale_ppsv2.pdf    Lungs: mild audible excessive secretions  CV: RRR, S1S2  GI: soft non distended non tender  incontinent  : incontinent  oliguria/anuria  bangura  Musculoskeletal: weakness x4 fully bedbound  Skin: no abnormal skin lesions, poor skin turgor  Neuro: cognitive impairment dsyphagia  Oral intake ability:  minimal  capability    LABS:                        7.1    19.56 )-----------( 115      ( 29 Aug 2023 14:49 )             23.1     08-29    137  |  104  |  21<H>  ----------------------------<  123<H>  5.0   |  29  |  1.30    Ca    9.0      29 Aug 2023 10:24  Phos  2.7     08-29  Mg     1.8     08-29    TPro  4.9<L>  /  Alb  1.6<L>  /  TBili  0.2  /  DBili  x   /  AST  11  /  ALT  8<L>  /  AlkPhos  42  08-29    Urinalysis Basic - ( 29 Aug 2023 10:24 )    Color: x / Appearance: x / SG: x / pH: x  Gluc: 123 mg/dL / Ketone: x  / Bili: x / Urobili: x   Blood: x / Protein: x / Nitrite: x   Leuk Esterase: x / RBC: x / WBC x   Sq Epi: x / Non Sq Epi: x / Bacteria: x    RADIOLOGY & ADDITIONAL STUDIES: reviewed

## 2023-08-29 NOTE — PROGRESS NOTE ADULT - PROBLEM SELECTOR PLAN 5
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 Bulky local necrotic metastatic adenopathy.   - palliative care consult in AM for clarification on GOC: family wants everything but chest compressions.   - hold on comfort meds in med rec (ativan, morphine) for now, due to risk of respiratory depression

## 2023-08-29 NOTE — PROGRESS NOTE ADULT - ATTENDING COMMENTS
68 year old man admitted from home hospice because of altered mental status and dehydration.  Patient was on home hospice, but family has asked for more aggressive treatment. No fevers, no emesis or diarrhea.   PMH advanced dementia, gastric cancer; Oncology assessed that chemotherapy would not benefit because of overall poor functional status.           multiple cardiovascular diseases - DM, CVA, HTN, HLD, CKD, hx of chronic indwelling catheter.           Discharged from [facility] about a week ago after management for suspected breakthrough seizure with home VPA dose increased to 750mg BID.    # Acute respiratory failure with hypoxia, atelectasis vs PE vs aspiration  # Acute encephalopathy   # Fluid responsive septic shock   # NEVILLE on CKD  # anemia, chronic diseasse  # gastric cancer. possible CNS metastases as the cause of altered mental status.   # s/p CVA  # hx seizure  According to family's wishes will treat for sepsis, pneumonia  Steroids in case altered mental status is secondary to CNS metastases  Supplemental oxygen to keep SaO2 > 96%  Empiric IV antibiotics with broad spectrum gram negative coverage for LIONEL; continue cefepime and azithromycin for atypical organism coverage  IVF hydration   Fall and aspiration precaution  Keep NPO  Palliative/Hospice care consult, appreciated  MICU consultation, appreciated 68 year old man admitted from home hospice because of altered mental status and dehydration.  Patient was on home hospice, but family has asked for more aggressive treatment. No fevers, no emesis or diarrhea.   PMH advanced dementia, gastric cancer; Oncology assessed that chemotherapy would not benefit because of overall poor functional status.           multiple cardiovascular diseases - DM, CVA, HTN, HLD, CKD, hx of chronic indwelling catheter.           Discharged from [facility] about a week ago after management for suspected breakthrough seizure with home VPA dose increased to 750mg BID.    # Acute respiratory failure with hypoxia, atelectasis vs PE vs aspiration  # Acute encephalopathy   # Fluid responsive septic shock   # NEVILLE on CKD  # anemia, chronic disease  # gastric cancer. possible CNS metastases as the cause of altered mental status.   # s/p CVA  # hx seizure  According to family's wishes will treat for sepsis, pneumonia  Supplemental oxygen to keep SaO2 > 93%  Empiric IV antibiotics with broad spectrum gram negative coverage for LIONEL; continue cefepime and azithromycin for atypical organism coverage  IVF hydration   Fall and aspiration precaution  start dysphagia diet  Palliative/Hospice care consult, appreciated

## 2023-08-29 NOTE — PROGRESS NOTE ADULT - ASSESSMENT
68 year old man with advanced dementia (years), gastric cancer w/ local metastatic lymph nodes (not surgical or chemo candidate), seizure disorder, HTN, HLD, DM, CVA x2 (last 2013), CKD (Baseline Cr 1.5-1.8), chronic Perez (exchanged in ED 8/17/23) admitted to medicine for AHRF 2/2 asp. pna, NEVILLE on CKD, and worsening mental status in the setting of advanced gastric cancer. Palliative care consulted for goals of care status; patient made DNR/DNI with full continuation of medical care. 68 year old man with advanced dementia (years), gastric cancer w/ local metastatic lymph nodes (not surgical or chemo candidate), seizure disorder, HTN, HLD, DM, CVA x2 (last 2013), CKD (Baseline Cr 1.5-1.8), chronic Perez (exchanged in ED 8/17/23) admitted to medicine for AHRF 2/2 asp. pna, NEVILLE on CKD, and worsening mental status in the setting of advanced gastric cancer. Palliative care consulted for goals of care status; patient made DNR/DNI with continued treatment of seizures possible aspiration pneumonia.

## 2023-08-29 NOTE — PROGRESS NOTE ADULT - PROBLEM SELECTOR PLAN 3
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 Bulky local necrotic metastatic adenopathy.   CT showed no evidence of metastatic disease/change  Per heme/onc: Given patient's  advanced disease and poor PS, would not be a candidate for systemic therapy.   Risks of disease modifying treatment would outweigh any benefit.   Patient poor candidate for tube feeds given mental status' gastric cancer with mass  Prognosis is poor; patient eligible for hospice. 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 Bulky local necrotic metastatic adenopathy.   CT showed no evidence of metastatic disease/change  Per heme/onc: Given patient's  advanced disease and poor PS, would not be a candidate for systemic therapy.   Risks of disease modifying treatment would outweigh any benefit.   Patient poor candidate for tube feeds given mental status' gastric cancer with mass

## 2023-08-30 LAB
GLUCOSE BLDC GLUCOMTR-MCNC: 117 MG/DL — HIGH (ref 70–99)
GLUCOSE BLDC GLUCOMTR-MCNC: 95 MG/DL — SIGNIFICANT CHANGE UP (ref 70–99)
HCT VFR BLD CALC: 21.1 % — LOW (ref 39–50)
HCT VFR BLD CALC: 22.1 % — LOW (ref 39–50)
HGB BLD-MCNC: 6.7 G/DL — CRITICAL LOW (ref 13–17)
HGB BLD-MCNC: 6.8 G/DL — CRITICAL LOW (ref 13–17)
MAGNESIUM SERPL-MCNC: 1.8 MG/DL — SIGNIFICANT CHANGE UP (ref 1.6–2.6)
MCHC RBC-ENTMCNC: 26.2 PG — LOW (ref 27–34)
MCHC RBC-ENTMCNC: 27.2 PG — SIGNIFICANT CHANGE UP (ref 27–34)
MCHC RBC-ENTMCNC: 30.8 GM/DL — LOW (ref 32–36)
MCHC RBC-ENTMCNC: 31.8 GM/DL — LOW (ref 32–36)
MCV RBC AUTO: 85 FL — SIGNIFICANT CHANGE UP (ref 80–100)
MCV RBC AUTO: 85.8 FL — SIGNIFICANT CHANGE UP (ref 80–100)
NRBC # BLD: 0 /100 WBCS — SIGNIFICANT CHANGE UP (ref 0–0)
NRBC # BLD: 0 /100 WBCS — SIGNIFICANT CHANGE UP (ref 0–0)
PHOSPHATE SERPL-MCNC: 1.9 MG/DL — LOW (ref 2.5–4.5)
PLATELET # BLD AUTO: 143 K/UL — LOW (ref 150–400)
PLATELET # BLD AUTO: 148 K/UL — LOW (ref 150–400)
RBC # BLD: 2.46 M/UL — LOW (ref 4.2–5.8)
RBC # BLD: 2.6 M/UL — LOW (ref 4.2–5.8)
RBC # FLD: 20.7 % — HIGH (ref 10.3–14.5)
RBC # FLD: 20.7 % — HIGH (ref 10.3–14.5)
WBC # BLD: 13.51 K/UL — HIGH (ref 3.8–10.5)
WBC # BLD: 14.3 K/UL — HIGH (ref 3.8–10.5)
WBC # FLD AUTO: 13.51 K/UL — HIGH (ref 3.8–10.5)
WBC # FLD AUTO: 14.3 K/UL — HIGH (ref 3.8–10.5)

## 2023-08-30 PROCEDURE — 99233 SBSQ HOSP IP/OBS HIGH 50: CPT | Mod: GC

## 2023-08-30 RX ORDER — ROBINUL 0.2 MG/ML
0.4 INJECTION INTRAMUSCULAR; INTRAVENOUS EVERY 4 HOURS
Refills: 0 | Status: DISCONTINUED | OUTPATIENT
Start: 2023-08-30 | End: 2023-09-06

## 2023-08-30 RX ADMIN — HEPARIN SODIUM 5000 UNIT(S): 5000 INJECTION INTRAVENOUS; SUBCUTANEOUS at 05:28

## 2023-08-30 RX ADMIN — CHLORHEXIDINE GLUCONATE 1 APPLICATION(S): 213 SOLUTION TOPICAL at 05:31

## 2023-08-30 RX ADMIN — AZITHROMYCIN 255 MILLIGRAM(S): 500 TABLET, FILM COATED ORAL at 12:37

## 2023-08-30 RX ADMIN — HEPARIN SODIUM 5000 UNIT(S): 5000 INJECTION INTRAVENOUS; SUBCUTANEOUS at 17:10

## 2023-08-30 RX ADMIN — CEFEPIME 100 MILLIGRAM(S): 1 INJECTION, POWDER, FOR SOLUTION INTRAMUSCULAR; INTRAVENOUS at 05:29

## 2023-08-30 RX ADMIN — Medication 62.5 MILLIMOLE(S): at 12:33

## 2023-08-30 RX ADMIN — ROBINUL 0.4 MILLIGRAM(S): 0.2 INJECTION INTRAMUSCULAR; INTRAVENOUS at 17:52

## 2023-08-30 RX ADMIN — Medication 57.5 MILLIGRAM(S): at 05:31

## 2023-08-30 RX ADMIN — CEFEPIME 100 MILLIGRAM(S): 1 INJECTION, POWDER, FOR SOLUTION INTRAMUSCULAR; INTRAVENOUS at 17:10

## 2023-08-30 RX ADMIN — ROBINUL 0.2 MILLIGRAM(S): 0.2 INJECTION INTRAMUSCULAR; INTRAVENOUS at 05:35

## 2023-08-30 RX ADMIN — Medication 57.5 MILLIGRAM(S): at 17:49

## 2023-08-30 RX ADMIN — ROBINUL 0.4 MILLIGRAM(S): 0.2 INJECTION INTRAMUSCULAR; INTRAVENOUS at 11:11

## 2023-08-30 NOTE — DIETITIAN INITIAL EVALUATION ADULT - OTHER INFO
Patient from home lives with family admitted for progressive worsening of mental status & SOB. Visited pt. "debilitated" on NC, daughter & son at beside, per daughter, pt. was "eating some food at home", now with very "poor" oral intake, attempted earlier to feed pt. "spoonful" of puree entree at breakfast, intake <25% with feeding assists, also with additional weight loss >1 month, dosing 134.4 Lbs on 08/27/23, also with multiple diagnoses of malnutrition by RDs, last dx. on 07/17/23 noted. Per daughter, willing to have nutrition supplements, seen by Palliative Care team & possible inpatient hospice placement & awaiting.

## 2023-08-30 NOTE — PROGRESS NOTE ADULT - PROBLEM SELECTOR PLAN 4
see GOC note - not yet eligible for acute hospice Spoke to son and daughter at bedside; are accepting of patient's condition; understand that he is progressing in his illness. Still reiterated a lack of trust in the home hospice care they received prior to hospitalization for lacking to evaluate/assess patient's worsening respiratory symptoms at the time.     Palliative will continue to follow

## 2023-08-30 NOTE — DIETITIAN INITIAL EVALUATION ADULT - PERTINENT MEDS FT
MEDICATIONS  (STANDING):  azithromycin  IVPB 500 milliGRAM(s) IV Intermittent every 24 hours  cefepime   IVPB 2000 milliGRAM(s) IV Intermittent every 12 hours  chlorhexidine 2% Cloths 1 Application(s) Topical <User Schedule>  heparin   Injectable 5000 Unit(s) SubCutaneous every 12 hours  valproate sodium  IVPB 750 milliGRAM(s) IV Intermittent two times a day    MEDICATIONS  (PRN):  glycopyrrolate Injectable 0.4 milliGRAM(s) IV Push every 4 hours PRN secretions

## 2023-08-30 NOTE — PROGRESS NOTE ADULT - SUBJECTIVE AND OBJECTIVE BOX
PGY-1 Progress Note discussed with attending    PAGER #: [[phone]] TILL 5:00 PM  PLEASE CONTACT ON CALL TEAM:  - On Call Team (Please refer to Art) FROM 5:00 PM - 8:30PM  - Nightfloat Team FROM 8:30 -7:30 AM    CHIEF COMPLAINT & BRIEF HOSPITAL COURSE: No acute overnight event. No change in baseline at bedside.     INTERVAL HPI/OVERNIGHT EVENTS:   MEDICATIONS  (STANDING):  azithromycin  IVPB 500 milliGRAM(s) IV Intermittent every 24 hours  cefepime   IVPB 2000 milliGRAM(s) IV Intermittent every 12 hours  chlorhexidine 2% Cloths 1 Application(s) Topical <User Schedule>  heparin   Injectable 5000 Unit(s) SubCutaneous every 12 hours  lactated ringers. 1000 milliLiter(s) (100 mL/Hr) IV Continuous <Continuous>  valproate sodium  IVPB 750 milliGRAM(s) IV Intermittent two times a day    MEDICATIONS  (PRN):  glycopyrrolate Injectable 0.4 milliGRAM(s) IV Push every 4 hours PRN secretions      Vital Signs Last 24 Hrs  T(C): 36.7 (30 Aug 2023 05:31), Max: 36.7 (30 Aug 2023 05:31)  T(F): 98 (30 Aug 2023 05:31), Max: 98 (30 Aug 2023 05:31)  HR: 72 (30 Aug 2023 05:31) (72 - 85)  BP: 128/72 (30 Aug 2023 05:31) (125/71 - 174/84)  BP(mean): --  RR: 18 (30 Aug 2023 05:31) (17 - 18)  SpO2: 100% (30 Aug 2023 05:31) (96% - 100%)    Parameters below as of 30 Aug 2023 05:31  Patient On (Oxygen Delivery Method): nasal cannula  O2 Flow (L/min): 5      PHYSICAL EXAMINATION:  GENERAL: NAD, well built  HEAD:  Atraumatic, Normocephalic  EYES:  conjunctiva and sclera clear  CHEST/LUNG: Short shallow breaths   HEART: Regular rate and rhythm; No murmurs, rubs, or gallops  ABDOMEN: Soft, Nontender, Nondistended; Bowel sounds present  NERVOUS SYSTEM:  Alert & Oriented x0  EXTREMITIES:  2+ Peripheral Pulses, No clubbing, cyanosis, or edema  SKIN: warm dry                          7.1    19.56 )-----------( 115      ( 29 Aug 2023 14:49 )             23.1     08-29    137  |  104  |  21<H>  ----------------------------<  123<H>  5.0   |  29  |  1.30    Ca    9.0      29 Aug 2023 10:24  Phos  2.7     08-29  Mg     1.8     08-29    TPro  4.9<L>  /  Alb  1.6<L>  /  TBili  0.2  /  DBili  x   /  AST  11  /  ALT  8<L>  /  AlkPhos  42  08-29    LIVER FUNCTIONS - ( 29 Aug 2023 10:24 )  Alb: 1.6 g/dL / Pro: 4.9 g/dL / ALK PHOS: 42 U/L / ALT: 8 U/L DA / AST: 11 U/L / GGT: x                   CAPILLARY BLOOD GLUCOSE      RADIOLOGY & ADDITIONAL TESTS:                   PGY-1 Progress Note discussed with attending    PAGER #: [[phone]] TILL 5:00 PM  PLEASE CONTACT ON CALL TEAM:  - On Call Team (Please refer to Art) FROM 5:00 PM - 8:30PM  - Nightfloat Team FROM 8:30 -7:30 AM    CHIEF COMPLAINT & BRIEF HOSPITAL COURSE: No acute overnight event. No change in baseline at bedside. Spoke to daughter today, reported his baseline status, he is verbal and eating soft foods.     INTERVAL HPI/OVERNIGHT EVENTS:   MEDICATIONS  (STANDING):  azithromycin  IVPB 500 milliGRAM(s) IV Intermittent every 24 hours  cefepime   IVPB 2000 milliGRAM(s) IV Intermittent every 12 hours  chlorhexidine 2% Cloths 1 Application(s) Topical <User Schedule>  heparin   Injectable 5000 Unit(s) SubCutaneous every 12 hours  lactated ringers. 1000 milliLiter(s) (100 mL/Hr) IV Continuous <Continuous>  valproate sodium  IVPB 750 milliGRAM(s) IV Intermittent two times a day    MEDICATIONS  (PRN):  glycopyrrolate Injectable 0.4 milliGRAM(s) IV Push every 4 hours PRN secretions      Vital Signs Last 24 Hrs    T(C): 36.7 (30 Aug 2023 05:31), Max: 36.7 (30 Aug 2023 05:31)  T(F): 98 (30 Aug 2023 05:31), Max: 98 (30 Aug 2023 05:31)  HR: 72 (30 Aug 2023 05:31) (72 - 85)  BP: 128/72 (30 Aug 2023 05:31) (125/71 - 174/84)  BP(mean): --  RR: 18 (30 Aug 2023 05:31) (17 - 18)  SpO2: 100% (30 Aug 2023 05:31) (96% - 100%)    Parameters below as of 30 Aug 2023 05:31  Patient On (Oxygen Delivery Method): nasal cannula  O2 Flow (L/min): 5      PHYSICAL EXAMINATION:  GENERAL: NAD, well built, lethargic   HEAD:  Atraumatic, Normocephalic  EYES:  conjunctiva and sclera clear  CHEST/LUNG: Short shallow breaths, aspirated breathing, diffuse crackles all lobes  HEART: Regular rate and rhythm; No murmurs, rubs, or gallops  ABDOMEN: Soft, Nontender, Nondistended; Bowel sounds present  NERVOUS SYSTEM:  Alert & Oriented x0  EXTREMITIES:  2+ Peripheral Pulses, No clubbing, cyanosis, or edema  SKIN: warm dry, hands/feet swelling                           7.1    19.56 )-----------( 115      ( 29 Aug 2023 14:49 )             23.1     08-29    137  |  104  |  21<H>  ----------------------------<  123<H>  5.0   |  29  |  1.30    Ca    9.0      29 Aug 2023 10:24  Phos  2.7     08-29  Mg     1.8     08-29    TPro  4.9<L>  /  Alb  1.6<L>  /  TBili  0.2  /  DBili  x   /  AST  11  /  ALT  8<L>  /  AlkPhos  42  08-29    LIVER FUNCTIONS - ( 29 Aug 2023 10:24 )  Alb: 1.6 g/dL / Pro: 4.9 g/dL / ALK PHOS: 42 U/L / ALT: 8 U/L DA / AST: 11 U/L / GGT: x                   CAPILLARY BLOOD GLUCOSE      RADIOLOGY & ADDITIONAL TESTS:

## 2023-08-30 NOTE — PROGRESS NOTE ADULT - SUBJECTIVE AND OBJECTIVE BOX
follow up on:  complex medical decision making related to goals of care    [facility] Geriatric and Palliative Consult Service:  Janey Guadalupe DO: cell ([phone])  Alfredo Kohli MD: cell ([phone])  Tan Millan NP: cell ([phone])   Marvel Morris LMSW: cell ([phone])   Angela Barber NP: via [employer] Teams    Can contact any Palliative Team member via [employer] Teams for consults and questions    OVERNIGHT EVENTS:    Present Symptoms: Mild, Moderate, Severe  Pain:             Location -                               Aggravating factors -             Quality -             Radiation -             Timing-             Severity (0-10 scale):             Minimal acceptable level (0-10 scale):  Fatigue:  Nausea:  Lack of Appetite:   SOB:  Depression:  Anxiety:  Review of Systems: [All others negative or Unable to obtain due to poor mentation]    CPOT:    https://www.[email or web address]/getattachment/[email or web address]/Critical-Care-Pain-Observation-Tool-(CPOT)  PAIN AD Score:   http://geriatrictoolkit.[facility]/cog/painad.pdf (press ctrl +  left click to view)MEDICATIONS  (STANDING):  azithromycin  IVPB 500 milliGRAM(s) IV Intermittent every 24 hours  cefepime   IVPB 2000 milliGRAM(s) IV Intermittent every 12 hours  chlorhexidine 2% Cloths 1 Application(s) Topical <User Schedule>  heparin   Injectable 5000 Unit(s) SubCutaneous every 12 hours  lactated ringers. 1000 milliLiter(s) (100 mL/Hr) IV Continuous <Continuous>  valproate sodium  IVPB 750 milliGRAM(s) IV Intermittent two times a day    MEDICATIONS  (PRN):  glycopyrrolate Injectable 0.4 milliGRAM(s) IV Push every 4 hours PRN secretions      PHYSICAL EXAM:  Vital Signs Last 24 Hrs  T(C): 36.7 (30 Aug 2023 05:31), Max: 36.7 (30 Aug 2023 05:31)  T(F): 98 (30 Aug 2023 05:31), Max: 98 (30 Aug 2023 05:31)  HR: 72 (30 Aug 2023 05:31) (72 - 85)  BP: 128/72 (30 Aug 2023 05:31) (125/71 - 174/84)  BP(mean): --  RR: 18 (30 Aug 2023 05:31) (17 - 18)  SpO2: 100% (30 Aug 2023 05:31) (96% - 100%)    Parameters below as of 30 Aug 2023 05:31  Patient On (Oxygen Delivery Method): nasal cannula  O2 Flow (L/min): 5      General: alert  oriented x ____    lethargic distressed cachexia  verbal nonverbal  unarousable     Palliative Performance Scale/Karnofsky Score:  http://npcrc.org/files/news/palliative_performance_scale_ppsv2.pdf    HEENT: no abnormal lesion, dry mouth  ET tube/trach oral lesions:  Lungs: tachypnea/labored breathing, audible excessive secretions  CV: RRR, S1S2, tachycardia  GI: soft non distended non tender  incontinent               PEG/NG/OG tube  constipation  last BM:   : incontinent  oliguria/anuria  bangura  Musculoskeletal: weakness x4 edema x4    ambulatory with assistance   mostly/fully bedbound/wheelchair bound  Skin: no abnormal skin lesions, poor skin turgor, pressure ulcers stage:   Neuro: no deficits, mild cognitive impairment dsyphagia/dysarthria paresis  Oral intake ability: unable/only mouth care, minimal moderate full capability    LABS:                          6.7    13.51 )-----------( 143      ( 30 Aug 2023 07:45 )             21.1     08-29    137  |  104  |  21<H>  ----------------------------<  123<H>  5.0   |  29  |  1.30    Ca    9.0      29 Aug 2023 10:24  Phos  1.9     08-30  Mg     1.8     08-30    TPro  4.9<L>  /  Alb  1.6<L>  /  TBili  0.2  /  DBili  x   /  AST  11  /  ALT  8<L>  /  AlkPhos  42  08-29    Urinalysis Basic - ( 29 Aug 2023 10:24 )    Color: x / Appearance: x / SG: x / pH: x  Gluc: 123 mg/dL / Ketone: x  / Bili: x / Urobili: x   Blood: x / Protein: x / Nitrite: x   Leuk Esterase: x / RBC: x / WBC x   Sq Epi: x / Non Sq Epi: x / Bacteria: x        RADIOLOGY & ADDITIONAL STUDIES: follow up on:  complex medical decision making related to goals of care    [facility] Geriatric and Palliative Consult Service:  Janey Guadalupe DO: cell ([phone])  Alfredo Kohli MD: cell ([phone])  Tan Millan NP: cell ([phone])   Marvel Morris LMSW: cell ([phone])   Angela Barber NP: via [employer]    Can contact any Palliative Team member via [employer] Teams for consults and questions    OVERNIGHT EVENTS:    Present Symptoms: Unable to obtain due to poor mentation    CPOT:    https://www.[email or web address]/getattachment/[email or web address]/Critical-Care-Pain-Observation-Tool-(CPOT)  PAIN AD Score:   http://geriatrictoolkit.[facility]/cog/painad.pdf (press ctrl +  left click to view)MEDICATIONS  (STANDING):  azithromycin  IVPB 500 milliGRAM(s) IV Intermittent every 24 hours  cefepime   IVPB 2000 milliGRAM(s) IV Intermittent every 12 hours  chlorhexidine 2% Cloths 1 Application(s) Topical <User Schedule>  heparin   Injectable 5000 Unit(s) SubCutaneous every 12 hours  lactated ringers. 1000 milliLiter(s) (100 mL/Hr) IV Continuous <Continuous>  valproate sodium  IVPB 750 milliGRAM(s) IV Intermittent two times a day    MEDICATIONS  (PRN):  glycopyrrolate Injectable 0.4 milliGRAM(s) IV Push every 4 hours PRN secretions      PHYSICAL EXAM:  Vital Signs Last 24 Hrs  T(C): 36.7 (30 Aug 2023 05:31), Max: 36.7 (30 Aug 2023 05:31)  T(F): 98 (30 Aug 2023 05:31), Max: 98 (30 Aug 2023 05:31)  HR: 72 (30 Aug 2023 05:31) (72 - 85)  BP: 128/72 (30 Aug 2023 05:31) (125/71 - 174/84)  BP(mean): --  RR: 18 (30 Aug 2023 05:31) (17 - 18)  SpO2: 100% (30 Aug 2023 05:31) (96% - 100%)    Parameters below as of 30 Aug 2023 05:31  Patient On (Oxygen Delivery Method): nasal cannula  O2 Flow (L/min): 5      General:  lethargic     Palliative Performance Scale/Karnofsky Score:  http://[facility].org/files/news/palliative_performance_scale_ppsv2.pdf    Lungs:audible excessive secretions    LABS:                          6.7    13.51 )-----------( 143      ( 30 Aug 2023 07:45 )             21.1     08-29    137  |  104  |  21<H>  ----------------------------<  123<H>  5.0   |  29  |  1.30    Ca    9.0      29 Aug 2023 10:24  Phos  1.9     08-30  Mg     1.8     08-30    TPro  4.9<L>  /  Alb  1.6<L>  /  TBili  0.2  /  DBili  x   /  AST  11  /  ALT  8<L>  /  AlkPhos  42  08-29    Urinalysis Basic - ( 29 Aug 2023 10:24 )    Color: x / Appearance: x / SG: x / pH: x  Gluc: 123 mg/dL / Ketone: x  / Bili: x / Urobili: x   Blood: x / Protein: x / Nitrite: x   Leuk Esterase: x / RBC: x / WBC x   Sq Epi: x / Non Sq Epi: x / Bacteria: x        RADIOLOGY & ADDITIONAL STUDIES: follow up on:  complex medical decision making related to goals of care    [facility] Geriatric and Palliative Consult Service:  Janey Guadalupe DO: cell ([phone])  Alfredo Kohli MD: cell ([phone])  Tan Millan NP: cell ([phone])   Marvel Morris LMSW: cell ([phone])   Angela Barber NP: via [employer] Teams    Can contact any Palliative Team member via [employer] Teams for consults and questions    OVERNIGHT EVENTS: occ arousable, able to take in a few spoonful of apple sauce, etc. oxygen needs improving    Present Symptoms: Unable to obtain due to poor mentation    CPOT:    https://www.[facility].org/getattachment/[email or web address]/Critical-Care-Pain-Observation-Tool-(CPOT)  PAIN AD Score:   http://geriatrictoolkit.[facility]/cog/painad.pdf (press ctrl +  left click to view)MEDICATIONS  (STANDING):  azithromycin  IVPB 500 milliGRAM(s) IV Intermittent every 24 hours  cefepime   IVPB 2000 milliGRAM(s) IV Intermittent every 12 hours  chlorhexidine 2% Cloths 1 Application(s) Topical <User Schedule>  heparin   Injectable 5000 Unit(s) SubCutaneous every 12 hours  lactated ringers. 1000 milliLiter(s) (100 mL/Hr) IV Continuous <Continuous>  valproate sodium  IVPB 750 milliGRAM(s) IV Intermittent two times a day    MEDICATIONS  (PRN):  glycopyrrolate Injectable 0.4 milliGRAM(s) IV Push every 4 hours PRN secretions      PHYSICAL EXAM:  Vital Signs Last 24 Hrs  T(C): 36.7 (30 Aug 2023 05:31), Max: 36.7 (30 Aug 2023 05:31)  T(F): 98 (30 Aug 2023 05:31), Max: 98 (30 Aug 2023 05:31)  HR: 72 (30 Aug 2023 05:31) (72 - 85)  BP: 128/72 (30 Aug 2023 05:31) (125/71 - 174/84)  BP(mean): --  RR: 18 (30 Aug 2023 05:31) (17 - 18)  SpO2: 100% (30 Aug 2023 05:31) (96% - 100%)    Parameters below as of 30 Aug 2023 05:31  Patient On (Oxygen Delivery Method): nasal cannula  O2 Flow (L/min): 5      General:  lethargic     Palliative Performance Scale/Karnofsky Score:  http://[facility].org/files/news/palliative_performance_scale_ppsv2.pdf    Lungs:audible excessive secretions    LABS:                          6.7    13.51 )-----------( 143      ( 30 Aug 2023 07:45 )             21.1     08-29    137  |  104  |  21<H>  ----------------------------<  123<H>  5.0   |  29  |  1.30    Ca    9.0      29 Aug 2023 10:24  Phos  1.9     08-30  Mg     1.8     08-30    TPro  4.9<L>  /  Alb  1.6<L>  /  TBili  0.2  /  DBili  x   /  AST  11  /  ALT  8<L>  /  AlkPhos  42  08-29    Urinalysis Basic - ( 29 Aug 2023 10:24 )    Color: x / Appearance: x / SG: x / pH: x  Gluc: 123 mg/dL / Ketone: x  / Bili: x / Urobili: x   Blood: x / Protein: x / Nitrite: x   Leuk Esterase: x / RBC: x / WBC x   Sq Epi: x / Non Sq Epi: x / Bacteria: x    RADIOLOGY & ADDITIONAL STUDIES: reviewed

## 2023-08-30 NOTE — PROGRESS NOTE ADULT - PROBLEM SELECTOR PLAN 8
- hx of advanced dementia on citalopram  - c.w meds when off NPO - hx of DM not on any home meds  - FS q6 while NPO

## 2023-08-30 NOTE — PROGRESS NOTE ADULT - ATTENDING COMMENTS
68 year old man admitted from home hospice because of altered mental status and dehydration.  Patient was on home hospice, but family has asked for more aggressive treatment. No fevers, no emesis or diarrhea.   PMH advanced dementia, gastric cancer; Oncology assessed that chemotherapy would not benefit because of overall poor functional status.           multiple cardiovascular diseases - DM, CVA, HTN, HLD, CKD, hx of chronic indwelling catheter.           Discharged from [facility] about a week ago after management for suspected breakthrough seizure with home VPA dose increased to 750mg BID.    # Acute respiratory failure with hypoxia, atelectasis vs PE vs aspiration  # Acute encephalopathy   # Fluid responsive septic shock   # NEVILLE on CKD  # anemia, chronic disease  # gastric cancer. possible CNS metastases as the cause of altered mental status.   # s/p CVA  # hx seizure  According to family's wishes will treat for sepsis, pneumonia   - Patient appears to be continuing to aspirate    - Continue Empiric IV antibiotics with broad spectrum gram negative coverage for LIONEL; continue cefepime and azithromycin for atypical organism coverage  - Monitor mental status  - Will monitor Anemia, appears stable at this time, no signs of bleeding  - Supplemental oxygen to keep SaO2 > 93%- titrate down as tolerated  - Will DC IVF hydration as patient with swelling in arms and legs  - Fall and aspiration precaution  - dysphagia diet  Palliative/Hospice care consult, appreciated

## 2023-08-30 NOTE — DIETITIAN INITIAL EVALUATION ADULT - PROBLEM SELECTOR PLAN 5
- Discharged from [facility] about a week ago after management for suspected breakthrough seizure with home VPA dose increased to 750mg BID.   - At [facility] family decided on pleasure feeds and home hospice however still wants pt to be full code. Pt was brought in today from home hospice with acute alteration from his baseline mental status with decreased responsiveness associated with decreased oral intake.   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 Bulky local necrotic metastatic adenopathy.   - palliative care consult in AM for clarification on GOC  - continue to monitor vitals and mental status on medicine floor , full code  - hold on comfort meds in med rec (ativan, morphine) for now, due to risk of respiratory depression

## 2023-08-30 NOTE — PROGRESS NOTE ADULT - PROBLEM SELECTOR PLAN 3
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 Bulky local necrotic metastatic adenopathy.   CT showed no evidence of metastatic disease/change  Per heme/onc: Given patient's  advanced disease and poor PS, would not be a candidate for systemic therapy.   Risks of disease modifying treatment would outweigh any benefit.   Patient poor candidate for tube feeds given mental status' gastric cancer with mass Patient has Gastric pyloric mass, suspect adenocarcinoma. Bulky local necrotic metastatic adenopathy.   CT showed no evidence of metastatic disease/change  Per heme/onc: Given patient's  advanced disease and poor PS, would not be a candidate for systemic therapy.   Risks of disease modifying treatment would outweigh any benefit.   Patient poor candidate for tube feeds given mental status' gastric cancer with mass  Patient able tolerate some apple sauce today

## 2023-08-30 NOTE — PROGRESS NOTE ADULT - PROBLEM SELECTOR PLAN 2
-p/w with acute mental status change; lethargic, unresponsive  -Patient was discharged from [facility] about a week ago after management for suspected breakthrough seizure with home VPA dose increased to 750mg BID.   -Patient presented from home hospice with acute alteration from his baseline mental status with decreased responsiveness associated with decreased oral intake.  EEG at [facility]  Mild nonspecific diffuse or multifocal cerebral dysfunction.  No epileptiform pattern or seizure seen.  -encephalopathy likely due infectious vs worsening seizure vs worsening gastric cancer -p/w with acute mental status change; lethargic, unresponsive  -Patient was discharged from [facility] about a week ago after management for suspected breakthrough seizure with home VPA dose increased to 750mg BID.   -Patient presented from home hospice with acute alteration from his baseline mental status with decreased responsiveness associated with decreased oral intake.  EEG at [facility]  Mild nonspecific diffuse or multifocal cerebral dysfunction.  No epileptiform pattern or seizure seen.  -encephalopathy likely due infectious vs worsening seizure vs worsening gastric cancer  -per family; patient more awake yesterday

## 2023-08-30 NOTE — DIETITIAN INITIAL EVALUATION ADULT - PROBLEM SELECTOR PLAN 6
- hx of CKD, currently with NEVILLE BUN/Cr 22/1.84 (baseline b/w 1.5-1.8)  - continue to monitor Cr after fluid resuscitation, likely pre-renal, dehydration  - bangura in place due to AMS   - f/u urine electrolytes, renal US

## 2023-08-30 NOTE — DIETITIAN INITIAL EVALUATION ADULT - ADD RECOMMEND
Rec. least restrictive diet due to chronic illness   Add MVI/minerals/Vit. C 500mg BID daily for wound care

## 2023-08-30 NOTE — PROGRESS NOTE ADULT - PROBLEM SELECTOR PLAN 1
On NC 5 L  - CXR concerning for RLL infiltrate.  - pt.. diagnosed with pneumonia   - c/w cefepime and azithro for asp pna coverage, and atypical pna coverage   - c/w fluid resuscitation - Decreased NC 5 L to 3 L today   - CXR concerning for RLL infiltrate.  - pt diagnosed with pneumonia   - c/w cefepime and azithro for asp pna coverage, and atypical pna coverage (day 4)   - dc fluid resuscitation

## 2023-08-30 NOTE — PROGRESS NOTE ADULT - PROBLEM SELECTOR PLAN 10
- Continue to monitor hb, downtrending 7.1 > 6.7   - Consider blood transfusion if continues to sig. decrease - heparin for dvt ppx

## 2023-08-30 NOTE — PROGRESS NOTE ADULT - PROBLEM SELECTOR PLAN 7
- hx of DM not on any home meds  - FS q6 while NPO - hx of seizures, on increased dose form recent [facility] discharge  - c/w IV valproic acid 750 BID

## 2023-08-30 NOTE — PROGRESS NOTE ADULT - PROBLEM SELECTOR PLAN 1
Recommendation: -p/w with altered mental status, barely arousable  -was on 10 L of nonrebreather this morning but tapered down for 5L on NC  -CXR RLL infiltrate concerning for aspiration PNA   - patient was started cefepime and azithro for aspiration PNA coverage, and atypical pna coverage c/w fluid resuscitation  -Patient still requiring 5LNC.  -secretions audible on exam; glycopyrrolate 0.4mg PRN q4 Recommendation: -p/w with altered mental status, barely arousable  -was on 10 L of nonrebreather this morning but tapered down for 5L on NC  -CXR RLL infiltrate concerning for aspiration PNA   - patient was started cefepime and azithro for aspiration PNA coverage, and atypical pna coverage c/w fluid resuscitation  -Patient still requiring 5LNC titrating down to 3LNC  -secretions audible on exam; glycopyrrolate 0.4mg PRN q4 Recommendation: -p/w with altered mental status, barely arousable  -was on 10 L of nonrebreather this morning but tapered down for 5L on NC  -CXR RLL infiltrate concerning for aspiration PNA   - patient was started cefepime and azithro for aspiration PNA coverage, and atypical pna coverage c/w fluid resuscitation  -Patient 5LNC titrating down to 3LNC  -mild secretions audible on exam; glycopyrrolate 0.4mg PRN q4

## 2023-08-30 NOTE — DIETITIAN INITIAL EVALUATION ADULT - NS FNS REASON FOR WEIGHT CHANG
chronic illness w/metastatic cancer/decreased po intake/altered GI function (specify)/other (specify)

## 2023-08-30 NOTE — PROGRESS NOTE ADULT - PROBLEM SELECTOR PLAN 5
- hx of CKD  - continue to monitor Cr after fluid resuscitation  - bangura in place due to AMS - hx of CKD  - Monitor Cr, downtrending now Cr 1.84>1.53>1.30    - bangura in place due to AMS  - dc fluids - Continue to monitor hb, downtrending 7.1 > 6.7->6.8  - it is baseline  - will continue to monitor cbc but will most likely no transfuse as it will not help the patient improve his mental status or condition.   -will reconsider transfusion if drops below 6.5

## 2023-08-30 NOTE — DIETITIAN INITIAL EVALUATION ADULT - FACTORS AFF FOOD INTAKE
weakness/difficulty chewing/difficulty feeding self/difficulty swallowing/difficulty with food procurement/preparation/persistent lack of appetite/Zoroastrianism/ethnic/cultural/personal food preferences/other (specify)

## 2023-08-30 NOTE — PROGRESS NOTE ADULT - PROBLEM SELECTOR PLAN 4
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 Bulky local necrotic metastatic adenopathy.

## 2023-08-30 NOTE — PROGRESS NOTE ADULT - ATTENDING COMMENTS
endstage gastric cancer was on home hospice, now with pneumonia - trial of abx and ivf, if clinically can stabilize then d/c home without hospice (lost trust with [facility] hospice team) or if clinically worsens, open to inpatient hospice. will continue to follow clinically and for support to family.

## 2023-08-30 NOTE — PROGRESS NOTE ADULT - ASSESSMENT
68 year old man with advanced dementia (years), gastric cancer w/ local metastatic lymph nodes (not surgical or chemo candidate), seizure disorder, HTN, HLD, DM, CVA x2 (last 2013), CKD (Baseline Cr 1.5-1.8), chronic Perez (exchanged in ED 8/17/23) admitted to medicine for AHRF 2/2 asp. pna, NEVILLE on CKD, and worsening mental status in the setting of advanced gastric cancer. Palliative care consulted for goals of care status; patient made DNR/DNI with continued treatment of seizures possible aspiration pneumonia.

## 2023-08-30 NOTE — DIETITIAN INITIAL EVALUATION ADULT - NSFNSPHYEXAMSKINFT_GEN_A_CORE
Pressure Injury 1: coccyx, Stage I  Pressure Injury 2: Bilateral:, heel, Stage I  Pressure Injury 3: none, none  Pressure Injury 4: none, none  Pressure Injury 5: none, none  Pressure Injury 6: none, none  Pressure Injury 7: none, none  Pressure Injury 8: none, none  Pressure Injury 9: none, none  Pressure Injury 10: none, none  Pressure Injury 11: none, none, Pressure Injury 1: coccyx, Stage I  Pressure Injury 2: Bilateral:, heel, Stage I  Pressure Injury 3: none, none  Pressure Injury 4: none, none  Pressure Injury 5: none, none  Pressure Injury 6: none, none  Pressure Injury 7: none, none  Pressure Injury 8: none, none  Pressure Injury 9: none, none  Pressure Injury 10: none, none  Pressure Injury 11: none, none, Pressure Injury 1: coccyx, Stage I  Pressure Injury 2: Bilateral:, heel, Stage I  Pressure Injury 3: none, none  Pressure Injury 4: none, none  Pressure Injury 5: none, none  Pressure Injury 6: none, none  Pressure Injury 7: none, none  Pressure Injury 8: none, none  Pressure Injury 9: none, none  Pressure Injury 10: none, none  Pressure Injury 11: none, none Pressure Injury 1: coccyx, Stage I  Pressure Injury 2: Bilateral:, heel, Stage I

## 2023-08-30 NOTE — PROGRESS NOTE ADULT - PROBLEM SELECTOR PLAN 6
- hx of seizures, on increased dose form recent [facility] discharge  - c/w IV valproic acid 750 BID - hx of CKD  - Monitor Cr, downtrending now Cr 1.84>1.53>1.30    - bangura in place due to AMS  - dc fluids

## 2023-08-30 NOTE — DIETITIAN INITIAL EVALUATION ADULT - PERTINENT LABORATORY DATA
08-29    137  |  104  |  21<H>  ----------------------------<  123<H>  5.0   |  29  |  1.30    Ca    9.0      29 Aug 2023 10:24  Phos  1.9     08-30  Mg     1.8     08-30    TPro  4.9<L>  /  Alb  1.6<L>  /  TBili  0.2  /  DBili  x   /  AST  11  /  ALT  8<L>  /  AlkPhos  42  08-29  POCT Blood Glucose.: 95 mg/dL (08-30-23 @ 11:48)  A1C with Estimated Average Glucose Result: 6.0 % (08-18-23 @ 06:10)  A1C with Estimated Average Glucose Result: 5.4 % (08-17-23 @ 14:31)  A1C with Estimated Average Glucose Result: 6.4 % (07-16-23 @ 06:11)

## 2023-08-31 LAB
ALBUMIN SERPL ELPH-MCNC: 1.6 G/DL — LOW (ref 3.5–5)
ALP SERPL-CCNC: 43 U/L — SIGNIFICANT CHANGE UP (ref 40–120)
ALT FLD-CCNC: 9 U/L DA — LOW (ref 10–60)
ANION GAP SERPL CALC-SCNC: 5 MMOL/L — SIGNIFICANT CHANGE UP (ref 5–17)
AST SERPL-CCNC: 12 U/L — SIGNIFICANT CHANGE UP (ref 10–40)
BILIRUB SERPL-MCNC: 0.2 MG/DL — SIGNIFICANT CHANGE UP (ref 0.2–1.2)
BUN SERPL-MCNC: 18 MG/DL — SIGNIFICANT CHANGE UP (ref 7–18)
CALCIUM SERPL-MCNC: 8.6 MG/DL — SIGNIFICANT CHANGE UP (ref 8.4–10.5)
CHLORIDE SERPL-SCNC: 100 MMOL/L — SIGNIFICANT CHANGE UP (ref 96–108)
CO2 SERPL-SCNC: 30 MMOL/L — SIGNIFICANT CHANGE UP (ref 22–31)
CREAT SERPL-MCNC: 1.14 MG/DL — SIGNIFICANT CHANGE UP (ref 0.5–1.3)
EGFR: 71 ML/MIN/1.73M2 — SIGNIFICANT CHANGE UP
GLUCOSE BLDC GLUCOMTR-MCNC: 102 MG/DL — HIGH (ref 70–99)
GLUCOSE BLDC GLUCOMTR-MCNC: 87 MG/DL — SIGNIFICANT CHANGE UP (ref 70–99)
GLUCOSE SERPL-MCNC: 90 MG/DL — SIGNIFICANT CHANGE UP (ref 70–99)
HCT VFR BLD CALC: 22.1 % — LOW (ref 39–50)
HGB BLD-MCNC: 7.1 G/DL — LOW (ref 13–17)
MAGNESIUM SERPL-MCNC: 1.7 MG/DL — SIGNIFICANT CHANGE UP (ref 1.6–2.6)
MCHC RBC-ENTMCNC: 26.8 PG — LOW (ref 27–34)
MCHC RBC-ENTMCNC: 32.1 GM/DL — SIGNIFICANT CHANGE UP (ref 32–36)
MCV RBC AUTO: 83.4 FL — SIGNIFICANT CHANGE UP (ref 80–100)
NRBC # BLD: 0 /100 WBCS — SIGNIFICANT CHANGE UP (ref 0–0)
PHOSPHATE SERPL-MCNC: 2.5 MG/DL — SIGNIFICANT CHANGE UP (ref 2.5–4.5)
PLATELET # BLD AUTO: 140 K/UL — LOW (ref 150–400)
POTASSIUM SERPL-MCNC: 4.4 MMOL/L — SIGNIFICANT CHANGE UP (ref 3.5–5.3)
POTASSIUM SERPL-SCNC: 4.4 MMOL/L — SIGNIFICANT CHANGE UP (ref 3.5–5.3)
PROT SERPL-MCNC: 4.7 G/DL — LOW (ref 6–8.3)
RBC # BLD: 2.65 M/UL — LOW (ref 4.2–5.8)
RBC # FLD: 20 % — HIGH (ref 10.3–14.5)
SODIUM SERPL-SCNC: 135 MMOL/L — SIGNIFICANT CHANGE UP (ref 135–145)
WBC # BLD: 10.01 K/UL — SIGNIFICANT CHANGE UP (ref 3.8–10.5)
WBC # FLD AUTO: 10.01 K/UL — SIGNIFICANT CHANGE UP (ref 3.8–10.5)

## 2023-08-31 PROCEDURE — 99233 SBSQ HOSP IP/OBS HIGH 50: CPT | Mod: GC

## 2023-08-31 RX ADMIN — CEFEPIME 100 MILLIGRAM(S): 1 INJECTION, POWDER, FOR SOLUTION INTRAMUSCULAR; INTRAVENOUS at 19:25

## 2023-08-31 RX ADMIN — ROBINUL 0.4 MILLIGRAM(S): 0.2 INJECTION INTRAMUSCULAR; INTRAVENOUS at 22:02

## 2023-08-31 RX ADMIN — ROBINUL 0.4 MILLIGRAM(S): 0.2 INJECTION INTRAMUSCULAR; INTRAVENOUS at 05:01

## 2023-08-31 RX ADMIN — Medication 57.5 MILLIGRAM(S): at 20:27

## 2023-08-31 RX ADMIN — CHLORHEXIDINE GLUCONATE 1 APPLICATION(S): 213 SOLUTION TOPICAL at 05:10

## 2023-08-31 RX ADMIN — HEPARIN SODIUM 5000 UNIT(S): 5000 INJECTION INTRAVENOUS; SUBCUTANEOUS at 05:06

## 2023-08-31 RX ADMIN — CEFEPIME 100 MILLIGRAM(S): 1 INJECTION, POWDER, FOR SOLUTION INTRAMUSCULAR; INTRAVENOUS at 05:03

## 2023-08-31 RX ADMIN — HEPARIN SODIUM 5000 UNIT(S): 5000 INJECTION INTRAVENOUS; SUBCUTANEOUS at 19:21

## 2023-08-31 RX ADMIN — Medication 57.5 MILLIGRAM(S): at 06:22

## 2023-08-31 RX ADMIN — AZITHROMYCIN 255 MILLIGRAM(S): 500 TABLET, FILM COATED ORAL at 14:44

## 2023-08-31 NOTE — PROGRESS NOTE ADULT - SUBJECTIVE AND OBJECTIVE BOX
PGY-1 Progress Note discussed with attending    PAGER #: [[phone]] TILL 5:00 PM  PLEASE CONTACT ON CALL TEAM:  - On Call Team (Please refer to Art) FROM 5:00 PM - 8:30PM  - Nightfloat Team FROM 8:30 -7:30 AM    CHIEF COMPLAINT & BRIEF HOSPITAL COURSE: No acute overnight event. patient had episodes apenic breathing upon examination.     INTERVAL HPI/OVERNIGHT EVENTS:   MEDICATIONS  (STANDING):  azithromycin  IVPB 500 milliGRAM(s) IV Intermittent every 24 hours  cefepime   IVPB 2000 milliGRAM(s) IV Intermittent every 12 hours  chlorhexidine 2% Cloths 1 Application(s) Topical <User Schedule>  heparin   Injectable 5000 Unit(s) SubCutaneous every 12 hours  lactated ringers. 1000 milliLiter(s) (100 mL/Hr) IV Continuous <Continuous>  valproate sodium  IVPB 750 milliGRAM(s) IV Intermittent two times a day    MEDICATIONS  (PRN):  glycopyrrolate Injectable 0.4 milliGRAM(s) IV Push every 4 hours PRN secretions      Vital Signs Last 24 Hrs    T(C): 36.7 (30 Aug 2023 05:31), Max: 36.7 (30 Aug 2023 05:31)  T(F): 98 (30 Aug 2023 05:31), Max: 98 (30 Aug 2023 05:31)  HR: 72 (30 Aug 2023 05:31) (72 - 85)  BP: 128/72 (30 Aug 2023 05:31) (125/71 - 174/84)  BP(mean): --  RR: 18 (30 Aug 2023 05:31) (17 - 18)  SpO2: 100% (30 Aug 2023 05:31) (96% - 100%)    Parameters below as of 30 Aug 2023 05:31  Patient On (Oxygen Delivery Method): nasal cannula  O2 Flow (L/min): 5      PHYSICAL EXAMINATION:  GENERAL: NAD, well built, lethargic   HEAD:  Atraumatic, Normocephalic  EYES:  conjunctiva and sclera clear  CHEST/LUNG: Short shallow breaths, aspirated breathing, diffuse crackles all lobes, episode of apneic breathing   HEART: Regular rate and rhythm; No murmurs, rubs, or gallops  ABDOMEN: Soft, Nontender, Nondistended; Bowel sounds present  NERVOUS SYSTEM:  Alert & Oriented x0  EXTREMITIES:  2+ Peripheral Pulses, No clubbing, cyanosis, or edema  SKIN: warm dry, hands/feet swelling                           7.1    19.56 )-----------( 115      ( 29 Aug 2023 14:49 )             23.1     08-29    137  |  104  |  21<H>  ----------------------------<  123<H>  5.0   |  29  |  1.30    Ca    9.0      29 Aug 2023 10:24  Phos  2.7     08-29  Mg     1.8     08-29    TPro  4.9<L>  /  Alb  1.6<L>  /  TBili  0.2  /  DBili  x   /  AST  11  /  ALT  8<L>  /  AlkPhos  42  08-29    LIVER FUNCTIONS - ( 29 Aug 2023 10:24 )  Alb: 1.6 g/dL / Pro: 4.9 g/dL / ALK PHOS: 42 U/L / ALT: 8 U/L DA / AST: 11 U/L / GGT: x                   CAPILLARY BLOOD GLUCOSE      RADIOLOGY & ADDITIONAL TESTS:                   PGY-1 Progress Note discussed with attending    PAGER #: [[phone]] TILL 5:00 PM  PLEASE CONTACT ON CALL TEAM:  - On Call Team (Please refer to Art) FROM 5:00 PM - 8:30PM  - Nightfloat Team FROM 8:30 -7:30 AM    CHIEF COMPLAINT & BRIEF HOSPITAL COURSE:  INTERVAL HPI/OVERNIGHT EVENTS:  No acute overnight event. Patient had episodes apneic breathing upon examination. Unresponsive to sternal rub, non verbal in the morning. Few hours later, responsive to sternal rub and minimally verbal.     MEDICATIONS  (STANDING):  azithromycin  IVPB 500 milliGRAM(s) IV Intermittent every 24 hours  cefepime   IVPB 2000 milliGRAM(s) IV Intermittent every 12 hours  chlorhexidine 2% Cloths 1 Application(s) Topical <User Schedule>  heparin   Injectable 5000 Unit(s) SubCutaneous every 12 hours  lactated ringers. 1000 milliLiter(s) (100 mL/Hr) IV Continuous <Continuous>  valproate sodium  IVPB 750 milliGRAM(s) IV Intermittent two times a day    MEDICATIONS  (PRN):  glycopyrrolate Injectable 0.4 milliGRAM(s) IV Push every 4 hours PRN secretions      Vital Signs Last 24 Hrs    T(C): 36.7 (30 Aug 2023 05:31), Max: 36.7 (30 Aug 2023 05:31)  T(F): 98 (30 Aug 2023 05:31), Max: 98 (30 Aug 2023 05:31)  HR: 72 (30 Aug 2023 05:31) (72 - 85)  BP: 128/72 (30 Aug 2023 05:31) (125/71 - 174/84)  BP(mean): --  RR: 18 (30 Aug 2023 05:31) (17 - 18)  SpO2: 100% (30 Aug 2023 05:31) (96% - 100%)    Parameters below as of 30 Aug 2023 05:31  Patient On (Oxygen Delivery Method): nasal cannula  O2 Flow (L/min): 5      PHYSICAL EXAMINATION:  GENERAL: NAD, well built, lethargic, responsive to sternal rub, minimally to non-verbal  HEAD:  Atraumatic, Normocephalic  EYES:  conjunctiva and sclera clear, slow pupillary reflex  CHEST/LUNG: Short shallow breaths, aspirated and apenic breathing, no crackles   HEART: Regular rate and rhythm; No murmurs, rubs, or gallops  ABDOMEN: Soft, Nontender, Nondistended; Bowel sounds present  NERVOUS SYSTEM:  Alert & Oriented x0  EXTREMITIES:  2+ Peripheral Pulses, No clubbing, cyanosis, or edema  SKIN: warm dry, hands/feet swelling                           7.1    19.56 )-----------( 115      ( 29 Aug 2023 14:49 )             23.1     08-29    137  |  104  |  21<H>  ----------------------------<  123<H>  5.0   |  29  |  1.30    Ca    9.0      29 Aug 2023 10:24  Phos  2.7     08-29  Mg     1.8     08-29    TPro  4.9<L>  /  Alb  1.6<L>  /  TBili  0.2  /  DBili  x   /  AST  11  /  ALT  8<L>  /  AlkPhos  42  08-29    LIVER FUNCTIONS - ( 29 Aug 2023 10:24 )  Alb: 1.6 g/dL / Pro: 4.9 g/dL / ALK PHOS: 42 U/L / ALT: 8 U/L DA / AST: 11 U/L / GGT: x                   CAPILLARY BLOOD GLUCOSE      RADIOLOGY & ADDITIONAL TESTS:

## 2023-08-31 NOTE — PROGRESS NOTE ADULT - ATTENDING COMMENTS
68 year old man admitted from home hospice because of altered mental status and dehydration.  Patient was on home hospice, but family has asked for more aggressive treatment. No fevers, no emesis or diarrhea.   PMH advanced dementia, gastric cancer; Oncology assessed that chemotherapy would not benefit because of overall poor functional status.           multiple cardiovascular diseases - DM, CVA, HTN, HLD, CKD, hx of chronic indwelling catheter.           Discharged from [facility] about a week ago after management for suspected breakthrough seizure with home VPA dose increased to 750mg BID.    # Acute respiratory failure with hypoxia, atelectasis vs PE vs aspiration  # Acute encephalopathy   # Fluid responsive septic shock   # NEVILLE on CKD  # anemia, chronic disease  # gastric cancer. possible CNS metastases as the cause of altered mental status.   # s/p CVA  # hx seizure  According to family's wishes will treat for sepsis, pneumonia   - Patient appears to be continuing to aspirate    - Continue Empiric IV antibiotics with broad spectrum gram negative coverage for LIONEL; continue cefepime and azithromycin for atypical organism coverage  - Monitor mental status  - Will monitor Anemia, appears stable at this time, no signs of bleeding  - Supplemental oxygen to keep SaO2 > 93%- titrate down as tolerated  - Will DC IVF hydration as patient with swelling in arms and legs  - Fall and aspiration precaution  - dysphagia diet  Palliative/Hospice care consult, appreciated     - Family concerned about taking care of patient at home, interested in possible LTC with hospice.

## 2023-08-31 NOTE — PROGRESS NOTE ADULT - PROBLEM SELECTOR PLAN 1
- Decreased NC 5 L to 3 L today   - CXR concerning for RLL infiltrate.  - pt diagnosed with pneumonia   - c/w cefepime and azithro for asp pna coverage, and atypical pna coverage (day 5) - Decreased NC 5 L to 2 L today   - CXR concerning for RLL infiltrate.  - pt diagnosed with pneumonia   - c/w cefepime and azithro for asp pna coverage, and atypical pna coverage (day 5) - Decreased NC 5 L to 2 L today   - CXR concerning for RLL infiltrate.  - pt diagnosed with pneumonia   - c/w cefepime and azithro for asp pna coverage, and atypical pna coverage (day 5).  possible DC abx after today.

## 2023-08-31 NOTE — PROGRESS NOTE ADULT - PROBLEM SELECTOR PLAN 6
- hx of CKD  - Monitor Cr  - bangura in place due to AMS - hx of CKD  - Monitor Cr, downtrending   - bangura in place due to AMS

## 2023-08-31 NOTE — PROGRESS NOTE ADULT - PROBLEM SELECTOR PLAN 5
- Continue to monitor hb, downtrending 7.1 > 6.7->6.8  - it is baseline  - will continue to monitor cbc but will most likely no transfuse as it will not help the patient improve his mental status or condition.   -will reconsider transfusion if drops below 6.5

## 2023-08-31 NOTE — PROGRESS NOTE ADULT - NUTRITIONAL ASSESSMENT
This patient has been assessed with a concern for Malnutrition and has been determined to have a diagnosis/diagnoses of Severe protein-calorie malnutrition and Underweight (BMI < 19).    This patient is being managed with:   Diet Pureed-  Supplement Feeding Modality:  Oral  Ensure Enlive Cans or Servings Per Day:  1       Frequency:  Three Times a day  Entered: Aug 30 2023  2:55PM    Diet Pureed-  Entered: Aug 29 2023  2:22PM    The following pending diet order is being considered for treatment of Severe protein-calorie malnutrition and Underweight (BMI < 19):null

## 2023-09-01 ENCOUNTER — TRANSCRIPTION ENCOUNTER (OUTPATIENT)
Age: 68
End: 2023-09-01

## 2023-09-01 LAB
ALBUMIN SERPL ELPH-MCNC: 1.6 G/DL — LOW (ref 3.5–5)
ALP SERPL-CCNC: 41 U/L — SIGNIFICANT CHANGE UP (ref 40–120)
ALT FLD-CCNC: 8 U/L DA — LOW (ref 10–60)
ANION GAP SERPL CALC-SCNC: 5 MMOL/L — SIGNIFICANT CHANGE UP (ref 5–17)
ANISOCYTOSIS BLD QL: SLIGHT — SIGNIFICANT CHANGE UP
AST SERPL-CCNC: 9 U/L — LOW (ref 10–40)
BILIRUB SERPL-MCNC: 0.2 MG/DL — SIGNIFICANT CHANGE UP (ref 0.2–1.2)
BUN SERPL-MCNC: 19 MG/DL — HIGH (ref 7–18)
CALCIUM SERPL-MCNC: 9.1 MG/DL — SIGNIFICANT CHANGE UP (ref 8.4–10.5)
CHLORIDE SERPL-SCNC: 99 MMOL/L — SIGNIFICANT CHANGE UP (ref 96–108)
CO2 SERPL-SCNC: 30 MMOL/L — SIGNIFICANT CHANGE UP (ref 22–31)
CREAT SERPL-MCNC: 1.21 MG/DL — SIGNIFICANT CHANGE UP (ref 0.5–1.3)
CULTURE RESULTS: SIGNIFICANT CHANGE UP
CULTURE RESULTS: SIGNIFICANT CHANGE UP
EGFR: 66 ML/MIN/1.73M2 — SIGNIFICANT CHANGE UP
GLUCOSE BLDC GLUCOMTR-MCNC: 91 MG/DL — SIGNIFICANT CHANGE UP (ref 70–99)
GLUCOSE BLDC GLUCOMTR-MCNC: 91 MG/DL — SIGNIFICANT CHANGE UP (ref 70–99)
GLUCOSE SERPL-MCNC: 79 MG/DL — SIGNIFICANT CHANGE UP (ref 70–99)
HCT VFR BLD CALC: 20.2 % — CRITICAL LOW (ref 39–50)
HGB BLD-MCNC: 6.4 G/DL — CRITICAL LOW (ref 13–17)
HYPOCHROMIA BLD QL: SLIGHT — SIGNIFICANT CHANGE UP
MAGNESIUM SERPL-MCNC: 1.8 MG/DL — SIGNIFICANT CHANGE UP (ref 1.6–2.6)
MANUAL SMEAR VERIFICATION: SIGNIFICANT CHANGE UP
MCHC RBC-ENTMCNC: 26.4 PG — LOW (ref 27–34)
MCHC RBC-ENTMCNC: 31.7 GM/DL — LOW (ref 32–36)
MCV RBC AUTO: 83.5 FL — SIGNIFICANT CHANGE UP (ref 80–100)
NRBC # BLD: 0 /100 WBCS — SIGNIFICANT CHANGE UP (ref 0–0)
PHOSPHATE SERPL-MCNC: 2.4 MG/DL — LOW (ref 2.5–4.5)
PLAT MORPH BLD: NORMAL — SIGNIFICANT CHANGE UP
PLATELET # BLD AUTO: 134 K/UL — LOW (ref 150–400)
PLATELET COUNT - ESTIMATE: ABNORMAL
POIKILOCYTOSIS BLD QL AUTO: SLIGHT — SIGNIFICANT CHANGE UP
POTASSIUM SERPL-MCNC: 4.1 MMOL/L — SIGNIFICANT CHANGE UP (ref 3.5–5.3)
POTASSIUM SERPL-SCNC: 4.1 MMOL/L — SIGNIFICANT CHANGE UP (ref 3.5–5.3)
PROT SERPL-MCNC: 4.6 G/DL — LOW (ref 6–8.3)
RBC # BLD: 2.42 M/UL — LOW (ref 4.2–5.8)
RBC # FLD: 20.2 % — HIGH (ref 10.3–14.5)
RBC BLD AUTO: ABNORMAL
SODIUM SERPL-SCNC: 134 MMOL/L — LOW (ref 135–145)
SPECIMEN SOURCE: SIGNIFICANT CHANGE UP
SPECIMEN SOURCE: SIGNIFICANT CHANGE UP
WBC # BLD: 7.42 K/UL — SIGNIFICANT CHANGE UP (ref 3.8–10.5)
WBC # FLD AUTO: 7.42 K/UL — SIGNIFICANT CHANGE UP (ref 3.8–10.5)

## 2023-09-01 PROCEDURE — 99233 SBSQ HOSP IP/OBS HIGH 50: CPT | Mod: GC

## 2023-09-01 RX ADMIN — HEPARIN SODIUM 5000 UNIT(S): 5000 INJECTION INTRAVENOUS; SUBCUTANEOUS at 05:57

## 2023-09-01 RX ADMIN — AZITHROMYCIN 255 MILLIGRAM(S): 500 TABLET, FILM COATED ORAL at 13:19

## 2023-09-01 RX ADMIN — Medication 57.5 MILLIGRAM(S): at 18:07

## 2023-09-01 RX ADMIN — CEFEPIME 100 MILLIGRAM(S): 1 INJECTION, POWDER, FOR SOLUTION INTRAMUSCULAR; INTRAVENOUS at 18:06

## 2023-09-01 RX ADMIN — CHLORHEXIDINE GLUCONATE 1 APPLICATION(S): 213 SOLUTION TOPICAL at 05:57

## 2023-09-01 RX ADMIN — HEPARIN SODIUM 5000 UNIT(S): 5000 INJECTION INTRAVENOUS; SUBCUTANEOUS at 18:07

## 2023-09-01 RX ADMIN — Medication 62.5 MILLIMOLE(S): at 10:40

## 2023-09-01 RX ADMIN — CEFEPIME 100 MILLIGRAM(S): 1 INJECTION, POWDER, FOR SOLUTION INTRAMUSCULAR; INTRAVENOUS at 05:55

## 2023-09-01 RX ADMIN — Medication 57.5 MILLIGRAM(S): at 05:59

## 2023-09-01 NOTE — DISCHARGE NOTE PROVIDER - HOSPITAL COURSE
Patient is 68 year old male presented with altered mental status from baseline, decreased responsiveness and poor oral intake for 3 days at home hospice. Patient's baseline is verbal and adequate intake of puree foods and pleasure feeding. He has past medical history of advanced dementia, gastric cancer with local metastatic lymph nodes (not on chemotherapy), seizure disorder, CVA X 2, CKD, chronic Bangura. ROS was not obtained due to altered mental status. Chest x ray shows right lower lobe infiltrate. CT abdomen shows a gastric pyloric mass and bulky necrotic adenopathy. CT head is normal and shows no metastasis of cancer. Right lower lobe infiltrates, hypotension, worsening altered mental status and suggested diagnosis of acute hypoxic respiratory failure secondary to sepsis from aspiration pneumonia. Elevating creatinine and BUN levels suggest acute kidney injury which is now improving. Patient was treated with IV fluids, IV cefepime and azithromycin, IV valproic acid and O2 on 2 L nasal canula and then admitted to medicine for AHRF from sepsis and aspiration pneumonia. Throughout hospital course, patient was alert and responsive to sternal rub, not oriented, lethargic with apneic and aspirated breathing and diffuse crackles throughout all lung bases. On medicine unit, patient was placed on a non rebreather 5 L/min then switched to 2L/min on nasal cannula with 99% O2 saturation. Patient was treated with fluids for X days and stopped fluids on day X because NEVILLE improved and to reduce extremity swelling.       68 year old man with advanced dementia (years), gastric cancer w/ local metastatic lymph nodes (not surgical or chemo candidate), seizure disorder, HTN, HLD, DM, CVA x2 (last 2013), CKD (Baseline Cr 1.5-1.8), chronic bangura (exchanged in ED 8/17/23). Discharged from [facility] about a week ago after management for suspected breakthrough seizure with home VPA dose increased to 750mg BID. At [facility], susan decided on pleasure feeds and home hospice however still wants pt to be full code. Pt was brought in today from home hospice with acute alteration from his baseline mental status with decreased responsiveness associated with decreased oral intake. No fevers, no emesis or diarrhea. ROS not possible with mental status. Spoke with family at length about patient's prognosis, however, family still wants pt to be full code.     Vitals: BP labile, however fluid responsive 80s-100s/50s-70s, afebrile   Labs: 7.41/44/28/254 (ABG), lac wnl, BUN 22/Cr 1.84, trop 9.8 , U/A neg   CXR concerning for RLL infiltrate   CTH wnl , recent CT Ab/P imaging showed - Gastric pyloric mass, suspect adenocarcinoma. Bulky local necrotic metastatic adenopathy.    #    Poorly differentiated  gastric  adenocarcinoma with bulky LAD  HER 2 2+  CPS=8   MSI intact   diagnosed 7/2023  pt with multiple comorbid conditions, poor performance status, not a candidate for sx or  palliative chemotx , was eval  at [facility] , subsequently recommended palliative comfort cvare and   d/c home  with [facility] hospice  full code  Now pt is readmitted with MS changes  CT head neg for  mets     68 male with dementia, gastric cancer but not on chemotherapy, prior CVAs, DM, HTN, HLD, CKD, & seizures presenting to the ED with decreased p.o. intake for the last 3 days and gradual worsening of mental status acutely worse today.  Hypoxia in route via EMS improving with supplemental O2 at bedside.  Protecting airway currently.  Patient currently on opioids at home for hospice care but no evidence of respiratory depression or opioid overdose at this time.  No indication for naloxone at this time.  Chronically ill-appearing.  Rectal low-grade temperature concerning for possible sepsis.       Patient is 68 year old male presented with altered mental status from baseline, decreased responsiveness and poor oral intake for 3 days at home hospice. Patient's baseline is verbal and adequate intake of puree foods and pleasure feeding. He has past medical history of advanced dementia, gastric cancer with local metastatic lymph nodes (not on chemotherapy), seizure disorder, CVA X 2, CKD, chronic Perez. ROS was not obtained due to altered mental status. Chest x ray shows right lower lobe infiltrate. CT abdomen shows a gastric pyloric mass and bulky necrotic adenopathy. CT head is normal and shows no metastasis of cancer. Right lower lobe infiltrates, hypotension, worsening altered mental status and suggested diagnosis of acute hypoxic respiratory failure secondary to sepsis from aspiration pneumonia. Elevating creatinine and BUN levels suggest acute kidney injury which is now improving. Patient was treated with IV fluids, IV cefepime and azithromycin,  mg BID valproic acid and O2 on 2 L nasal canula and then admitted to medicine for AHRF from sepsis and aspiration pneumonia. Throughout hospital course, patient was alert and responsive to sternal rub, not oriented, lethargic with apneic and aspirated breathing and diffuse crackles throughout all lung bases. On medicine unit, patient was placed on a non rebreather 10 L/min then tapered to 5 L/min to 2L/min on nasal cannula with 95%-99% O2 saturation. Patient was treated with fluids for X days and stopped fluids on day X because NEVILLE improved and to reduce extremity swelling. Otherwise, patient is continued on cefepime/azithromycin for 7 days and IV valproic acid. Heme/oncology consulted patient's gastric cancer status and decided that patient is not a candidate for palliative chemotherapy due to poor prognosis and multiple comorbidities. Palliative care spoke with daughter and son of patient at length that patient's deteriorating condition, poor prognosis and that compressions/ventilations will not change trajectory or quality of life. Family agreed to DNR/DNI and long term hospice care. Patient was started on pleasure feeding (puree foods) on 8/29 as he is poor candidate for a feeding tube. Patient will be discharged to long term hospice facility where he will continue to receive treatments and rehabilitation.      Patient is 68 year old male presented with worsening altered mental status, decreased responsiveness and poor oral intake for 3 days at home hospice. Patient's baseline is verbal and adequate intake of puree foods and pleasure feeding. He has past medical history of advanced dementia, gastric cancer with local metastatic lymph nodes (not on chemotherapy), seizure disorder, CVA X 2, CKD, chronic Perez. ROS was not obtained due to altered mental status. Chest x ray shows right lower lobe infiltrate. CT abdomen shows a gastric pyloric mass and bulky necrotic adenopathy. CT head is normal and shows no hemorrhage or metastasis of cancer. Right lower lobe infiltrates, elevated WBCS, hypotension, worsening altered mental status suggested diagnosis of acute hypoxic respiratory failure secondary to sepsis from aspiration pneumonia. Elevating creatinine and BUN levels suggest acute kidney injury which is now improving. Patient was treated with IV fluids, IV 2000mg cefepime and 500mg azithromycin every 12 hours, IV 750mg valproic acid BID and O2 on 2 L nasal canula and then admitted to medicine for AHRF from sepsis and aspiration pneumonia. Throughout hospital course, patient was alert and responsive to sternal rub, not oriented, lethargic with apneic and aspirated breathing and diffuse crackles throughout all lung bases. On medicine unit, patient was placed on a non rebreather 10 L/min then tapered to 5 L/min to 2L/min on nasal cannula with 95%-99% O2 saturation. Patient was treated with fluids for 3 days and stopped fluids on day 3 because NEVILLE improved and to reduce extremity swelling. Otherwise, patient is continued on cefepime/azithromycin for 7 days and IV valproic acid. Heme/oncology consulted patient's gastric cancer status and decided that patient is not a candidate for palliative chemotherapy due to poor prognosis and multiple comorbidities. Palliative care spoke with daughter and son of patient at length that patient's deteriorating condition, poor prognosis and that compressions/ventilations will not change trajectory or quality of life. Family agreed to DNR/DNI and long term hospice care. Patient was started on pleasure feeding (puree foods) on 8/29 as he is poor candidate for a feeding tube. Patient will be discharged to long term hospice facility where he will continue to receive treatments and rehabilitation.      Patient is 68 year old male presented with worsening altered mental status, decreased responsiveness and poor oral intake for 3 days at home hospice. Patient's baseline is verbal and adequate intake of puree foods and pleasure feeding. He has past medical history of advanced dementia, gastric cancer with local metastatic lymph nodes (not on chemotherapy), seizure disorder, CVA X 2, CKD, chronic Perez. Chest x ray shows right lower lobe infiltrate. CT abdomen shows a gastric pyloric mass and bulky necrotic adenopathy. CT head is normal and shows no hemorrhage or metastasis of cancer. Right lower lobe infiltrates, elevated WBCS, hypotension, worsening altered mental status suggested diagnosis of acute hypoxic respiratory failure secondary to sepsis from aspiration pneumonia. Elevating creatinine and BUN levels suggest acute kidney injury improved. Patient was treated with IV fluids, NS/Dextrose, IV 2000mg cefepime and 500mg azithromycin every 12 hours for 7 days, IV 750mg valproic acid BID and O2. Throughout hospital course, patient was alert and responsive to sternal rub, not oriented, lethargic with apneic and aspirated breathing and diffuse crackles throughout all lung bases. On medicine unit, patient was placed on a non rebreather 10 L/min then tapered  off oxygen.  Heme/oncology consulted patient's gastric cancer status and decided that patient is not a candidate for palliative chemotherapy due to poor prognosis and multiple comorbidities. Palliative care spoke with daughter and son of patient at length that patient's deteriorating condition, poor prognosis and that compressions/ventilations will not change trajectory or quality of life. Family agreed to DNR/DNI and but not hospice. Family concern for nutritional status and is considering J tube. surgery  consulted for procedure and advise patient is a poor candidate. Prognosis is poor and high risk of mortality based on condition. Pt. was discharged to [facility]         Patient is 68 year old male presented with worsening altered mental status, decreased responsiveness and poor oral intake for 3 days at home hospice. Patient's baseline is verbal and adequate intake of puree foods and pleasure feeding. He has past medical history of advanced dementia, gastric cancer with local metastatic lymph nodes (not on chemotherapy), seizure disorder, CVA X 2, CKD, chronic Perez. Chest x ray shows right lower lobe infiltrate. CT abdomen shows a gastric pyloric mass and bulky necrotic adenopathy. CT head is normal and shows no hemorrhage or metastasis of cancer. Right lower lobe infiltrates, elevated WBCS, hypotension, worsening altered mental status suggested diagnosis of acute hypoxic respiratory failure secondary to sepsis from aspiration pneumonia. Elevating creatinine and BUN levels suggest acute kidney injury improved. Patient was treated with IV fluids, NS/Dextrose, IV 2000mg cefepime and 500mg azithromycin every 12 hours for 7 days, IV 750mg valproic acid BID and O2. Throughout hospital course, patient was alert and responsive to sternal rub, not oriented, lethargic with apneic and aspirated breathing and diffuse crackles throughout all lung bases. On medicine unit, patient was placed on a non rebreather 10 L/min then tapered  off oxygen.  Heme/oncology consulted patient's gastric cancer status and decided that patient is not a candidate for palliative chemotherapy due to poor prognosis and multiple comorbidities. Palliative care spoke with daughter and son of patient at length that patient's deteriorating condition, poor prognosis and that compressions/ventilations will not change trajectory or quality of life. Family agreed to DNR/DNI and but not hospice. Family concern for nutritional status and is considering J tube. surgery  consulted for procedure and advise patient is a poor candidate. Prognosis is poor and high risk of mortality based on condition.  Pt continues with pleasure feeding.     Please note that this a brief summary of hospital course please refer to daily progress notes and consult notes for full course and events         Patient is 68 year old male presented with worsening altered mental status, decreased responsiveness and poor oral intake for 3 days at home hospice. Patient's baseline is verbal and adequate intake of puree foods and pleasure feeding. He has past medical history of advanced dementia, gastric cancer with local metastatic lymph nodes (not on chemotherapy), seizure disorder, CVA X 2, CKD, chronic Perez. Chest x ray shows right lower lobe infiltrate. CT abdomen shows a gastric pyloric mass and bulky necrotic adenopathy. CT head is normal and shows no hemorrhage or metastasis of cancer. Right lower lobe infiltrates, elevated WBCS, hypotension, worsening altered mental status suggested diagnosis of acute hypoxic respiratory failure secondary to sepsis from aspiration pneumonia. Elevating creatinine and BUN levels suggest acute kidney injury improved. Patient was treated with IV fluids, NS/Dextrose, IV 2000mg cefepime and 500mg azithromycin every 12 hours for 7 days, IV 750mg valproic acid BID and O2. Throughout hospital course, patient was alert and responsive to sternal rub, not oriented, lethargic with apneic and aspirated breathing and diffuse crackles throughout all lung bases. On medicine unit, patient was placed on a non rebreather 10 L/min then tapered off oxygen.  Heme/oncology consulted patient's gastric cancer status and decided that patient is not a candidate for palliative chemotherapy due to poor prognosis and multiple comorbidities. Palliative care spoke with daughter and son of patient at length that patient's deteriorating condition, poor prognosis and that compressions/ventilations will not change trajectory or quality of life. Family agreed to DNR/DNI and but not hospice. Family concern for nutritional status and is considering J tube. Patient initially failed swallow evaluation, placed NG-tube then dislodge, unable to be reaccessed. surgery  consulted for J-tube procedure and advise patient is a poor candidate. Prognosis is poor and high risk of mortality based on condition.  Pt continues with pleasure feeding.   Patient to be d/c to home with home care per family wishes, HHA to start on 9/30.     Please note that this a brief summary of hospital course please refer to daily progress notes and consult notes for full course and event   Patient is 68 year old male presented with worsening altered mental status, decreased responsiveness and poor oral intake for 3 days at home hospice. Patient's baseline is verbal and adequate intake of puree foods and pleasure feeding. He has past medical history of advanced dementia, gastric cancer with local metastatic lymph nodes (not on chemotherapy), seizure disorder, CVA X 2, CKD, chronic Perez. Chest x ray shows right lower lobe infiltrate. CT abdomen shows a gastric pyloric mass and bulky necrotic adenopathy. CT head is normal and shows no hemorrhage or metastasis of cancer. Right lower lobe infiltrates, elevated WBCS, hypotension, worsening altered mental status suggested diagnosis of acute hypoxic respiratory failure secondary to sepsis from aspiration pneumonia. Elevating creatinine and BUN levels suggest acute kidney injury improved. Patient was treated with IV fluids, NS/Dextrose, IV 2000mg cefepime and 500mg azithromycin every 12 hours for 7 days, IV 750mg valproic acid BID and O2. Throughout hospital course, patient was alert and responsive to sternal rub, not oriented, lethargic with apneic and aspirated breathing and diffuse crackles throughout all lung bases. On medicine unit, patient was placed on a non rebreather 10 L/min then tapered off oxygen.  Heme/oncology consulted patient's gastric cancer status and decided that patient is not a candidate for palliative chemotherapy due to poor prognosis and multiple comorbidities. Palliative care spoke with daughter and son of patient at length that patient's deteriorating condition, poor prognosis and that compressions/ventilations will not change trajectory or quality of life. Family agreed to DNR/DNI and but not hospice. Family concern for nutritional status and is considering J tube. Patient initially failed swallow evaluation, placed NG-tube then dislodge, unable to be reaccessed. surgery  consulted for J-tube procedure and advise patient is a poor candidate. Prognosis is poor and high risk of mortality based on condition.  Pt continues with pleasure feeding.   Patient is medically optimized for discharge home with home care per family wishes, HHA to start on 9/30.     Please note that this a brief summary of hospital course please refer to daily progress notes and consult notes for full course and event.  Discharge plan was discussed with attending physician.    Patient is 68 year old male presented with worsening altered mental status, decreased responsiveness and poor oral intake for 3 days at home hospice. Patient's baseline is verbal and adequate intake of puree foods and pleasure feeding. He has past medical history of advanced dementia, gastric cancer with local metastatic lymph nodes (not on chemotherapy), seizure disorder, CVA X 2, CKD, chronic Perez. Chest x ray shows right lower lobe infiltrate. CT abdomen shows a gastric pyloric mass and bulky necrotic adenopathy. CT head is normal and shows no hemorrhage or metastasis of cancer. Right lower lobe infiltrates, elevated WBCS, hypotension, worsening altered mental status suggested diagnosis of acute hypoxic respiratory failure secondary to sepsis from aspiration pneumonia. Elevating creatinine and BUN levels suggest acute kidney injury improved. Patient was treated with IV fluids, NS/Dextrose, IV 2000mg cefepime and 500mg azithromycin for 7 days and O2. Throughout hospital course, patient was alert and responsive to sternal rub, not oriented, lethargic with apneic and aspirated breathing and diffuse crackles throughout all lung bases. On medicine unit, patient was placed on a non rebreather 10 L/min then tapered off oxygen.  Heme/oncology consulted patient's gastric cancer status and decided that patient is not a candidate for palliative chemotherapy due to poor prognosis and multiple comorbidities. Palliative care spoke with daughter and son of patient at length that patient's deteriorating condition, poor prognosis and that compressions/ventilations will not change trajectory or quality of life. Family agreed to DNR/DNI and but not hospice. Family concern for nutritional status and is considering J tube. Patient initially failed swallow evaluation, placed NG-tube then dislodged, unable to be reaccessed. surgery  consulted for J-tube procedure and advise patient is a poor candidate. Prognosis is poor and high risk of mortality based on condition.  Pt continues with pleasure feeding.   Patient is medically optimized for discharge home with home care per family wishes, HHA to start on 9/30.     Please note that this a brief summary of hospital course please refer to daily progress notes and consult notes for full course and event.  Discharge plan was discussed with attending physician.

## 2023-09-01 NOTE — PHARMACOTHERAPY INTERVENTION NOTE - COMMENTS
Recommended to discontinue the azithromycin order since patient has received 5 days of azithromycin therapy.    Mitzi Castellanos, PharmD, [facility]  Clinical Pharmacy Specialist, Infectious Diseases  Tele-Antimicrobial Stewardship Program (Tele-ASP)  Tele-ASP Phone: [phone]

## 2023-09-01 NOTE — DISCHARGE NOTE PROVIDER - DETAILS OF MALNUTRITION DIAGNOSIS/DIAGNOSES
This patient has been assessed with a concern for Malnutrition and was treated during this hospitalization for the following Nutrition diagnosis/diagnoses:     -  08/30/2023: Severe protein-calorie malnutrition   -  08/30/2023: Underweight (BMI < 19)

## 2023-09-01 NOTE — DISCHARGE NOTE PROVIDER - NSDCCPCAREPLAN_GEN_ALL_CORE_FT
PRINCIPAL DISCHARGE DIAGNOSIS  Diagnosis: Altered mental status  Assessment and Plan of Treatment: You were admitted to the hospital for worsening altered mental status and poor oral intake for 3 days likely caused by sepsis from pneumonia. Chest x ray showed right lower lobe infiltrates suggesting aspiration pneumnia. You were treated with antibiotics (IV azithromycin and cefipime every 12 hours) for 7 days for the aspiration pneumonia. You were also given IV fluids to treat your sepsis.      SECONDARY DISCHARGE DIAGNOSES  Diagnosis: NEVILLE (acute kidney injury)  Assessment and Plan of Treatment: You developed acute kidney injury from dehydration. Your increasing creatinine and BUN levels suggested acute kidney injury. You were treated with IV fluids. Your acute kidney injury is now improving and you no longer require treatment.    Diagnosis: Dehydration  Assessment and Plan of Treatment: Your dehyrdated state was assessed and treated with IV fluids. Dehydration is due to sepsis from pneumonia and altered mental status.    Diagnosis: Pneumonia  Assessment and Plan of Treatment: You were admitted to the hospital for pneumonia caused by aspiration of gastric contents. You were treated with a 7 day course of antibiotics (cefipime and azithromycin). Your elevated white blood cell count was closely monitored to assess the progression of the infection. Your white blood cells returned to a normal value overtime. You no longer require antibiotic treatment for pneumonia.     PRINCIPAL DISCHARGE DIAGNOSIS  Diagnosis: Altered mental status  Assessment and Plan of Treatment: You were admitted to the hospital for worsening altered mental status and poor oral intake for 3 days. This could have been from sepsis from pnuemonia as well as possible stroke or metastasis of your gastric  cancer.  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  You were given IV fluids NS/Dextrose. You were not unable to eat due to risk of aspiration pneumonia from lack of swallowing ability. Tube placement was discussed and treatment goals as well with your family. It was decided that xxxxx      SECONDARY DISCHARGE DIAGNOSES  Diagnosis: At risk for nutrition deficiency  Assessment and Plan of Treatment: Due to your mental status and condition we have not been able to feed you oral food due to high risk of aspiration pneumonia. Your family was very concern of your nutritional status. Tube placement was discussed to put into your jejunum due to your gastric cancer and it was decided that xxxxxx.  We had you on iv fluids and replaced your electrolytes daily as needed.    Diagnosis: Gastric cancer  Assessment and Plan of Treatment: You were diagnosed with gastric cancer about 6 weeks ago. We did a CT abdomen which showed  a gastric pyloric mass and bulky necrotic adenopathy. Heme/oncology was consulted decided that you would not be a candidate for palliative chemotherapy due to poor prognosis and multiple comorbidities. It was advise to proceed with comfort care.    Diagnosis: Pneumonia, aspiration  Assessment and Plan of Treatment: You were developed acute respiratory failure with hypoxia from sepsis and pneumonia infection. You are unable to breath on your own and was given supplemental oxygen to treat the low oxygen levels. We did an xray of your chest which showed right lower lobe infiltrate indicating pneumonia. We treated you with cefipime and azithromycin . You were on 5L of oxygen with nonrebreather mask but with the antibitocs we were able to taper you off oxygen all together. After 7 days we stopped your antibitoics and it was concluded that you no longer have pnuemonia. Due to your deposition, you are still  a high risk of aspiration and feeding is not advised unless you are alert and oriented and able to swallow.    Diagnosis: NEVILLE (acute kidney injury)  Assessment and Plan of Treatment: You developed acute kidney injury from dehydration. Your increasing creatinine and BUN levels suggested acute kidney injury. You were treated with IV fluids. Your acute kidney injury is now improving and you no longer require treatment.    Diagnosis: Dehydration  Assessment and Plan of Treatment: Your dehyrdated state was assessed and treated with IV fluids. Dehydration is due to sepsis from pneumonia and altered mental status.    Diagnosis: Anemia  Assessment and Plan of Treatment: During yoru stay your hemoglobin which is a component of your blood was very low. We monitored this carefully and even though we determined that transfusion would not have helped your progression and there were no signs of bleeding we still gave you one unit of blood as your family requested. Your hemoglobin sumit from 6.5 to 9.5. We continued to monitored this during your stay.     PRINCIPAL DISCHARGE DIAGNOSIS  Diagnosis: Altered mental status  Assessment and Plan of Treatment: You were admitted to the hospital for worsening altered mental status and poor oral intake for 3 days. This could have been from sepsis from pnuemonia as well as possible stroke or metastasis of your gastric  cancer.  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  You were given IV fluids NS/Dextrose. You were not unable to eat due to risk of aspiration pneumonia from lack of swallowing ability. Tube placement was discussed and treatment goals as well with your family. You werre a poor candidate for any tube placement. We had pallliative care come down and they recommende dto xxxx. It was decided that xxxxx      SECONDARY DISCHARGE DIAGNOSES  Diagnosis: At risk for nutrition deficiency  Assessment and Plan of Treatment: Due to your mental status and condition we have not been able to feed you oral food due to high risk of aspiration pneumonia. Your family was very concern of your nutritional status. Tube placement was discussed to put into your jejunum due to your gastric cancer and it was decided that you were a poor candidate and the surgery team did not want to put it in as the risk outweigh the benefits.   We had you on iv fluids and replaced your electrolytes daily as needed.    Diagnosis: Gastric cancer  Assessment and Plan of Treatment: You were diagnosed with gastric cancer about 6 weeks ago. We did a CT abdomen which showed  a gastric pyloric mass and bulky necrotic adenopathy. Heme/oncology was consulted decided that you would not be a candidate for palliative chemotherapy due to poor prognosis and multiple comorbidities. It was advise to proceed with comfort care.    Diagnosis: Pneumonia, aspiration  Assessment and Plan of Treatment: You were developed acute respiratory failure with hypoxia from sepsis and pneumonia infection. You are unable to breath on your own and was given supplemental oxygen to treat the low oxygen levels. We did an xray of your chest which showed right lower lobe infiltrate indicating pneumonia. We treated you with cefipime and azithromycin . You were on 5L of oxygen with nonrebreather mask but with the antibitocs we were able to taper you off oxygen all together. After 7 days we stopped your antibitoics and it was concluded that you no longer have pnuemonia. Due to your deposition, you are still  a high risk of aspiration and feeding is not advised unless you are alert and oriented and able to swallow.    Diagnosis: NEVILLE (acute kidney injury)  Assessment and Plan of Treatment: You developed acute kidney injury from dehydration. Your increasing creatinine and BUN levels suggested acute kidney injury. You were treated with IV fluids. Your acute kidney injury is now improving and you no longer require treatment.    Diagnosis: Dehydration  Assessment and Plan of Treatment: Your dehyrdated state was assessed and treated with IV fluids. Dehydration is due to sepsis from pneumonia and altered mental status.    Diagnosis: Anemia  Assessment and Plan of Treatment: During yoru stay your hemoglobin which is a component of your blood was very low. We monitored this carefully and even though we determined that transfusion would not have helped your progression and there were no signs of bleeding we still gave you one unit of blood as your family requested. Your hemoglobin sumit from 6.5 to 9.5. We continued to monitored this during your stay.     PRINCIPAL DISCHARGE DIAGNOSIS  Diagnosis: Pneumonia, aspiration  Assessment and Plan of Treatment: You were hospitalized for worsening altered mental status and poor oral intake for 3 days. this is likely from aspiration pneumonia  developed acute respiratory failure with hypoxia from sepsis and pneumonia infection. You are unable to breath on your own and was given supplemental oxygen to treat the low oxygen levels. We did an xray of your chest which showed right lower lobe infiltrate indicating pneumonia. We treated you with IV antibiotics cefipime and azithromycin and have completed the course of antibiotics during your stay. You were on 5L of oxygen with nonrebreather mask but with the antibitocs we were able to taper you off oxygen.  Due to your deposition, you are still  a high risk of aspiration and feeding is not advised unless you are alert and able to swallow.  you are at high risk of aspiration pneumonia from lack of swallowing ability. Tube placement was discussed and treatment goals as well with your family. You are a poor candidate for any tube placement. You were evaluated by Palliative care services and recommended hospice care services, which you have declined at this time.   You are to be discharged to home with [facility].      SECONDARY DISCHARGE DIAGNOSES  Diagnosis: NEVILLE (acute kidney injury)  Assessment and Plan of Treatment: You developed acute kidney injury from dehydration. Your increasing creatinine and BUN levels suggested acute kidney injury. You were treated with IV fluids. Your acute kidney injury is now improving and you no longer require treatment.    Diagnosis: Dehydration  Assessment and Plan of Treatment: Your dehyrdated state was assessed and treated with IV fluids. Dehydration is due to sepsis from pneumonia and altered mental status.    Diagnosis: Anemia  Assessment and Plan of Treatment: During your stay your hemoglobin which is a component of your blood was very low. We monitored this carefully and even though we determined that transfusion would not have helped your progression and there were no signs of bleeding we still gave you one unit of blood as your family requested. Your hemoglobin sumit from 6.5 to 9.5.    Diagnosis: At risk for nutrition deficiency  Assessment and Plan of Treatment: Due to your mental status and condition we have not been able to feed you oral food due to high risk of aspiration pneumonia. Your family was very concern of your nutritional status. Tube placement was discussed to put into your jejunum due to your gastric cancer and it was decided that you were a poor candidate and the surgery team did not want to put it in as the risk outweigh the benefits.   We had you on iv fluids and replaced your electrolytes daily as needed.    Diagnosis: Gastric cancer  Assessment and Plan of Treatment: You were diagnosed with gastric cancer about 6 weeks ago. We did a CT abdomen which showed  a gastric pyloric mass and bulky necrotic adenopathy. Heme/oncology was consulted decided that you would not be a candidate for palliative chemotherapy due to poor prognosis and multiple comorbidities. It was advise to proceed with comfort care.    Diagnosis: Pneumonia, aspiration  Assessment and Plan of Treatment: You were developed acute respiratory failure with hypoxia from sepsis and pneumonia infection. You are unable to breath on your own and was given supplemental oxygen to treat the low oxygen levels. We did an xray of your chest which showed right lower lobe infiltrate indicating pneumonia. We treated you with cefipime and azithromycin . You were on 5L of oxygen with nonrebreather mask but with the antibitocs we were able to taper you off oxygen all together. After 7 days we stopped your antibitoics and it was concluded that you no longer have pnuemonia. Due to your deposition, you are still  a high risk of aspiration and feeding is not advised unless you are alert and oriented and able to swallow.     PRINCIPAL DISCHARGE DIAGNOSIS  Diagnosis: Pneumonia, aspiration  Assessment and Plan of Treatment: You were hospitalized for worsening altered mental status and poor oral intake for 3 days. this is likely from aspiration pneumonia  developed acute respiratory failure with hypoxia from sepsis and pneumonia infection. You are unable to breath on your own and was given supplemental oxygen to treat the low oxygen levels. We did an xray of your chest which showed right lower lobe infiltrate indicating pneumonia. We treated you with IV antibiotics cefipime and azithromycin and have completed the course of antibiotics during your stay. You were on 5L of oxygen with nonrebreather mask but with the antibitocs we were able to taper you off oxygen.  Due to your deposition, you are still  a high risk of aspiration and feeding is not advised unless you are alert and able to swallow.  you are at high risk of aspiration pneumonia from lack of swallowing ability. Tube placement was discussed and treatment goals as well with your family. You are a poor candidate for any tube placement. You were evaluated by Palliative care services and recommended hospice care services, which you have declined at this time.   You are to be discharged to home with [facility].      SECONDARY DISCHARGE DIAGNOSES  Diagnosis: Acute respiratory failure with hypoxia  Assessment and Plan of Treatment: You were developed acute respiratory failure with hypoxia from sepsis and pneumonia infection. You are unable to breath on your own and was given supplemental oxygen to treat the low oxygen levels. Now you are tolerated well on room air, no further need for oxygen therapy. continue activity as you tolerated.    Diagnosis: Seizure  Assessment and Plan of Treatment: contiue seizure medications as prescribed. Maintain seizure precaution. Aspiration/Fall precaution. Follow up with your primary MD after discharge.    Diagnosis: Anemia  Assessment and Plan of Treatment: During your stay your hemoglobin which is a component of your blood was very low. We monitored this carefully and even though we determined that transfusion would not have helped your progression and there were no signs of bleeding we still gave you one unit of blood as your family requested. Your hemoglobin sumit from 6.5 to 9.5. continue follow up with your primary MD after discharge. Maintain a balanced diet.    Diagnosis: At risk for nutrition deficiency  Assessment and Plan of Treatment: Due to your mental status and condition we have not been able to feed you oral food due to high risk of aspiration pneumonia. Your family was very concern of your nutritional status. Tube placement was discussed to put into your jejunum due to your gastric cancer and it was decided that you were a poor candidate and the surgery team did not want to put it in as the risk outweigh the benefits.   We had you on iv fluids and replaced your electrolytes daily as needed. continue a balanced diet at home as you tolerated. Follow up with primary MD after discharge.    Diagnosis: Gastric cancer  Assessment and Plan of Treatment: You were diagnosed with gastric cancer about 6 weeks ago. We did a CT abdomen which showed  a gastric pyloric mass and bulky necrotic adenopathy. Heme/oncology was consulted decided that you would not be a candidate for palliative chemotherapy due to poor prognosis and multiple comorbidities. It was advise to proceed with comfort care.    Diagnosis: DM (diabetes mellitus)  Assessment and Plan of Treatment: You have history of diabetes mellitus. No longer needed antidiabetic medication this time. You will continue to follow up with your primary care physician.    Diagnosis: Chronic indwelling Perez catheter  Assessment and Plan of Treatment: contiune Perez catheter care at home. Follow up with primary MD and/or Urology outpatient to replace urinary catheter. Monitor urine output. Maintain good oral hydraion.    Diagnosis: Acute kidney injury superimposed on CKD  Assessment and Plan of Treatment: You developed acute kidney injury from dehydration. Your increasing creatinine and BUN levels suggested acute kidney injury. You were treated with IV fluids. Your acute kidney injury is now improving and you no longer require treatment.  Avoid taking (NSAIDs) - (ex: Ibuprofen, Advil, Celebrex, Naprosyn)  Avoid taking any nephrotoxic agents (can harm kidneys) - Intravenous contrast for diagnostic testing, combination cold medications.  Have all medications adjusted for your renal function by your Health Care Provider.  Follow up with your primary Md after discharge.

## 2023-09-01 NOTE — PROGRESS NOTE ADULT - PROBLEM SELECTOR PLAN 1
- Decreased NC 5 L to 3 L today   - CXR concerning for RLL infiltrate.  - pt diagnosed with pneumonia   - c/w cefepime and azithro for asp pna coverage, and atypical pna coverage (day 4)  -placement for long term hospice care discussion with palliative and family - Decreased NC 5 L to 3 L today   - CXR concerning for RLL infiltrate.  - pt diagnosed with pneumonia   - c/w cefepime and azithro for asp pna coverage, and atypical pna coverage (day 6).  - will stop abx on day 7   -placement for long term hospice care discussion with palliative and family

## 2023-09-01 NOTE — DISCHARGE NOTE PROVIDER - YES NO FOR MLM POSITIVE OR NEGATIVE COVID RESULT
Date of Service: 6/14/2023    YOB: 1985    HISTORY OF PRESENT ILLNESS:   This is a 37 year old year old patient with the below mentioned problem list who comes in for follow up evaluation. Since seeing me last, *** Mary describes the pain as *** type of pain and patient rates the pain at *** out of 10. Mary denies any recent headaches, visual changes, or jaw claudication. The patient also denies any new systemic complaints. Mary is tolerating the current medications well and denies any adverse events or any new systemic rheumatic complaints.  The patient denies any headaches, visual changes, chest pain, difficulty breathing, nausea and/or vomiting, fever and/or chills, or changes in bowel and/or urinary habits.     HISTORIES:  I have reviewed the patient's medications and allergies, past medical, surgical, social and family history, updating these as appropriate.  See Histories section of the Electronic Medical Record for a display of this information.    There is no problem list on file for this patient.       REVIEW OF SYSTEMS:   As per the History of Present Illness.     PHYSICAL EXAMINATION:   There were no vitals filed for this visit.     GENERAL: Well dressed and well developed.   HEENT: Normocephalic, atraumatic. Normal appearing sclerae and conjunctivae. Pupils equal, round, reactive to light and accommodation. Oropharynx clear. Nasal mucosa and lips without ulcerations.   NECK: Supple and nontender. No cervical or supraclavicular lymphadenopathy.   CARDIOVASCULAR: Regular rate and rhythm. Normal S1, S2. No murmurs or rubs. The temporal arteries were intact and palpable without any tenderness, cords, or other changes. The rest of the cardiovascular exam was within normal limits.  LUNGS: Clear to auscultation. No rales or wheezing. Breathing is unlabored.    EXTREMITIES: No edema, cyanosis, or clubbing.   SKIN: No rashes, digital ulcers, periungual erythema, nail pitting, nodules, or  sclerodactyly.   MUSCULOSKELETAL:  Full range of motion of the neck; full range of motion of the bilateral shoulders, bilateral elbows, and bilateral wrists. *** osteoarthritic nodules of the DIP joints. No subcutaneous nodules, synovitis or nail changes.  Full range of motion of the bilateral hips; full range of motion of the bilateral knees with 1+ crepitus bilaterally; full range of motion of the bilateral ankles; full range of motion of the MTPs. There are no signs of synovitis, effusions, or Baker's cyst.   SPINE: No tenderness, normal range of motion.   NEUROLOGICAL: Sensation intact. Cranial nerves 2-12 intact. The rest of the neurologic exam was within normal limits.  PSYCHOLOGICAL: Alert and oriented x3. Mood and affect are normal.     LABORATORY/IMAGING DATA:   ***    ASSESSMENT/PLAN:      ***     ***     No laboratory values for today. I reviewed the side effects of all medications prescribed by me as listed in the physician's desk reference and in the package inserts.  Other reasonable and generally accepted treatment options, including the option to do nothing at all, were discussed. The patient was instructed by me to contact our office if the symptoms have not improved, worsened, or if there are any issues/concerns. The patient will return to clinic in ***       Onel Kraus MD  PGY-1               ,

## 2023-09-01 NOTE — PROGRESS NOTE ADULT - SUBJECTIVE AND OBJECTIVE BOX
PGY-1 Progress Note discussed with attending    PAGER #: [[phone]] TILL 5:00 PM  PLEASE CONTACT ON CALL TEAM:  - On Call Team (Please refer to Art) FROM 5:00 PM - 8:30PM  - Nightfloat Team FROM 8:30 -7:30 AM    CHIEF COMPLAINT & BRIEF HOSPITAL COURSE: No acute overnight event. No change in baseline at bedside. Short apenic breath    INTERVAL HPI/OVERNIGHT EVENTS:   MEDICATIONS  (STANDING):  azithromycin  IVPB 500 milliGRAM(s) IV Intermittent every 24 hours  cefepime   IVPB 2000 milliGRAM(s) IV Intermittent every 12 hours  chlorhexidine 2% Cloths 1 Application(s) Topical <User Schedule>  heparin   Injectable 5000 Unit(s) SubCutaneous every 12 hours  lactated ringers. 1000 milliLiter(s) (100 mL/Hr) IV Continuous <Continuous>  valproate sodium  IVPB 750 milliGRAM(s) IV Intermittent two times a day    MEDICATIONS  (PRN):  glycopyrrolate Injectable 0.4 milliGRAM(s) IV Push every 4 hours PRN secretions      Vital Signs Last 24 Hrs    T(C): 36.7 (30 Aug 2023 05:31), Max: 36.7 (30 Aug 2023 05:31)  T(F): 98 (30 Aug 2023 05:31), Max: 98 (30 Aug 2023 05:31)  HR: 72 (30 Aug 2023 05:31) (72 - 85)  BP: 128/72 (30 Aug 2023 05:31) (125/71 - 174/84)  BP(mean): --  RR: 18 (30 Aug 2023 05:31) (17 - 18)  SpO2: 100% (30 Aug 2023 05:31) (96% - 100%)    Parameters below as of 30 Aug 2023 05:31  Patient On (Oxygen Delivery Method): nasal cannula  O2 Flow (L/min): 5      PHYSICAL EXAMINATION:  GENERAL: NAD, well built, lethargic   HEAD:  Atraumatic, Normocephalic  EYES:  conjunctiva and sclera clear  CHEST/LUNG: Short shallow breaths, aspirated breathing, diffuse crackles all lobes  HEART: Regular rate and rhythm; No murmurs, rubs, or gallops  ABDOMEN: Soft, Nontender, Nondistended; Bowel sounds present  NERVOUS SYSTEM:  Alert & Oriented x0  EXTREMITIES:  2+ Peripheral Pulses, No clubbing, cyanosis, or edema  SKIN: warm dry, hands/feet swelling                           7.1    19.56 )-----------( 115      ( 29 Aug 2023 14:49 )             23.1     08-29    137  |  104  |  21<H>  ----------------------------<  123<H>  5.0   |  29  |  1.30    Ca    9.0      29 Aug 2023 10:24  Phos  2.7     08-29  Mg     1.8     08-29    TPro  4.9<L>  /  Alb  1.6<L>  /  TBili  0.2  /  DBili  x   /  AST  11  /  ALT  8<L>  /  AlkPhos  42  08-29    LIVER FUNCTIONS - ( 29 Aug 2023 10:24 )  Alb: 1.6 g/dL / Pro: 4.9 g/dL / ALK PHOS: 42 U/L / ALT: 8 U/L DA / AST: 11 U/L / GGT: x                   CAPILLARY BLOOD GLUCOSE      RADIOLOGY & ADDITIONAL TESTS:                   PGY-1 Progress Note discussed with attending    PAGER #: [[phone]] TILL 5:00 PM  PLEASE CONTACT ON CALL TEAM:  - On Call Team (Please refer to Art) FROM 5:00 PM - 8:30PM  - Nightfloat Team FROM 8:30 -7:30 AM    CHIEF COMPLAINT & BRIEF HOSPITAL COURSE: No acute overnight event. No change in baseline at bedside. Short apenic breath. Eyes open today and more alert and blinking.     INTERVAL HPI/OVERNIGHT EVENTS:   MEDICATIONS  (STANDING):  azithromycin  IVPB 500 milliGRAM(s) IV Intermittent every 24 hours  cefepime   IVPB 2000 milliGRAM(s) IV Intermittent every 12 hours  chlorhexidine 2% Cloths 1 Application(s) Topical <User Schedule>  heparin   Injectable 5000 Unit(s) SubCutaneous every 12 hours  lactated ringers. 1000 milliLiter(s) (100 mL/Hr) IV Continuous <Continuous>  valproate sodium  IVPB 750 milliGRAM(s) IV Intermittent two times a day    MEDICATIONS  (PRN):  glycopyrrolate Injectable 0.4 milliGRAM(s) IV Push every 4 hours PRN secretions      Vital Signs Last 24 Hrs    T(C): 36.7 (30 Aug 2023 05:31), Max: 36.7 (30 Aug 2023 05:31)  T(F): 98 (30 Aug 2023 05:31), Max: 98 (30 Aug 2023 05:31)  HR: 72 (30 Aug 2023 05:31) (72 - 85)  BP: 128/72 (30 Aug 2023 05:31) (125/71 - 174/84)  BP(mean): --  RR: 18 (30 Aug 2023 05:31) (17 - 18)  SpO2: 100% (30 Aug 2023 05:31) (96% - 100%)    Parameters below as of 30 Aug 2023 05:31  Patient On (Oxygen Delivery Method): nasal cannula  O2 Flow (L/min): 5      PHYSICAL EXAMINATION:  GENERAL: NAD, well built, lethargic   HEAD:  Atraumatic, Normocephalic  EYES:  conjunctiva and sclera clear  CHEST/LUNG: Short shallow breaths, aspirated breathing, diffuse crackles all lobes  HEART: Regular rate and rhythm; No murmurs, rubs, or gallops  ABDOMEN: Soft, Nontender, Nondistended; Bowel sounds present  NERVOUS SYSTEM:  Alert & Oriented x0, slow pupillary reflex  EXTREMITIES:  2+ Peripheral Pulses, No clubbing, cyanosis, or edema  SKIN: warm dry, hands/feet swelling                           7.1    19.56 )-----------( 115      ( 29 Aug 2023 14:49 )             23.1     08-29    137  |  104  |  21<H>  ----------------------------<  123<H>  5.0   |  29  |  1.30    Ca    9.0      29 Aug 2023 10:24  Phos  2.7     08-29  Mg     1.8     08-29    TPro  4.9<L>  /  Alb  1.6<L>  /  TBili  0.2  /  DBili  x   /  AST  11  /  ALT  8<L>  /  AlkPhos  42  08-29    LIVER FUNCTIONS - ( 29 Aug 2023 10:24 )  Alb: 1.6 g/dL / Pro: 4.9 g/dL / ALK PHOS: 42 U/L / ALT: 8 U/L DA / AST: 11 U/L / GGT: x                   CAPILLARY BLOOD GLUCOSE      RADIOLOGY & ADDITIONAL TESTS:

## 2023-09-01 NOTE — PROGRESS NOTE ADULT - PROBLEM SELECTOR PLAN 6
- hx of CKD  - Monitor Cr, downtrending now Cr 1.84>1.53>1.30    - bangura in place due to AMS  - dc fluids

## 2023-09-01 NOTE — DISCHARGE NOTE PROVIDER - ATTENDING DISCHARGE PHYSICAL EXAMINATION:
PHYSICAL EXAM:  GENERAL: NAD, cachectic  HEAD:  +Bitemporal wasting  NECK: Supple, No JVD  CHEST/LUNG: Clear to auscultation bilaterally; No wheeze, rhonchi, rales (anterior exam)  HEART: Regular rate and rhythm; No murmurs, rubs, or gallops  ABDOMEN: Soft, Nontender, Nondistended; Bowel sounds present  EXTREMITIES:  2+ Peripheral Pulses, No clubbing, cyanosis, trace LE edema  PSYCH: unable to assess 2/2 lethargy  NEUROLOGY: unable to assess 2/2 lethargy  SKIN: No rashes or lesions

## 2023-09-01 NOTE — PROGRESS NOTE ADULT - PROBLEM SELECTOR PLAN 5
- Continue to monitor hb, downtrending 7.1 > 6.7->6.8-> 6.4  - it is baseline  - will continue to monitor cbc but will most likely no transfuse as it will not help the patient improve his mental status or condition.   -will reconsider transfusion if drops below 6.5 - Continue to monitor hb, downtrending 7.1 > 6.7->6.8-> 6.4  - it is baseline  - will continue to monitor cbc but will most likely no transfuse as it will not help the patient improve his mental status or condition.

## 2023-09-01 NOTE — PROGRESS NOTE ADULT - ATTENDING COMMENTS
68 year old man admitted from home hospice because of altered mental status and dehydration.  Patient was on home hospice, but family has asked for more aggressive treatment. No fevers, no emesis or diarrhea.   PMH advanced dementia, gastric cancer; Oncology assessed that chemotherapy would not benefit because of overall poor functional status.           multiple cardiovascular diseases - DM, CVA, HTN, HLD, CKD, hx of chronic indwelling catheter.           Discharged from [facility] about a week ago after management for suspected breakthrough seizure with home VPA dose increased to 750mg BID.    # Acute respiratory failure with hypoxia, atelectasis vs PE vs aspiration  # Acute encephalopathy   # Fluid responsive septic shock   # NEVILLE on CKD  # anemia, chronic disease  # gastric cancer. possible CNS metastases as the cause of altered mental status.   # s/p CVA  # hx seizure  According to family's wishes will treat for sepsis, pneumonia   - Patient appears to be continuing to aspirate with agonal breathing, minimal responsive, occasionally opens eyes   -Completed IV abx tomorrow  - Monitor mental status - no improvement  - Will monitor Anemia, appears stable at this time, no signs of bleeding  - Supplemental oxygen to keep SaO2 > 93%- titrate down as tolerated  - Will DC IVF hydration as patient with swelling in arms and legs  - Fall and aspiration precaution  - dysphagia diet  Palliative/Hospice care consult, appreciated     - Family concerned about taking care of patient at home, interested in possible LTC with hospice

## 2023-09-02 LAB
ALBUMIN SERPL ELPH-MCNC: 1.5 G/DL — LOW (ref 3.5–5)
ALP SERPL-CCNC: 41 U/L — SIGNIFICANT CHANGE UP (ref 40–120)
ALT FLD-CCNC: 7 U/L DA — LOW (ref 10–60)
ANION GAP SERPL CALC-SCNC: 3 MMOL/L — LOW (ref 5–17)
AST SERPL-CCNC: 9 U/L — LOW (ref 10–40)
BILIRUB SERPL-MCNC: 0.2 MG/DL — SIGNIFICANT CHANGE UP (ref 0.2–1.2)
BUN SERPL-MCNC: 21 MG/DL — HIGH (ref 7–18)
CALCIUM SERPL-MCNC: 8.5 MG/DL — SIGNIFICANT CHANGE UP (ref 8.4–10.5)
CHLORIDE SERPL-SCNC: 102 MMOL/L — SIGNIFICANT CHANGE UP (ref 96–108)
CO2 SERPL-SCNC: 30 MMOL/L — SIGNIFICANT CHANGE UP (ref 22–31)
CREAT SERPL-MCNC: 1.4 MG/DL — HIGH (ref 0.5–1.3)
EGFR: 55 ML/MIN/1.73M2 — LOW
GLUCOSE BLDC GLUCOMTR-MCNC: 102 MG/DL — HIGH (ref 70–99)
GLUCOSE BLDC GLUCOMTR-MCNC: 95 MG/DL — SIGNIFICANT CHANGE UP (ref 70–99)
GLUCOSE SERPL-MCNC: 77 MG/DL — SIGNIFICANT CHANGE UP (ref 70–99)
HCT VFR BLD CALC: 19.8 % — CRITICAL LOW (ref 39–50)
HGB BLD-MCNC: 6.2 G/DL — CRITICAL LOW (ref 13–17)
MAGNESIUM SERPL-MCNC: 1.9 MG/DL — SIGNIFICANT CHANGE UP (ref 1.6–2.6)
MCHC RBC-ENTMCNC: 26.3 PG — LOW (ref 27–34)
MCHC RBC-ENTMCNC: 31.3 GM/DL — LOW (ref 32–36)
MCV RBC AUTO: 83.9 FL — SIGNIFICANT CHANGE UP (ref 80–100)
NRBC # BLD: 0 /100 WBCS — SIGNIFICANT CHANGE UP (ref 0–0)
PHOSPHATE SERPL-MCNC: 3 MG/DL — SIGNIFICANT CHANGE UP (ref 2.5–4.5)
PLATELET # BLD AUTO: 144 K/UL — LOW (ref 150–400)
POTASSIUM SERPL-MCNC: 4 MMOL/L — SIGNIFICANT CHANGE UP (ref 3.5–5.3)
POTASSIUM SERPL-SCNC: 4 MMOL/L — SIGNIFICANT CHANGE UP (ref 3.5–5.3)
PROT SERPL-MCNC: 4.5 G/DL — LOW (ref 6–8.3)
RBC # BLD: 2.36 M/UL — LOW (ref 4.2–5.8)
RBC # FLD: 20.3 % — HIGH (ref 10.3–14.5)
SODIUM SERPL-SCNC: 135 MMOL/L — SIGNIFICANT CHANGE UP (ref 135–145)
WBC # BLD: 7.37 K/UL — SIGNIFICANT CHANGE UP (ref 3.8–10.5)
WBC # FLD AUTO: 7.37 K/UL — SIGNIFICANT CHANGE UP (ref 3.8–10.5)

## 2023-09-02 PROCEDURE — 99233 SBSQ HOSP IP/OBS HIGH 50: CPT | Mod: GC

## 2023-09-02 RX ORDER — SODIUM CHLORIDE 9 MG/ML
1000 INJECTION, SOLUTION INTRAVENOUS
Refills: 0 | Status: DISCONTINUED | OUTPATIENT
Start: 2023-09-02 | End: 2023-09-04

## 2023-09-02 RX ADMIN — CEFEPIME 100 MILLIGRAM(S): 1 INJECTION, POWDER, FOR SOLUTION INTRAMUSCULAR; INTRAVENOUS at 05:26

## 2023-09-02 RX ADMIN — Medication 57.5 MILLIGRAM(S): at 18:27

## 2023-09-02 RX ADMIN — HEPARIN SODIUM 5000 UNIT(S): 5000 INJECTION INTRAVENOUS; SUBCUTANEOUS at 05:26

## 2023-09-02 RX ADMIN — SODIUM CHLORIDE 60 MILLILITER(S): 9 INJECTION, SOLUTION INTRAVENOUS at 17:06

## 2023-09-02 RX ADMIN — HEPARIN SODIUM 5000 UNIT(S): 5000 INJECTION INTRAVENOUS; SUBCUTANEOUS at 17:10

## 2023-09-02 RX ADMIN — Medication 62.5 MILLIMOLE(S): at 09:05

## 2023-09-02 RX ADMIN — CHLORHEXIDINE GLUCONATE 1 APPLICATION(S): 213 SOLUTION TOPICAL at 06:31

## 2023-09-02 RX ADMIN — Medication 57.5 MILLIGRAM(S): at 06:21

## 2023-09-02 RX ADMIN — ROBINUL 0.4 MILLIGRAM(S): 0.2 INJECTION INTRAMUSCULAR; INTRAVENOUS at 12:39

## 2023-09-02 NOTE — PROGRESS NOTE ADULT - ATTENDING COMMENTS
68 year old man admitted from home hospice because of altered mental status and dehydration.  Patient was on home hospice, but family has asked for more aggressive treatment. No fevers, no emesis or diarrhea.   PMH advanced dementia, gastric cancer; Oncology assessed that chemotherapy would not benefit because of overall poor functional status.           multiple cardiovascular diseases - DM, CVA, HTN, HLD, CKD, hx of chronic indwelling catheter.           Discharged from [facility] about a week ago after management for suspected breakthrough seizure with home VPA dose increased to 750mg BID.    # Acute respiratory failure with hypoxia, atelectasis vs PE vs aspiration  # Acute encephalopathy   # Fluid responsive septic shock   # NEVILLE on CKD  # anemia, chronic disease  # gastric cancer. possible CNS metastases as the cause of altered mental status.   # s/p CVA  # hx seizure  According to family's wishes will treat for sepsis, pneumonia   - Patient appears to be continuing to aspirate with agonal breathing, minimal responsive, occasionally opens eyes   -Completed IV abx today  - Monitor mental status - no improvement  - Will monitor Anemia, appears stable at this time, no signs of bleeding  - Supplemental oxygen to keep SaO2 > 93%- titrate down as tolerated  - Start IVF hydration per family request  - Fall and aspiration precaution  - dysphagia diet  Palliative/Hospice care consult, appreciated     - Family concerned about taking care of patient at home, interested in possible LTC with hospice vs transfer to [place]    - [facility] transfer center aware that family may want to transfer, Reports no medical necessity for transfer but will potentially take patient if family wants to. Pending insurance auth/info as unsure if transportation will be covered by insurance.

## 2023-09-02 NOTE — PROGRESS NOTE ADULT - SUBJECTIVE AND OBJECTIVE BOX
PGY-1 Progress Note discussed with attending    PAGER #: [[phone]] TILL 5:00 PM  PLEASE CONTACT ON CALL TEAM:  - On Call Team (Please refer to Art) FROM 5:00 PM - 8:30PM  - Nightfloat Team FROM 8:30 -7:30 AM    CHIEF COMPLAINT & BRIEF HOSPITAL COURSE: No acute overnight event. No change in baseline at bedside. Short apenic breath.     INTERVAL HPI/OVERNIGHT EVENTS:   MEDICATIONS  (STANDING):  azithromycin  IVPB 500 milliGRAM(s) IV Intermittent every 24 hours  cefepime   IVPB 2000 milliGRAM(s) IV Intermittent every 12 hours  chlorhexidine 2% Cloths 1 Application(s) Topical <User Schedule>  heparin   Injectable 5000 Unit(s) SubCutaneous every 12 hours  lactated ringers. 1000 milliLiter(s) (100 mL/Hr) IV Continuous <Continuous>  valproate sodium  IVPB 750 milliGRAM(s) IV Intermittent two times a day    MEDICATIONS  (PRN):  glycopyrrolate Injectable 0.4 milliGRAM(s) IV Push every 4 hours PRN secretions      Vital Signs Last 24 Hrs    T(C): 36.7 (30 Aug 2023 05:31), Max: 36.7 (30 Aug 2023 05:31)  T(F): 98 (30 Aug 2023 05:31), Max: 98 (30 Aug 2023 05:31)  HR: 72 (30 Aug 2023 05:31) (72 - 85)  BP: 128/72 (30 Aug 2023 05:31) (125/71 - 174/84)  BP(mean): --  RR: 18 (30 Aug 2023 05:31) (17 - 18)  SpO2: 100% (30 Aug 2023 05:31) (96% - 100%)    Parameters below as of 30 Aug 2023 05:31  Patient On (Oxygen Delivery Method): nasal cannula  O2 Flow (L/min): 5      PHYSICAL EXAMINATION:  GENERAL: NAD, well built, lethargic   HEAD:  Atraumatic, Normocephalic  EYES:  conjunctiva and sclera clear  CHEST/LUNG: Short shallow breaths, aspirated breathing, diffuse crackles all lobes  HEART: Regular rate and rhythm; No murmurs, rubs, or gallops  ABDOMEN: Soft, Nontender, Nondistended; Bowel sounds present  NERVOUS SYSTEM:  Alert & Oriented x0, slow pupillary reflex  EXTREMITIES:  2+ Peripheral Pulses, No clubbing, cyanosis, or edema  SKIN: warm dry, hands/feet swelling                           7.1    19.56 )-----------( 115      ( 29 Aug 2023 14:49 )             23.1     08-29    137  |  104  |  21<H>  ----------------------------<  123<H>  5.0   |  29  |  1.30    Ca    9.0      29 Aug 2023 10:24  Phos  2.7     08-29  Mg     1.8     08-29    TPro  4.9<L>  /  Alb  1.6<L>  /  TBili  0.2  /  DBili  x   /  AST  11  /  ALT  8<L>  /  AlkPhos  42  08-29    LIVER FUNCTIONS - ( 29 Aug 2023 10:24 )  Alb: 1.6 g/dL / Pro: 4.9 g/dL / ALK PHOS: 42 U/L / ALT: 8 U/L DA / AST: 11 U/L / GGT: x                   CAPILLARY BLOOD GLUCOSE      RADIOLOGY & ADDITIONAL TESTS:

## 2023-09-02 NOTE — PROGRESS NOTE ADULT - PROBLEM SELECTOR PLAN 1
- Decreased NC 5 L to 3 L today   - CXR concerning for RLL infiltrate.  - pt diagnosed with pneumonia   -completed abx   -placement for long term hospice care discussion with palliative and family

## 2023-09-02 NOTE — PROGRESS NOTE ADULT - PROBLEM SELECTOR PLAN 5
- Continue to monitor hb, downtrending 7.1 > 6.7->6.8-> 6.4->6.2  - it is baseline  - will continue to monitor cbc but will most likely no transfuse as it will not help the patient improve his mental status or condition.

## 2023-09-03 LAB
ALBUMIN SERPL ELPH-MCNC: 1.6 G/DL — LOW (ref 3.5–5)
ALP SERPL-CCNC: 51 U/L — SIGNIFICANT CHANGE UP (ref 40–120)
ALT FLD-CCNC: 7 U/L DA — LOW (ref 10–60)
ANION GAP SERPL CALC-SCNC: 5 MMOL/L — SIGNIFICANT CHANGE UP (ref 5–17)
AST SERPL-CCNC: 13 U/L — SIGNIFICANT CHANGE UP (ref 10–40)
BILIRUB SERPL-MCNC: 0.1 MG/DL — LOW (ref 0.2–1.2)
BUN SERPL-MCNC: 19 MG/DL — HIGH (ref 7–18)
CALCIUM SERPL-MCNC: 8.2 MG/DL — LOW (ref 8.4–10.5)
CHLORIDE SERPL-SCNC: 103 MMOL/L — SIGNIFICANT CHANGE UP (ref 96–108)
CO2 SERPL-SCNC: 28 MMOL/L — SIGNIFICANT CHANGE UP (ref 22–31)
CREAT SERPL-MCNC: 1.37 MG/DL — HIGH (ref 0.5–1.3)
EGFR: 57 ML/MIN/1.73M2 — LOW
GLUCOSE BLDC GLUCOMTR-MCNC: 85 MG/DL — SIGNIFICANT CHANGE UP (ref 70–99)
GLUCOSE BLDC GLUCOMTR-MCNC: 89 MG/DL — SIGNIFICANT CHANGE UP (ref 70–99)
GLUCOSE BLDC GLUCOMTR-MCNC: 94 MG/DL — SIGNIFICANT CHANGE UP (ref 70–99)
GLUCOSE BLDC GLUCOMTR-MCNC: 95 MG/DL — SIGNIFICANT CHANGE UP (ref 70–99)
GLUCOSE SERPL-MCNC: 96 MG/DL — SIGNIFICANT CHANGE UP (ref 70–99)
HCT VFR BLD CALC: 23 % — LOW (ref 39–50)
HGB BLD-MCNC: 7.1 G/DL — LOW (ref 13–17)
MAGNESIUM SERPL-MCNC: 2.1 MG/DL — SIGNIFICANT CHANGE UP (ref 1.6–2.6)
MCHC RBC-ENTMCNC: 26 PG — LOW (ref 27–34)
MCHC RBC-ENTMCNC: 30.9 GM/DL — LOW (ref 32–36)
MCV RBC AUTO: 84.2 FL — SIGNIFICANT CHANGE UP (ref 80–100)
NRBC # BLD: 0 /100 WBCS — SIGNIFICANT CHANGE UP (ref 0–0)
PHOSPHATE SERPL-MCNC: 3.1 MG/DL — SIGNIFICANT CHANGE UP (ref 2.5–4.5)
PLATELET # BLD AUTO: 142 K/UL — LOW (ref 150–400)
POTASSIUM SERPL-MCNC: 4 MMOL/L — SIGNIFICANT CHANGE UP (ref 3.5–5.3)
POTASSIUM SERPL-SCNC: 4 MMOL/L — SIGNIFICANT CHANGE UP (ref 3.5–5.3)
PROT SERPL-MCNC: 4.9 G/DL — LOW (ref 6–8.3)
RBC # BLD: 2.73 M/UL — LOW (ref 4.2–5.8)
RBC # FLD: 20 % — HIGH (ref 10.3–14.5)
SODIUM SERPL-SCNC: 136 MMOL/L — SIGNIFICANT CHANGE UP (ref 135–145)
WBC # BLD: 9.3 K/UL — SIGNIFICANT CHANGE UP (ref 3.8–10.5)
WBC # FLD AUTO: 9.3 K/UL — SIGNIFICANT CHANGE UP (ref 3.8–10.5)

## 2023-09-03 PROCEDURE — 99233 SBSQ HOSP IP/OBS HIGH 50: CPT

## 2023-09-03 RX ORDER — SODIUM CHLORIDE 9 MG/ML
1000 INJECTION INTRAMUSCULAR; INTRAVENOUS; SUBCUTANEOUS
Refills: 0 | Status: DISCONTINUED | OUTPATIENT
Start: 2023-09-03 | End: 2023-09-05

## 2023-09-03 RX ADMIN — SODIUM CHLORIDE 60 MILLILITER(S): 9 INJECTION INTRAMUSCULAR; INTRAVENOUS; SUBCUTANEOUS at 17:06

## 2023-09-03 RX ADMIN — Medication 57.5 MILLIGRAM(S): at 06:43

## 2023-09-03 RX ADMIN — CHLORHEXIDINE GLUCONATE 1 APPLICATION(S): 213 SOLUTION TOPICAL at 06:43

## 2023-09-03 RX ADMIN — Medication 57.5 MILLIGRAM(S): at 17:14

## 2023-09-03 RX ADMIN — HEPARIN SODIUM 5000 UNIT(S): 5000 INJECTION INTRAVENOUS; SUBCUTANEOUS at 06:43

## 2023-09-03 RX ADMIN — HEPARIN SODIUM 5000 UNIT(S): 5000 INJECTION INTRAVENOUS; SUBCUTANEOUS at 17:13

## 2023-09-03 NOTE — PROGRESS NOTE ADULT - SUBJECTIVE AND OBJECTIVE BOX
[facility]  Patient is a 68y old  Male who presents with a chief complaint of AMS (02 Sep 2023 08:01)      SUBJECTIVE / OVERNIGHT EVENTS:  Continues to be minimally responsive even to painful stimuli, Opens eyes occasionally. Agonal breathing pattern.      MEDICATIONS  (STANDING):  chlorhexidine 2% Cloths 1 Application(s) Topical <User Schedule>  dextrose 5% + sodium chloride 0.9%. 1000 milliLiter(s) (60 mL/Hr) IV Continuous <Continuous>  heparin   Injectable 5000 Unit(s) SubCutaneous every 12 hours  valproate sodium  IVPB 750 milliGRAM(s) IV Intermittent two times a day    MEDICATIONS  (PRN):  glycopyrrolate Injectable 0.4 milliGRAM(s) IV Push every 4 hours PRN secretions          OBJECTIVE:  Vital Signs Last 24 Hrs  T(C): 36.6 (03 Sep 2023 05:24), Max: 36.9 (02 Sep 2023 15:29)  T(F): 97.8 (03 Sep 2023 05:24), Max: 98.4 (02 Sep 2023 15:29)  HR: 64 (03 Sep 2023 05:24) (64 - 77)  BP: 137/76 (03 Sep 2023 05:24) (112/68 - 137/76)  BP(mean): --  RR: 16 (03 Sep 2023 05:24) (16 - 17)  SpO2: 100% (03 Sep 2023 05:24) (94% - 100%)    Parameters below as of 03 Sep 2023 05:24  Patient On (Oxygen Delivery Method): nasal cannula  O2 Flow (L/min): 2    PHYSICAL EXAM:  GENERAL: elderly male lying in bed  HEAD:  Atraumatic, Normocephalic  EYES: conjunctiva and sclera clear  NECK: Supple, No JVD  CHEST/LUNG: agonal breathing pattern, occasional apneic events, Clear to auscultation bilaterally; No wheeze  HEART: Regular rate and rhythm; No murmurs, rubs, or gallops  ABDOMEN: Soft, Nontender, Nondistended; Bowel sounds present  EXTREMITIES:  No clubbing, cyanosis, or edema  PSYCH: AAOx0  NEUROLOGY: non-focal, does not respond to verbal, tactile and painful stimuli  SKIN: No rashes or lesions    CAPILLARY BLOOD GLUCOSE      POCT Blood Glucose.: 85 mg/dL (03 Sep 2023 12:02)  POCT Blood Glucose.: 95 mg/dL (03 Sep 2023 08:20)  POCT Blood Glucose.: 95 mg/dL (02 Sep 2023 17:14)    I&O's Summary    02 Sep 2023 07:01  -  03 Sep 2023 07:00  --------------------------------------------------------  IN: 0 mL / OUT: 400 mL / NET: -400 mL              LABS:                        7.1    9.30  )-----------( 142      ( 03 Sep 2023 08:08 )             23.0     09-03    136  |  103  |  19<H>  ----------------------------<  96  4.0   |  28  |  1.37<H>    Ca    8.2<L>      03 Sep 2023 08:08  Phos  3.1     09-03  Mg     2.1     09-03    TPro  4.9<L>  /  Alb  1.6<L>  /  TBili  0.1<L>  /  DBili  x   /  AST  13  /  ALT  7<L>  /  AlkPhos  51  09-03          Urinalysis Basic - ( 03 Sep 2023 08:08 )    Color: x / Appearance: x / SG: x / pH: x  Gluc: 96 mg/dL / Ketone: x  / Bili: x / Urobili: x   Blood: x / Protein: x / Nitrite: x   Leuk Esterase: x / RBC: x / WBC x   Sq Epi: x / Non Sq Epi: x / Bacteria: x            RADIOLOGY & ADDITIONAL TESTS:

## 2023-09-03 NOTE — PROGRESS NOTE ADULT - ASSESSMENT
68 year old man admitted from home hospice because of altered mental status and dehydration.  Patient was on home hospice, but family has asked for more aggressive treatment. No fevers, no emesis or diarrhea.   PMH advanced dementia, gastric cancer; Oncology assessed that chemotherapy would not benefit because of overall poor functional status.           multiple cardiovascular diseases - DM, CVA, HTN, HLD, CKD, hx of chronic indwelling catheter.           Discharged from [facility] about a week ago after management for suspected breakthrough seizure with home VPA dose increased to 750mg BID.    # Acute respiratory failure with hypoxia, atelectasis vs PE vs aspiration  # Acute encephalopathy   # Fluid responsive septic shock   # NEVILLE on CKD  # anemia, chronic disease  # gastric cancer. possible CNS metastases as the cause of altered mental status.   # s/p CVA  # hx seizure  According to family's wishes will treat for sepsis, pneumonia   - Patient appears to be continuing to aspirate with agonal breathing, minimal responsive, occasionally opens eyes   -Completed IV abx today  - Monitor mental status - no improvement  - Will monitor Anemia, appears stable at this time, no signs of bleeding  - Supplemental oxygen to keep SaO2 > 93%- titrate down as tolerated  - Start IVF hydration per family request  - Fall and aspiration precaution  - dysphagia diet  Palliative/Hospice care consult, appreciated     - Family concerned about taking care of patient at home, interested in possible LTC with hospice vs transfer to [place]    - [place] transfer center aware that family may want to transfer, Reports no medical necessity for transfer but will potentially take patient if family wants to. Pending insurance auth/info as unlikely transportation will be covered by insurance. Family to discuss

## 2023-09-04 LAB
ALBUMIN SERPL ELPH-MCNC: 1.7 G/DL — LOW (ref 3.5–5)
ALP SERPL-CCNC: 54 U/L — SIGNIFICANT CHANGE UP (ref 40–120)
ALT FLD-CCNC: 7 U/L DA — LOW (ref 10–60)
ANION GAP SERPL CALC-SCNC: 5 MMOL/L — SIGNIFICANT CHANGE UP (ref 5–17)
AST SERPL-CCNC: 11 U/L — SIGNIFICANT CHANGE UP (ref 10–40)
BILIRUB SERPL-MCNC: 0.2 MG/DL — SIGNIFICANT CHANGE UP (ref 0.2–1.2)
BUN SERPL-MCNC: 24 MG/DL — HIGH (ref 7–18)
CALCIUM SERPL-MCNC: 8.8 MG/DL — SIGNIFICANT CHANGE UP (ref 8.4–10.5)
CHLORIDE SERPL-SCNC: 105 MMOL/L — SIGNIFICANT CHANGE UP (ref 96–108)
CO2 SERPL-SCNC: 28 MMOL/L — SIGNIFICANT CHANGE UP (ref 22–31)
CREAT SERPL-MCNC: 1.22 MG/DL — SIGNIFICANT CHANGE UP (ref 0.5–1.3)
EGFR: 65 ML/MIN/1.73M2 — SIGNIFICANT CHANGE UP
GLUCOSE BLDC GLUCOMTR-MCNC: 61 MG/DL — LOW (ref 70–99)
GLUCOSE BLDC GLUCOMTR-MCNC: 62 MG/DL — LOW (ref 70–99)
GLUCOSE BLDC GLUCOMTR-MCNC: 69 MG/DL — LOW (ref 70–99)
GLUCOSE BLDC GLUCOMTR-MCNC: 69 MG/DL — LOW (ref 70–99)
GLUCOSE SERPL-MCNC: 69 MG/DL — LOW (ref 70–99)
HCT VFR BLD CALC: 23.4 % — LOW (ref 39–50)
HGB BLD-MCNC: 7.1 G/DL — LOW (ref 13–17)
MAGNESIUM SERPL-MCNC: 1.9 MG/DL — SIGNIFICANT CHANGE UP (ref 1.6–2.6)
MCHC RBC-ENTMCNC: 25.7 PG — LOW (ref 27–34)
MCHC RBC-ENTMCNC: 30.3 GM/DL — LOW (ref 32–36)
MCV RBC AUTO: 84.8 FL — SIGNIFICANT CHANGE UP (ref 80–100)
NRBC # BLD: 0 /100 WBCS — SIGNIFICANT CHANGE UP (ref 0–0)
PHOSPHATE SERPL-MCNC: 2.5 MG/DL — SIGNIFICANT CHANGE UP (ref 2.5–4.5)
PLATELET # BLD AUTO: 171 K/UL — SIGNIFICANT CHANGE UP (ref 150–400)
POTASSIUM SERPL-MCNC: 4.3 MMOL/L — SIGNIFICANT CHANGE UP (ref 3.5–5.3)
POTASSIUM SERPL-SCNC: 4.3 MMOL/L — SIGNIFICANT CHANGE UP (ref 3.5–5.3)
PROT SERPL-MCNC: 5.2 G/DL — LOW (ref 6–8.3)
RBC # BLD: 2.76 M/UL — LOW (ref 4.2–5.8)
RBC # FLD: 19.8 % — HIGH (ref 10.3–14.5)
SODIUM SERPL-SCNC: 138 MMOL/L — SIGNIFICANT CHANGE UP (ref 135–145)
WBC # BLD: 8.18 K/UL — SIGNIFICANT CHANGE UP (ref 3.8–10.5)
WBC # FLD AUTO: 8.18 K/UL — SIGNIFICANT CHANGE UP (ref 3.8–10.5)

## 2023-09-04 PROCEDURE — 99497 ADVNCD CARE PLAN 30 MIN: CPT

## 2023-09-04 PROCEDURE — 99233 SBSQ HOSP IP/OBS HIGH 50: CPT | Mod: GC

## 2023-09-04 RX ADMIN — Medication 57.5 MILLIGRAM(S): at 06:00

## 2023-09-04 RX ADMIN — SODIUM CHLORIDE 60 MILLILITER(S): 9 INJECTION INTRAMUSCULAR; INTRAVENOUS; SUBCUTANEOUS at 09:02

## 2023-09-04 RX ADMIN — CHLORHEXIDINE GLUCONATE 1 APPLICATION(S): 213 SOLUTION TOPICAL at 06:00

## 2023-09-04 RX ADMIN — SODIUM CHLORIDE 60 MILLILITER(S): 9 INJECTION INTRAMUSCULAR; INTRAVENOUS; SUBCUTANEOUS at 06:01

## 2023-09-04 RX ADMIN — HEPARIN SODIUM 5000 UNIT(S): 5000 INJECTION INTRAVENOUS; SUBCUTANEOUS at 06:00

## 2023-09-04 RX ADMIN — HEPARIN SODIUM 5000 UNIT(S): 5000 INJECTION INTRAVENOUS; SUBCUTANEOUS at 18:06

## 2023-09-04 RX ADMIN — Medication 57.5 MILLIGRAM(S): at 18:03

## 2023-09-04 NOTE — PROGRESS NOTE ADULT - PROBLEM SELECTOR PLAN 5
- Continue to monitor hb, downtrending 7.1 > 6.7->6.8-> 6.4->6.2-> 7.1  - it is baseline  - will continue to monitor cbc but will most likely no transfuse as it will not help the patient improve his mental status or condition.

## 2023-09-04 NOTE — PROGRESS NOTE ADULT - PROBLEM SELECTOR PLAN 1
- Decreased NC 5 L to 3 L today   - CXR concerning for RLL infiltrate.  - pt diagnosed with aspiration pneumonia   -placement for long term hospice care vs. transfer to [facility] for a second opinion

## 2023-09-04 NOTE — PROGRESS NOTE ADULT - SUBJECTIVE AND OBJECTIVE BOX
PGY-1 Progress Note discussed with attending    PAGER #: [[phone]] TILL 5:00 PM  PLEASE CONTACT ON CALL TEAM:  - On Call Team (Please refer to Art) FROM 5:00 PM - 8:30PM  - Nightfloat Team FROM 8:30 -7:30 AM    CHIEF COMPLAINT & BRIEF HOSPITAL COURSE: No acute overnight event. No change in baseline at bedside. Short apenic breath.     INTERVAL HPI/OVERNIGHT EVENTS:   MEDICATIONS  (STANDING):  azithromycin  IVPB 500 milliGRAM(s) IV Intermittent every 24 hours  cefepime   IVPB 2000 milliGRAM(s) IV Intermittent every 12 hours  chlorhexidine 2% Cloths 1 Application(s) Topical <User Schedule>  heparin   Injectable 5000 Unit(s) SubCutaneous every 12 hours  lactated ringers. 1000 milliLiter(s) (100 mL/Hr) IV Continuous <Continuous>  valproate sodium  IVPB 750 milliGRAM(s) IV Intermittent two times a day    MEDICATIONS  (PRN):  glycopyrrolate Injectable 0.4 milliGRAM(s) IV Push every 4 hours PRN secretions      Vital Signs Last 24 Hrs    T(C): 36.7 (30 Aug 2023 05:31), Max: 36.7 (30 Aug 2023 05:31)  T(F): 98 (30 Aug 2023 05:31), Max: 98 (30 Aug 2023 05:31)  HR: 72 (30 Aug 2023 05:31) (72 - 85)  BP: 128/72 (30 Aug 2023 05:31) (125/71 - 174/84)  BP(mean): --  RR: 18 (30 Aug 2023 05:31) (17 - 18)  SpO2: 100% (30 Aug 2023 05:31) (96% - 100%)    Parameters below as of 30 Aug 2023 05:31  Patient On (Oxygen Delivery Method): nasal cannula  O2 Flow (L/min): 5      PHYSICAL EXAMINATION:  GENERAL: NAD, well built, lethargic   HEAD:  Atraumatic, Normocephalic  EYES:  conjunctiva and sclera clear  CHEST/LUNG: Short shallow breaths, aspirated breathing, diffuse crackles all lobes  HEART: Regular rate and rhythm; No murmurs, rubs, or gallops  ABDOMEN: Soft, Nontender, Nondistended; Bowel sounds present  NERVOUS SYSTEM:  Alert & Oriented x0, slow pupillary reflex  EXTREMITIES:  2+ Peripheral Pulses, No clubbing, cyanosis, or edema  SKIN: warm dry, hands/feet swelling                           7.1    19.56 )-----------( 115      ( 29 Aug 2023 14:49 )             23.1     08-29    137  |  104  |  21<H>  ----------------------------<  123<H>  5.0   |  29  |  1.30    Ca    9.0      29 Aug 2023 10:24  Phos  2.7     08-29  Mg     1.8     08-29    TPro  4.9<L>  /  Alb  1.6<L>  /  TBili  0.2  /  DBili  x   /  AST  11  /  ALT  8<L>  /  AlkPhos  42  08-29    LIVER FUNCTIONS - ( 29 Aug 2023 10:24 )  Alb: 1.6 g/dL / Pro: 4.9 g/dL / ALK PHOS: 42 U/L / ALT: 8 U/L DA / AST: 11 U/L / GGT: x                   CAPILLARY BLOOD GLUCOSE      RADIOLOGY & ADDITIONAL TESTS:

## 2023-09-04 NOTE — PROGRESS NOTE ADULT - ATTENDING COMMENTS
68 year old man admitted from home hospice because of altered mental status and dehydration.  Patient was on home hospice, but family has asked for more aggressive treatment. No fevers, no emesis or diarrhea.   PMH advanced dementia, gastric cancer; Oncology assessed that chemotherapy would not benefit because of overall poor functional status.           multiple cardiovascular diseases - DM, CVA, HTN, HLD, CKD, hx of chronic indwelling catheter.           Discharged from [facility] about a week ago after management for suspected breakthrough seizure with home VPA dose increased to 750mg BID.    # Acute respiratory failure with hypoxia, atelectasis vs PE vs aspiration  # Acute encephalopathy   # Fluid responsive septic shock   # NEVILLE on CKD  # anemia, chronic disease  # gastric cancer. possible CNS metastases as the cause of altered mental status.   # s/p CVA  # hx seizure  According to family's wishes treated for sepsis, pneumonia   - Patient appears to be continuing to aspirate with agonal breathing, minimal responsive, occasionally opens eyes   -Completed IV abx  - Monitor mental status - no improvement  - Will monitor Anemia, appears stable at this time, no signs of bleeding  - Supplemental oxygen to keep SaO2 > 93%- titrate down as tolerated  - c/w IVF hydration per family request  - Fall and aspiration precaution  - dysphagia diet  Palliative/Hospice care consult, appreciated     - Family concerned about taking care of patient at home and does not seem to be considering hospice at this time. Family reports that they will like to continue medical treatment and would like to see if patient will wake up.    - Discussed severe prognosis and length with multiple family members for past week. Discussed how medically treated reversible causes of his AMS, including aspiration pneumonia with no significant improvement of mental status. Family is worried about patient's nutritional status. Discussed at length and multiple times how risk outweighs benefit of TPN or PEG tube nutrition. Patient appears to be microaspirating and unable to clear his airway. Discussed risk of enteral feeding and increased aspiration risk. Discussed TPN will likely have minimal benefit for patient's clinical status and will likely only cause more medical/harmful complications and suffering such as possible thrombosis and new infections. Family would like to look into other options for nutrition and requesting transfer to [place].    - [place] transfer center aware that family requesting to transfer, Reports no medical necessity for transfer but will potentially take patient if family wants to. Pending insurance auth/info as unlikely transportation will be covered by insurance per [facility].

## 2023-09-04 NOTE — GOALS OF CARE CONVERSATION - ADVANCED CARE PLANNING - CONVERSATION DETAILS
PC SW and NP met with pt's son Dipika with his other son Mary Dan on the phone. Pt's family inquired about pt receiving TPN. PC NP provided education on TPN and explained that pt is not a candidate for TPN at this time. PC team provided education around the risks and benefits of comfort feeds. Pt's family stated that they want pt to receive more than comfort feeds and that they do not want hospice, as they have had a bad experience with hospice previously. Pt's family reported that their goals are not in line with hospice, as they want to continue to offer pt IV fluids, regular blood transfusions, and more aggressive treatment options. Pt's family expressed desires to continue with conservative medical management, as they are not ready for the pt's medical care to focus on comfort at this time. Pt's son Mary Barrera expressed feelings of frustration around the pt not being offered TPN "for a few days." Pt's son explained that he is in the process of getting the pt transferred to another hospital at this time. Pt's family reported having no psychosocial needs at this time, but agreed to reach out as needed.

## 2023-09-05 DIAGNOSIS — N17.9 ACUTE KIDNEY FAILURE, UNSPECIFIED: ICD-10-CM

## 2023-09-05 LAB
ALBUMIN SERPL ELPH-MCNC: 1.7 G/DL — LOW (ref 3.5–5)
ALP SERPL-CCNC: 55 U/L — SIGNIFICANT CHANGE UP (ref 40–120)
ALT FLD-CCNC: 8 U/L DA — LOW (ref 10–60)
ANION GAP SERPL CALC-SCNC: 8 MMOL/L — SIGNIFICANT CHANGE UP (ref 5–17)
ANISOCYTOSIS BLD QL: SLIGHT — SIGNIFICANT CHANGE UP
AST SERPL-CCNC: 13 U/L — SIGNIFICANT CHANGE UP (ref 10–40)
BILIRUB SERPL-MCNC: 0.3 MG/DL — SIGNIFICANT CHANGE UP (ref 0.2–1.2)
BLD GP AB SCN SERPL QL: SIGNIFICANT CHANGE UP
BUN SERPL-MCNC: 24 MG/DL — HIGH (ref 7–18)
CALCIUM SERPL-MCNC: 8.9 MG/DL — SIGNIFICANT CHANGE UP (ref 8.4–10.5)
CHLORIDE SERPL-SCNC: 105 MMOL/L — SIGNIFICANT CHANGE UP (ref 96–108)
CO2 SERPL-SCNC: 26 MMOL/L — SIGNIFICANT CHANGE UP (ref 22–31)
CREAT SERPL-MCNC: 1.27 MG/DL — SIGNIFICANT CHANGE UP (ref 0.5–1.3)
DACRYOCYTES BLD QL SMEAR: SLIGHT — SIGNIFICANT CHANGE UP
EGFR: 62 ML/MIN/1.73M2 — SIGNIFICANT CHANGE UP
ELLIPTOCYTES BLD QL SMEAR: SLIGHT — SIGNIFICANT CHANGE UP
GLUCOSE BLDC GLUCOMTR-MCNC: 59 MG/DL — LOW (ref 70–99)
GLUCOSE BLDC GLUCOMTR-MCNC: 75 MG/DL — SIGNIFICANT CHANGE UP (ref 70–99)
GLUCOSE BLDC GLUCOMTR-MCNC: 84 MG/DL — SIGNIFICANT CHANGE UP (ref 70–99)
GLUCOSE BLDC GLUCOMTR-MCNC: 99 MG/DL — SIGNIFICANT CHANGE UP (ref 70–99)
GLUCOSE SERPL-MCNC: 51 MG/DL — CRITICAL LOW (ref 70–99)
HCT VFR BLD CALC: 21.4 % — LOW (ref 39–50)
HGB BLD-MCNC: 6.5 G/DL — CRITICAL LOW (ref 13–17)
HYPOCHROMIA BLD QL: SLIGHT — SIGNIFICANT CHANGE UP
MAGNESIUM SERPL-MCNC: 1.9 MG/DL — SIGNIFICANT CHANGE UP (ref 1.6–2.6)
MANUAL SMEAR VERIFICATION: SIGNIFICANT CHANGE UP
MCHC RBC-ENTMCNC: 26 PG — LOW (ref 27–34)
MCHC RBC-ENTMCNC: 30.4 GM/DL — LOW (ref 32–36)
MCV RBC AUTO: 85.6 FL — SIGNIFICANT CHANGE UP (ref 80–100)
NRBC # BLD: 0 /100 WBCS — SIGNIFICANT CHANGE UP (ref 0–0)
PHOSPHATE SERPL-MCNC: 2.3 MG/DL — LOW (ref 2.5–4.5)
PLAT MORPH BLD: NORMAL — SIGNIFICANT CHANGE UP
PLATELET # BLD AUTO: 183 K/UL — SIGNIFICANT CHANGE UP (ref 150–400)
POIKILOCYTOSIS BLD QL AUTO: SLIGHT — SIGNIFICANT CHANGE UP
POLYCHROMASIA BLD QL SMEAR: SLIGHT — SIGNIFICANT CHANGE UP
POTASSIUM SERPL-MCNC: 4.2 MMOL/L — SIGNIFICANT CHANGE UP (ref 3.5–5.3)
POTASSIUM SERPL-SCNC: 4.2 MMOL/L — SIGNIFICANT CHANGE UP (ref 3.5–5.3)
PROT SERPL-MCNC: 4.9 G/DL — LOW (ref 6–8.3)
RBC # BLD: 2.5 M/UL — LOW (ref 4.2–5.8)
RBC # FLD: 19.9 % — HIGH (ref 10.3–14.5)
RBC BLD AUTO: ABNORMAL
SCHISTOCYTES BLD QL AUTO: SLIGHT — SIGNIFICANT CHANGE UP
SODIUM SERPL-SCNC: 139 MMOL/L — SIGNIFICANT CHANGE UP (ref 135–145)
WBC # BLD: 7.33 K/UL — SIGNIFICANT CHANGE UP (ref 3.8–10.5)
WBC # FLD AUTO: 7.33 K/UL — SIGNIFICANT CHANGE UP (ref 3.8–10.5)

## 2023-09-05 PROCEDURE — 99233 SBSQ HOSP IP/OBS HIGH 50: CPT | Mod: GC

## 2023-09-05 PROCEDURE — 99253 IP/OBS CNSLTJ NEW/EST LOW 45: CPT

## 2023-09-05 RX ORDER — SODIUM CHLORIDE 9 MG/ML
1000 INJECTION, SOLUTION INTRAVENOUS
Refills: 0 | Status: DISCONTINUED | OUTPATIENT
Start: 2023-09-05 | End: 2023-09-06

## 2023-09-05 RX ADMIN — SODIUM CHLORIDE 80 MILLILITER(S): 9 INJECTION, SOLUTION INTRAVENOUS at 14:48

## 2023-09-05 RX ADMIN — HEPARIN SODIUM 5000 UNIT(S): 5000 INJECTION INTRAVENOUS; SUBCUTANEOUS at 05:09

## 2023-09-05 RX ADMIN — CHLORHEXIDINE GLUCONATE 1 APPLICATION(S): 213 SOLUTION TOPICAL at 05:14

## 2023-09-05 RX ADMIN — Medication 62.5 MILLIMOLE(S): at 10:30

## 2023-09-05 RX ADMIN — Medication 57.5 MILLIGRAM(S): at 18:48

## 2023-09-05 RX ADMIN — HEPARIN SODIUM 5000 UNIT(S): 5000 INJECTION INTRAVENOUS; SUBCUTANEOUS at 18:49

## 2023-09-05 RX ADMIN — Medication 57.5 MILLIGRAM(S): at 05:09

## 2023-09-05 NOTE — CONSULT NOTE ADULT - ASSESSMENT
68 year old male with gastric cancer not a candidate for surgical resection or chemo, hospice appropriate, [facility] Dementia.   Family requesting a mode for nutrition and does not want hospice care at this time    - patient poor surgical candidate, doubt that risks of procedure outweigh the benefits  - recommend further goals of care discussion and family discussion   - patient would require clearance for the OR if deemed appropriate intervention   - continue medical management  - will follow   - discussed with Dr. Penny

## 2023-09-05 NOTE — PROGRESS NOTE ADULT - PROBLEM SELECTOR PLAN 3
pt. is ANOx 0.    pleasure feeding upon alertness pt. is ANOx 0.    pleasure feeding upon alertness  family wants to consider J tube for feeding

## 2023-09-05 NOTE — CONSULT NOTE ADULT - SUBJECTIVE AND OBJECTIVE BOX
HPI:  68 year old man with advanced dementia (years), gastric cancer w/ local metastatic lymph nodes (not surgical or chemo candidate), seizure disorder, HTN, HLD, DM, CVA x2 (last 2013), CKD (Baseline Cr 1.5-1.8), chronic bangura (exchanged in ED 8/17/23). Discharged from [facility] about a week ago after management for suspected breakthrough seizure with home VPA dose increased to 750mg BID. At [facility], susan decided on pleasure feeds and home hospice however still wants pt to be full code. Pt was brought in today from home hospice with acute alteration from his baseline mental status with decreased responsiveness associated with decreased oral intake. No fevers, no emesis or diarrhea. ROS not possible with mental status. Spoke with family at length about patient's prognosis, however, family still wants pt to be full code.     Patient seen and examined at bedside for possible Jejunostomy tube placement. Patient lethargic and not able to provide history at time of exam.      (27 Aug 2023 21:59)    PAST MEDICAL & SURGICAL HISTORY:  DM (diabetes mellitus)  Seizure  CVA (cerebrovascular accident)  HLD (hyperlipidemia)  CKD (chronic kidney disease)  Dementia  Gastric cancer    Review of Systems: unable to obtain     MEDICATIONS  (STANDING):  chlorhexidine 2% Cloths 1 Application(s) Topical <User Schedule>  dextrose 5% + lactated ringers. 1000 milliLiter(s) (80 mL/Hr) IV Continuous <Continuous>  heparin   Injectable 5000 Unit(s) SubCutaneous every 12 hours  valproate sodium  IVPB 750 milliGRAM(s) IV Intermittent two times a day    MEDICATIONS  (PRN):  glycopyrrolate Injectable 0.4 milliGRAM(s) IV Push every 4 hours PRN secretions    FAMILY HISTORY:    Vital Signs Last 24 Hrs  T(C): 36.3 (05 Sep 2023 14:51), Max: 36.8 (04 Sep 2023 21:16)  T(F): 97.4 (05 Sep 2023 14:51), Max: 98.2 (04 Sep 2023 21:16)  HR: 63 (05 Sep 2023 14:51) (63 - 81)  BP: 148/74 (05 Sep 2023 14:51) (118/54 - 148/74)  RR: 17 (05 Sep 2023 14:51) (17 - 17)  SpO2: 100% (05 Sep 2023 14:51) (90% - 100%)  Parameters below as of 05 Sep 2023 14:51  Patient On (Oxygen Delivery Method): nasal cannula  O2 Flow (L/min): 2    Physical Exam:  General:  lethargic, NAD  Eyes: EOMI  HENT:  WNL, no JVD  Chest: respirations nonlabored  Cardiovascular:  Regular rate & rhythm  Abdomen: soft, ND   Extremities: no edema bilaterally  Skin: warm and dry  Musculoskeletal: no calf tenderness  Neuro:  lethargic   Psych: lethargic     LABS:                        6.5    7.33  )-----------( 183      ( 05 Sep 2023 07:00 )             21.4     09-05    139  |  105  |  24<H>  ----------------------------<  51<LL>  4.2   |  26  |  1.27    Ca    8.9      05 Sep 2023 07:00  Phos  2.3     09-05  Mg     1.9     09-05    TPro  4.9<L>  /  Alb  1.7<L>  /  TBili  0.3  /  DBili  x   /  AST  13  /  ALT  8<L>  /  AlkPhos  55  09-05    Urinalysis Basic - ( 05 Sep 2023 07:00 )    Color: x / Appearance: x / SG: x / pH: x  Gluc: 51 mg/dL / Ketone: x  / Bili: x / Urobili: x   Blood: x / Protein: x / Nitrite: x   Leuk Esterase: x / RBC: x / WBC x   Sq Epi: x / Non Sq Epi: x / Bacteria: x    RADIOLOGY & ADDITIONAL STUDIES:  < from: CT Abdomen and Pelvis w/ IV Cont (07.15.23 @ 11:55) >  IMPRESSION:  Gastric pyloric mass, suspect adenocarcinoma.  Bulky local necrotic metastatic adenopathy.  No distant metastatic disease.  Retained secretions in upper trachea.  Trace bilateral pleural effusions.    --- End of Report ---    TAMMY GUTIERREZ MD; Resident Radiologist  This document has been electronically signed.  ANUP HOLLINS MD; Attending Radiologist  This document has been electronically signed. Jul 15 2023  3:47PM    < end of copied text >

## 2023-09-05 NOTE — CONSULT NOTE ADULT - NSCONSULTADDITIONALINFOA_GEN_ALL_CORE
Had a long d/w the family  Discussed about his terminal condition and the mental status    Doing a jejunostomy in this setting is not advisable  They said they will discuss and will get back

## 2023-09-05 NOTE — PROGRESS NOTE ADULT - NUTRITIONAL ASSESSMENT
This patient has been assessed with a concern for Malnutrition and has been determined to have a diagnosis/diagnoses of Severe protein-calorie malnutrition and Underweight (BMI < 19).    This patient is being managed with:   Diet Pureed-  Supplement Feeding Modality:  Oral  Ensure Enlive Cans or Servings Per Day:  1       Frequency:  Three Times a day  Entered: Aug 30 2023  2:55PM    Diet Pureed-  Entered: Aug 29 2023  2:22PM    The following pending diet order is being considered for treatment of Severe protein-calorie malnutrition and Underweight (BMI < 19):null This patient has been assessed with a concern for Malnutrition and has been determined to have a diagnosis/diagnoses of Severe protein-calorie malnutrition and Underweight (BMI < 19).

## 2023-09-05 NOTE — PROGRESS NOTE ADULT - SUBJECTIVE AND OBJECTIVE BOX
PGY-1 Progress Note discussed with attending    PAGER #: [phone]] TILL 5:00 PM  PLEASE CONTACT ON CALL TEAM:  - On Call Team (Please refer to Art) FROM 5:00 PM - 8:30PM  - Nightfloat Team FROM 8:30 -7:30 AM    CHIEF COMPLAINT & BRIEF HOSPITAL COURSE: No acute overnight events. Pt. is at his baseline. Possible transfer to [place] for a second opinion.      INTERVAL HPI/OVERNIGHT EVENTS:   MEDICATIONS  (STANDING):  chlorhexidine 2% Cloths 1 Application(s) Topical <User Schedule>  dextrose 5% + lactated ringers. 1000 milliLiter(s) (80 mL/Hr) IV Continuous <Continuous>  heparin   Injectable 5000 Unit(s) SubCutaneous every 12 hours  valproate sodium  IVPB 750 milliGRAM(s) IV Intermittent two times a day    MEDICATIONS  (PRN):  glycopyrrolate Injectable 0.4 milliGRAM(s) IV Push every 4 hours PRN secretions    Vital Signs Last 24 Hrs  T(C): 36.6 (05 Sep 2023 05:39), Max: 36.8 (04 Sep 2023 21:16)  T(F): 97.9 (05 Sep 2023 05:39), Max: 98.2 (04 Sep 2023 21:16)  HR: 73 (05 Sep 2023 05:39) (73 - 81)  BP: 121/66 (05 Sep 2023 05:39) (118/54 - 121/66)  BP(mean): --  RR: 17 (05 Sep 2023 05:39) (17 - 17)  SpO2: 90% (05 Sep 2023 05:39) (90% - 96%)    Parameters below as of 05 Sep 2023 05:39  Patient On (Oxygen Delivery Method): nasal cannula  O2 Flow (L/min): 2      PHYSICAL EXAMINATION:  GENERAL: NAD, well built  HEAD:  Atraumatic, Normocephalic  EYES:  conjunctiva and sclera clear  CHEST/LUNG: +agonal breath.   HEART: Regular rate and rhythm; No murmurs, rubs, or gallops  ABDOMEN: Soft, Nontender, Nondistended; Bowel sounds present  NERVOUS SYSTEM:  Alert & Oriented X0,    EXTREMITIES:  2+ Peripheral Pulses, No clubbing, cyanosis, or edema  SKIN: warm dry                          6.5    7.33  )-----------( 183      ( 05 Sep 2023 07:00 )             21.4     09-05    139  |  105  |  24<H>  ----------------------------<  51<LL>  4.2   |  26  |  1.27    Ca    8.9      05 Sep 2023 07:00  Phos  2.3     09-05  Mg     1.9     09-05    TPro  4.9<L>  /  Alb  1.7<L>  /  TBili  0.3  /  DBili  x   /  AST  13  /  ALT  8<L>  /  AlkPhos  55  09-05    LIVER FUNCTIONS - ( 05 Sep 2023 07:00 )  Alb: 1.7 g/dL / Pro: 4.9 g/dL / ALK PHOS: 55 U/L / ALT: 8 U/L DA / AST: 13 U/L / GGT: x                   CAPILLARY BLOOD GLUCOSE      RADIOLOGY & ADDITIONAL TESTS:

## 2023-09-05 NOTE — PROGRESS NOTE ADULT - ATTENDING COMMENTS
Patient seen and examined with Son Patricia at bedside.       68 year old man admitted from home hospice because of altered mental status and dehydration.  Patient was on home hospice, but family has asked for more aggressive treatment especially Nutrition. Patient with Progressive Dementia over the past year to 1.5 yrs as per Son although was able to ambulate with assistance was in outside Hospitals at [place] and later in [facility] diagnosed with Gastric cancer last month.  PMH advanced dementia, gastric cancer; Oncology assessed that chemotherapy would not benefit because of overall poor functional status.   Patient also with multiple cardiovascular diseases - DM, CVA, HTN, HLD, CKD, hx of chronic indwelling catheter. Discharged from [facility] about a week ago after management for suspected breakthrough seizure with home VPA dose increased to 750mg BID. Discharged to home Hospice but family was not happy with care     Patient seen by Palliative/ Hospice team and appropriate for Hospice but Family is concerned about Nutrition and wishes patient to get TPN. Also requested Transfer to [facility]. Patient is obtunded and can only open eyes with verbal stimulation.     Vital Signs Last 24 Hrs  T(C): 36.3 (05 Sep 2023 14:51), Max: 36.8 (04 Sep 2023 21:16)  T(F): 97.4 (05 Sep 2023 14:51), Max: 98.2 (04 Sep 2023 21:16)  HR: 63 (05 Sep 2023 14:51) (63 - 81)  BP: 148/74 (05 Sep 2023 14:51) (118/54 - 148/74)  RR: 17 (05 Sep 2023 14:51) (17 - 17)  SpO2: 100% (05 Sep 2023 14:51) (90% - 100%): nasal cannula  O2 Flow (L/min): 2    P/E: Limited exam  Elderly male, obtunded   Neuro:  Limited exam   CVS: S1S2 present  Resp: BLAE+, No coarse breath sounds  GI: Soft, BS+, Non tender, non distended  Extr: No  calf tenderness B/L Lower extremities  Skin: Warm and moist without any rashes    Labs:                        6.5    7.33  )-----------( 183      ( 05 Sep 2023 07:00 )             21.4   09-05  139  |  105  |  24<H>  ----------------------------<  51<LL>  4.2   |  26  |  1.27  Ca    8.9      05 Sep 2023 07:00  Phos  2.3     09-05  Mg     1.9     09-05    TPro  4.9<L>  /  Alb  1.7<L>  /  TBili  0.3  /  DBili  x   /  AST  13  /  ALT  8<L>  /  AlkPhos  55  09-05    D/D:  # Acute respiratory failure with hypoxia, atelectasis vs PE vs aspiration  # Acute Toxic and Metabolic encephalopathy with underlying Dementia possibly contributing  # Chronic Anemia likely Anemia of Chronic disease with mild drop  # Fluid responsive septic shock   # NEVILLE improved vs NEVILLE on CKD improved  # gastric cancer. possible CNS metastases as the cause of altered mental status.   # s/p CVA  # Hx seizure    Plan:  Type and Cross; Transfuse one unit PRBC   - Patient appears to be continuing to aspirate with agonal breathing, minimal responsive, occasionally opens eyes   -Completed IV abx  - Monitor mental status - no improvement  - Supplemental oxygen to keep SaO2 > 93%- titrate down as tolerated  Continue IV Fluid with Dextrose  - Fall and aspiration precaution  Hold oral feeds while encephalopathic; once more awkae can feed with caution  Palliative/Hospice care consult, appreciated     - Family concerned about taking care of patient at home and does not seem to be considering hospice at this time. Family reports that they will like to continue medical treatment and would like to see if patient will wake up.  -     Spoke with Son Cirilo face to face with other family members at least 3 of them one of them Son in law (Xenia). Family is worried about patient's nutritional status.   Discussed at length and multiple times how risk outweighs benefit of TPN or PEG tube nutrition. I also discussed TPN is reserved for patients like surgical patients who had undergone some intestinal surgery as a bridge for few days until patient recover and can resume oral diet.   I also discussed the potential harm associated wioth TPN not limited to infections especially fungal which can be hard to eradicate.  Patient appears to be microaspirating and unable to clear his airway due to underlying dementia and progressive physical deconditioning. I also discussed risk of enteral feeding and increased aspiration risk.  Family would like to look into other options for nutrition and requesting transfer to [facility]. I did discuss with them the option of a potential J tube which he may not be even able to tolerate a urgical procedure at his time and even that does not eliminate aspiration risk and only gives family the satisfaction of nutrition witout any improvement in quality of life. They understand that  nutritional status kendell not improve the underlying Dementia or the Gastric cancer.  I also spoke with [facility] multiple times today and earlier this evening spoke with willow Hospitalist Dr. Tawny Navarro Chief Hospitalist at [facility] and reviewed patient clinical course and diagnosis and family wishes to have a second opinion for nutritional modality at outside Hospital not affiliated with [facility]. Dr. Navarro does not feel any medical indication for transfer at this time and referred [facility] to refer to [facility] to see if patient could be accepted    I updated Son Patricia this evening Patient seen and examined with Son Patricia at bedside.     68 year old man admitted from home because of altered mental status and dehydration.  Patient was on home hospice, but family has asked for more aggressive treatment especially Nutrition. Patient with Progressive Dementia over the past year to 1.5 yrs as per Son although was able to ambulate with assistance was in outside Hospitals at [facility] and later in [facility] diagnosed with Gastric cancer last month.  PMH advanced dementia. Oncology assessed that chemotherapy would not benefit because of overall poor functional status and underlying Dementia. Patient also with multiple cardiovascular diseases - DM, CVA, HTN, HLD, CKD, hx of chronic indwelling catheter. Discharged from [facility] about a week ago after management for suspected breakthrough seizure with home VPA dose increased to 750mg BID. Discharged to home Hospice but family was not happy with care     Patient seen by Palliative/ Hospice team and appropriate for Hospice but Family is concerned about Nutrition and wishes patient to get TPN. Also requested Transfer to [facility]. Patient is obtunded and can only open eyes with verbal stimulation. As per Son last month patient was able to eat and was able to speak and follow simple commands.     Vital Signs Last 24 Hrs  T(C): 36.3 (05 Sep 2023 14:51), Max: 36.8 (04 Sep 2023 21:16)  T(F): 97.4 (05 Sep 2023 14:51), Max: 98.2 (04 Sep 2023 21:16)  HR: 63 (05 Sep 2023 14:51) (63 - 81)  BP: 148/74 (05 Sep 2023 14:51) (118/54 - 148/74)  RR: 17 (05 Sep 2023 14:51) (17 - 17)  SpO2: 100% (05 Sep 2023 14:51) (90% - 100%): nasal cannula  O2 Flow (L/min): 2    P/E: Limited exam  Elderly male, obtunded   Neuro:  Limited exam   CVS: S1S2 present  Resp: BLAE+, No coarse breath sounds  GI: Soft, BS+, Non tender, non distended  Extr: No  calf tenderness B/L Lower extremities  Skin: Warm and moist without any rashes    Labs:                        6.5    7.33  )-----------( 183      ( 05 Sep 2023 07:00 )             21.4   09-05  139  |  105  |  24<H>  ----------------------------<  51<LL>  4.2   |  26  |  1.27  Ca    8.9      05 Sep 2023 07:00  Phos  2.3     09-05  Mg     1.9     09-05  TPro  4.9<L>  /  Alb  1.7<L>  /  TBili  0.3  /  DBili  x   /  AST  13  /  ALT  8<L>  /  AlkPhos  55  09-05    D/D:  Acute respiratory failure with hypoxia, atelectasis vs PE vs aspiration  Acute Toxic and Metabolic encephalopathy with underlying Dementia possibly contributing  Chronic Anemia likely Anemia of Chronic disease with mild drop  Fluid responsive septic shock resolved  NEVILLE improved vs NEVILLE on CKD improved  Recently diagnosed gastric cancer. possible CNS metastases as the cause of altered mental status.   s/p CVA  #Hx seizure    Plan:  Patient is obtunded, not arousable   Type and Cross; Transfuse one unit PRBC as wishes blood transfusion and all conservative medical management; no active bleed;   Patient appears to be continuing to aspirate with agonal breathing, minimal responsive, occasionally opens eyes  Completed IV abx; No appreciable improvement in mentation despite medical mgmt at this time;  Supplemental oxygen to keep SaO2 > 93%- titrate down as tolerated; on 2 liter NC currently  Continue IV Fluid with Dextrose  Aspiration precaution  Hold oral feeds while encephalopathic; once more awake can feed with caution    Spoke with Son Cirilo face to face with other family members at least 3 of them one of them Son in law (Xenia). Family is worried about patient's nutritional status. Discussed at length and multiple times how risk outweighs benefit of TPN or PEG tube nutrition. I also discussed TPN is reserved for patients like surgical patients who had undergone some intestinal surgery as a bridge for few days until patient recover and can resume oral diet.   I also discussed the potential harm associated wioth TPN not limited to infections especially fungal which can be hard to eradicate.  Patient appears to be microaspirating and unable to clear his airway due to underlying dementia and progressive physical deconditioning. I also discussed risk of enteral feeding and increased aspiration risk.  Family would like to look into other options for nutrition and requesting transfer to [place]. I did discuss with them the option of a potential J tube which he may not be even able to tolerate a surgical procedure at his time and even that does not eliminate aspiration risk and only gives family the satisfaction of nutrition without any improvement in quality of life. They understand that  nutritional status will not improve the underlying Dementia or the Gastric cancer.  I also spoke with [facility] multiple times today and earlier this evening spoke with chief Hospitalist Dr. Tawny Navarro Chief Hospitalist at [facility] and reviewed patient clinical course and diagnosis and family wishes to have a second opinion for nutritional modality at outside Hospital not affiliated with [facility]. Dr. Navarro does not feel any medical indication for transfer at this time and referred Transfer Center to refer to [facility] to see if patient could be accepted  I updated Son Patricia this evening about the same  Surgical consult appreciated; d/w Dr. Penny; Patient is at high risk of mortality with an invasive procedure   Discussed with Denis

## 2023-09-06 DIAGNOSIS — E63.9 NUTRITIONAL DEFICIENCY, UNSPECIFIED: ICD-10-CM

## 2023-09-06 LAB
ALBUMIN SERPL ELPH-MCNC: 1.7 G/DL — LOW (ref 3.5–5)
ALP SERPL-CCNC: 52 U/L — SIGNIFICANT CHANGE UP (ref 40–120)
ALT FLD-CCNC: 8 U/L DA — LOW (ref 10–60)
ANION GAP SERPL CALC-SCNC: 4 MMOL/L — LOW (ref 5–17)
AST SERPL-CCNC: 13 U/L — SIGNIFICANT CHANGE UP (ref 10–40)
BILIRUB SERPL-MCNC: 0.3 MG/DL — SIGNIFICANT CHANGE UP (ref 0.2–1.2)
BUN SERPL-MCNC: 20 MG/DL — HIGH (ref 7–18)
CALCIUM SERPL-MCNC: 8.7 MG/DL — SIGNIFICANT CHANGE UP (ref 8.4–10.5)
CHLORIDE SERPL-SCNC: 104 MMOL/L — SIGNIFICANT CHANGE UP (ref 96–108)
CO2 SERPL-SCNC: 31 MMOL/L — SIGNIFICANT CHANGE UP (ref 22–31)
CREAT SERPL-MCNC: 1.21 MG/DL — SIGNIFICANT CHANGE UP (ref 0.5–1.3)
EGFR: 66 ML/MIN/1.73M2 — SIGNIFICANT CHANGE UP
GLUCOSE BLDC GLUCOMTR-MCNC: 127 MG/DL — HIGH (ref 70–99)
GLUCOSE SERPL-MCNC: 118 MG/DL — HIGH (ref 70–99)
HCT VFR BLD CALC: 29.4 % — LOW (ref 39–50)
HGB BLD-MCNC: 9.5 G/DL — LOW (ref 13–17)
MAGNESIUM SERPL-MCNC: 1.9 MG/DL — SIGNIFICANT CHANGE UP (ref 1.6–2.6)
MCHC RBC-ENTMCNC: 27.3 PG — SIGNIFICANT CHANGE UP (ref 27–34)
MCHC RBC-ENTMCNC: 32.3 GM/DL — SIGNIFICANT CHANGE UP (ref 32–36)
MCV RBC AUTO: 84.5 FL — SIGNIFICANT CHANGE UP (ref 80–100)
NRBC # BLD: 0 /100 WBCS — SIGNIFICANT CHANGE UP (ref 0–0)
PHOSPHATE SERPL-MCNC: 2 MG/DL — LOW (ref 2.5–4.5)
PLATELET # BLD AUTO: 203 K/UL — SIGNIFICANT CHANGE UP (ref 150–400)
POTASSIUM SERPL-MCNC: 4.1 MMOL/L — SIGNIFICANT CHANGE UP (ref 3.5–5.3)
POTASSIUM SERPL-SCNC: 4.1 MMOL/L — SIGNIFICANT CHANGE UP (ref 3.5–5.3)
PROT SERPL-MCNC: 5.3 G/DL — LOW (ref 6–8.3)
RBC # BLD: 3.48 M/UL — LOW (ref 4.2–5.8)
RBC # FLD: 18.4 % — HIGH (ref 10.3–14.5)
SODIUM SERPL-SCNC: 139 MMOL/L — SIGNIFICANT CHANGE UP (ref 135–145)
WBC # BLD: 10.26 K/UL — SIGNIFICANT CHANGE UP (ref 3.8–10.5)
WBC # FLD AUTO: 10.26 K/UL — SIGNIFICANT CHANGE UP (ref 3.8–10.5)

## 2023-09-06 PROCEDURE — 99233 SBSQ HOSP IP/OBS HIGH 50: CPT | Mod: GC

## 2023-09-06 RX ORDER — ROBINUL 0.2 MG/ML
0.4 INJECTION INTRAMUSCULAR; INTRAVENOUS EVERY 6 HOURS
Refills: 0 | Status: DISCONTINUED | OUTPATIENT
Start: 2023-09-06 | End: 2023-09-30

## 2023-09-06 RX ORDER — SODIUM CHLORIDE 9 MG/ML
1000 INJECTION, SOLUTION INTRAVENOUS
Refills: 0 | Status: DISCONTINUED | OUTPATIENT
Start: 2023-09-06 | End: 2023-09-07

## 2023-09-06 RX ORDER — SODIUM CHLORIDE 9 MG/ML
1000 INJECTION, SOLUTION INTRAVENOUS
Refills: 0 | Status: DISCONTINUED | OUTPATIENT
Start: 2023-09-06 | End: 2023-09-06

## 2023-09-06 RX ADMIN — Medication 57.5 MILLIGRAM(S): at 07:05

## 2023-09-06 RX ADMIN — HEPARIN SODIUM 5000 UNIT(S): 5000 INJECTION INTRAVENOUS; SUBCUTANEOUS at 07:05

## 2023-09-06 RX ADMIN — CHLORHEXIDINE GLUCONATE 1 APPLICATION(S): 213 SOLUTION TOPICAL at 07:05

## 2023-09-06 RX ADMIN — ROBINUL 0.4 MILLIGRAM(S): 0.2 INJECTION INTRAMUSCULAR; INTRAVENOUS at 12:43

## 2023-09-06 RX ADMIN — Medication 62.5 MILLIMOLE(S): at 12:43

## 2023-09-06 RX ADMIN — ROBINUL 0.4 MILLIGRAM(S): 0.2 INJECTION INTRAMUSCULAR; INTRAVENOUS at 07:40

## 2023-09-06 RX ADMIN — Medication 57.5 MILLIGRAM(S): at 18:33

## 2023-09-06 RX ADMIN — SODIUM CHLORIDE 80 MILLILITER(S): 9 INJECTION, SOLUTION INTRAVENOUS at 07:13

## 2023-09-06 RX ADMIN — SODIUM CHLORIDE 80 MILLILITER(S): 9 INJECTION, SOLUTION INTRAVENOUS at 12:43

## 2023-09-06 RX ADMIN — HEPARIN SODIUM 5000 UNIT(S): 5000 INJECTION INTRAVENOUS; SUBCUTANEOUS at 18:34

## 2023-09-06 NOTE — PROGRESS NOTE ADULT - SUBJECTIVE AND OBJECTIVE BOX
PGY-1 Progress Note discussed with attending    PAGER #: [[phone]] TILL 5:00 PM  PLEASE CONTACT ON CALL TEAM:  - On Call Team (Please refer to Art) FROM 5:00 PM - 8:30PM  - Nightfloat Team FROM 8:30 -7:30 AM    CHIEF COMPLAINT & BRIEF HOSPITAL COURSE: No acute overnight events. Pt. is at his baseline. Possible transfer to [place] for a second opinion.  Eyes open on exam today.     INTERVAL HPI/OVERNIGHT EVENTS:   MEDICATIONS  (STANDING):  chlorhexidine 2% Cloths 1 Application(s) Topical <User Schedule>  dextrose 5% + lactated ringers. 1000 milliLiter(s) (80 mL/Hr) IV Continuous <Continuous>  heparin   Injectable 5000 Unit(s) SubCutaneous every 12 hours  valproate sodium  IVPB 750 milliGRAM(s) IV Intermittent two times a day    MEDICATIONS  (PRN):  glycopyrrolate Injectable 0.4 milliGRAM(s) IV Push every 4 hours PRN secretions    Vital Signs Last 24 Hrs  T(C): 36.6 (05 Sep 2023 05:39), Max: 36.8 (04 Sep 2023 21:16)  T(F): 97.9 (05 Sep 2023 05:39), Max: 98.2 (04 Sep 2023 21:16)  HR: 73 (05 Sep 2023 05:39) (73 - 81)  BP: 121/66 (05 Sep 2023 05:39) (118/54 - 121/66)  BP(mean): --  RR: 17 (05 Sep 2023 05:39) (17 - 17)  SpO2: 90% (05 Sep 2023 05:39) (90% - 96%)    Parameters below as of 05 Sep 2023 05:39  Patient On (Oxygen Delivery Method): nasal cannula  O2 Flow (L/min): 2      PHYSICAL EXAMINATION:  GENERAL: NAD, well built  HEAD:  Atraumatic, Normocephalic  EYES:  conjunctiva and sclera clear  CHEST/LUNG: +agonal breath.   HEART: Regular rate and rhythm; No murmurs, rubs, or gallops  ABDOMEN: Soft, Nontender, Nondistended; Bowel sounds present  NERVOUS SYSTEM:  Alert & Oriented X0,    EXTREMITIES:  2+ Peripheral Pulses, No clubbing, cyanosis, or edema  SKIN: warm dry                          6.5    7.33  )-----------( 183      ( 05 Sep 2023 07:00 )             21.4     09-05    139  |  105  |  24<H>  ----------------------------<  51<LL>  4.2   |  26  |  1.27    Ca    8.9      05 Sep 2023 07:00  Phos  2.3     09-05  Mg     1.9     09-05    TPro  4.9<L>  /  Alb  1.7<L>  /  TBili  0.3  /  DBili  x   /  AST  13  /  ALT  8<L>  /  AlkPhos  55  09-05    LIVER FUNCTIONS - ( 05 Sep 2023 07:00 )  Alb: 1.7 g/dL / Pro: 4.9 g/dL / ALK PHOS: 55 U/L / ALT: 8 U/L DA / AST: 13 U/L / GGT: x                   CAPILLARY BLOOD GLUCOSE      RADIOLOGY & ADDITIONAL TESTS:                   PGY-1 Progress Note discussed with attending    PAGER #: [[phone]] TILL 5:00 PM  PLEASE CONTACT ON CALL TEAM:  - On Call Team (Please refer to Art) FROM 5:00 PM - 8:30PM  - Nightfloat Team FROM 8:30 -7:30 AM    CHIEF COMPLAINT & BRIEF HOSPITAL COURSE: No acute overnight events. Responsive to sternal rub, eyes open, still lethargic and drowsy. Spoke to Patient's family about goals of care.     INTERVAL HPI/OVERNIGHT EVENTS:   MEDICATIONS  (STANDING):  chlorhexidine 2% Cloths 1 Application(s) Topical <User Schedule>  dextrose 5% + lactated ringers. 1000 milliLiter(s) (80 mL/Hr) IV Continuous <Continuous>  heparin   Injectable 5000 Unit(s) SubCutaneous every 12 hours  valproate sodium  IVPB 750 milliGRAM(s) IV Intermittent two times a day    MEDICATIONS  (PRN):  glycopyrrolate Injectable 0.4 milliGRAM(s) IV Push every 4 hours PRN secretions    Vital Signs Last 24 Hrs  T(C): 36.6 (05 Sep 2023 05:39), Max: 36.8 (04 Sep 2023 21:16)  T(F): 97.9 (05 Sep 2023 05:39), Max: 98.2 (04 Sep 2023 21:16)  HR: 73 (05 Sep 2023 05:39) (73 - 81)  BP: 121/66 (05 Sep 2023 05:39) (118/54 - 121/66)  BP(mean): --  RR: 17 (05 Sep 2023 05:39) (17 - 17)  SpO2: 90% (05 Sep 2023 05:39) (90% - 96%)    Parameters below as of 05 Sep 2023 05:39  Patient On (Oxygen Delivery Method): nasal cannula  O2 Flow (L/min): 2      PHYSICAL EXAMINATION:  GENERAL: NAD, well built  HEAD:  Atraumatic, Normocephalic  EYES:  conjunctiva and sclera clear  CHEST/LUNG: +agonal breath, CTA BL  HEART: Regular rate and rhythm; No murmurs, rubs, or gallops  ABDOMEN: Soft, Nontender, Nondistended; Bowel sounds present  NERVOUS SYSTEM:  Alert & Oriented X0,    EXTREMITIES:  2+ Peripheral Pulses, No clubbing, cyanosis, or edema  SKIN: warm dry                          6.5    7.33  )-----------( 183      ( 05 Sep 2023 07:00 )             21.4     09-05    139  |  105  |  24<H>  ----------------------------<  51<LL>  4.2   |  26  |  1.27    Ca    8.9      05 Sep 2023 07:00  Phos  2.3     09-05  Mg     1.9     09-05    TPro  4.9<L>  /  Alb  1.7<L>  /  TBili  0.3  /  DBili  x   /  AST  13  /  ALT  8<L>  /  AlkPhos  55  09-05    LIVER FUNCTIONS - ( 05 Sep 2023 07:00 )  Alb: 1.7 g/dL / Pro: 4.9 g/dL / ALK PHOS: 55 U/L / ALT: 8 U/L DA / AST: 13 U/L / GGT: x                   CAPILLARY BLOOD GLUCOSE      RADIOLOGY & ADDITIONAL TESTS:

## 2023-09-06 NOTE — PROGRESS NOTE ADULT - PROBLEM SELECTOR PLAN 3
pt. is ANOx 0.    pleasure feeding upon alertness  family wants to consider J tube for feeding -> poor surgical candidate , surgery wants another conversation of goals.   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   possible underlying dementia also contributing. 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 Bulky local necrotic metastatic adenopathy.  - diagnosed 2 months ago

## 2023-09-06 NOTE — PROGRESS NOTE ADULT - PROBLEM SELECTOR PLAN 7
- hx of seizures, on increased dose form recent [facility] discharge  - c/w IV valproic acid 750 BID - Continue to monitor hb, downtrending 7.1 > 6.7->6.8-> 6.4->6.2-> 6.5 > 9.5  - gave one unit of blood on 9/5/23  - type and screen done before transfusion

## 2023-09-06 NOTE — PROGRESS NOTE ADULT - ATTENDING COMMENTS
INCOMPLETE NOTE    Patient seen and examined with Son Patricia at bedside with Housestaff    68 year old man admitted from home because of altered mental status and dehydration.  Patient was on home hospice, but family has asked for more aggressive treatment especially Nutrition. Patient with Progressive Dementia over the past year to 1.5 yrs as per Son although was able to ambulate with assistance was in outside Hospitals at [place] and later in [facility] diagnosed with Gastric cancer last month.  PMH advanced dementia. Oncology assessed that chemotherapy would not benefit because of overall poor functional status and underlying Dementia. Patient also with multiple cardiovascular diseases - DM, CVA, HTN, HLD, CKD, hx of chronic indwelling catheter. Discharged from [facility] about a week ago after management for suspected breakthrough seizure with home VPA dose increased to 750mg BID. Discharged to home Hospice but family was not happy with care     Patient seen by Palliative/ Hospice team and appropriate for Hospice but Family is concerned about Nutrition and wishes patient to get TPN. Also requested Transfer to [facility]. Patient is obtunded and can only open eyes with verbal stimulation. As per Son last month patient was able to eat and was able to speak and follow simple commands.     Vital Signs Last 24 Hrs  T(C): 36.8 (06 Sep 2023 12:20), Max: 36.8 (06 Sep 2023 05:23)  T(F): 98.3 (06 Sep 2023 12:20), Max: 98.3 (06 Sep 2023 12:20)  HR: 62 (06 Sep 2023 12:20) (62 - 69)  BP: 161/68 (06 Sep 2023 12:20) (146/75 - 168/70)  RR: 19 (06 Sep 2023 12:20) (17 - 20)  SpO2: 94% (06 Sep 2023 12:20) (94% - 97%): nasal cannula  O2 Flow (L/min): 2      P/E: Limited exam  Elderly male, obtunded   Neuro:  Limited exam   CVS: S1S2 present  Resp: BLAE+, No coarse breath sounds  GI: Soft, BS+, Non tender, non distended  Extr: No  calf tenderness B/L Lower extremities  Skin: Warm and moist without any rashes    Labs:                     9.5    10.26 )-----------( 203      ( 06 Sep 2023 07:00 )             29.4   09-06  139  |  104  |  20<H>  ----------------------------<  118<H>  4.1   |  31  |  1.21    Ca    8.7      06 Sep 2023 07:00  Phos  2.0     09-06  Mg     1.9     09-06  TPro  5.3<L>  /  Alb  1.7<L>  /  TBili  0.3  /  DBili  x   /  AST  13  /  ALT  8<L>  /  AlkPhos  52  09-06    D/D:  Acute respiratory failure with hypoxia, atelectasis vs PE vs aspiration  Acute Toxic and Metabolic encephalopathy with underlying Dementia possibly contributing  Chronic Anemia likely Anemia of Chronic disease with mild drop  Fluid responsive septic shock resolved  NEVILLE improved vs NEVILLE on CKD improved  Recently diagnosed gastric cancer. possible CNS metastases as the cause of altered mental status.   s/p CVA  #Hx seizure    Plan:  Patient is obtunded, slightly more arousable today with verbal stimu;ation  Type and Cross; Transfuse one unit PRBC as wishes blood transfusion and all conservative medical management; no active bleed;   Patient appears to be continuing to aspirate with agonal breathing, minimal responsive, occasionally opens eyes  Completed IV abx; No appreciable improvement in mentation despite medical mgmt at this time;  Supplemental oxygen to keep SaO2 > 93%- titrate down as tolerated; on 2 liter NC currently  Continue IV Fluid with Dextrose  Aspiration precaution  Hold oral feeds while encephalopathic; once more awake can feed with caution    Spoke with Son Cirilo face to face with other family members at least 3 of them one of them Son in law (Xenia). Family is worried about patient's nutritional status. Discussed at length and multiple times how risk outweighs benefit of TPN or PEG tube nutrition. I also discussed TPN is reserved for patients like surgical patients who had undergone some intestinal surgery as a bridge for few days until patient recover and can resume oral diet.   I also discussed the potential harm associated wioth TPN not limited to infections especially fungal which can be hard to eradicate.  Patient appears to be microaspirating and unable to clear his airway due to underlying dementia and progressive physical deconditioning. I also discussed risk of enteral feeding and increased aspiration risk.  Family would like to look into other options for nutrition and requesting transfer to [place]. I did discuss with them the option of a potential J tube which he may not be even able to tolerate a surgical procedure at his time and even that does not eliminate aspiration risk and only gives family the satisfaction of nutrition without any improvement in quality of life. They understand that  nutritional status will not improve the underlying Dementia or the Gastric cancer.  spoke with chief Hospitalist Dr. Tawny Navarro Chief Hospitalist at [facility] and reviewed patient clinical course and diagnosis and family wishes to have a second opinion for nutritional modality at outside Hospital not affiliated with [facility]. Dr. Navarro does not feel any medical indication for transfer at this time and referred Transfer Center to refer to [facility] to see if patient could be accepted  Surgical consult appreciated; d/w Dr. Penny; Patient is at high risk of mortality with an invasive procedure   Discussed with Denis Patient seen and examined with Son Patricia at bedside with Housestaff.    68 year old man admitted from home because of altered mental status and dehydration.  Patient was on home hospice, but family has asked for more aggressive treatment especially Nutrition. Patient with Progressive Dementia over the past year to 1.5 yrs as per Son although was able to ambulate with assistance was in outside Hospitals at [place] and later in [facility] diagnosed with Gastric cancer last month.  PMH advanced dementia. Oncology assessed that chemotherapy would not benefit because of overall poor functional status and underlying Dementia. Patient also with multiple cardiovascular diseases - DM, CVA, HTN, HLD, CKD, hx of chronic indwelling catheter. Discharged from [facility] about a week ago after management for suspected breakthrough seizure with home VPA dose increased to 750mg BID. Discharged to home Hospice but family was not happy with care     Patient seen by Palliative/ Hospice team and appropriate for Hospice but Family is concerned about Nutrition and wishes patient to get TPN. Also requested Transfer to [facility] but not accepted Patient is obtunded little more responsive with with verbal stimulation. As per Son last month patient was able to eat and was able to speak and follow simple commands but unable to eat since discharge from [facility].   Despite extensive counseling and GOC with Son at bedside and multiple family members including daughter in [place] whom is a [employer] and despite poor prognosis and quality of life, family is still wanting nutrition.     Vital Signs Last 24 Hrs  T(C): 36.8 (06 Sep 2023 12:20), Max: 36.8 (06 Sep 2023 05:23)  T(F): 98.3 (06 Sep 2023 12:20), Max: 98.3 (06 Sep 2023 12:20)  HR: 62 (06 Sep 2023 12:20) (62 - 69)  BP: 161/68 (06 Sep 2023 12:20) (146/75 - 168/70)  RR: 19 (06 Sep 2023 12:20) (17 - 20)  SpO2: 94% (06 Sep 2023 12:20) (94% - 97%): nasal cannula  O2 Flow (L/min): 2      P/E: Limited exam  Elderly male, obtunded minimally responsive opening eyes  Neuro:  Limited exam   CVS: S1S2 present  Resp: BLAE+, No coarse breath sounds  GI: Soft, BS+, Non tender, non distended  Extr: No  calf tenderness B/L Lower extremities  Skin: Warm and moist without any rashes    Labs:                     9.5    10.26 )-----------( 203      ( 06 Sep 2023 07:00 )             29.4   09-06  139  |  104  |  20<H>  ----------------------------<  118<H>  4.1   |  31  |  1.21    Ca    8.7      06 Sep 2023 07:00  Phos  2.0     09-06  Mg     1.9     09-06  TPro  5.3<L>  /  Alb  1.7<L>  /  TBili  0.3  /  DBili  x   /  AST  13  /  ALT  8<L>  /  AlkPhos  52  09-06    D/D:  Acute respiratory failure with hypoxia, atelectasis vs aspiration improved  Acute Toxic and Metabolic encephalopathy with underlying Dementia possibly contributing  Chronic Anemia likely Anemia of Chronic disease s/p PRBC  Fluid responsive septic shock resolved  NEVILLE improved vs NEVILLE on CKD improved  Recently diagnosed gastric cancer. possible CNS metastases as the cause of altered mental status.   s/p CVA  #Hx seizure    Plan:  Patient is obtunded, slightly more arousable today with verbal stimulation  s/p one unit PRBC as per family wishes; no active bleed; H/H stable  Patient appears to be continuing to aspirate with agonal breathing, minimal responsive,   Completed IV abx; No appreciable improvement in mentation despite medical mgmt at this time;  Supplemental oxygen to keep SaO2 > 93%- titrate down as tolerated; tapered to 1 liter; stable   Continue IV Fluid with Dextrose: D51/2 NS  Aspiration precaution  Hold oral feeds while encephalopathic; once more awake can feed with caution    Spoke with Son Cirilo face to face with other family members. Family is concerned about patient's nutritional status despite counseling that TPN is not an option and Patient is at high risk of J tube. again d/w daughter discussed the potential harm associated with TPN not limited to infections especially fungal which can be hard to eradicate.  Patient appears to be microaspirating and unable to clear his airway due to underlying dementia and progressive physical deconditioning. I also discussed risk of enteral feeding and increased aspiration risk.  Patient was not accepted to [facility] as per family request.  Again discussed with them the option of a potential J tube which he may not be even able to tolerate a surgical procedure at his time and even that does not eliminate aspiration risk and only gives family the satisfaction of nutrition without any improvement in quality of life. They understand that  nutritional status will not improve the underlying Dementia or the Gastric cancer.  Surgical consult appreciated; d/w Dr. Penny; Patient is at high risk of mortality with an invasive procedure   Family is not in line with Hospice care at this time  Requested Palliative follow up; d/w Dr. Guadalupe who has previously spoken with family  Prognosis extremely guarded and mortality expected    Discussed with Denis

## 2023-09-06 NOTE — PROGRESS NOTE ADULT - PROBLEM SELECTOR PLAN 9
- hx of advanced dementia on citalopram  - c.w meds when off NPO - hx of seizures, on increased dose form recent [facility] discharge  - c/w IV valproic acid 750 BID

## 2023-09-06 NOTE — PROGRESS NOTE ADULT - PROBLEM SELECTOR PLAN 5
- Continue to monitor hb, downtrending 7.1 > 6.7->6.8-> 6.4->6.2-> 6.5  - gave one unit of blood   type and screen done before transfusion - Resolving, saturating at 97% O2   - Decreased NC 2 L to 1 L today   - pt diagnosed with aspiration pneumonia, now resolving (Chest xray RLL infiltrates)  -completed IV abx   -placement for long term hospice care discussion with palliative and family. Pt. may be transferred to [place] for second opinion.

## 2023-09-06 NOTE — PROGRESS NOTE ADULT - PROBLEM SELECTOR PLAN 1
- Decreased NC 5 L to 3 L today   - CXR concerning for RLL infiltrate.  - pt diagnosed with aspiration pneumonia   -completed abx   -placement for long term hospice care discussion with palliative and family. Pt. may be transferred to [facility] for second opinion. pt. is ANOx 0   pleasure feeding upon alertness  family wants to consider J tube for feeding -> poor surgical candidate , surgery wants another conversation of goals.   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   possible underlying dementia also contributing pt. is ANOx 0   pleasure feeding upon alertness  family wants to consider J tube for feeding -> poor surgical candidate , surgery wants another conversation of goals.   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   possible underlying dementia also contributing  Family requested transfer to [place], declined transfer today as medical treatment would not change and pt is good candidate for palliative care

## 2023-09-06 NOTE — PROGRESS NOTE ADULT - PROBLEM SELECTOR PLAN 4
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 Bulky local necrotic metastatic adenopathy.  - diagnosed 2 months ago - hx of advanced dementia on citalopram  - c.w meds when off NPO

## 2023-09-06 NOTE — PROGRESS NOTE ADULT - PROBLEM SELECTOR PLAN 2
see above  responded to fluids. BMI <19   Surgery consulted for J tube discussed with family that pt is poor surgical candidate and risks out weight benefits   Family considering J tube for feeding  Started IV NS/dextrose today BMI <19   Surgery consulted for J tube discussed with family that pt is poor surgical candidate and risks out weight benefits   Family considering J tube for feeding  Started IV dextrose 5% and lactated ringers 1000mL today

## 2023-09-07 LAB
ALBUMIN SERPL ELPH-MCNC: 1.8 G/DL — LOW (ref 3.5–5)
ALP SERPL-CCNC: 55 U/L — SIGNIFICANT CHANGE UP (ref 40–120)
ALT FLD-CCNC: 7 U/L DA — LOW (ref 10–60)
ANION GAP SERPL CALC-SCNC: 4 MMOL/L — LOW (ref 5–17)
AST SERPL-CCNC: 8 U/L — LOW (ref 10–40)
BILIRUB SERPL-MCNC: 0.3 MG/DL — SIGNIFICANT CHANGE UP (ref 0.2–1.2)
BUN SERPL-MCNC: 12 MG/DL — SIGNIFICANT CHANGE UP (ref 7–18)
CALCIUM SERPL-MCNC: 8.6 MG/DL — SIGNIFICANT CHANGE UP (ref 8.4–10.5)
CHLORIDE SERPL-SCNC: 102 MMOL/L — SIGNIFICANT CHANGE UP (ref 96–108)
CO2 SERPL-SCNC: 31 MMOL/L — SIGNIFICANT CHANGE UP (ref 22–31)
CREAT SERPL-MCNC: 1.11 MG/DL — SIGNIFICANT CHANGE UP (ref 0.5–1.3)
EGFR: 73 ML/MIN/1.73M2 — SIGNIFICANT CHANGE UP
GLUCOSE SERPL-MCNC: 137 MG/DL — HIGH (ref 70–99)
HCT VFR BLD CALC: 31.7 % — LOW (ref 39–50)
HGB BLD-MCNC: 10.3 G/DL — LOW (ref 13–17)
MAGNESIUM SERPL-MCNC: 1.7 MG/DL — SIGNIFICANT CHANGE UP (ref 1.6–2.6)
MCHC RBC-ENTMCNC: 27.5 PG — SIGNIFICANT CHANGE UP (ref 27–34)
MCHC RBC-ENTMCNC: 32.5 GM/DL — SIGNIFICANT CHANGE UP (ref 32–36)
MCV RBC AUTO: 84.5 FL — SIGNIFICANT CHANGE UP (ref 80–100)
NRBC # BLD: 0 /100 WBCS — SIGNIFICANT CHANGE UP (ref 0–0)
PHOSPHATE SERPL-MCNC: 2.1 MG/DL — LOW (ref 2.5–4.5)
PLATELET # BLD AUTO: 199 K/UL — SIGNIFICANT CHANGE UP (ref 150–400)
POTASSIUM SERPL-MCNC: 3.4 MMOL/L — LOW (ref 3.5–5.3)
POTASSIUM SERPL-SCNC: 3.4 MMOL/L — LOW (ref 3.5–5.3)
PROT SERPL-MCNC: 5.6 G/DL — LOW (ref 6–8.3)
RBC # BLD: 3.75 M/UL — LOW (ref 4.2–5.8)
RBC # FLD: 18.6 % — HIGH (ref 10.3–14.5)
SODIUM SERPL-SCNC: 137 MMOL/L — SIGNIFICANT CHANGE UP (ref 135–145)
VALPROATE SERPL-MCNC: 116 UG/ML — HIGH (ref 50–100)
WBC # BLD: 19.56 K/UL — HIGH (ref 3.8–10.5)
WBC # FLD AUTO: 19.56 K/UL — HIGH (ref 3.8–10.5)

## 2023-09-07 PROCEDURE — 71045 X-RAY EXAM CHEST 1 VIEW: CPT | Mod: 26

## 2023-09-07 PROCEDURE — 99232 SBSQ HOSP IP/OBS MODERATE 35: CPT | Mod: GC

## 2023-09-07 RX ORDER — VALPROIC ACID (AS SODIUM SALT) 250 MG/5ML
500 SOLUTION, ORAL ORAL EVERY 12 HOURS
Refills: 0 | Status: DISCONTINUED | OUTPATIENT
Start: 2023-09-08 | End: 2023-09-19

## 2023-09-07 RX ORDER — DEXTROSE MONOHYDRATE, SODIUM CHLORIDE, AND POTASSIUM CHLORIDE 50; .745; 4.5 G/1000ML; G/1000ML; G/1000ML
1000 INJECTION, SOLUTION INTRAVENOUS
Refills: 0 | Status: DISCONTINUED | OUTPATIENT
Start: 2023-09-07 | End: 2023-09-11

## 2023-09-07 RX ADMIN — CHLORHEXIDINE GLUCONATE 1 APPLICATION(S): 213 SOLUTION TOPICAL at 06:39

## 2023-09-07 RX ADMIN — Medication 57.5 MILLIGRAM(S): at 06:39

## 2023-09-07 RX ADMIN — DEXTROSE MONOHYDRATE, SODIUM CHLORIDE, AND POTASSIUM CHLORIDE 50 MILLILITER(S): 50; .745; 4.5 INJECTION, SOLUTION INTRAVENOUS at 21:10

## 2023-09-07 RX ADMIN — HEPARIN SODIUM 5000 UNIT(S): 5000 INJECTION INTRAVENOUS; SUBCUTANEOUS at 06:40

## 2023-09-07 RX ADMIN — SODIUM CHLORIDE 80 MILLILITER(S): 9 INJECTION, SOLUTION INTRAVENOUS at 06:39

## 2023-09-07 RX ADMIN — Medication 62.5 MILLIMOLE(S): at 11:06

## 2023-09-07 RX ADMIN — ROBINUL 0.4 MILLIGRAM(S): 0.2 INJECTION INTRAMUSCULAR; INTRAVENOUS at 09:04

## 2023-09-07 RX ADMIN — DEXTROSE MONOHYDRATE, SODIUM CHLORIDE, AND POTASSIUM CHLORIDE 50 MILLILITER(S): 50; .745; 4.5 INJECTION, SOLUTION INTRAVENOUS at 18:16

## 2023-09-07 RX ADMIN — HEPARIN SODIUM 5000 UNIT(S): 5000 INJECTION INTRAVENOUS; SUBCUTANEOUS at 18:15

## 2023-09-07 RX ADMIN — DEXTROSE MONOHYDRATE, SODIUM CHLORIDE, AND POTASSIUM CHLORIDE 80 MILLILITER(S): 50; .745; 4.5 INJECTION, SOLUTION INTRAVENOUS at 13:17

## 2023-09-07 NOTE — PROGRESS NOTE ADULT - NUTRITIONAL ASSESSMENT
This patient has been assessed with a concern for Malnutrition and has been determined to have a diagnosis/diagnoses of Severe protein-calorie malnutrition and Underweight (BMI < 19).    This patient is being managed with:   Diet NPO-   Tube Feed Start Time: 23:00  Entered: Sep  6 2023 12:06PM    The following pending diet order is being considered for treatment of Severe protein-calorie malnutrition and Underweight (BMI < 19):  Diet Pureed-  Supplement Feeding Modality:  Oral  Ensure Enlive Cans or Servings Per Day:  1       Frequency:  Three Times a day  Entered: Aug 30 2023  2:55PM

## 2023-09-07 NOTE — PROGRESS NOTE ADULT - PROBLEM SELECTOR PLAN 9
- hx of seizures, on increased dose form recent [facility] discharge  - c/w IV valproic acid 750 BID - hx of seizures, on increased dose form recent [facility] discharge  - Reduce valproic acid from 750 to 500 BID   - Valproic acid levels 9/7- 116

## 2023-09-07 NOTE — PROGRESS NOTE ADULT - PROBLEM SELECTOR PLAN 7
- Continue to monitor hb, downtrending 7.1 > 6.7->6.8-> 6.4->6.2-> 6.5 > 9.5  - gave one unit of blood on 9/5/23  - type and screen done before transfusion - Continue to monitor hb, downtrending 7.1 > 6.7->6.8-> 6.4->6.2-> 6.5 > 9.5 > 10.3  - gave one unit of blood on 9/5/23  - type and screen done before transfusion

## 2023-09-07 NOTE — PROGRESS NOTE ADULT - PROBLEM SELECTOR PLAN 2
BMI <19   Surgery consulted for J tube discussed with family that pt is poor surgical candidate and risks out weight benefits   Family considering J tube for feeding  Started IV dextrose 5% and lactated ringers 1000mL today

## 2023-09-07 NOTE — PROGRESS NOTE ADULT - PROBLEM SELECTOR PLAN 6
see above  responded to fluids. see above  responded to fluids.  Monitor WBC, elevated today, wbc count - 19.56

## 2023-09-07 NOTE — CHART NOTE - NSCHARTNOTEFT_GEN_A_CORE
Spoke to the patient's son at length  / [phone]  Pt obtunded with terminal gastric cancer and dementia  Doing a jejunostomy in this setting would be high risk and also with the terminal condition he has   Son wanted to know he could have a ng tube for feeding and understands the chance for aspiration is extremely high  Will  d/w the med team if there is a possibility of inserting a tube past the stomach if there is no complete gastric obstruction    Had spoken to the son and as per request spoken to his sister ( daughter)  and family  on the day of surgical consult.

## 2023-09-07 NOTE — PROGRESS NOTE ADULT - PROBLEM SELECTOR PLAN 1
pt. is ANOx 0   pleasure feeding upon alertness  family wants to consider J tube for feeding -> poor surgical candidate , surgery wants another conversation of goals.   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   possible underlying dementia also contributing  Family requested transfer to [place], declined transfer today as medical treatment would not change and pt is good candidate for palliative care pt. is ANOx 0   pleasure feeding upon alertness  family wants to consider J tube for feeding -> poor surgical candidate , surgery wants another conversation of goals.   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   possible underlying dementia also contributing  Family requested transfer to [place], declined transfer today as medical treatment would not change and pt is good candidate for palliative care  Monitor WBC, elevated today, wbc count - 19.56 pt. is ANOx 0  family wants to consider J tube for feeding -> poor surgical candidate , surgery wants another conversation of goals.   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   rejected transfer to [facility]

## 2023-09-07 NOTE — PROGRESS NOTE ADULT - ATTENDING COMMENTS
Patient seen and examined with Son Patricia at bedside with Housestaff.    68 year old man admitted from home because of altered mental status and dehydration.  Patient was on home hospice, but family has asked for more aggressive treatment especially Nutrition. Patient with Progressive Dementia over the past year to 1.5 yrs as per Son although was able to ambulate with assistance was in outside [facility]als at [place] and later in [facility] diagnosed with Gastric cancer last month.  PMH advanced dementia. Oncology assessed that chemotherapy would not benefit because of overall poor functional status and underlying Dementia. Patient also with multiple cardiovascular diseases - DM, CVA, HTN, HLD, CKD, hx of chronic indwelling catheter. Discharged from [facility] about a week ago after management for suspected breakthrough seizure with home VPA dose increased to 750mg BID. Discharged to home Hospice but family was not happy with care     Patient remains same, open eyes; mild activity with verabal stmimulation    Vital Signs Last 24 Hrs  T(C): 36.7 (07 Sep 2023 11:59), Max: 37.2 (07 Sep 2023 05:28)  T(F): 98 (07 Sep 2023 11:59), Max: 98.9 (07 Sep 2023 05:28)  HR: 80 (07 Sep 2023 11:59) (63 - 80)  BP: 162/78 (07 Sep 2023 11:59) (130/61 - 170/64)  RR: 19 (07 Sep 2023 18:21) (18 - 19)  SpO2: 95% (07 Sep 2023 18:21) (88% - 97%) nasal cannula  O2 Flow (L/min): 2    P/E: Limited exam  Elderly male, obtunded minimally responsive opening eyes  Neuro:  Limited exam   CVS: S1S2 present  Resp: BLAE+, No coarse breath sounds  GI: Soft, BS+, Non tender, non distended  Extr: No  calf tenderness B/L Lower extremities  Skin: Warm and moist without any rashes    Labs:                                10.3   19.56 )-----------( 199      ( 07 Sep 2023 08:55 )             31.7   09-07    137  |  102  |  12  ----------------------------<  137<H>  3.4<L>   |  31  |  1.11  Ca    8.6      07 Sep 2023 08:55  Phos  2.1     09-07  Mg     1.7     09-07  TPro  5.6<L>  /  Alb  1.8<L>  /  TBili  0.3  /  DBili  x   /  AST  8<L>  /  ALT  7<L>  /  AlkPhos  55  09-07    D/D:  Acute respiratory failure with hypoxia, atelectasis vs aspiration improved  Acute Toxic and Metabolic encephalopathy with underlying Dementia possibly contributing  Chronic Anemia likely Anemia of Chronic disease s/p PRBC  Fluid responsive septic shock resolved  NEVILLE improved vs NEVILLE on CKD improved  Recently diagnosed gastric cancer. possible CNS metastases as the cause of altered mental status.   s/p CVA  #Hx seizure    Plan:  Patient is slightly more arousable with eyes open    s/p one unit PRBC as per family wishes; no active bleed; H/H stable  Patient appears to be continuing to aspirate with agonal breathing, minimal responsive,   Completed IV abx; No appreciable improvement in mentation despite medical mgmt at this time;  Supplemental oxygen to keep SaO2 > 93%- titrate down as tolerated; tapered to 1 liter; stable   Continue IV Fluid with Dextrose: D51/2 NS  Aspiration precaution  Hold oral feeds while encephalopathic; once more awake can feed with caution    Spoke with Severino Kerr again this afternoon. Family is concerned about patient's nutritional status despite counseling that TPN is not an option and Patient is at high risk of J tube. They wish to talk to Surgery Dr. Zohaib damon informed them of very high risk of J tube and consider NGT temporarily.   Patient is not a candidate for J tube placement at this time  Patient appears to be microaspirating and unable to clear his airway due to underlying dementia and progressive physical deconditioning.   Palliative follow up Dr. salina case with family \[facility]\"" of care  As per Surgical suggestion and Dr. Guadalupe [facility] discussion Family would like to give a trial of NGT feeds  NGT placed this evening; F/U CXR; if in place can start low rate feeds with Jevity 1.5 20cc/hr and slowly advvance  Spoke with Son Germain and Son in law Mary 862# and updated; Patioent is at very high risk of Aspiration with an NGT and not clear how much benefit or risk with Gastric cancer; Family understands risks including death form aspiration  Reduce IV fluids to 60cc/hr  Reduce Valproate dose to 500 mg BID as levels are elevated  I also discussed risk of enteral feeding and increased aspiration risk.    Patient was not accepted to [facility] as per family request.  Again discussed with them the option of a potential J tube which he may not be even able to tolerate a surgical procedure at his time and even that does not eliminate aspiration risk and only gives family the satisfaction of nutrition without any improvement in quality of life. They understand that  nutritional status will not improve the underlying Dementia or the Gastric cancer.  Surgical consult appreciated; d/w Dr. Penny; Patient is at high risk of mortality with an invasive procedure   Family is not in line with Hospice care at this time    Prognosis extremely guarded and mortality expected  Discussed with Denis Patient seen and examined this afternoon with Son Patricia and wife at bedside     68 year old man admitted from home because of altered mental status and dehydration.  Patient was on home hospice, but family has asked for more aggressive treatment especially Nutrition. Patient with Progressive Dementia over the past year to 1.5 yrs as per Son although was able to ambulate with assistance was in outside Hospitals at [place] and later in [facility] diagnosed with Gastric cancer last month.  PMH advanced dementia. Oncology assessed that chemotherapy would not benefit because of overall poor functional status and underlying Dementia. Patient also with multiple cardiovascular diseases - DM, CVA, HTN, HLD, CKD, hx of chronic indwelling catheter. Discharged from [facility] about a week ago after management for suspected breakthrough seizure with home VPA dose increased to 750mg BID. Discharged to home Hospice but family was not happy with care     Patient remains clinically unchanged, eyes open to verbal simulation. unable to offer complaints; Family spoken to by Paliiative care Dr. Guadalupe and Surgical attending Dr. Penny.     Vital Signs Last 24 Hrs  T(C): 36.7 (07 Sep 2023 11:59), Max: 37.2 (07 Sep 2023 05:28)  T(F): 98 (07 Sep 2023 11:59), Max: 98.9 (07 Sep 2023 05:28)  HR: 80 (07 Sep 2023 11:59) (63 - 80)  BP: 162/78 (07 Sep 2023 11:59) (130/61 - 170/64)  RR: 19 (07 Sep 2023 18:21) (18 - 19)  SpO2: 95% (07 Sep 2023 18:21) (88% - 97%) nasal cannula  O2 Flow (L/min): 2    P/E: Limited exam  Elderly male, obtunded minimally responsive opening eyes  Neuro:  Limited exam   CVS: S1S2 present  Resp: BLAE+, increased coarse breath sounds today  GI: Soft, BS+, Non tender, non distended  Extr: No edema  Skin: Warm and moist without any rashes    Labs:                                10.3   19.56 )-----------( 199      ( 07 Sep 2023 08:55 )             31.7   09-07  137  |  102  |  12  ----------------------------<  137<H>  3.4<L>   |  31  |  1.11  Ca    8.6      07 Sep 2023 08:55  Phos  2.1     09-07  Mg     1.7     09-07  TPro  5.6<L>  /  Alb  1.8<L>  /  TBili  0.3  /  DBili  x   /  AST  8<L>  /  ALT  7<L>  /  AlkPhos  55  09-07    Valproic Acid Level, Serum (09.07.23 @ 08:55) Valproic Acid Level, Serum: 116 ug/mL    D/D:  Acute Toxic and Metabolic encephalopathy with underlying Dementia possibly contributing  Acute respiratory failure with hypoxia, atelectasis vs aspiration improved  Chronic Anemia likely Anemia of Chronic disease s/p PRBC with stable H/H  Recently diagnosed gastric cancer. poorly differentiated Adenocarcinoma .   Fluid responsive septic shock resolved  NEVILLE improved vs NEVILLE on CKD improved  Hx CVA  Hx seizure    Plan:  Patient is slightly more arousable with eyes open    s/p one unit PRBC as per family wishes; no active bleed; H/H stable  Patient appears to be continuing to aspirate with agonal breathing, minimal responsive,   Completed IV abx; No appreciable improvement in mentation despite medical mgmt at this time;  Supplemental oxygen to keep SaO2 > 93%- titrate down as tolerated; tapered to 1 liter; stable   Continue IV Fluid with Dextrose: D51/2 NS; reduce to 60cc/hr  Aspiration precaution  Hold oral feeds while encephalopathic; once more awake can feed with caution  Spoke with Severino Kerr again this afternoon. Family is concerned about patient's nutritional status despite counseling that TPN is not an option and Patient is at high risk of J tube. Patient is not a candidate for J tube placement at this time  Patient appears to be microaspirating and unable to clear his airway due to underlying dementia and progressive physical deconditioning.   Palliative follow up Dr. Guadalupe discused with family Goals of care; given family adamant on some nutrition, and as suggested by Sx and Dr. Guadalupe d/w Daughter over the phone, we have come to an agrreement to give a trial of NGT  NGT placed this evening; F/U CXR; if in place can start low rate feeds with Jevity 1.5 20cc/hr and slowly advvance  Spoke with Son Germain and Son in law Mary 862# and updated; Patient is at very high risk of Aspiration with an NGT and not clear how much benefit or risk with Gastric cancer; Family understands risks including death from aspiration  They understand that  nutritional status will not improve the underlying Dementia or the Gastric cancer.  Reduce IV fluids to 60cc/hr  Reduce Valproate dose to 500 mg BID as levels are elevated  Family is not in line with Hospice care at this time; we have offered even Palliative care at  a residential facility which can accept patient with PIC/IVF  Prognosis extremely guarded and mortality expected  Discussed with Denis

## 2023-09-07 NOTE — PROGRESS NOTE ADULT - PROBLEM SELECTOR PLAN 5
- Resolving, saturating at 97% O2   - Decreased NC 2 L to 1 L today   - pt diagnosed with aspiration pneumonia, now resolving (Chest xray RLL infiltrates)  -completed IV abx   -placement for long term hospice care discussion with palliative and family. Pt. may be transferred to [place] for second opinion. - Resolving, saturating at 97% O2   - Decreased NC 2 L to 1 L today   - pt diagnosed with aspiration pneumonia, now resolving (Chest xray RLL infiltrates)  -completed IV abx

## 2023-09-07 NOTE — PROGRESS NOTE ADULT - PROBLEM SELECTOR PROBLEM 5
Acute respiratory failure with hypoxia Cartilage Graft Text: The defect edges were debeveled with a #15 scalpel blade.  Given the location of the defect, shape of the defect, the fact the defect involved a full thickness cartilage defect a cartilage graft was deemed most appropriate.  An appropriate donor site was identified, cleansed, and anesthetized. The cartilage graft was then harvested and transferred to the recipient site, oriented appropriately and then sutured into place.  The secondary defect was then repaired using a primary closure.

## 2023-09-07 NOTE — PROGRESS NOTE ADULT - SUBJECTIVE AND OBJECTIVE BOX
PGY-1 Progress Note discussed with attending    PAGER #: [[phone]] TILL 5:00 PM  PLEASE CONTACT ON CALL TEAM:  - On Call Team (Please refer to Art) FROM 5:00 PM - 8:30PM  - Nightfloat Team FROM 8:30 -7:30 AM    CHIEF COMPLAINT & BRIEF HOSPITAL COURSE: No acute overnight events. Responsive to sternal rub, eyes open, still lethargic and drowsy. Spoke to Patient's family about goals of care.     INTERVAL HPI/OVERNIGHT EVENTS:   MEDICATIONS  (STANDING):  chlorhexidine 2% Cloths 1 Application(s) Topical <User Schedule>  dextrose 5% + lactated ringers. 1000 milliLiter(s) (80 mL/Hr) IV Continuous <Continuous>  heparin   Injectable 5000 Unit(s) SubCutaneous every 12 hours  valproate sodium  IVPB 750 milliGRAM(s) IV Intermittent two times a day    MEDICATIONS  (PRN):  glycopyrrolate Injectable 0.4 milliGRAM(s) IV Push every 4 hours PRN secretions    Vital Signs Last 24 Hrs  T(C): 36.6 (05 Sep 2023 05:39), Max: 36.8 (04 Sep 2023 21:16)  T(F): 97.9 (05 Sep 2023 05:39), Max: 98.2 (04 Sep 2023 21:16)  HR: 73 (05 Sep 2023 05:39) (73 - 81)  BP: 121/66 (05 Sep 2023 05:39) (118/54 - 121/66)  BP(mean): --  RR: 17 (05 Sep 2023 05:39) (17 - 17)  SpO2: 90% (05 Sep 2023 05:39) (90% - 96%)    Parameters below as of 05 Sep 2023 05:39  Patient On (Oxygen Delivery Method): nasal cannula  O2 Flow (L/min): 2      PHYSICAL EXAMINATION:  GENERAL: NAD, well built  HEAD:  Atraumatic, Normocephalic  EYES:  conjunctiva and sclera clear  CHEST/LUNG: +agonal breath, CTA BL  HEART: Regular rate and rhythm; No murmurs, rubs, or gallops  ABDOMEN: Soft, Nontender, Nondistended; Bowel sounds present  NERVOUS SYSTEM:  Alert & Oriented X0,    EXTREMITIES:  2+ Peripheral Pulses, No clubbing, cyanosis, or edema  SKIN: warm dry                          6.5    7.33  )-----------( 183      ( 05 Sep 2023 07:00 )             21.4     09-05    139  |  105  |  24<H>  ----------------------------<  51<LL>  4.2   |  26  |  1.27    Ca    8.9      05 Sep 2023 07:00  Phos  2.3     09-05  Mg     1.9     09-05    TPro  4.9<L>  /  Alb  1.7<L>  /  TBili  0.3  /  DBili  x   /  AST  13  /  ALT  8<L>  /  AlkPhos  55  09-05    LIVER FUNCTIONS - ( 05 Sep 2023 07:00 )  Alb: 1.7 g/dL / Pro: 4.9 g/dL / ALK PHOS: 55 U/L / ALT: 8 U/L DA / AST: 13 U/L / GGT: x                   CAPILLARY BLOOD GLUCOSE      RADIOLOGY & ADDITIONAL TESTS:

## 2023-09-07 NOTE — PROGRESS NOTE ADULT - PROBLEM SELECTOR PLAN 3
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 Bulky local necrotic metastatic adenopathy.  - diagnosed 2 months ago

## 2023-09-07 NOTE — PROGRESS NOTE ADULT - SUBJECTIVE AND OBJECTIVE BOX
follow up on:  complex medical decision making related to goals of care    [facility] Geriatric and Palliative Consult Service:  Janey Guadalupe DO: cell ([phone])  Alfredo Kohli MD: cell ([phone])  Tan Millan NP: cell ([phone])   Marvel Morris LMSW: cell ([phone])   Angela Barber NP: via [employer] Teams    Can contact any Palliative Team member via [employer] Teams for consults and questions    OVERNIGHT EVENTS:    Present Symptoms: Mild, Moderate, Severe  Pain:             Location -                               Aggravating factors -             Quality -             Radiation -             Timing-             Severity (0-10 scale):             Minimal acceptable level (0-10 scale):  Fatigue:  Nausea:  Lack of Appetite:   SOB:  Depression:  Anxiety:  Review of Systems: [All others negative or Unable to obtain due to poor mentation]    CPOT:    https://[email or web address]/Critical-Care-[email or web address]-Observation-Tool-(CPOT)  PAIN AD Score:   [email or web address] (press ctrl +  left click to view)MEDICATIONS  (STANDING):  chlorhexidine 2% Cloths 1 Application(s) Topical <User Schedule>  dextrose 5% + sodium chloride 0.45% with potassium chloride 20 mEq/L 1000 milliLiter(s) (80 mL/Hr) IV Continuous <Continuous>  heparin   Injectable 5000 Unit(s) SubCutaneous every 12 hours    MEDICATIONS  (PRN):  glycopyrrolate Injectable 0.4 milliGRAM(s) IV Push every 6 hours PRN secretions      PHYSICAL EXAM:  Vital Signs Last 24 Hrs  T(C): 36.7 (07 Sep 2023 11:59), Max: 37.2 (07 Sep 2023 05:28)  T(F): 98 (07 Sep 2023 11:59), Max: 98.9 (07 Sep 2023 05:28)  HR: 80 (07 Sep 2023 11:59) (63 - 80)  BP: 162/78 (07 Sep 2023 11:59) (130/61 - 170/64)  BP(mean): --  RR: 18 (07 Sep 2023 11:59) (18 - 18)  SpO2: 88% (07 Sep 2023 11:59) (88% - 97%)    Parameters below as of 07 Sep 2023 11:59  Patient On (Oxygen Delivery Method): room air        General: alert  oriented x ____    lethargic distressed cachexia  verbal nonverbal  unarousable     Palliative Performance Scale/Karnofsky Score:  [email or web address]/news/[email or web address]_performance_scale_[email or web address].pdf    HEENT: no abnormal lesion, dry mouth  ET tube/trach oral lesions:  Lungs: tachypnea/labored breathing, audible excessive secretions  CV: RRR, S1S2, tachycardia  GI: soft non distended non tender  incontinent               PEG/NG/OG tube  constipation  last BM:   : incontinent  oliguria/anuria  bangura  Musculoskeletal: weakness x4 edema x4    ambulatory with assistance   mostly/fully bedbound/wheelchair bound  Skin: no abnormal skin lesions, poor skin turgor, pressure ulcers stage:   Neuro: no deficits, mild cognitive impairment dsyphagia/dysarthria paresis  Oral intake ability: unable/only mouth care, minimal moderate full capability    LABS:                          10.3   19.56 )-----------( 199      ( 07 Sep 2023 08:55 )             31.7     09-07    137  |  102  |  12  ----------------------------<  137<H>  3.4<L>   |  31  |  1.11    Ca    8.6      07 Sep 2023 08:55  Phos  2.1     09-07  Mg     1.7     09-07    TPro  5.6<L>  /  Alb  1.8<L>  /  TBili  0.3  /  DBili  x   /  AST  8<L>  /  ALT  7<L>  /  AlkPhos  55  09-07    Urinalysis Basic - ( 07 Sep 2023 08:55 )    Color: x / Appearance: x / SG: x / pH: x  Gluc: 137 mg/dL / Ketone: x  / Bili: x / Urobili: x   Blood: x / Protein: x / Nitrite: x   Leuk Esterase: x / RBC: x / WBC x   Sq Epi: x / Non Sq Epi: x / Bacteria: x        RADIOLOGY & ADDITIONAL STUDIES:

## 2023-09-08 LAB
ALBUMIN SERPL ELPH-MCNC: 1.6 G/DL — LOW (ref 3.5–5)
ALP SERPL-CCNC: 55 U/L — SIGNIFICANT CHANGE UP (ref 40–120)
ALT FLD-CCNC: 6 U/L DA — LOW (ref 10–60)
ANION GAP SERPL CALC-SCNC: 4 MMOL/L — LOW (ref 5–17)
AST SERPL-CCNC: 9 U/L — LOW (ref 10–40)
BILIRUB SERPL-MCNC: 0.3 MG/DL — SIGNIFICANT CHANGE UP (ref 0.2–1.2)
BUN SERPL-MCNC: 11 MG/DL — SIGNIFICANT CHANGE UP (ref 7–18)
CALCIUM SERPL-MCNC: 8.4 MG/DL — SIGNIFICANT CHANGE UP (ref 8.4–10.5)
CHLORIDE SERPL-SCNC: 102 MMOL/L — SIGNIFICANT CHANGE UP (ref 96–108)
CO2 SERPL-SCNC: 31 MMOL/L — SIGNIFICANT CHANGE UP (ref 22–31)
CREAT SERPL-MCNC: 1.05 MG/DL — SIGNIFICANT CHANGE UP (ref 0.5–1.3)
EGFR: 78 ML/MIN/1.73M2 — SIGNIFICANT CHANGE UP
GLUCOSE SERPL-MCNC: 154 MG/DL — HIGH (ref 70–99)
HCT VFR BLD CALC: 29 % — LOW (ref 39–50)
HGB BLD-MCNC: 9.3 G/DL — LOW (ref 13–17)
MAGNESIUM SERPL-MCNC: 1.7 MG/DL — SIGNIFICANT CHANGE UP (ref 1.6–2.6)
MCHC RBC-ENTMCNC: 27 PG — SIGNIFICANT CHANGE UP (ref 27–34)
MCHC RBC-ENTMCNC: 32.1 GM/DL — SIGNIFICANT CHANGE UP (ref 32–36)
MCV RBC AUTO: 84.3 FL — SIGNIFICANT CHANGE UP (ref 80–100)
NRBC # BLD: 0 /100 WBCS — SIGNIFICANT CHANGE UP (ref 0–0)
PHOSPHATE SERPL-MCNC: 2.2 MG/DL — LOW (ref 2.5–4.5)
PLATELET # BLD AUTO: 162 K/UL — SIGNIFICANT CHANGE UP (ref 150–400)
POTASSIUM SERPL-MCNC: 3.7 MMOL/L — SIGNIFICANT CHANGE UP (ref 3.5–5.3)
POTASSIUM SERPL-SCNC: 3.7 MMOL/L — SIGNIFICANT CHANGE UP (ref 3.5–5.3)
PROT SERPL-MCNC: 5.2 G/DL — LOW (ref 6–8.3)
RBC # BLD: 3.44 M/UL — LOW (ref 4.2–5.8)
RBC # FLD: 19.2 % — HIGH (ref 10.3–14.5)
SODIUM SERPL-SCNC: 137 MMOL/L — SIGNIFICANT CHANGE UP (ref 135–145)
WBC # BLD: 20.86 K/UL — HIGH (ref 3.8–10.5)
WBC # FLD AUTO: 20.86 K/UL — HIGH (ref 3.8–10.5)

## 2023-09-08 PROCEDURE — 99233 SBSQ HOSP IP/OBS HIGH 50: CPT | Mod: GC

## 2023-09-08 RX ADMIN — CHLORHEXIDINE GLUCONATE 1 APPLICATION(S): 213 SOLUTION TOPICAL at 05:51

## 2023-09-08 RX ADMIN — Medication 55 MILLIGRAM(S): at 05:51

## 2023-09-08 RX ADMIN — HEPARIN SODIUM 5000 UNIT(S): 5000 INJECTION INTRAVENOUS; SUBCUTANEOUS at 05:51

## 2023-09-08 RX ADMIN — DEXTROSE MONOHYDRATE, SODIUM CHLORIDE, AND POTASSIUM CHLORIDE 50 MILLILITER(S): 50; .745; 4.5 INJECTION, SOLUTION INTRAVENOUS at 17:21

## 2023-09-08 RX ADMIN — HEPARIN SODIUM 5000 UNIT(S): 5000 INJECTION INTRAVENOUS; SUBCUTANEOUS at 17:20

## 2023-09-08 RX ADMIN — Medication 55 MILLIGRAM(S): at 17:21

## 2023-09-08 NOTE — PROGRESS NOTE ADULT - PROBLEM SELECTOR PLAN 6
see above  responded to fluids.  Monitor WBC, elevated today, wbc count - 19.56 see above  responded to fluids.  Monitor WBC, increasing - 19.56 > 20.86

## 2023-09-08 NOTE — PROGRESS NOTE ADULT - PROBLEM SELECTOR PLAN 1
pt. is ANOx 0  family wants to consider J tube for feeding -> poor surgical candidate , surgery wants another conversation of goals.   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   rejected transfer to [facility] pt. is ANOx 0  family wants to consider J tube for feeding -> poor surgical candidate , surgery wants another conversation of goals.   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   rejected transfer to [place]

## 2023-09-08 NOTE — PROGRESS NOTE ADULT - PROBLEM SELECTOR PLAN 2
BMI <19   Surgery consulted for J tube discussed with family that pt is poor surgical candidate and risks out weight benefits   Family considering J tube for feeding  Started IV dextrose 5% and lactated ringers 1000mL today BMI <19   NG tube place  tolerating 20 cc  upgraded to 30 cc

## 2023-09-08 NOTE — PROGRESS NOTE ADULT - NUTRITIONAL ASSESSMENT
This patient has been assessed with a concern for Malnutrition and has been determined to have a diagnosis/diagnoses of Severe protein-calorie malnutrition and Underweight (BMI < 19).    This patient is being managed with:   Diet NPO with Tube Feed-  Tube Feeding Modality: Nasogastric  Jevity 1.5 Yonas  Total Volume for 24 Hours (mL): 96  Intermittent  Starting Tube Feed Rate {mL per Hour}: 10  Increase Tube Feed Rate by (mL): 10    Every hour  Until Goal Tube Feed Rate (mL per Hour): 20  Tube Feeding Hours ON: 1  Tube Feeding OFF (Hours): 4  Tube Feed Start Time: 18:00  Free Water Flush   Total Volume per Flush (mL): 25   Frequency: Every 4 Hours    Start Time: 03:00  Supplement Feeding Modality:  Nasogastric  Entered: Sep  7 2023  4:38PM    The following pending diet order is being considered for treatment of Severe protein-calorie malnutrition and Underweight (BMI < 19):  Diet Pureed-  Supplement Feeding Modality:  Oral  Ensure Enlive Cans or Servings Per Day:  1       Frequency:  Three Times a day  Entered: Aug 30 2023  2:55PM

## 2023-09-08 NOTE — PROGRESS NOTE ADULT - SUBJECTIVE AND OBJECTIVE BOX
PGY-1 Progress Note discussed with attending    PAGER #: [[phone]] TILL 5:00 PM  PLEASE CONTACT ON CALL TEAM:  - On Call Team (Please refer to Art) FROM 5:00 PM - 8:30PM  - Nightfloat Team FROM 8:30 -7:30 AM    CHIEF COMPLAINT & BRIEF HOSPITAL COURSE:    INTERVAL HPI/OVERNIGHT EVENTS:   MEDICATIONS  (STANDING):  chlorhexidine 2% Cloths 1 Application(s) Topical <User Schedule>  dextrose 5% + sodium chloride 0.45% with potassium chloride 20 mEq/L 1000 milliLiter(s) (50 mL/Hr) IV Continuous <Continuous>  heparin   Injectable 5000 Unit(s) SubCutaneous every 12 hours  valproate sodium  IVPB 500 milliGRAM(s) IV Intermittent every 12 hours    MEDICATIONS  (PRN):  glycopyrrolate Injectable 0.4 milliGRAM(s) IV Push every 6 hours PRN secretions      REVIEW OF SYSTEMS:  CONSTITUTIONAL: No fever, weight loss, or fatigue  RESPIRATORY: No shortness of breath  CARDIOVASCULAR: No chest pain  GASTROINTESTINAL: No abdominal pain.  GENITOURINARY: No dysuria  NEUROLOGICAL: No headaches  SKIN: No itching, burning, rashes    Vital Signs Last 24 Hrs  T(C): 37.1 (08 Sep 2023 05:04), Max: 37.7 (07 Sep 2023 19:57)  T(F): 98.8 (08 Sep 2023 05:04), Max: 99.8 (07 Sep 2023 19:57)  HR: 71 (08 Sep 2023 05:04) (71 - 80)  BP: 157/73 (08 Sep 2023 05:04) (129/71 - 162/78)  BP(mean): --  RR: 20 (08 Sep 2023 05:04) (18 - 20)  SpO2: 96% (08 Sep 2023 05:04) (88% - 96%)    Parameters below as of 08 Sep 2023 05:04  Patient On (Oxygen Delivery Method): nasal cannula  O2 Flow (L/min): 2      PHYSICAL EXAMINATION:  GENERAL: NAD, well built  HEAD:  Atraumatic, Normocephalic  EYES:  conjunctiva and sclera clear  CHEST/LUNG: Clear to auscultation. No rales, rhonchi, wheezing, or rubs  HEART: Regular rate and rhythm; No murmurs, rubs, or gallops  ABDOMEN: Soft, Nontender, Nondistended; Bowel sounds present  NERVOUS SYSTEM:  Alert & Oriented X3,    EXTREMITIES:  2+ Peripheral Pulses, No clubbing, cyanosis, or edema  SKIN: warm dry                          9.3    20.86 )-----------( 162      ( 08 Sep 2023 07:14 )             29.0     09-08    137  |  102  |  11  ----------------------------<  154<H>  3.7   |  31  |  1.05    Ca    8.4      08 Sep 2023 07:14  Phos  2.2     09-08  Mg     1.7     09-08    TPro  5.2<L>  /  Alb  1.6<L>  /  TBili  0.3  /  DBili  x   /  AST  9<L>  /  ALT  6<L>  /  AlkPhos  55  09-08    LIVER FUNCTIONS - ( 08 Sep 2023 07:14 )  Alb: 1.6 g/dL / Pro: 5.2 g/dL / ALK PHOS: 55 U/L / ALT: 6 U/L DA / AST: 9 U/L / GGT: x                   CAPILLARY BLOOD GLUCOSE      RADIOLOGY & ADDITIONAL TESTS:                   PGY-1 Progress Note discussed with attending    PAGER #: [[phone]] TILL 5:00 PM  PLEASE CONTACT ON CALL TEAM:  - On Call Team (Please refer to Art) FROM 5:00 PM - 8:30PM  - Nightfloat Team FROM 8:30 -7:30 AM    CHIEF COMPLAINT & BRIEF HOSPITAL COURSE:     INTERVAL HPI/OVERNIGHT EVENTS: Patient is on nasogastric now, still non-verbal but responsive to sternal rub. No acute overnight events.     MEDICATIONS  (STANDING):  chlorhexidine 2% Cloths 1 Application(s) Topical <User Schedule>  dextrose 5% + sodium chloride 0.45% with potassium chloride 20 mEq/L 1000 milliLiter(s) (50 mL/Hr) IV Continuous <Continuous>  heparin   Injectable 5000 Unit(s) SubCutaneous every 12 hours  valproate sodium  IVPB 500 milliGRAM(s) IV Intermittent every 12 hours    MEDICATIONS  (PRN):  glycopyrrolate Injectable 0.4 milliGRAM(s) IV Push every 6 hours PRN secretions      REVIEW OF SYSTEMS:  CONSTITUTIONAL: No fever, weight loss, or fatigue  RESPIRATORY: No shortness of breath  CARDIOVASCULAR: No chest pain  GASTROINTESTINAL: No abdominal pain.  GENITOURINARY: No dysuria  NEUROLOGICAL: No headaches  SKIN: No itching, burning, rashes    Vital Signs Last 24 Hrs  T(C): 37.1 (08 Sep 2023 05:04), Max: 37.7 (07 Sep 2023 19:57)  T(F): 98.8 (08 Sep 2023 05:04), Max: 99.8 (07 Sep 2023 19:57)  HR: 71 (08 Sep 2023 05:04) (71 - 80)  BP: 157/73 (08 Sep 2023 05:04) (129/71 - 162/78)  BP(mean): --  RR: 20 (08 Sep 2023 05:04) (18 - 20)  SpO2: 96% (08 Sep 2023 05:04) (88% - 96%)    Parameters below as of 08 Sep 2023 05:04  Patient On (Oxygen Delivery Method): nasal cannula  O2 Flow (L/min): 2      PHYSICAL EXAMINATION:  GENERAL: NAD, well built, lethargic   HEAD:  Atraumatic, Normocephalic  EYES:  conjunctiva and sclera clear  CHEST/LUNG: + diffuse rales right lung bases, No rhonchi, wheezing, or rubs  HEART: Regular rate and rhythm; No murmurs, rubs, or gallops  ABDOMEN: Soft, Nontender, Nondistended; Bowel sounds present, nasogastric tube in place  NERVOUS SYSTEM:  Alert & Oriented X 0     EXTREMITIES:  2+ Peripheral Pulses, No clubbing, cyanosis, or edema  SKIN: warm dry                          9.3    20.86 )-----------( 162      ( 08 Sep 2023 07:14 )             29.0     09-08    137  |  102  |  11  ----------------------------<  154<H>  3.7   |  31  |  1.05    Ca    8.4      08 Sep 2023 07:14  Phos  2.2     09-08  Mg     1.7     09-08    TPro  5.2<L>  /  Alb  1.6<L>  /  TBili  0.3  /  DBili  x   /  AST  9<L>  /  ALT  6<L>  /  AlkPhos  55  09-08    LIVER FUNCTIONS - ( 08 Sep 2023 07:14 )  Alb: 1.6 g/dL / Pro: 5.2 g/dL / ALK PHOS: 55 U/L / ALT: 6 U/L DA / AST: 9 U/L / GGT: x                   CAPILLARY BLOOD GLUCOSE      RADIOLOGY & ADDITIONAL TESTS:

## 2023-09-08 NOTE — PROGRESS NOTE ADULT - PROBLEM SELECTOR PLAN 7
- Continue to monitor hb, downtrending 7.1 > 6.7->6.8-> 6.4->6.2-> 6.5 > 9.5 > 10.3  - gave one unit of blood on 9/5/23  - type and screen done before transfusion - Continue to monitor hb, downtrending; now uptrending 7.1 > 6.7->6.8-> 6.4->6.2-> 6.5 > 9.5 > 10.3   - gave one unit of blood on 9/5/23  - type and screen done before transfusion

## 2023-09-08 NOTE — CHART NOTE - NSCHARTNOTEFT_GEN_A_CORE
Assessment:   Patient is a 68y old  Male who presents with a chief complaint of AMS (08 Sep 2023 09:57). Follow-up to document 8/30 by Carmine (MS, RD) and MD Consult for TF. Pt seen, TF infusing @ 30 ml/hr (TF order/ goal) Discussed w/ PGY 1, she reports pt has not been eating, high aspiration risk w/ poor prognosis. Reports son wanted TF. Plans to maintain current TF, as medically feasible.      Factors impacting intake: [ ] none [ ] nausea  [ ] vomiting [ ] diarrhea [ ] constipation  [ ]chewing problems [ ] swallowing issues  [ ] other:     Diet Prescription: Diet, NPO with Tube Feed:   Tube Feeding Modality: Nasogastric  Jevity 1.5 Yonas  Total Volume for 24 Hours (mL): 480  Continuous  Starting Tube Feed Rate {mL per Hour}: 10  Increase Tube Feed Rate by (mL): 10     Every 4 hours  Until Goal Tube Feed Rate (mL per Hour): 30  Tube Feed Duration (in Hours): 16  Tube Feed Start Time: 06:00  Tube Feed Stop Time: 22:00  Free Water Flush   Total Volume per Flush (mL): 25   Frequency: Every 4 Hours    Start Time: 03:00  Supplement Feeding Modality:  Nasogastric (09-08-23 @ 14:37)    Intake: Jevity 1.5 480 ml, 720 kcals,        Daily     Daily   % Weight Change    Pertinent Medications: MEDICATIONS  (STANDING):  chlorhexidine 2% Cloths 1 Application(s) Topical <User Schedule>  dextrose 5% + sodium chloride 0.45% with potassium chloride 20 mEq/L 1000 milliLiter(s) (50 mL/Hr) IV Continuous <Continuous>  heparin   Injectable 5000 Unit(s) SubCutaneous every 12 hours  valproate sodium  IVPB 500 milliGRAM(s) IV Intermittent every 12 hours    MEDICATIONS  (PRN):  glycopyrrolate Injectable 0.4 milliGRAM(s) IV Push every 6 hours PRN secretions    Pertinent Labs: 09-08 Na137 mmol/L Glu 154 mg/dL<H> K+ 3.7 mmol/L Cr  1.05 mg/dL BUN 11 mg/dL 09-08 Phos 2.2 mg/dL<L> 09-08 Alb 1.6 g/dL<L> 08-18 Chol 117 mg/dL LDL --    HDL 41 mg/dL Trig 108 mg/dL     CAPILLARY BLOOD GLUCOSE        Skin:     Estimated Needs:   [ ] no change since previous assessment  [ ] recalculated:       Previous Nutrition Diagnosis:   [ ] Altered GI function  [ ]Inadequate Oral Intake [ ] Swallowing Difficulty   [ ] Altered nutrition related labs [ ] Increased Nutrient Needs [ ] Overweight/Obesity   [ ] Unintended Weight Loss [ ] Food & Nutrition Related Knowledge Deficit [ ] Malnutrition   [ ] Other:     Nutrition Diagnosis is [ ] ongoing  [ ] resolved [ ] not applicable     New Nutrition Diagnosis: [ ] not applicable       Interventions:   Recommend  [ ] Change Diet To:  [ ] Nutrition Supplement  [ ] Nutrition Support  [ ] Other:     Monitoring and Evaluation:   [ ] PO intake [ x ] Tolerance to diet prescription [ x ] weights [ x ] labs[ x ] follow up per protocol  [ ] other: Assessment:   Patient is a 68y old  Male who presents with a chief complaint of AMS (08 Sep 2023 09:57).Pt noted as obtunded w/ terminal gastric cancer and dementia.  Follow-up to document 8/30 by Carmine (MS, RD) and MD Consult for TF. Pt seen, TF infusing @ 30 ml/hr (TF order/ goal). Discussed w/ PGY 1, she reports pt has not been eating, high aspiration risk w/ poor prognosis. Reports son wanted TF. Plans to maintain current TF, as medically feasible.      Factors impacting intake: [ ] none [ ] nausea  [ ] vomiting [ ] diarrhea [ ] constipation  [ ]chewing problems [ ] swallowing issues  [ ] other:     Diet Prescription: Diet, NPO with Tube Feed:   Tube Feeding Modality: Nasogastric  Jevity 1.5 Yonas  Total Volume for 24 Hours (mL): 480  Continuous  Starting Tube Feed Rate {mL per Hour}: 10  Increase Tube Feed Rate by (mL): 10     Every 4 hours  Until Goal Tube Feed Rate (mL per Hour): 30  Tube Feed Duration (in Hours): 16  Tube Feed Start Time: 06:00  Tube Feed Stop Time: 22:00  Free Water Flush   Total Volume per Flush (mL): 25   Frequency: Every 4 Hours    Start Time: 03:00  Supplement Feeding Modality:  Nasogastric (09-08-23 @ 14:37)    Intake: Jevity 1.5 480 ml, 720 kcals,  31 gm protein         Pertinent Medications: MEDICATIONS  (STANDING):  chlorhexidine 2% Cloths 1 Application(s) Topical <User Schedule>  dextrose 5% + sodium chloride 0.45% with potassium chloride 20 mEq/L 1000 milliLiter(s) (50 mL/Hr) IV Continuous <Continuous>  heparin   Injectable 5000 Unit(s) SubCutaneous every 12 hours  valproate sodium  IVPB 500 milliGRAM(s) IV Intermittent every 12 hours    MEDICATIONS  (PRN):  glycopyrrolate Injectable 0.4 milliGRAM(s) IV Push every 6 hours PRN secretions    Pertinent Labs: 09-08 Na137 mmol/L Glu 154 mg/dL<H> K+ 3.7 mmol/L Cr  1.05 mg/dL BUN 11 mg/dL 09-08 Phos 2.2 mg/dL<L> 09-08 Alb 1.6 g/dL<L> 08-18 Chol 117 mg/dL LDL --    HDL 41 mg/dL Trig 108 mg/dL    Previous Nutrition Diagnosis:   [ ] Altered GI function  [ ]Inadequate Oral Intake [ ] Swallowing Difficulty   [ ] Altered nutrition related labs [ ] Increased Nutrient Needs [ ] Overweight/Obesity   [ ] Unintended Weight Loss [ ] Food & Nutrition Related Knowledge Deficit [x ] Malnutrition (severe PCM)  [ ] Other:     Nutrition Diagnosis is [x ] ongoing  [ ] resolved [ ] not applicable     Interventions:   Recommend  [ ] Change Diet To:  [ ] Nutrition Supplement  [x ] Nutrition Support: TF as medically feasible and as consistent with goals of care   [x ] Other: MD to monitor (including electrolytes w/ replacement). RD available.     Monitoring and Evaluation: [ x ] follow up per protocol

## 2023-09-08 NOTE — PROGRESS NOTE ADULT - PROBLEM SELECTOR PLAN 9
- hx of seizures, on increased dose form recent [facility] discharge  - Reduce valproic acid from 750 to 500 BID   - Valproic acid levels 9/7- 116

## 2023-09-08 NOTE — PROGRESS NOTE ADULT - ATTENDING COMMENTS
Patient seen and examined this afternoon with Son Patricia and wife at bedside     68 year old man admitted from home because of altered mental status and dehydration.  Patient was on home hospice, but family has asked for more aggressive treatment especially Nutrition. Patient with Progressive Dementia over the past year to 1.5 yrs as per Son although was able to ambulate with assistance was in outside [facility] [place] and later in [facility] diagnosed with Gastric cancer last month.  PMH advanced dementia. Oncology assessed that chemotherapy would not benefit because of overall poor functional status and underlying Dementia. Patient also with multiple cardiovascular diseases - DM, CVA, HTN, HLD, CKD, hx of chronic indwelling catheter. Discharged from [facility] about a week ago after management for suspected breakthrough seizure with home VPA dose increased to 750mg BID. Discharged to home Hospice but family was not happy with care     Patient remains clinically unchanged, eyes open to verbal simulation. unable to offer complaints; Family spoken to by Paliiative care Dr. Guadalupe and Surgical attending Dr. Penny.     Vital Signs Last 24 Hrs  T(C): 36.7 (08 Sep 2023 14:16), Max: 37.7 (07 Sep 2023 19:57)  T(F): 98 (08 Sep 2023 14:16), Max: 99.8 (07 Sep 2023 19:57)  HR: 81 (08 Sep 2023 14:16) (71 - 81)  BP: 148/87 (08 Sep 2023 14:16) (129/71 - 157/73)  RR: 19 (08 Sep 2023 14:16) (18 - 20)  SpO2: 98% (08 Sep 2023 14:16) (94% - 98%) nasal cannula O2 Flow (L/min): 2      P/E: Limited exam  Elderly male, obtunded minimally responsive opening eyes  Neuro:  Limited exam   CVS: S1S2 present  Resp: BLAE+, increased coarse breath sounds today  GI: Soft, BS+, Non tender, non distended  Extr: No edema  Skin: Warm and moist without any rashes    Labs:                                9.3    20.86 )-----------( 162      ( 08 Sep 2023 07:14 )             29.0   09-08    137  |  102  |  11  ----------------------------<  154<H>  3.7   |  31  |  1.05  Ca    8.4      08 Sep 2023 07:14  Phos  2.2     09-08  Mg     1.7     09-08  TPro  5.2<L>  /  Alb  1.6<L>  /  TBili  0.3  /  DBili  x   /  AST  9<L>  /  ALT  6<L>  /  AlkPhos  55  09-08    Valproic Acid Level, Serum (09.07.23 @ 08:55) Valproic Acid Level, Serum: 116 ug/mL    D/D:  Acute Toxic and Metabolic encephalopathy with underlying Dementia possibly contributing  Acute respiratory failure with hypoxia, atelectasis vs aspiration improved  Leucocytosis reactive   Chronic Anemia likely Anemia of Chronic disease s/p PRBC with stable H/H  Recently diagnosed gastric cancer. poorly differentiated Adenocarcinoma .   Fluid responsive septic shock resolved  NEVILLE improved vs NEVILLE on CKD improved  Hx CVA  Hx seizure    Plan:  Patient is slightly more arousable with eyes open  stable resp. status   s/P NGT placement and feeeding intiated  s/p one unit PRBC as per family wishes; no active bleed; H/H stable  Patient appears to be continuing to aspirate with agonal breathing, minimal responsive,   Completed IV abx; No appreciable improvement in mentation despite medical mgmt at this time;  Supplemental oxygen to keep SaO2 > 93%- titrate down as tolerated; tapered to 1 liter; stable   Continue IV Fluid with Dextrose: D51/2 NS; reduce to 60cc/hr  Aspiration precaution  Hold oral feeds while encephalopathic; once more awake can feed with caution  Spoke with Severino Kerr again this afternoon. Family is concerned about patient's nutritional status despite counseling that TPN is not an option and Patient is at high risk of J tube. Patient is not a candidate for J tube placement at this time  Patient appears to be microaspirating and unable to clear his airway due to underlying dementia and progressive physical deconditioning.   Palliative follow up Dr. Guadalupe discused with family Goals of care; given family adamant on some nutrition, and as suggested by Sx and Dr. Guadalupe d/w Daughter over the phone, we have come to an agrreement to give a trial of NGT  NGT placed this evening; F/U CXR; if in place can start low rate feeds with Jevity 1.5 20cc/hr and slowly advvance  Spoke with Son Germain and Son in law Mary [phone]# and updated; Patient is at very high risk of Aspiration with an NGT and not clear how much benefit or risk with Gastric cancer; Family understands risks including death from aspiration  They understand that  nutritional status will not improve the underlying Dementia or the Gastric cancer.  Reduce IV fluids to 60cc/hr  Reduce Valproate dose to 500 mg BID as levels are elevated  Family is not in line with Hospice care at this time; we have offered even Palliative care at  a residential facility which can accept patient with PIC/IVF  Prognosis extremely guarded and mortality expected  Discussed with Denis

## 2023-09-08 NOTE — PROGRESS NOTE ADULT - PROBLEM SELECTOR PLAN 5
- Resolving, saturating at 97% O2   - Decreased NC 2 L to 1 L today   - pt diagnosed with aspiration pneumonia, now resolving (Chest xray RLL infiltrates)  -completed IV abx

## 2023-09-09 LAB
MAGNESIUM SERPL-MCNC: 1.9 MG/DL — SIGNIFICANT CHANGE UP (ref 1.6–2.6)
PHOSPHATE SERPL-MCNC: 1.6 MG/DL — LOW (ref 2.5–4.5)

## 2023-09-09 PROCEDURE — 99232 SBSQ HOSP IP/OBS MODERATE 35: CPT | Mod: GC

## 2023-09-09 PROCEDURE — 71045 X-RAY EXAM CHEST 1 VIEW: CPT | Mod: 26

## 2023-09-09 RX ADMIN — HEPARIN SODIUM 5000 UNIT(S): 5000 INJECTION INTRAVENOUS; SUBCUTANEOUS at 18:10

## 2023-09-09 RX ADMIN — DEXTROSE MONOHYDRATE, SODIUM CHLORIDE, AND POTASSIUM CHLORIDE 50 MILLILITER(S): 50; .745; 4.5 INJECTION, SOLUTION INTRAVENOUS at 09:55

## 2023-09-09 RX ADMIN — CHLORHEXIDINE GLUCONATE 1 APPLICATION(S): 213 SOLUTION TOPICAL at 06:23

## 2023-09-09 RX ADMIN — HEPARIN SODIUM 5000 UNIT(S): 5000 INJECTION INTRAVENOUS; SUBCUTANEOUS at 06:03

## 2023-09-09 RX ADMIN — Medication 85 MILLIMOLE(S): at 11:17

## 2023-09-09 RX ADMIN — Medication 55 MILLIGRAM(S): at 06:03

## 2023-09-09 RX ADMIN — Medication 55 MILLIGRAM(S): at 18:05

## 2023-09-09 NOTE — PROGRESS NOTE ADULT - NUTRITIONAL ASSESSMENT
This patient has been assessed with a concern for Malnutrition and has been determined to have a diagnosis/diagnoses of Severe protein-calorie malnutrition and Underweight (BMI < 19).    Modality:  Oral  Ensure Enlive Cans or Servings Per Day:  1       Frequency:  Three Times a day  Entered: Aug 30 2023  2:55PM

## 2023-09-09 NOTE — CHART NOTE - NSCHARTNOTEFT_GEN_A_CORE
Paged by RN that patient's NG tube became untaped and had come out. RN unsure when it may have happened but was notified by PCA. Assessed at bedside and tube was no longer in place and a majority of the length could be seen. Tube could not be accurately reinserted at this time so tube was removed and patient placed on NPO at this time.

## 2023-09-09 NOTE — PROGRESS NOTE ADULT - ATTENDING COMMENTS
Patient seen and examined this afternoon with Son Patricia and wife at bedside     68 year old man admitted from home because of altered mental status and dehydration.  Patient was on home hospice, but family has asked for more aggressive treatment especially Nutrition. Patient with Progressive Dementia over the past year to 1.5 yrs as per Son although was able to ambulate with assistance was in outside [facility] at [place] and later in [facility] diagnosed with Gastric cancer last month.  PMH advanced dementia. Oncology assessed that chemotherapy would not benefit because of overall poor functional status and underlying Dementia. Patient also with multiple cardiovascular diseases - DM, CVA, HTN, HLD, CKD, hx of chronic indwelling catheter. Discharged from [facility] about a week ago after management for suspected breakthrough seizure with home VPA dose increased to 750mg BID. Discharged to home Hospice but family was not happy with care     Patient remains clinically unchanged, eyes open to verbal simulation; slightly more awake today. Informed by RN this morning NGT dislodged overnight. replaced by PGY3 this morning.     Vital Signs Last 24 Hrs  T(C): 36.7 (09 Sep 2023 14:15), Max: 36.7 (09 Sep 2023 14:15)  T(F): 98.1 (09 Sep 2023 14:15), Max: 98.1 (09 Sep 2023 14:15)  HR: 66 (09 Sep 2023 14:15) (65 - 72)  BP: 123/68 (09 Sep 2023 14:15) (123/68 - 144/80)  RR: 18 (09 Sep 2023 14:15) (17 - 18)  SpO2: 97% (09 Sep 2023 14:15) (94% - 97%): nasal cannula O2 Flow (L/min): 2      P/E: Limited exam  Elderly male, obtunded minimally responsive opening eyes  Neuro:  Limited exam   CVS: S1S2 present  Resp: BLAE+, increased coarse breath sounds today  GI: Soft, BS+, Non tender, non distended  Extr: No edema  Skin: Warm and moist without any rashes    Labs: no new; repeat AM    Valproic Acid Level, Serum (09.07.23 @ 08:55) Valproic Acid Level, Serum: 116 ug/mL    D/D:  Acute Toxic and Metabolic encephalopathy with underlying Dementia possibly contributing  Acute respiratory failure with hypoxia, atelectasis vs aspiration improved  Leucocytosis reactive   Chronic Anemia likely Anemia of Chronic disease s/p PRBC with stable H/H  Recently diagnosed gastric cancer. poorly differentiated Adenocarcinoma .   Fluid responsive septic shock resolved  NEVILLE improved vs NEVILLE on CKD improved  Hx CVA  Hx seizure    Plan:  Patient is slightly more arousable with eyes open  stable resp. status   s/P NGT replacement and feeeding resumed; increae to 30cc/hr  Free water via NGT with flushes; Gentle IV hydration  s/p one unit PRBC as per family wishes; no active bleed; H/H stable,   Completed IV abx; No appreciable improvement in mentation despite medical mgmt at this time;  Supplemental oxygen to keep SaO2 > 93%- titrate down as tolerated; tapered to 1 liter; stable   Continue IV Fluid with Dextrose: D51/2 NS; reduce to 50cc/hr  Aspiration precaution  Hold oral feeds while encephalopathic; once more awake can feed with caution  Patient is not a candidate for J tube placement at this time  Patient appears to be micro aspirating and unable to clear his airway due to underlying dementia and progressive physical deconditioning.   Family understand that  nutritional status will not improve the underlying Dementia or the Gastric cancer; don't want patient die of starvation   Continue Valproate  500 mg BID; repeat labs AM  Family is not in line with Hospice care at this time; we have offered even Palliative care at  a residential facility which can accept patient with PICC/IVF  Prognosis extremely guarded and mortality expected  Discussed with PGY1 Nasrin Herrera and SOPHIA han Patient seen and examined this morning    68 year old man admitted from home because of altered mental status and dehydration.  Patient was on home hospice, but family has asked for more aggressive treatment especially Nutrition. Patient with Progressive Dementia over the past year to 1.5 yrs as per Son although was able to ambulate with assistance was in outside [facility] [place] and later in [facility] diagnosed with Gastric cancer last month.  PMH advanced dementia. Oncology assessed that chemotherapy would not benefit because of overall poor functional status and underlying Dementia. Patient also with multiple cardiovascular diseases - DM, CVA, HTN, HLD, CKD, hx of chronic indwelling catheter. Discharged from [facility] about a week ago after management for suspected breakthrough seizure with home VPA dose increased to 750mg BID. Discharged to home Hospice but family was not happy with care     Patient remains clinically unchanged, eyes open to verbal simulation; slightly more awake today. Informed by RN this morning NGT dislodged overnight. replaced by PGY3 this morning.     Vital Signs Last 24 Hrs  T(C): 36.7 (09 Sep 2023 14:15), Max: 36.7 (09 Sep 2023 14:15)  T(F): 98.1 (09 Sep 2023 14:15), Max: 98.1 (09 Sep 2023 14:15)  HR: 66 (09 Sep 2023 14:15) (65 - 72)  BP: 123/68 (09 Sep 2023 14:15) (123/68 - 144/80)  RR: 18 (09 Sep 2023 14:15) (17 - 18)  SpO2: 97% (09 Sep 2023 14:15) (94% - 97%): nasal cannula O2 Flow (L/min): 2      P/E: Limited exam  Elderly male, obtunded minimally responsive opening eyes  Neuro:  Limited exam   CVS: S1S2 present  Resp: BLAE+, increased coarse breath sounds today  GI: Soft, BS+, Non tender, non distended  Extr: No edema  Skin: Warm and moist without any rashes    Labs: no new; repeat AM    Valproic Acid Level, Serum (09.07.23 @ 08:55) Valproic Acid Level, Serum: 116 ug/mL    D/D:  Acute Toxic and Metabolic encephalopathy with underlying Dementia possibly contributing  Acute respiratory failure with hypoxia, atelectasis vs aspiration improved  Leucocytosis reactive   Chronic Anemia likely Anemia of Chronic disease s/p PRBC with stable H/H  Recently diagnosed gastric cancer. poorly differentiated Adenocarcinoma .   Fluid responsive septic shock resolved  NEVILLE improved vs NEVILLE on CKD improved  Hx CVA  Hx seizure    Plan:  Patient is slightly more arousable with eyes open  stable resp. status   s/P NGT replacement and feeeding resumed; increae to 30cc/hr  Free water via NGT with flushes; Gentle IV hydration  s/p one unit PRBC as per family wishes; no active bleed; H/H stable,   Completed IV abx; No appreciable improvement in mentation despite medical mgmt at this time;  Supplemental oxygen to keep SaO2 > 93%- titrate down as tolerated; tapered to 1 liter; stable   Continue IV Fluid with Dextrose: D51/2 NS; reduce to 50cc/hr  Aspiration precaution  Hold oral feeds while encephalopathic; once more awake can feed with caution  Patient is not a candidate for J tube placement at this time  Patient appears to be micro aspirating and unable to clear his airway due to underlying dementia and progressive physical deconditioning.   Family understand that  nutritional status will not improve the underlying Dementia or the Gastric cancer; don't want patient die of starvation   Continue Valproate  500 mg BID; repeat labs AM  Family is not in line with Hospice care at this time; we have offered even Palliative care at  a residential facility which can accept patient with PICC/IVF  Prognosis extremely guarded and mortality expected  Discussed with PGY1 Nasrin Herrera and SOPHIA han

## 2023-09-09 NOTE — PROGRESS NOTE ADULT - PROBLEM SELECTOR PLAN 1
pt. is ANOx 0  family wants to consider J tube for feeding -> poor surgical candidate , surgery wants another conversation of goals.   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   rejected transfer to [facility]

## 2023-09-09 NOTE — PROGRESS NOTE ADULT - SUBJECTIVE AND OBJECTIVE BOX
PGY-1 Progress Note discussed with attending    PAGER #: [[phone]] TILL 5:00 PM  PLEASE CONTACT ON CALL TEAM:  - On Call Team (Please refer to Art) FROM 5:00 PM - 8:30PM  - Nightfloat Team FROM 8:30 -7:30 AM    CHIEF COMPLAINT & BRIEF HOSPITAL COURSE:  NG tube fell out yesterday . Now NPO. Possible replacement. Pt. no change is status.     INTERVAL HPI/OVERNIGHT EVENTS:   MEDICATIONS  (STANDING):  chlorhexidine 2% Cloths 1 Application(s) Topical <User Schedule>  dextrose 5% + sodium chloride 0.45% with potassium chloride 20 mEq/L 1000 milliLiter(s) (50 mL/Hr) IV Continuous <Continuous>  heparin   Injectable 5000 Unit(s) SubCutaneous every 12 hours  valproate sodium  IVPB 500 milliGRAM(s) IV Intermittent every 12 hours    MEDICATIONS  (PRN):  glycopyrrolate Injectable 0.4 milliGRAM(s) IV Push every 6 hours PRN secretions        Vital Signs Last 24 Hrs  T(C): 36.4 (09 Sep 2023 05:29), Max: 36.7 (08 Sep 2023 14:16)  T(F): 97.6 (09 Sep 2023 05:29), Max: 98 (08 Sep 2023 14:16)  HR: 65 (09 Sep 2023 05:29) (65 - 81)  BP: 144/80 (09 Sep 2023 05:29) (124/82 - 148/87)  BP(mean): --  RR: 17 (09 Sep 2023 05:29) (17 - 19)  SpO2: 96% (09 Sep 2023 05:29) (94% - 98%)    Parameters below as of 09 Sep 2023 05:29  Patient On (Oxygen Delivery Method): nasal cannula  O2 Flow (L/min): 2      PHYSICAL EXAMINATION:  GENERAL: NAD, well built  HEAD:  Atraumatic, Normocephalic  EYES:  conjunctiva and sclera clear  CHEST/LUNG: Clear to auscultation. No rales, rhonchi, wheezing, or rubs  HEART: Regular rate and rhythm; No murmurs, rubs, or gallops  ABDOMEN: Soft, Nontender, Nondistended; Bowel sounds present  NERVOUS SYSTEM:  Alert & Oriented X0,    EXTREMITIES:  2+ Peripheral Pulses, No clubbing, cyanosis, or edema  SKIN: warm dry                          9.3    20.86 )-----------( 162      ( 08 Sep 2023 07:14 )             29.0     09-08    137  |  102  |  11  ----------------------------<  154<H>  3.7   |  31  |  1.05    Ca    8.4      08 Sep 2023 07:14  Phos  2.2     09-08  Mg     1.7     09-08    TPro  5.2<L>  /  Alb  1.6<L>  /  TBili  0.3  /  DBili  x   /  AST  9<L>  /  ALT  6<L>  /  AlkPhos  55  09-08    LIVER FUNCTIONS - ( 08 Sep 2023 07:14 )  Alb: 1.6 g/dL / Pro: 5.2 g/dL / ALK PHOS: 55 U/L / ALT: 6 U/L DA / AST: 9 U/L / GGT: x                   CAPILLARY BLOOD GLUCOSE      RADIOLOGY & ADDITIONAL TESTS:

## 2023-09-10 LAB
ALBUMIN SERPL ELPH-MCNC: 1.7 G/DL — LOW (ref 3.5–5)
ALP SERPL-CCNC: 50 U/L — SIGNIFICANT CHANGE UP (ref 40–120)
ALT FLD-CCNC: 9 U/L DA — LOW (ref 10–60)
ANION GAP SERPL CALC-SCNC: 2 MMOL/L — LOW (ref 5–17)
AST SERPL-CCNC: 10 U/L — SIGNIFICANT CHANGE UP (ref 10–40)
BILIRUB SERPL-MCNC: 0.2 MG/DL — SIGNIFICANT CHANGE UP (ref 0.2–1.2)
BUN SERPL-MCNC: 12 MG/DL — SIGNIFICANT CHANGE UP (ref 7–18)
CALCIUM SERPL-MCNC: 8.9 MG/DL — SIGNIFICANT CHANGE UP (ref 8.4–10.5)
CHLORIDE SERPL-SCNC: 102 MMOL/L — SIGNIFICANT CHANGE UP (ref 96–108)
CO2 SERPL-SCNC: 31 MMOL/L — SIGNIFICANT CHANGE UP (ref 22–31)
CREAT SERPL-MCNC: 0.88 MG/DL — SIGNIFICANT CHANGE UP (ref 0.5–1.3)
EGFR: 95 ML/MIN/1.73M2 — SIGNIFICANT CHANGE UP
GLUCOSE SERPL-MCNC: 178 MG/DL — HIGH (ref 70–99)
HCT VFR BLD CALC: 29.4 % — LOW (ref 39–50)
HGB BLD-MCNC: 9.4 G/DL — LOW (ref 13–17)
MAGNESIUM SERPL-MCNC: 2 MG/DL — SIGNIFICANT CHANGE UP (ref 1.6–2.6)
MCHC RBC-ENTMCNC: 26.9 PG — LOW (ref 27–34)
MCHC RBC-ENTMCNC: 32 GM/DL — SIGNIFICANT CHANGE UP (ref 32–36)
MCV RBC AUTO: 84 FL — SIGNIFICANT CHANGE UP (ref 80–100)
NRBC # BLD: 0 /100 WBCS — SIGNIFICANT CHANGE UP (ref 0–0)
PHOSPHATE SERPL-MCNC: 2.7 MG/DL — SIGNIFICANT CHANGE UP (ref 2.5–4.5)
PLATELET # BLD AUTO: 168 K/UL — SIGNIFICANT CHANGE UP (ref 150–400)
POTASSIUM SERPL-MCNC: 3.9 MMOL/L — SIGNIFICANT CHANGE UP (ref 3.5–5.3)
POTASSIUM SERPL-SCNC: 3.9 MMOL/L — SIGNIFICANT CHANGE UP (ref 3.5–5.3)
PROT SERPL-MCNC: 5.3 G/DL — LOW (ref 6–8.3)
RBC # BLD: 3.5 M/UL — LOW (ref 4.2–5.8)
RBC # FLD: 19.6 % — HIGH (ref 10.3–14.5)
SODIUM SERPL-SCNC: 135 MMOL/L — SIGNIFICANT CHANGE UP (ref 135–145)
WBC # BLD: 8.94 K/UL — SIGNIFICANT CHANGE UP (ref 3.8–10.5)
WBC # FLD AUTO: 8.94 K/UL — SIGNIFICANT CHANGE UP (ref 3.8–10.5)

## 2023-09-10 PROCEDURE — 99232 SBSQ HOSP IP/OBS MODERATE 35: CPT | Mod: GC

## 2023-09-10 RX ADMIN — CHLORHEXIDINE GLUCONATE 1 APPLICATION(S): 213 SOLUTION TOPICAL at 05:43

## 2023-09-10 RX ADMIN — HEPARIN SODIUM 5000 UNIT(S): 5000 INJECTION INTRAVENOUS; SUBCUTANEOUS at 05:43

## 2023-09-10 RX ADMIN — Medication 55 MILLIGRAM(S): at 05:42

## 2023-09-10 RX ADMIN — HEPARIN SODIUM 5000 UNIT(S): 5000 INJECTION INTRAVENOUS; SUBCUTANEOUS at 17:37

## 2023-09-10 RX ADMIN — Medication 55 MILLIGRAM(S): at 17:37

## 2023-09-10 RX ADMIN — DEXTROSE MONOHYDRATE, SODIUM CHLORIDE, AND POTASSIUM CHLORIDE 50 MILLILITER(S): 50; .745; 4.5 INJECTION, SOLUTION INTRAVENOUS at 16:42

## 2023-09-10 NOTE — PROGRESS NOTE ADULT - NUTRITIONAL ASSESSMENT
This patient has been assessed with a concern for Malnutrition and has been determined to have a diagnosis/diagnoses of Severe protein-calorie malnutrition and Underweight (BMI < 19).    This patient is being managed with:   Diet NPO with Tube Feed-  Tube Feeding Modality: Nasogastric  Jevity 1.5 Yonas  Total Volume for 24 Hours (mL): 480  Continuous  Starting Tube Feed Rate {mL per Hour}: 10  Increase Tube Feed Rate by (mL): 10     Every 4 hours  Until Goal Tube Feed Rate (mL per Hour): 30  Tube Feed Duration (in Hours): 16  Tube Feed Start Time: 06:00  Tube Feed Stop Time: 22:00  Free Water Flush   Total Volume per Flush (mL): 25   Frequency: Every 4 Hours    Start Time: 03:00  Supplement Feeding Modality:  Nasogastric  Entered: Sep  9 2023 10:58AM    The following pending diet order is being considered for treatment of Severe protein-calorie malnutrition and Underweight (BMI < 19):  Diet Pureed-  Supplement Feeding Modality:  Oral  Ensure Enlive Cans or Servings Per Day:  1       Frequency:  Three Times a day  Entered: Aug 30 2023  2:55PM

## 2023-09-10 NOTE — PROGRESS NOTE ADULT - PROBLEM SELECTOR PLAN 9
- hx of seizures, on increased dose from recent [facility] discharge  - Reduce valproic acid from 750 to 500 BID   - Valproic acid levels 9/7- 116

## 2023-09-10 NOTE — PROGRESS NOTE ADULT - SUBJECTIVE AND OBJECTIVE BOX
PGY-1 Progress Note discussed with attending    PAGER #: [[phone]] TILL 5:00 PM  PLEASE CONTACT ON CALL TEAM:  - On Call Team (Please refer to Art) FROM 5:00 PM - 8:30PM  - Nightfloat Team FROM 8:30 -7:30 AM      INTERVAL HPI/OVERNIGHT EVENTS: Pt non verbal but responsive to voice. On NGT feed. No acute events overnight      REVIEW OF SYSTEMS:  CONSTITUTIONAL: No fever, weight loss, or fatigue  RESPIRATORY: No cough, wheezing, chills or hemoptysis; No shortness of breath  CARDIOVASCULAR: No chest pain, palpitations, dizziness, or leg swelling  GASTROINTESTINAL: No abdominal pain. No nausea, vomiting, or hematemesis; No diarrhea or constipation. No melena or hematochezia.  GENITOURINARY: No dysuria or hematuria, urinary frequency  NEUROLOGICAL: No headaches, memory loss, loss of strength, numbness, or tremors  SKIN: No itching, burning, rashes, or lesions     Vital Signs Last 24 Hrs  T(C): 36.2 (10 Sep 2023 05:02), Max: 36.7 (09 Sep 2023 14:15)  T(F): 97.2 (10 Sep 2023 05:02), Max: 98.1 (09 Sep 2023 14:15)  HR: 62 (10 Sep 2023 05:02) (57 - 66)  BP: 153/73 (10 Sep 2023 05:02) (123/68 - 153/73)  BP(mean): --  RR: 17 (10 Sep 2023 05:02) (17 - 18)  SpO2: 98% (10 Sep 2023 05:02) (97% - 98%)    Parameters below as of 10 Sep 2023 05:02  Patient On (Oxygen Delivery Method): room air        PHYSICAL EXAMINATION:  GENERAL: NAD, well built  HEAD:  Atraumatic, Normocephalic  EYES:  conjunctiva and sclera clear  NECK: Supple, No JVD, Normal thyroid  CHEST/LUNG: Clear to auscultation. No rales, rhonchi, wheezing, or rubs  HEART: Regular rate and rhythm; No murmurs, rubs, or gallops  ABDOMEN: Soft, Nontender, Nondistended; Bowel sounds present  NERVOUS SYSTEM:  Alert & Oriented X3,    EXTREMITIES:  2+ Peripheral Pulses, No clubbing, cyanosis, or edema  SKIN: warm dry                          9.4    8.94  )-----------( 168      ( 10 Sep 2023 07:15 )             29.4     09-10    135  |  102  |  12  ----------------------------<  178<H>  3.9   |  31  |  0.88    Ca    8.9      10 Sep 2023 07:15  Phos  2.7     09-10  Mg     2.0     09-10    TPro  5.3<L>  /  Alb  1.7<L>  /  TBili  0.2  /  DBili  x   /  AST  10  /  ALT  9<L>  /  AlkPhos  50  09-10    LIVER FUNCTIONS - ( 10 Sep 2023 07:15 )  Alb: 1.7 g/dL / Pro: 5.3 g/dL / ALK PHOS: 50 U/L / ALT: 9 U/L DA / AST: 10 U/L / GGT: x                   CAPILLARY BLOOD GLUCOSE      RADIOLOGY & ADDITIONAL TESTS:

## 2023-09-10 NOTE — PROGRESS NOTE ADULT - ATTENDING COMMENTS
Patient seen and examined this morning    68 year old man admitted from home because of altered mental status and dehydration.  Patient was on home hospice, but family has asked for more aggressive treatment especially Nutrition. Patient with Progressive Dementia over the past year to 1.5 yrs as per Son although was able to ambulate with assistance was in outside [facility] at [place] and later in [facility] diagnosed with Gastric cancer last month.  PMH advanced dementia. Oncology assessed that chemotherapy would not benefit because of overall poor functional status and underlying Dementia. Patient also with multiple cardiovascular diseases - DM, CVA, HTN, HLD, CKD, hx of chronic indwelling catheter. Discharged from [facility] about a week ago after management for suspected breakthrough seizure with home VPA dose increased to 750mg BID. Discharged to home Hospice but family was not happy with care     Patient clinically remain stable, open eyes to verbal and tactile stimulation. can say few words. tolerate NGT feeds.      Vital Signs Last 24 Hrs  T(C): 36.2 (10 Sep 2023 05:02), Max: 36.6 (09 Sep 2023 20:03)  T(F): 97.2 (10 Sep 2023 05:02), Max: 97.8 (09 Sep 2023 20:03)  HR: 60 (10 Sep 2023 13:30) (57 - 62)  BP: 165/82 (10 Sep 2023 13:30) (148/63 - 165/82)  RR: 18 (10 Sep 2023 13:30) (17 - 18)  SpO2: 98% (10 Sep 2023 13:30) (97% - 98%): room air    P/E: Limited exam  Elderly male, obtunded minimally responsive opening eyes  Neuro:  Limited exam   CVS: S1S2 present  Resp: BLAE+, increased coarse breath sounds today  GI: Soft, BS+, Non tender, non distended  Extr: No edema  Skin: Warm and moist without any rashes    Labs:                         9.4    8.94  )-----------( 168      ( 10 Sep 2023 07:15 )             29.4     09-10    135  |  102  |  12  ----------------------------<  178<H>  3.9   |  31  |  0.88    Ca    8.9      10 Sep 2023 07:15  Phos  2.7     09-10  Mg     2.0     09-10    TPro  5.3<L>  /  Alb  1.7<L>  /  TBili  0.2  /  DBili  x   /  AST  10  /  ALT  9<L>  /  AlkPhos  50  09-10    Valproic Acid Level, Serum (09.07.23 @ 08:55) Valproic Acid Level, Serum: 116 ug/mL    D/D:  Acute Toxic and Metabolic encephalopathy with underlying Dementia possibly contributing  Severe Protein Calorie Malnutrition with cachexia  Acute respiratory failure with hypoxia, atelectasis vs aspiration improved  Leucocytosis reactive now normalized  Chronic Anemia likely Anemia of Chronic disease s/p PRBC with stable H/H  Recently diagnosed gastric cancer. poorly differentiated Adenocarcinoma .   Fluid responsive septic shock resolved  NEVILLE improved vs NEVILLE on CKD improved  Hx CVA  Hx seizure    Plan:  Patient is slightly more arousable with eyes open  stable resp. status   Continue NGT feeds, Jevity 1.5; increase to 40cc/hr if tolerate current rate  Free water via NGT with flushes; D/C IVF AM and give free water feeding tube  s/p one unit PRBC as per family wishes; no active bleed; H/H stable,   Completed IV abx; No appreciable improvement in mentation despite medical mgmt at this time;  WBC count now normalized back  Supplemental oxygen to keep SaO2 > 93%- titrate down as tolerated; tapered to 1 liter; stable   Continue IV Fluid with Dextrose: D51/2 NS; reduce to 40cc/hr may D/C AM   Aspiration precaution  Hold oral feeds while encephalopathic; once more awake can feed with caution  Patient is not a candidate for J tube placement at this time  Patient appears to be micro aspirating and unable to clear his airway due to underlying dementia and progressive physical deconditioning.   Family understand that  nutritional status will not improve the underlying Dementia or the Gastric cancer; don't want patient die of starvation   Continue Valproate  500 mg BID; repeat levels with next set of labs Tuesday; Can get routine Labs alternate days  Family is not in line with Hospice care at this time; we have offered even Palliative care at  a residential facility which can accept patient with PICC/IVF  Prognosis extremely guarded and mortality expected  Discussed with PGY1 Seb Herrera and SOPHIA Whiteside Patient seen and examined this morning    68 year old man admitted from home because of altered mental status and dehydration.  Patient was on home hospice, but family has asked for more aggressive treatment especially Nutrition. Patient with Progressive Dementia over the past year to 1.5 yrs as per Son although was able to ambulate with assistance was in outside [facility]als at [place] and later in [facility] diagnosed with Gastric cancer last month.  PMH advanced dementia. Oncology assessed that chemotherapy would not benefit because of overall poor functional status and underlying Dementia. Patient also with multiple cardiovascular diseases - DM, CVA, HTN, HLD, CKD, hx of chronic indwelling catheter. Discharged from [facility] about a week ago after management for suspected breakthrough seizure with home VPA dose increased to 750mg BID. Discharged to home Hospice but family was not happy with care     Patient clinically remain stable, open eyes to verbal and tactile stimulation. can say few words. tolerate NGT feeds.      Vital Signs Last 24 Hrs  T(C): 36.2 (10 Sep 2023 05:02), Max: 36.6 (09 Sep 2023 20:03)  T(F): 97.2 (10 Sep 2023 05:02), Max: 97.8 (09 Sep 2023 20:03)  HR: 60 (10 Sep 2023 13:30) (57 - 62)  BP: 165/82 (10 Sep 2023 13:30) (148/63 - 165/82)  RR: 18 (10 Sep 2023 13:30) (17 - 18)  SpO2: 98% (10 Sep 2023 13:30) (97% - 98%): room air    P/E: Limited exam  Elderly male, obtunded minimally responsive opening eyes  Neuro:  Limited exam   CVS: S1S2 present  Resp: BLAE+, increased coarse breath sounds today  GI: Soft, BS+, Non tender, non distended  Extr: No edema  Skin: Warm and moist without any rashes    Labs:                         9.4    8.94  )-----------( 168      ( 10 Sep 2023 07:15 )             29.4     09-10    135  |  102  |  12  ----------------------------<  178<H>  3.9   |  31  |  0.88    Ca    8.9      10 Sep 2023 07:15  Phos  2.7     09-10  Mg     2.0     09-10    TPro  5.3<L>  /  Alb  1.7<L>  /  TBili  0.2  /  DBili  x   /  AST  10  /  ALT  9<L>  /  AlkPhos  50  09-10    Valproic Acid Level, Serum (09.07.23 @ 08:55) Valproic Acid Level, Serum: 116 ug/mL    D/D:  Acute Toxic and Metabolic encephalopathy with underlying Dementia possibly contributing  Severe Protein Calorie Malnutrition with cachexia  Acute respiratory failure with hypoxia, atelectasis vs aspiration improved  Leucocytosis reactive now normalized  Chronic Anemia likely Anemia of Chronic disease s/p PRBC with stable H/H  Recently diagnosed gastric cancer. poorly differentiated Adenocarcinoma .   Fluid responsive septic shock resolved  NEVILLE improved vs NEVILLE on CKD improved  Hx CVA  Hx seizure    Plan:  Patient is slightly more arousable with eyes open  stable resp. status   Continue NGT feeds, Jevity 1.5; increase to 40cc/hr if tolerate current rate  Free water via NGT with flushes; D/C IVF AM and give free water feeding tube  s/p one unit PRBC as per family wishes; no active bleed; H/H stable,   Completed IV abx; No appreciable improvement in mentation despite medical mgmt at this time;  WBC count now normalized back  Supplemental oxygen to keep SaO2 > 93%- titrate down as tolerated; tapered to 1 liter; stable   Continue IV Fluid with Dextrose: D51/2 NS; reduce to 40cc/hr may D/C AM   Aspiration precaution  Hold oral feeds while encephalopathic; once more awake can feed with caution  Patient is not a candidate for J tube placement at this time  Patient appears to be micro aspirating and unable to clear his airway due to underlying dementia and progressive physical deconditioning.   Family understand that  nutritional status will not improve the underlying Dementia or the Gastric cancer; don't want patient die of starvation   Continue Valproate  500 mg BID; repeat levels with next set of labs Tuesday; Can get routine Labs alternate days  Family is not in line with Hospice care at this time; we have offered even Palliative care at  a residential facility which can accept patient with PICC/IVF  Prognosis extremely guarded and mortality expected  Updated Son Patricia over the phone earlier this evening  Discussed with PGY1 Seb Herrera and SOPHIA Whiteside

## 2023-09-10 NOTE — PROGRESS NOTE ADULT - PROBLEM SELECTOR PLAN 1
pt. is ANOx 0  family wants to consider J tube for feeding -> poor surgical candidate , surgery wants another conversation of goals.   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   rejected transfer to [place]

## 2023-09-11 LAB
ALBUMIN SERPL ELPH-MCNC: 1.6 G/DL — LOW (ref 3.5–5)
ALP SERPL-CCNC: 50 U/L — SIGNIFICANT CHANGE UP (ref 40–120)
ALT FLD-CCNC: 6 U/L DA — LOW (ref 10–60)
ANION GAP SERPL CALC-SCNC: 1 MMOL/L — LOW (ref 5–17)
AST SERPL-CCNC: 10 U/L — SIGNIFICANT CHANGE UP (ref 10–40)
BILIRUB SERPL-MCNC: 0.2 MG/DL — SIGNIFICANT CHANGE UP (ref 0.2–1.2)
BUN SERPL-MCNC: 12 MG/DL — SIGNIFICANT CHANGE UP (ref 7–18)
CALCIUM SERPL-MCNC: 8.9 MG/DL — SIGNIFICANT CHANGE UP (ref 8.4–10.5)
CHLORIDE SERPL-SCNC: 101 MMOL/L — SIGNIFICANT CHANGE UP (ref 96–108)
CO2 SERPL-SCNC: 34 MMOL/L — HIGH (ref 22–31)
CREAT SERPL-MCNC: 0.98 MG/DL — SIGNIFICANT CHANGE UP (ref 0.5–1.3)
EGFR: 85 ML/MIN/1.73M2 — SIGNIFICANT CHANGE UP
GLUCOSE SERPL-MCNC: 213 MG/DL — HIGH (ref 70–99)
HCT VFR BLD CALC: 29.2 % — LOW (ref 39–50)
HGB BLD-MCNC: 9 G/DL — LOW (ref 13–17)
MAGNESIUM SERPL-MCNC: 1.9 MG/DL — SIGNIFICANT CHANGE UP (ref 1.6–2.6)
MCHC RBC-ENTMCNC: 26.6 PG — LOW (ref 27–34)
MCHC RBC-ENTMCNC: 30.8 GM/DL — LOW (ref 32–36)
MCV RBC AUTO: 86.4 FL — SIGNIFICANT CHANGE UP (ref 80–100)
NRBC # BLD: 0 /100 WBCS — SIGNIFICANT CHANGE UP (ref 0–0)
PHOSPHATE SERPL-MCNC: 2.4 MG/DL — LOW (ref 2.5–4.5)
PLATELET # BLD AUTO: 167 K/UL — SIGNIFICANT CHANGE UP (ref 150–400)
POTASSIUM SERPL-MCNC: 4.7 MMOL/L — SIGNIFICANT CHANGE UP (ref 3.5–5.3)
POTASSIUM SERPL-SCNC: 4.7 MMOL/L — SIGNIFICANT CHANGE UP (ref 3.5–5.3)
PROT SERPL-MCNC: 5.3 G/DL — LOW (ref 6–8.3)
RBC # BLD: 3.38 M/UL — LOW (ref 4.2–5.8)
RBC # FLD: 19.5 % — HIGH (ref 10.3–14.5)
SODIUM SERPL-SCNC: 136 MMOL/L — SIGNIFICANT CHANGE UP (ref 135–145)
WBC # BLD: 7.33 K/UL — SIGNIFICANT CHANGE UP (ref 3.8–10.5)
WBC # FLD AUTO: 7.33 K/UL — SIGNIFICANT CHANGE UP (ref 3.8–10.5)

## 2023-09-11 PROCEDURE — 99232 SBSQ HOSP IP/OBS MODERATE 35: CPT | Mod: GC

## 2023-09-11 RX ORDER — SODIUM,POTASSIUM PHOSPHATES 278-250MG
1 POWDER IN PACKET (EA) ORAL ONCE
Refills: 0 | Status: COMPLETED | OUTPATIENT
Start: 2023-09-11 | End: 2023-09-11

## 2023-09-11 RX ADMIN — CHLORHEXIDINE GLUCONATE 1 APPLICATION(S): 213 SOLUTION TOPICAL at 06:00

## 2023-09-11 RX ADMIN — Medication 1 PACKET(S): at 17:30

## 2023-09-11 RX ADMIN — HEPARIN SODIUM 5000 UNIT(S): 5000 INJECTION INTRAVENOUS; SUBCUTANEOUS at 17:30

## 2023-09-11 RX ADMIN — HEPARIN SODIUM 5000 UNIT(S): 5000 INJECTION INTRAVENOUS; SUBCUTANEOUS at 06:00

## 2023-09-11 RX ADMIN — Medication 55 MILLIGRAM(S): at 17:30

## 2023-09-11 RX ADMIN — Medication 55 MILLIGRAM(S): at 05:59

## 2023-09-11 RX ADMIN — DEXTROSE MONOHYDRATE, SODIUM CHLORIDE, AND POTASSIUM CHLORIDE 50 MILLILITER(S): 50; .745; 4.5 INJECTION, SOLUTION INTRAVENOUS at 13:24

## 2023-09-11 NOTE — SWALLOW BEDSIDE ASSESSMENT ADULT - COMMENTS
Pt minimally verbal (mainly vegetative sounds), replied only Y/N & OK. HOB elevated to 90°. Arousable & awoke to sternal rubs. Open mouth posture. Trial of ice chips. Oral manipulation of ice chip >30 secs, followed by weak lingual pumping action. Significantly delayed swallow trigger elicited (> 1 min). During repeated trials therafter, attempts at bolus formation & oral transit of PO >1 min, Pt fell asleep w/ PO in oral cavity. Despite maximal multimodal prompts, Pt did not initiate swallow trigger. All bolus removed. AMS & pt swallow profile place patient at risk for aspiration.       Of Note:  Heme/oncology consulted patient's gastric cancer status and decided that patient is not a candidate for palliative chemotherapy due to poor prognosis and multiple comorbidities. Palliative care spoke with daughter and son of patient at length that patient's deteriorating condition, poor prognosis and that compressions/ventilations will not change trajectory or quality of life. Family agreed to DNR/DNI and but not hospice. Family concern for nutritional status and is considering J tube. surgery  consulted for procedure and advise patient is a poor candidate. NG tube placed. Prognosis is poor and high risk of mortality based on condition. Pt required oral care & suctioning to clear oral residue. Exam terminated.

## 2023-09-11 NOTE — SWALLOW BEDSIDE ASSESSMENT ADULT - NS SPL SWALLOW CLINIC TRIAL FT
Hospitalist Discharge Summary     Admit Date:  2021  4:19 PM   DC note date: 2021  Name:  Mary Madrigal   Age:  69 y.o.  :  1947   MRN:  [id number]   PCP:  Era Frausto MD  Treatment Team: Attending Provider: Vy Simmons MD; Care Manager: Suki Ley RN; Consulting Provider: Daisy Steven MD; Consulting Provider: Laure Chowdhury MD; Hospitalist: Lawyer Arturo NP; Physical Therapist: Faviola Palacios, PT, DPT     Presenting Complaint: Decreased Appetite and Fatigue    Initial Admission Diagnosis: Acute pyelonephritis [N10]     PROBLEMS:    LEFT FLANK PAIN WITH ACUTE PYELONEPHRITIS:    BACTEREMIA WITH AVR              ENDOCARDITIS   NAUSEA AND ANOREXIA   MOSHE ON CKD III   HISTORY OF CAD WITH CHRONIC ATRIAL FIB:  NOT  [address]:  Cards on board               Rate controlled   CAROTID ARTERY STENOSIS   OBESITY  HISTORY OF GIB  HYPERTENSION   DM II    HISTORY OF AORTIC VALVE REPLACEMENT   SPINAL STENOSIS  HISTORY OF C SPINE FUSION L1-4 [place] [facility] Course:  Patient presented to ER  with complaints of fatigue, lethargy, anorexia and nausea since fall on . She also reports head heaviness with neck pain. Seen x 2 by PHP without definitive findings. Found with WBC of 21.5, normal lactic. Creatinine 1.55 with baseline of 0.9.  UTI. Begun on rocephin. Also reports diarrhea 2-3 x per day. :  BLood cultures with GPC, vancomycin added. COntinued nausea. Added reglan. :  ID consulted with concern for bacteremia in AVR. Repeat BC   :  + E Faecalis   6/10:  Cardiology in for chest pain and elevated troponin. Second BC with no growth. Vanc stopped, ampicillin IV begun  :  HOWIE with mitral vegetation. Cardiac cath with mild CAD.    :   Plans for [facility] on discharge. :  ID changing ampicillin to q 4 hours, discontinue gent, start ceftriaxone q 12 hours.   Intense back pain, incontinence of urine and stool with hardware, pending MRI spine. Also unable to walk. [facility] can not accomodate abx schedule. Desaturation with activity, on oxygen. 6/15:  Up in chair, remains frail and weak. Pain at baseline. [facility] approval pending. EOT for ampicillin and ceftriaxone 7/20/21. May need oral suppression. ID cancelling MRI. Persistent diarrhea, starting florastor. Discontinued senna and miralax. Progressing   6/16:  Stable for discharge to [address] [place] [address] better today. Disposition: Long Term Care  Activity: Activity as tolerated and as instructed by PT/OT  Diet: ADULT ORAL NUTRITION SUPPLEMENT Dinner, Lunch, Breakfast; Diabetic Supplement  ADULT DIET Regular; Low Fat/Low Chol/High Fiber/ANJALI  Code Status: Full Code    Plan was discussed with patient, sisters, CM, [address] [place] [address]. All questions answered. Patient was stable at time of discharge. Given instructions to call a physician or return if any concerns. Discharge Info:   Current Discharge Medication List      START taking these medications    Details   ampicillin 2 g 2 g by IntraVENous route every four (4) hours for 34 days. Qty: 204 Dose, Refills: 0  Start date: 6/16/2021, End date: 7/20/2021      cefTRIAXone 2 gram 2 g IVPB 2 g by IntraVENous route every twelve (12) hours every twelve (12) hours for 33 days. Qty: 66 Dose, Refills: 0  Start date: 6/17/2021, End date: 7/20/2021      heparin sodium,porcine (heparin, porcine,) 5,000 unit/mL injection 1 mL by SubCUTAneous route every eight (8) hours. Qty: 30 Syringe, Refills: 1  Start date: 6/16/2021      ondansetron (ZOFRAN) 4 mg/2 mL soln 2 mL by IntraVENous route every six (6) hours as needed for Nausea. Qty: 60 mL, Refills: 0  Start date: 6/16/2021      Saccharomyces boulardii (FLORASTOR) 250 mg capsule Take 2 Capsules by mouth two (2) times a day for 34 days.   Qty: 136 Capsule, Refills: 0  Start date: 6/16/2021, End date: 7/20/2021      traMADoL (ULTRAM) 50 mg tablet Take 1 Tablet by mouth every six (6) hours as needed for Pain for up to 3 days. Max Daily Amount: 200 mg. Qty: 30 Tablet, Refills: 0  Start date: 6/16/2021, End date: 6/19/2021    Associated Diagnoses: Lumbar stenosis with neurogenic claudication; History of cervical spinal surgery; Spinal stenosis of lumbar region with neurogenic claudication; DDD (degenerative disc disease), cervical; Neck pain         CONTINUE these medications which have NOT CHANGED    Details   ondansetron (Zofran ODT) 4 mg disintegrating tablet Take 4 mg by mouth every eight (8) hours as needed for Nausea or Vomiting. HYDROcodone-acetaminophen (NORCO)  mg tablet Take 1 Tab by mouth every eight (8) hours as needed. furosemide (LASIX) 20 mg tablet TAKE 1 TABLET BY MOUTH EVERY DAY  Qty: 90 Tab, Refills: 0    Associated Diagnoses: Essential hypertension, benign      Uloric 40 mg tab tablet TAKE 1 TABLET BY MOUTH EVERY MORNING  Qty: 90 Tab, Refills: 0    Associated Diagnoses: Acute idiopathic gout involving toe of right foot      amLODIPine (NORVASC) 10 mg tablet TAKE 1 TABLET BY MOUTH DAILY  Qty: 90 Tab, Refills: 0    Associated Diagnoses: Essential hypertension, benign      potassium chloride SR (KLOR-CON 10) 10 mEq tablet TAKE 1 TABLET BY MOUTH DAILY  Qty: 90 Tab, Refills: 1    Associated Diagnoses: Essential hypertension, benign      esomeprazole (NEXIUM) 40 mg capsule Take 40 mg by mouth daily. losartan (COZAAR) 100 mg tablet TAKE 1 TABLET BY MOUTH DAILY  Qty: 90 Tab, Refills: 3      carvedilol (COREG) 25 mg tablet Take 1 Tab by mouth two (2) times daily (with meals). Qty: 180 Tab, Refills: 3      DISABLED PLACARD (DISABLED PLACARD) DMV To use with handicap placard form  Qty: 1 Each, Refills: 0    Associated Diagnoses: Spinal stenosis of lumbar region with neurogenic claudication      aspirin delayed-release 81 mg tablet Take 81 mg by mouth daily. calcium-vitamin D 600 mg(1,500mg) -200 unit tab Take 1 Tab by mouth daily.       ferrous sulfate 325 mg (65 mg iron) tablet Take 1 Tab by mouth daily (with breakfast). Qty: 30 Tab, Refills: 1      sucralfate (CARAFATE) 1 gram tablet Take 1 Tablet by mouth four (4) times daily as needed. nitroglycerin (NITROSTAT) 0.4 mg SL tablet 1 Tab by SubLINGual route every five (5) minutes as needed for Chest Pain. Up to 3 doses. Qty: 25 Tab, Refills: 4    Associated Diagnoses: Chest pain, unspecified type      famotidine (PEPCID) 20 mg tablet Take 1 Tab by mouth two (2) times a day. Qty: 180 Tab, Refills: 1    Associated Diagnoses: Gastroesophageal reflux disease, esophagitis presence not specified         STOP taking these medications       DOCUSATE CALCIUM (STOOL SOFTENER PO) Comments:   Reason for Stopping:         polyethylene glycol (MIRALAX) 17 gram/dose powder Comments:   Reason for Stopping:             Procedures done this admission:  LEFT HEART CATH / CORONARY ANGIOGRAPHY    Consults this admission:  IP CONSULT TO INFECTIOUS DISEASES  IP CONSULT TO CARDIOLOGY    EKG results:  Results for orders placed or performed during the hospital encounter of 06/06/21   EKG, 12 LEAD, INITIAL   Result Value Ref Range    Ventricular Rate 82 BPM    Atrial Rate 87 BPM    QRS Duration 90 ms    Q-T Interval 378 ms    QTC Calculation (Bezet) 441 ms    Calculated R Axis 7 degrees    Calculated T Axis 155 degrees    Diagnosis       Atrial fibrillation  ST & T wave abnormality, consider lateral ischemia  Abnormal ECG    Confirmed by [facility]  MD ()VIANEY ([id number]) on 6/10/2021 7:15:56 AM     Results for orders placed or performed in visit on 01/03/19   AMB POC EKG ROUTINE W/ 12 LEADS, INTER & REP    Impression    Sinus  Rhythm   -Poor R-wave progression -nonspecific -consider old anterior infarct.    -  Nonspecific T-abnormality. ABNORMAL      6/9:  ECHOCARDIOGRAM:    Left Ventricle Normal cavity size and systolic function (ejection fraction normal). Mild concentric hypertrophy.   Wall motion: normal. The estimated EF is 60 - 65%. Unable to assess left ventricular diastolic pressure. There is no thrombus. There is no mass. Left Atrium Normal cavity size. Right Ventricle Normal cavity size, wall thickness and global systolic function. Right Atrium Normal cavity size. Aortic Valve No stenosis. Prosthetic aortic valve. Aortic valve peak gradient is 44 mmHg. Aortic valve mean gradient is 21 mmHg. Aortic valve peak velocity is 330 cm/s. Trace aortic valve regurgitation. There is a bioprosthetic valve present. (DI: 0.4), AT: (102ms)  Poorly visualized. Consider HOWIE if clinically indicated. .   Mitral Valve No stenosis. Mitral valve thickening. Leaflet calcification. Moderate mitral annular calcification. Mild regurgitation. Tricuspid Valve Normal valve structure and no stenosis. Mild regurgitation. Right Ventricular Arterial Pressure (RVSP) is 35 mmHg. Pulmonary hypertension found to be mild. Pulmonic Valve Normal valve structure and no stenosis. Mild regurgitation. Aorta Normal aortic root, ascending aortic, and aortic arch. Pericardium Normal pericardium and no evidence of pericardial effusion. 6/11:  HOWIE:    Left Ventricle Normal cavity size, wall thickness and systolic function (ejection fraction normal). The estimated EF is 55 - 60%. Unable to assess diastolic function. Left Atrium Mildly dilated left atrium. Right Ventricle Normal cavity size and global systolic function. Right Atrium Normal cavity size. Aortic Valve Normal valve structure, no stenosis and no regurgitation. Mitral Valve Normal valve structure and no stenosis. Mild regurgitation. There is vegetation on the anterior leaflet of the mitral valve. Tricuspid Valve Normal valve structure, no stenosis and no regurgitation. Pulmonic Valve Normal valve structure, no stenosis and no regurgitation. Aorta Normal aortic root, ascending aortic, and aortic arch. Pericardium No evidence of pericardial effusion.      6/10/2021: CARDIAC CATH:   Left Main   The vessel was visualized by angiography. The vessel is angiographically normal.   Left Anterior Descending   There is mild disease. Mid LAD lesion, 40% stenosed. Lateral First Diagonal Branch   Lat 1st Diag lesion, 30% stenosed. Left Circumflex   Mid Cx lesion, 30% stenosed. Right Coronary Artery   There is mild disease. Mid RCA lesion, 30% stenosed. Diagnostic Imaging/Tests:   CXR:  6/13/2021:  PICC line as described. CXR:  6/9/2021:  Airspace opacity at the left lung base with probable left pleural effusion. Atelectasis or pneumonia should be considered. CT C SPINE:  6/6/2021:  Postoperative and degenerative changes. No acute abnormality noted in cervical spine     CT ABD PELV WO CONT:  6/6/2021: Nonobstructing stones in both kidneys. Small ventral hernia, no bowel involvement. MICRO:    COVID:  Negative   6/8:  BLOOD CULTURE:  No growth x 5 days    6/8:  BLOOD CULTURE:  No growth x 5 days     6/7:  URINE CULTURE:  No growth to date    6/6:  BLOOD CULTURE:      GRAM STAIN      Final   GRAM POS COCCI IN CHAINS    GRAM STAIN      Final   AEROBIC AND ANAEROBIC BOTTLES    GRAM STAIN      Final   RESULTS VERIFIED, PHONED TO AND READ BACK BY Gabriel Aguila RN ON 6/7/21 @1046, TA    Culture result: Abnormal       Final   ENTEROCOCCUS FAECALIS GROUP D      6/6:  BLOOD CULTURE:    GRAM STAIN      Final   GRAM POS COCCI IN CHAINS    GRAM STAIN      Final   AEROBIC AND ANAEROBIC BOTTLES    GRAM STAIN      Final   CRITICAL RESULT NOT CALLED DUE TO PREVIOUS NOTIFICATION OF CRITICAL RESULT WITHIN THE LAST 24 HOURS.     Culture result: Abnormal       Final   ENTEROCOCCUS SPECIES        Labs: Results:       BMP, Mg, Phos Recent Labs     06/16/21  0441 06/15/21  0623 06/14/21  0605    140 142   K 3.9 4.3 4.5    104 107   CO2 31 32 32   AGAP 7 4* 3*   BUN 13 16 18   CREA 0.90 0.88 0.80   CA 8.5 8.4 8.9   * 112* 114*      CBC Recent Labs     06/16/21  0441 06/15/21  0623 06/14/21  0605   WBC 6.7 6.0 5.3   RBC 4.45 4.29 4.35   HGB 10.2* 10.0* 10.1*   HCT 34.1* 33.0* 34.3*    203 182   GRANS 79* 75 71   LYMPH 9* 13 14   EOS 0* 1 1   MONOS 11 11 13*   BASOS 0 0 1   IG 1 1 1   ANEU 5.3 4.5 3.8   ABL 0.6 0.8 0.7   SILVINO 0.0 0.0 0.1   ABM 0.8 0.7 0.7   ABB 0.0 0.0 0.0   AIG 0.0 0.0 0.0      Cardiac Testing Lab Results   Component Value Date/Time     12/14/2015 12:34 PM     10/13/2015 04:48 PM    B-type Natriuretic Peptide 85.7 09/04/2014 02:40 PM     11/05/2014 11:33 AM    CK - MB 1.5 11/05/2014 11:33 AM    CK-MB Index 1.1 11/05/2014 11:33 AM    Troponin-I, Qt. <0.04 10/13/2015 04:48 PM      Coagulation Tests Lab Results   Component Value Date/Time    Prothrombin time 12.7 01/21/2019 01:17 PM    Prothrombin time 10.5 03/15/2017 10:00 AM    Prothrombin time 10.0 09/19/2016 09:04 AM    INR 1.0 01/21/2019 01:17 PM    INR 1.0 03/15/2017 10:00 AM    INR 1.0 09/19/2016 09:04 AM    aPTT 25.3 03/15/2017 10:00 AM    aPTT 26.0 09/19/2016 09:04 AM    aPTT 25.1 06/09/2016 12:00 PM      A1c Lab Results   Component Value Date/Time    Hemoglobin A1c 6.4 (H) 01/16/2020 09:43 AM    Hemoglobin A1c 6.4 (H) 08/28/2019 02:22 PM    Hemoglobin A1c 6.4 (H) 04/23/2019 11:21 AM      Lipid Panel Lab Results   Component Value Date/Time    Cholesterol, total 169 07/11/2018 09:00 AM    HDL Cholesterol 47 07/11/2018 09:00 AM    LDL, calculated 76 07/11/2018 09:00 AM    VLDL, calculated 46 (H) 07/11/2018 09:00 AM    Triglyceride 231 (H) 07/11/2018 09:00 AM      Thyroid Panel Lab Results   Component Value Date/Time    TSH 1.160 06/06/2021 03:52 PM    TSH 1.550 07/12/2017 09:38 AM        Most Recent UA Lab Results   Component Value Date/Time    Color YELLOW 06/07/2021 09:38 PM    Appearance CLOUDY 06/07/2021 09:38 PM    Specific gravity 1.017 08/06/2019 01:34 PM    pH (UA) 5.5 06/07/2021 09:38 PM    Protein TRACE (A) 06/07/2021 09:38 PM    Glucose Negative 06/07/2021 09:38 PM Ketone Negative 06/07/2021 09:38 PM    Bilirubin Negative 06/07/2021 09:38 PM    Blood Negative 06/07/2021 09:38 PM    Urobilinogen 0.2 06/07/2021 09:38 PM    Nitrites Negative 06/07/2021 09:38 PM    Leukocyte Esterase SMALL (A) 06/07/2021 09:38 PM    WBC 10-20 06/07/2021 09:38 PM    RBC 0-3 06/07/2021 09:38 PM    Epithelial cells 0-3 06/07/2021 09:38 PM    Bacteria 0 06/07/2021 09:38 PM    Casts 0-3 06/07/2021 09:38 PM    Crystals, urine 0 06/06/2021 05:53 PM    Mucus 0 06/06/2021 05:53 PM    Other observations RESULTS VERIFIED MANUALLY 06/06/2021 05:53 PM          All Labs from Last 24 Hrs:  Recent Results (from the past 24 hour(s))   CBC WITH AUTOMATED DIFF    Collection Time: 06/16/21  4:41 AM   Result Value Ref Range    WBC 6.7 4.3 - 11.1 K/uL    RBC 4.45 4.05 - 5.2 M/uL    HGB 10.2 (L) 11.7 - 15.4 g/dL    HCT 34.1 (L) 35.8 - 46.3 %    MCV 76.6 (L) 79.6 - 97.8 FL    MCH 22.9 (L) 26.1 - 32.9 PG    MCHC 29.9 (L) 31.4 - 35.0 g/dL    RDW 14.8 (H) 11.9 - 14.6 %    PLATELET 961 388 - 847 K/uL    MPV 9.5 9.4 - 12.3 FL    ABSOLUTE NRBC 0.00 0.0 - 0.2 K/uL    DF AUTOMATED      NEUTROPHILS 79 (H) 43 - 78 %    LYMPHOCYTES 9 (L) 13 - 44 %    MONOCYTES 11 4.0 - 12.0 %    EOSINOPHILS 0 (L) 0.5 - 7.8 %    BASOPHILS 0 0.0 - 2.0 %    IMMATURE GRANULOCYTES 1 0.0 - 5.0 %    ABS. NEUTROPHILS 5.3 1.7 - 8.2 K/UL    ABS. LYMPHOCYTES 0.6 0.5 - 4.6 K/UL    ABS. MONOCYTES 0.8 0.1 - 1.3 K/UL    ABS. EOSINOPHILS 0.0 0.0 - 0.8 K/UL    ABS. BASOPHILS 0.0 0.0 - 0.2 K/UL    ABS. IMM.  GRANS. 0.0 0.0 - 0.5 K/UL   METABOLIC PANEL, BASIC    Collection Time: 06/16/21  4:41 AM   Result Value Ref Range    Sodium 140 136 - 145 mmol/L    Potassium 3.9 3.5 - 5.1 mmol/L    Chloride 102 98 - 107 mmol/L    CO2 31 21 - 32 mmol/L    Anion gap 7 7 - 16 mmol/L    Glucose 103 (H) 65 - 100 mg/dL    BUN 13 8 - 23 MG/DL    Creatinine 0.90 0.6 - 1.0 MG/DL    GFR est AA >60 >60 ml/min/1.73m2    GFR est non-AA >60 >60 ml/min/1.73m2    Calcium 8.5 8.3 - 10.4 MG/DL Discharge Exam:  Patient Vitals for the past 24 hrs:   Temp Pulse Resp BP SpO2   06/16/21 1500 98 °F (36.7 °C) 72 18 136/72 95 %   06/16/21 1140 99.2 °F (37.3 °C) 81 18 (!) 144/71 95 %   06/16/21 0730 98.2 °F (36.8 °C) 78 18 (!) 131/93 95 %   06/16/21 0357 98.2 °F (36.8 °C) 65 18 (!) 141/72 91 %   06/15/21 2310 98.3 °F (36.8 °C) 80 16 (!) 142/81 91 %   06/15/21 1909 99 °F (37.2 °C) 61 18 (!) 147/67 90 %     Oxygen Therapy  O2 Sat (%): 95 % (06/16/21 1500)  Pulse via Oximetry: 69 beats per minute (06/10/21 1815)  O2 Device: None (Room air) (06/14/21 0817)  O2 Flow Rate (L/min): 4 l/min (06/13/21 1915)    Estimated body mass index is 41.4 kg/m² as calculated from the following:    Height as of this encounter: 5' 5\" (1.651 m). Weight as of this encounter: 112.9 kg (248 lb 12.8 oz). Intake/Output Summary (Last 24 hours) at 6/16/2021 1524  Last data filed at 6/16/2021 1500  Gross per 24 hour   Intake 600 ml   Output 1650 ml   Net -1050 ml        General appearance: Up in chair. Pain at baseline. Family at bedside. Oriented and alert. Morbidly obese. Head: Normocephalic, without obvious abnormality, atraumatic  Throat: Lips, mucosa, and tongue normal. Teeth and gums normal  Neck: supple, symmetrical, trachea midline, and no JVD  Lungs: clear to auscultation bilaterally  Heart: Irregular rate and rhythm, S1, S2 normal, + murmur  Abdomen: soft, non-tender. Bowel sounds normal. No masses,  no organomegaly  Extremities:  All extremities normal, atraumatic, no cyanosis or edema  Skin: Skin color, texture, turgor normal. No rashes or lesions  Neurologic: Grossly normal     Current Med List in Hospital:   Current Facility-Administered Medications   Medication Dose Route Frequency    Saccharomyces boulardii (FLORASTOR) capsule 500 mg  500 mg Oral BID    cefTRIAXone (ROCEPHIN) 2 g in 0.9% sodium chloride (MBP/ADV) 50 mL MBP  2 g IntraVENous Q12H    ampicillin (OMNIPEN) 2 g in 0.9% sodium chloride (MBP/ADV) 100 mL MBP  2 g IntraVENous Q4H    losartan (COZAAR) tablet 100 mg  100 mg Oral DAILY    sodium chloride (NS) flush 10 mL  10 mL InterCATHeter Q8H    sodium chloride (NS) flush 10 mL  10 mL InterCATHeter PRN    [Held by provider] furosemide (LASIX) injection 40 mg  40 mg IntraVENous DAILY    ferrous sulfate tablet 325 mg  1 Tablet Oral BID WITH MEALS    alum-mag hydroxide-simeth (MYLANTA) oral suspension 30 mL  30 mL Oral Q4H PRN    simethicone (MYLICON) tablet 80 mg  80 mg Oral QID PRN    HYDROcodone-acetaminophen (NORCO)  mg tablet 1 Tablet  1 Tablet Oral Q8H PRN    traMADoL (ULTRAM) tablet 50 mg  50 mg Oral Q6H PRN    sodium chloride (NS) flush 5-10 mL  5-10 mL IntraVENous Q8H    sodium chloride (NS) flush 5-10 mL  5-10 mL IntraVENous PRN    sodium chloride (NS) flush 5-40 mL  5-40 mL IntraVENous Q8H    sodium chloride (NS) flush 5-40 mL  5-40 mL IntraVENous PRN    heparin (porcine) injection 5,000 Units  5,000 Units SubCUTAneous Q8H    ondansetron (ZOFRAN) injection 4 mg  4 mg IntraVENous Q6H PRN    amLODIPine (NORVASC) tablet 10 mg  10 mg Oral DAILY    aspirin delayed-release tablet 81 mg  81 mg Oral DAILY    calcium-vitamin D (OS-SHANDRA +D3) 250 mg-125 unit per tablet 1 Tablet  1 Tablet Oral DAILY    carvediloL (COREG) tablet 25 mg  25 mg Oral BID WITH MEALS    pantoprazole (PROTONIX) tablet 40 mg  40 mg Oral ACB    sucralfate (CARAFATE) tablet 1 g  1 g Oral AC&HS       Allergies   Allergen Reactions    Latex Rash    Metformin Diarrhea    Sulfa (Sulfonamide Antibiotics) Anaphylaxis    Nsaids (Non-Steroidal Anti-Inflammatory Drug) Other (comments)     ulcers    Macrobid [Nitrofurantoin Monohyd/M-Cryst] Other (comments)     Causes Gout    Other Plant, Animal, Environmental Itching     Pt itched after wearing a plastic blood pressure cuff.   Pt reports BP will be higher in right arm     Oxycodone Other (comments)     Hallucination, anxiety with oxycontin-- denies rx to hydrocodone    Tape [Adhesive] Rash     Paper tape ok     Immunization History   Administered Date(s) Administered    COVID-19, PFIZER, MRNA, LNP-S, PF, 30MCG/0.3ML DOSE 01/21/2021, 02/13/2021    Influenza High Dose Vaccine PF 09/30/2016, 10/23/2017, 10/16/2018    Influenza Vaccine 10/02/2014    Influenza Vaccine (>6 mo Afluria QUAD Vial 44194 (0.25 mL) / 10625 (0.5 mL)) 11/10/2015    Influenza Vaccine (Tri) Adjuvanted (>65 Yrs FLUAD TRI 27624) 10/30/2019    Pneumococcal Conjugate (PCV-13) 11/10/2015    Pneumococcal Polysaccharide (PPSV-23) 10/02/2014    TB Skin Test (PPD) Intradermal 06/09/2016, 10/10/2016, 03/21/2017, 06/08/2021     Follow Up Orders:    FOLLOW UP WITH ID AS DISCUSSED WITH THEM   FOLLOW UP WITH PRIMARY DOCTOR AS INSTRUCTED BY [facility] STAFF     Time spent in patient discharge and coordination 45 minutes.       Signed:  Sammie Gaspar NP Pt. opened mouth to accept; initial swallow elicited, with subsequent absent lingual response therafter. Pt required oral care & suctioning to clear oral residue.

## 2023-09-11 NOTE — SWALLOW BEDSIDE ASSESSMENT ADULT - ORAL PHASE
Decreased anterior-posterior movement of the bolus/Delayed oral transit time/Stasis in anterior sulcus base-of-tongue pumping ; prolonged oral bolus holding/Decreased anterior-posterior movement of the bolus/Delayed oral transit time/Stasis in anterior sulcus/Lingual stasis

## 2023-09-11 NOTE — SWALLOW BEDSIDE ASSESSMENT ADULT - SLP REFERRING PHYSICIAN
"-- DO NOT REPLY / DO NOT REPLY ALL --  -- Message is from the [employer]--    Patient is requesting a medication refill - medication is on active medication list    Patient is currently OUT of the requested medication. Was Medication Pended? Yes. Rx Name and Dose:  Accu-chek Angelika plus test strip   Patient need doctor to contact pharmacy or patient insurance to approve that patient needs the test strips   Duration: 90 days    Pharmacy  [id number] [place] [address]    Patient confirmed the above pharmacy as correct? Yes    Caller Information       Type Contact Phone    12/11/2020 12:26 PM CST Phone (Incoming) Aliya Cloud (Mother) [phone]          Alternative phone number: na     Turnaround time given to caller: ""This message will be sent to [facility] Provider's name]. The clinical team will fulfill your request as soon as they review your message. \""  "
F/U call made to [facility], spoke to Santos Boateng and informed we have not received the CMN form, she will refax.
Spoke with Marina from pharmacy, she will re-fax CMN form.
Amy Bo

## 2023-09-11 NOTE — SWALLOW BEDSIDE ASSESSMENT ADULT - SLP PERTINENT HISTORY OF CURRENT PROBLEM
69 yo man w/ advanced dementia (years), gastric CA w/ local metastatic lymph nodes (not surgical or chemo candidate), seizure disorder, HTN, HLD, DM, CVA x2 (last 2013), CKD (Baseline Cr 1.5-1.8), chronic bangura (exchanged in ED 8/17/23) admitted to medicine for AHRF 2/2 asp. pna, NEVILLE on CKD, and worsening mental status in the setting of advanced gastric cancer. 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

## 2023-09-11 NOTE — SWALLOW BEDSIDE ASSESSMENT ADULT - SWALLOW EVAL: DIAGNOSIS
Pt p/w s&s severe oropharyngeal dysphagia, c/b impaired bolus formation, poor bolus transport, base-of-tongue pumping, and absent initiation of swallow trigger. Pt at risk for silent aspiration w/ even most conservative PO texture.

## 2023-09-11 NOTE — SWALLOW BEDSIDE ASSESSMENT ADULT - ORAL PREPARATORY PHASE
Reduced oral grading/Decreased mastication ability/Bolus falls into anterior sulcus Reduced oral grading/Decreased mastication ability

## 2023-09-11 NOTE — PROGRESS NOTE ADULT - ATTENDING COMMENTS
Patient seen and examined this morning    68 year old man admitted from home because of altered mental status and dehydration.  Patient was on home hospice, but family has asked for more aggressive treatment especially Nutrition. Patient with Progressive Dementia over the past year to 1.5 yrs as per Son although was able to ambulate with assistance was in outside [facility] [place] and later in [facility] diagnosed with Gastric cancer last month.  PMH advanced dementia. Oncology assessed that chemotherapy would not benefit because of overall poor functional status and underlying Dementia. Patient also with multiple cardiovascular diseases - DM, CVA, HTN, HLD, CKD, hx of chronic indwelling catheter. Discharged from [facility] about a week ago after management for suspected breakthrough seizure with home VPA dose increased to 750mg BID. Discharged to home Hospice but family was not happy with care     Patient clinically remain stable, open eyes to verbal and tactile stimulation. can say few words. tolerate NGT feeds.          P/E: Limited exam  Elderly male, obtunded minimally responsive opening eyes  Neuro:  Limited exam   CVS: S1S2 present  Resp: BLAE+, increased coarse breath sounds today  GI: Soft, BS+, Non tender, non distended  Extr: No edema  Skin: Warm and moist without any rashes    Labs:                         9.4    8.94  )-----------( 168      ( 10 Sep 2023 07:15 )             29.4     09-10    135  |  102  |  12  ----------------------------<  178<H>  3.9   |  31  |  0.88    Ca    8.9      10 Sep 2023 07:15  Phos  2.7     09-10  Mg     2.0     09-10    TPro  5.3<L>  /  Alb  1.7<L>  /  TBili  0.2  /  DBili  x   /  AST  10  /  ALT  9<L>  /  AlkPhos  50  09-10    Valproic Acid Level, Serum (09.07.23 @ 08:55) Valproic Acid Level, Serum: 116 ug/mL    D/D:  Acute Toxic and Metabolic encephalopathy with underlying Dementia possibly contributing  Severe Protein Calorie Malnutrition with cachexia  Acute respiratory failure with hypoxia, atelectasis vs aspiration improved  Leucocytosis reactive now normalized  Chronic Anemia likely Anemia of Chronic disease s/p PRBC with stable H/H  Recently diagnosed gastric cancer. poorly differentiated Adenocarcinoma .   Fluid responsive septic shock resolved  NEVILLE improved vs NEVILLE on CKD improved  Hx CVA  Hx seizure    Plan:  Patient is slightly more arousable with eyes open  stable resp. status   Continue NGT feeds, Jevity 1.5; increase to 40cc/hr if tolerate current rate  Free water via NGT with flushes; D/C IVF AM and give free water feeding tube  s/p one unit PRBC as per family wishes; no active bleed; H/H stable,   Completed IV abx; No appreciable improvement in mentation despite medical mgmt at this time;  WBC count now normalized back  Supplemental oxygen to keep SaO2 > 93%- titrate down as tolerated; tapered to 1 liter; stable   Continue IV Fluid with Dextrose: D51/2 NS; reduce to 40cc/hr may D/C AM   Aspiration precaution  Hold oral feeds while encephalopathic; once more awake can feed with caution  Patient is not a candidate for J tube placement at this time  Patient appears to be micro aspirating and unable to clear his airway due to underlying dementia and progressive physical deconditioning.   Family understand that  nutritional status will not improve the underlying Dementia or the Gastric cancer; don't want patient die of starvation   Continue Valproate  500 mg BID; repeat levels with next set of labs Tuesday; Can get routine Labs alternate days  Family is not in line with Hospice care at this time; we have offered even Palliative care at  a residential facility which can accept patient with PICC/IVF  Prognosis extremely guarded and mortality expected  Updated Son Patricia over the phone earlier this evening  Discussed with PGY1 Seb Herrera and SOPHIA Whiteside Patient seen and examined this morning    68 year old man admitted from home because of altered mental status and dehydration.  Patient was on home hospice, but family has asked for more aggressive treatment especially Nutrition. Patient with Progressive Dementia over the past year to 1.5 yrs as per Son although was able to ambulate with assistance was in outside [facility] at [place] and later in [facility] diagnosed with Gastric cancer last month.  PMH advanced dementia. Oncology assessed that chemotherapy would not benefit because of overall poor functional status and underlying Dementia. Patient also with multiple cardiovascular diseases - DM, CVA, HTN, HLD, CKD, hx of chronic indwelling catheter. Discharged from [facility] about a week ago after management for suspected breakthrough seizure with home VPA dose increased to 750mg BID. Discharged to home Hospice but family was not happy with care     Patient clinically remain stable, open eyes to calling his name. can mumble few words with Son. tolerate NGT feeds.      P/E: Limited exam  Elderly male, obtunded slightly more responsive opening eyes  Neuro:  Limited exam   CVS: S1S2 present  Resp: BLAE+, no wheeze or Rhonchi  GI: Soft, BS+, Non tender, non distended  Extr: No edema  Skin: Warm and moist without any rashes    Labs: reviewed. stable as below 9/10                        9.4    8.94  )-----------( 168      ( 10 Sep 2023 07:15 )             29.4     09-10    135  |  102  |  12  ----------------------------<  178<H>  3.9   |  31  |  0.88    Ca    8.9      10 Sep 2023 07:15  Phos  2.7     09-10  Mg     2.0     09-10    TPro  5.3<L>  /  Alb  1.7<L>  /  TBili  0.2  /  DBili  x   /  AST  10  /  ALT  9<L>  /  AlkPhos  50  09-10    Valproic Acid Level, Serum (09.07.23 @ 08:55) Valproic Acid Level, Serum: 116 ug/mL    D/D:  Acute Toxic and Metabolic encephalopathy with underlying Dementia possibly contributing  Severe Protein Calorie Malnutrition with cachexia  Acute respiratory failure with hypoxia, atelectasis vs aspiration improved  Leucocytosis reactive now normalized  Chronic Anemia likely Anemia of Chronic disease s/p PRBC with stable H/H  Recently diagnosed gastric cancer. poorly differentiated Adenocarcinoma .   Fluid responsive septic shock resolved  NEVILLE improved vs NEVILLE on CKD improved  Hx CVA  Hx seizure    Plan:  Patient is slightly more arousable with eyes open  stable resp. status   Continue NGT feeds, Jevity 1.5; increase to 40cc/hr if tolerate current rate  Free water via NGT with flushes; D/C IVF AM and give free water feeding tube  s/p one unit PRBC as per family wishes; no active bleed; H/H stable,   Completed IV abx; No appreciable improvement in mentation despite medical mgmt at this time;  WBC count now normalized   Supplemental oxygen to keep SaO2 > 93%- titrate down as tolerated; tapered to 1 liter; stable   Aspiration precaution  Hold oral feeds while encephalopathic; once more awake can feed with caution  Patient is not a candidate for J tube placement at this time  Patient appears to be micro aspirating and unable to clear his airway due to underlying dementia and progressive physical deconditioning.   Family understand that  nutritional status will not improve the underlying Dementia or the Gastric cancer; don't want patient die of starvation   Continue Valproate  500 mg BID; repeat levels with next set of labs Tuesday; Can get routine Labs alternate days  Family is not in line with Hospice care at this time; we have offered even Palliative care at  a residential facility which can accept patient with PICC/IVF  Prognosis extremely guarded and mortality expected    I have discussed with Son about family should have a meeting to decide on continued care as NGT is only a temporary option  Discussed with PGY1 Isabel Herrera and SOPHIA Good  I will be away 9/12/23 onwards; Hospitalist Colleague/ Dr. Pastrana to assume care AM

## 2023-09-11 NOTE — PROGRESS NOTE ADULT - SUBJECTIVE AND OBJECTIVE BOX
PGY-1 Progress Note discussed with attending    PAGER #: [] TILL 5:00 PM  PLEASE CONTACT ON CALL TEAM:  - On Call Team (Please refer to Art) FROM 5:00 PM - 8:30PM  - Nightfloat Team FROM 8:30 -7:30 AM      INTERVAL HPI/OVERNIGHT EVENTS: no acute overnight events. NG tube in place. Pt arousable to name but exam limited due to mentation.       REVIEW OF SYSTEMS:  limited due to mentation    MEDICATIONS  (STANDING):  chlorhexidine 2% Cloths 1 Application(s) Topical <User Schedule>  dextrose 5% + sodium chloride 0.45% with potassium chloride 20 mEq/L 1000 milliLiter(s) (50 mL/Hr) IV Continuous <Continuous>  heparin   Injectable 5000 Unit(s) SubCutaneous every 12 hours  potassium phosphate / sodium phosphate Powder (PHOS-NaK) 1 Packet(s) Oral once  valproate sodium  IVPB 500 milliGRAM(s) IV Intermittent every 12 hours    MEDICATIONS  (PRN):  glycopyrrolate Injectable 0.4 milliGRAM(s) IV Push every 6 hours PRN secretions      Vital Signs Last 24 Hrs  T(C): 36.4 (11 Sep 2023 04:38), Max: 36.4 (11 Sep 2023 04:38)  T(F): 97.5 (11 Sep 2023 04:38), Max: 97.5 (11 Sep 2023 04:38)  HR: 68 (11 Sep 2023 04:38) (55 - 68)  BP: 145/78 (11 Sep 2023 04:38) (145/78 - 165/82)  BP(mean): 100 (11 Sep 2023 04:38) (100 - 100)  RR: 18 (11 Sep 2023 04:38) (17 - 18)  SpO2: 98% (11 Sep 2023 04:38) (98% - 98%)    Parameters below as of 11 Sep 2023 04:38  Patient On (Oxygen Delivery Method): room air        PHYSICAL EXAMINATION:  HEAD:  Atraumatic, Normocephalic  EYES:  conjunctiva and sclera clear  NECK: Supple, No JVD, Normal thyroid  CHEST/LUNG: +coarse breath sounds b/l. No rales, rhonchi, wheezing, or rubs  HEART: Regular rate and rhythm; No murmurs, rubs, or gallops  ABDOMEN: Soft, Nontender, Nondistended; Bowel sounds present, no pain or masses on palpation  EXTREMITIES:  2+ Peripheral Pulses, No clubbing, cyanosis, or edema  SKIN: warm dry                          9.0    7.33  )-----------( 167      ( 11 Sep 2023 06:48 )             29.2     09-11    136  |  101  |  12  ----------------------------<  213<H>  4.7   |  34<H>  |  0.98    Ca    8.9      11 Sep 2023 06:48  Phos  2.4     09-11  Mg     1.9     09-11    TPro  5.3<L>  /  Alb  1.6<L>  /  TBili  0.2  /  DBili  x   /  AST  10  /  ALT  6<L>  /  AlkPhos  50  09-11    LIVER FUNCTIONS - ( 11 Sep 2023 06:48 )  Alb: 1.6 g/dL / Pro: 5.3 g/dL / ALK PHOS: 50 U/L / ALT: 6 U/L DA / AST: 10 U/L / GGT: x                   I&O's Summary    10 Sep 2023 07:01  -  11 Sep 2023 07:00  --------------------------------------------------------  IN: 0 mL / OUT: 1300 mL / NET: -1300 mL            CAPILLARY BLOOD GLUCOSE      RADIOLOGY & ADDITIONAL TESTS:

## 2023-09-11 NOTE — PROGRESS NOTE ADULT - PROBLEM SELECTOR PLAN 9
- hx of seizures, on increased dose from recent [facility] discharge  - Reduce valproic acid from 750 to 500 BID   - f/u Valproic acid level 9/13

## 2023-09-11 NOTE — SWALLOW BEDSIDE ASSESSMENT ADULT - PHARYNGEAL PHASE
Absent laryngeal elevation/Absent trigger of swallow/Multiple swallows Delayed pharyngeal swallow/Decreased laryngeal elevation/Absent laryngeal elevation

## 2023-09-11 NOTE — SWALLOW BEDSIDE ASSESSMENT ADULT - SPECIFY REASON(S)
Subjective assessment of current swallow function; family wants to consider J tube for feeding -> poor surgical candidate, Sx wants another conversation of goals. Last seen by this SLP April 2023

## 2023-09-11 NOTE — PROGRESS NOTE ADULT - PROBLEM SELECTOR PLAN 1
pt. is ANOx 0  family wants to consider J tube for feeding -> poor surgical candidate , surgery wants another conversation of goals.   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   rejected transfer to [place]

## 2023-09-11 NOTE — CHART NOTE - NSCHARTNOTEFT_GEN_A_CORE
pt. pulled out NG tube. This is the second time, son said he saw him pulling on it. First time it was unclear how it fell out. I asked him if he wanted us to put it in again tonight and he said he wanted to discuss with the primary team. If replaced this be the third time and patient would need to be in some sort of restraints.

## 2023-09-12 DIAGNOSIS — R62.7 ADULT FAILURE TO THRIVE: ICD-10-CM

## 2023-09-12 PROCEDURE — 99497 ADVNCD CARE PLAN 30 MIN: CPT | Mod: 25

## 2023-09-12 PROCEDURE — 99233 SBSQ HOSP IP/OBS HIGH 50: CPT | Mod: GC

## 2023-09-12 RX ADMIN — HEPARIN SODIUM 5000 UNIT(S): 5000 INJECTION INTRAVENOUS; SUBCUTANEOUS at 17:23

## 2023-09-12 RX ADMIN — CHLORHEXIDINE GLUCONATE 1 APPLICATION(S): 213 SOLUTION TOPICAL at 05:28

## 2023-09-12 RX ADMIN — Medication 55 MILLIGRAM(S): at 05:28

## 2023-09-12 RX ADMIN — Medication 55 MILLIGRAM(S): at 17:23

## 2023-09-12 RX ADMIN — HEPARIN SODIUM 5000 UNIT(S): 5000 INJECTION INTRAVENOUS; SUBCUTANEOUS at 05:28

## 2023-09-12 NOTE — PROGRESS NOTE ADULT - PROBLEM SELECTOR PLAN 5
- Resolving, saturating at 97% O2   - Decreased NC 2 L to 1 L today   - pt diagnosed with aspiration pneumonia, now resolving (Chest xray RLL infiltrates)  -completed IV abx - hx of advanced dementia on citalopram  - c/w meds when off NPO

## 2023-09-12 NOTE — PROGRESS NOTE ADULT - PROBLEM SELECTOR PLAN 6
see above  responded to fluids.  RESOLVED - Resolving, saturating at 97% O2   - Decreased NC 2 L to 1 L today   - pt diagnosed with aspiration pneumonia, now resolving (Chest xray RLL infiltrates)  -completed IV abx

## 2023-09-12 NOTE — PROGRESS NOTE ADULT - PROBLEM SELECTOR PLAN 3
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 Bulky local necrotic metastatic adenopathy.  - diagnosed 2 months ago NGT feeding as tolerated  failed speech and swallow eval

## 2023-09-12 NOTE — PROGRESS NOTE ADULT - CONVERSATION DETAILS
Initially family understood that patient's status is deteriorating; however initially concerned that changing code status would mean withdrawal of care. Reiterated that care would be given; that ICU level care with chest compressions/ventilation would not change trajectory or quality of life in patient. Son and daughters(via phone on speaker) acknowledged this and agreed that the intubation/chest compressions would not change patient's current state. Patient made DNR/DNI;. Prognosis is poor; patient eligible for hospice; however not IPU given patient is not requiring IV pain medication; not requiring continuous infusion of antiseizure medications, not agitated Updated MOLST in chart.
Discussed treatment plan and GOC with son in [place] and daughter/son in law on phone.     However, family continues to strongly endorse that they're goal of the patient recovery of mental status as they believed that worsening mental status is due to poor nutritional status. Continued to endorse poor prognosis and discomfort that comes with placing NGT and further feeding options including J - tube. Family reports that they would like him to be fed so that eventually he can go home and does not appear interested in palliative options at this time. Discussed how patient is poor surgical candidate for J tube placement and will likely not be able to tolerate procedure. Patient would like to place NGT again despite risk of aspiration, discomfort and also patient pulling it out again. Family asking if possible to place restraints on patient for nutrition feeding through NGT. I endorsed the pain and suffering that would cause and how it would be unethical to place patient in restraints. Family agrees for no restraints but requesting NGT placement for continued feeding. Will place back on NGT per family request.
Family concerned about taking care of patient at home and does not seem to be considering hospice at this time. Family reports that they will like to continue medical treatment and would like to see if patient will wake up.    - Discussed severe prognosis and length with multiple family members daily for the past week. Discussed how medically treated reversible causes of his AMS, including aspiration pneumonia with no significant improvement of mental status.   -Family is worried about patient's nutritional status. Discussed at length and multiple times how risk outweighs benefit of TPN or PEG tube nutrition. Patient appears to continuously having microaspirations and unable to clear his airway. Discussed risk of enteral feeding and increased aspiration risk. Discussed TPN will likely have minimal benefit for patient's clinical status and will likely only cause more medical/harmful complications and suffering such as possible thrombosis and new infections. Family would like to look into other options for nutrition and requesting transfer to [place].    - [place] transfer center aware that family requesting to transfer, Reports no medical necessity for transfer but will potentially take patient if family wants to. Pending insurance auth/info as unlikely transportation will be covered by insurance per [place] transfer center.    -Palliative care team consulted for further GOC
Patient's son, at bedside and daughter, via telephone in [place] have asked for full treatment, including intubation, but no chest compression.  Daughter specifically stated that she does not want to withhold any care from her father.  He had been on home hospice, and they asked for full treatment.  I explained that I thought treatment would be futile, but that I would follow their wishes.  MOLST was completed by [facility] RN and signed by me.  Following family's wishes, I asked MICU for consultation.  After MICU consultation, son and daughter agreed to DNR/DNI, but still ask for treatment, as indicated.   MOLST form is completed by Palliative Care.    We will treat treatable issues during the hospitalization.  We have explained that the prognosis is grave.   Son is at the bedside.  Daughter may return from [place] (she was just here).

## 2023-09-12 NOTE — PROGRESS NOTE ADULT - PROBLEM SELECTOR PLAN 1
pt. is ANOx 0  family wants to consider J tube for feeding -> poor surgical candidate , surgery wants another conversation of goals.   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   rejected transfer to [place]

## 2023-09-12 NOTE — PROGRESS NOTE ADULT - NUTRITIONAL ASSESSMENT
This patient has been assessed with a concern for Malnutrition and has been determined to have a diagnosis/diagnoses of Severe protein-calorie malnutrition and Underweight (BMI < 19).    This patient is being managed with:   Diet NPO with Tube Feed-  Tube Feeding Modality: Nasogastric  Jevity 1.5 Yonas  Total Volume for 24 Hours (mL): 640  Continuous  Starting Tube Feed Rate {mL per Hour}: 10  Increase Tube Feed Rate by (mL): 10     Every 4 hours  Until Goal Tube Feed Rate (mL per Hour): 40  Tube Feed Duration (in Hours): 16  Tube Feed Start Time: 06:00  Tube Feed Stop Time: 22:00  Free Water Flush   Total Volume per Flush (mL): 25   Frequency: Every 4 Hours  Pump   Rate (mL per Hour): 25   Frequency: Every Hour    Start Time: 03:00  Supplement Feeding Modality:  Nasogastric  Entered: Sep 11 2023  2:36PM    The following pending diet order is being considered for treatment of Severe protein-calorie malnutrition and Underweight (BMI < 19):  Diet Pureed-  Supplement Feeding Modality:  Oral  Ensure Enlive Cans or Servings Per Day:  1       Frequency:  Three Times a day  Entered: Aug 30 2023  2:55PM

## 2023-09-12 NOTE — PROGRESS NOTE ADULT - PROBLEM SELECTOR PLAN 9
- hx of seizures, on increased dose from recent [facility] discharge  - Reduce valproic acid from 750 to 500 BID   - f/u Valproic acid level 9/13 creatine normalized  but BUN still high

## 2023-09-12 NOTE — CHART NOTE - NSCHARTNOTEFT_GEN_A_CORE
Assessment:   68yMalePatient is a 68y old  Male who presents with a chief complaint of AMS (12 Sep 2023 09:40).   Pt visited. Lethargic. Pt Pulled Out NG tube.  Pt is DNR, MEWS exempted.  SLP maribel on 9/11-NPo except ice chips. Palliative on case.       Factors impacting intake: [ ] none [ ] nausea  [ ] vomiting [ ] diarrhea [ ] constipation  [ ]chewing problems [ ] swallowing issues  [ ] other:     Diet Prescription: Diet, NPO with Tube Feed:   Tube Feeding Modality: Nasogastric  Jevity 1.5 Yonas  Total Volume for 24 Hours (mL): 640  Continuous  Starting Tube Feed Rate {mL per Hour}: 10  Increase Tube Feed Rate by (mL): 10     Every 4 hours  Until Goal Tube Feed Rate (mL per Hour): 40  Tube Feed Duration (in Hours): 16  Tube Feed Start Time: 06:00  Tube Feed Stop Time: 22:00  Free Water Flush   Total Volume per Flush (mL): 25   Frequency: Every 4 Hours  Pump   Rate (mL per Hour): 25   Frequency: Every Hour    Start Time: 03:00  Supplement Feeding Modality:  Nasogastric (09-11-23 @ 14:37)            Pertinent Medications: MEDICATIONS  (STANDING):  chlorhexidine 2% Cloths 1 Application(s) Topical <User Schedule>  heparin   Injectable 5000 Unit(s) SubCutaneous every 12 hours  valproate sodium  IVPB 500 milliGRAM(s) IV Intermittent every 12 hours    MEDICATIONS  (PRN):  glycopyrrolate Injectable 0.4 milliGRAM(s) IV Push every 6 hours PRN secretions    Pertinent Labs: 09-11 Na136 mmol/L Glu 213 mg/dL<H> K+ 4.7 mmol/L Cr  0.98 mg/dL BUN 12 mg/dL 09-11 Phos 2.4 mg/dL<L> 09-11 Alb 1.6 g/dL<L> 08-18 Chol 117 mg/dL LDL --    HDL 41 mg/dL Trig 108 mg/dL     CAPILLARY BLOOD GLUCOSE        Skin:     Estimated Needs:   [ ] no change since previous assessment  [ ] recalculated:     Previous Nutrition Diagnosis:   [ ] Inadequate Energy Intake [ ]Inadequate Oral Intake [ ] Excessive Energy Intake   [ ] Underweight [ ] Increased Nutrient Needs [ ] Overweight/Obesity   [ ] Altered GI Function [ ] Unintended Weight Loss [ ] Food & Nutrition Related Knowledge Deficit [ x] Malnutrition Severe     Nutrition Diagnosis is [x ] ongoing  [ ] resolved [ ] not applicable     New Nutrition Diagnosis: [ ] not applicable       Interventions:   Recommend  [ ] Change Diet To:  [ ] Nutrition Supplement  [ ] Nutrition Support  [x ] Other: Per Goals of care.     Monitoring and Evaluation:   [ ] PO intake [ x ] Tolerance to diet prescription [ x ] weights [ x ] labs[ x ] follow up per protocol  [ ] other:

## 2023-09-12 NOTE — PROGRESS NOTE ADULT - PROBLEM SELECTOR PLAN 10
- hx of DM not on any home meds  - FS q6 while NPO - hx of seizures, on increased dose from recent [facility] discharge  - Reduce valproic acid from 750 to 500 BID   - f/u Valproic acid level 9/13

## 2023-09-12 NOTE — PROGRESS NOTE ADULT - PROBLEM SELECTOR PLAN 4
- hx of advanced dementia on citalopram  - c/w meds when off NPO - [facility]TH wnl no metastatic changes , recent CT Ab/P imaging showed - Gastric pyloric mass, suspect adenocarcinoma. Bulky local necrotic metastatic adenopathy.  - diagnosed 2 months ago

## 2023-09-12 NOTE — PROGRESS NOTE ADULT - CONVERSATION/DISCUSSION
Diagnosis/Prognosis/MOLST Discussed/Treatment Options
Diagnosis/Prognosis/MOLST Discussed/Treatment Options
Diagnosis/Prognosis/Treatment Options
Treatment Options
Diagnosis/Prognosis/MOLST Discussed/Treatment Options/Palliative Care Referral

## 2023-09-12 NOTE — PROGRESS NOTE ADULT - ATTENDING COMMENTS
68 year old man admitted from home because of altered mental status and dehydration.  Patient was on home hospice, but family has asked for more aggressive treatment especially Nutrition. Patient with Progressive Dementia over the past year to 1.5 yrs as per Son although was able to ambulate with assistance was in outside [facility] [place] and later in [facility] diagnosed with Gastric cancer last month.  PMH advanced dementia. Oncology assessed that chemotherapy would not benefit because of overall poor functional status and underlying Dementia. Patient also with multiple cardiovascular diseases - DM, CVA, HTN, HLD, CKD, hx of chronic indwelling catheter. Discharged from [facility] about a week ago after management for suspected breakthrough seizure with home VPA dose increased to 750mg BID. Discharged to home Hospice but family was not happy with care     Patient clinically remain stable, open eyes to verbal and tactile stimulation. can say few words. tolerate NGT feeds.          P/E: Limited exam  Elderly male, obtunded minimally responsive opening eyes  Neuro:  Limited exam   CVS: S1S2 present  Resp: BLAE+, increased coarse breath sounds today  GI: Soft, BS+, Non tender, non distended  Extr: No edema  Skin: Warm and moist without any rashes    Labs:                         9.4    8.94  )-----------( 168      ( 10 Sep 2023 07:15 )             29.4     09-10    135  |  102  |  12  ----------------------------<  178<H>  3.9   |  31  |  0.88    Ca    8.9      10 Sep 2023 07:15  Phos  2.7     09-10  Mg     2.0     09-10    TPro  5.3<L>  /  Alb  1.7<L>  /  TBili  0.2  /  DBili  x   /  AST  10  /  ALT  9<L>  /  AlkPhos  50  09-10    Valproic Acid Level, Serum (09.07.23 @ 08:55) Valproic Acid Level, Serum: 116 ug/mL    D/D:  Acute Toxic and Metabolic encephalopathy with underlying Dementia possibly contributing  Severe Protein Calorie Malnutrition with cachexia  Acute respiratory failure with hypoxia, atelectasis vs aspiration improved  Leucocytosis reactive now normalized  Chronic Anemia likely Anemia of Chronic disease s/p PRBC with stable H/H  Recently diagnosed gastric cancer. poorly differentiated Adenocarcinoma .   Fluid responsive septic shock resolved  NEVILLE improved vs NEVILLE on CKD improved  Hx CVA  Hx seizure    - Patient pulled out NGT last night, discussed at length regarding trauma and pain with NGT placement    - However, family continues to strongly endorse that they're goal of the patient recovery of mental status as they believed that worsening mental status is due to poor nutritional status. Continued to endorse poor prognosis and discomfort that comes with placing NGT and further feeding options including J - tube. Family reports that they would like him to be fed so that eventually he can go home and does not appear interested in palliative options at this time. Discussed how patient is poor surgical candidate for J tube placement and will likely not be able to tolerate procedure. Patient would like to place NGT again despite risk of aspiration, discomfort and also patient pulling it out again. Family asking if possible to place restraints on patient for nutrition feeding through NGT. I endorsed the pain and suffering that would cause and how it would be unethical to place patient in restraints. Family agrees for no restraints but requesting NGT placement for continued feeding   - Place NGT and continue NGT feeds with Jevity 1.5, increase to 40cc/hr if tolerate current rate  Free water via NGT with flushes  s/p one unit PRBC as per family wishes; no active bleed; H/H stable,   Completed IV abx; No appreciable improvement in mentation despite medical mgmt at this time;  WBC count now normalized back  Supplemental oxygen to keep SaO2 > 93%- titrate down as tolerated; tapered to 1 liter; stable   Continue IV Fluid with Dextrose: D51/2 NS; reduce to 40cc/hr may D/C AM   Aspiration precaution  Hold oral feeds while encephalopathic; once more awake can feed with caution  Patient is not a candidate for J tube placement at this time  Patient appears to be micro aspirating and unable to clear his airway due to underlying dementia and progressive physical deconditioning.   Family understand that  nutritional status will not improve the underlying Dementia or the Gastric cancer; don't want patient die of starvation   Continue Valproate  500 mg BID; repeat levels with next set of labs Tuesday; Can get routine Labs alternate days  Family is not in line with Hospice care at this time; we have offered even Palliative care at  a residential facility which can accept patient with PICC/IVF  Prognosis extremely guarded and mortality expected  Updated Son Patricia over the phone earlier this evening  Discussed with PGY1 Seb Herrera and SOPHIA Whiteside . 68 year old man admitted from home because of altered mental status and dehydration.  Patient was on home hospice, but family has asked for more aggressive treatment especially Nutrition. Patient with Progressive Dementia over the past year to 1.5 yrs as per Son although was able to ambulate with assistance was in outside [facility] at [place] and later in [facility] diagnosed with Gastric cancer last month.  PMH advanced dementia. Oncology assessed that chemotherapy would not benefit because of overall poor functional status and underlying Dementia. Patient also with multiple cardiovascular diseases - DM, CVA, HTN, HLD, CKD, hx of chronic indwelling catheter. Discharged from [facility] about a week ago after management for suspected breakthrough seizure with home VPA dose increased to 750mg BID. Discharged to home Hospice but family was not happy with care     Acute Toxic and Metabolic encephalopathy with underlying Dementia possibly contributing  Severe Protein Calorie Malnutrition with cachexia  Acute respiratory failure with hypoxia, atelectasis vs aspiration improved  Leucocytosis reactive now normalized  Chronic Anemia likely Anemia of Chronic disease s/p PRBC with stable H/H  Recently diagnosed gastric cancer. poorly differentiated Adenocarcinoma .   Fluid responsive septic shock resolved  NEVILLE improved vs NEVILLE on CKD improved  Hx CVA  Hx seizure    - Patient pulled out NGT last night, discussed at length regarding trauma and pain with NGT placement    - However, family continues to strongly endorse that they're goal of the patient recovery of mental status as they believed that worsening mental status is due to poor nutritional status. Continued to endorse poor prognosis and discomfort that comes with placing NGT and further feeding options including J - tube. Family reports that they would like him to be fed so that eventually he can go home and does not appear interested in palliative options at this time. Discussed how patient is poor surgical candidate for J tube placement and will likely not be able to tolerate procedure. Patient would like to place NGT again despite risk of aspiration, discomfort and also patient pulling it out again. Family asking if possible to place restraints on patient for nutrition feeding through NGT. I endorsed the pain and suffering that would cause and how it would be unethical to place patient in restraints. Family agrees for no restraints but requesting NGT placement for continued feeding   - Place NGT and continue NGT feeds with Jevity 1.5, increase to 40cc/hr if tolerate current rate  Free water via NGT with flushes  s/p one unit PRBC as per family wishes; no active bleed; H/H stable,   Completed IV abx; No appreciable improvement in mentation despite medical mgmt at this time;  WBC count now normalized back  Supplemental oxygen to keep SaO2 > 93%- titrate down as tolerated; tapered to 1 liter; stable   Continue IV Fluid with Dextrose: D51/2 NS; reduce to 40cc/hr may D/C AM   Aspiration precaution  Hold oral feeds while encephalopathic; once more awake can feed with caution  Patient is not a candidate for J tube placement at this time  Patient appears to be micro aspirating and unable to clear his airway due to underlying dementia and progressive physical deconditioning.   Family understand that  nutritional status will not improve the underlying Dementia or the Gastric cancer; don't want patient die of starvation   Continue Valproate  500 mg BID; repeat levels with next set of labs Tuesday; Can get routine Labs alternate days  Family is not in line with Hospice care at this time; we have offered even Palliative care at  a residential facility which can accept patient with PICC/IVF  Prognosis extremely guarded and mortality expected

## 2023-09-12 NOTE — PROGRESS NOTE ADULT - SUBJECTIVE AND OBJECTIVE BOX
PGY-1 Progress Note discussed with attending    PAGER #: [] TILL 5:00 PM  PLEASE CONTACT ON CALL TEAM:  - On Call Team (Please refer to Art) FROM 5:00 PM - 8:30PM  - Nightfloat Team FROM 8:30 -7:30 AM    CHIEF COMPLAINT & BRIEF HOSPITAL COURSE:      INTERVAL HPI/OVERNIGHT EVENTS:       REVIEW OF SYSTEMS:  CONSTITUTIONAL: No fever, weight loss, or fatigue  RESPIRATORY: No cough, wheezing, chills or hemoptysis; No shortness of breath  CARDIOVASCULAR: No chest pain, palpitations, dizziness, or leg swelling  GASTROINTESTINAL: No abdominal pain. No nausea, vomiting, or hematemesis; No diarrhea or constipation. No melena or hematochezia.  GENITOURINARY: No dysuria or hematuria, urinary frequency  NEUROLOGICAL: No headaches, memory loss, loss of strength, numbness, or tremors  SKIN: No itching, burning, rashes, or lesions     MEDICATIONS  (STANDING):  chlorhexidine 2% Cloths 1 Application(s) Topical <User Schedule>  heparin   Injectable 5000 Unit(s) SubCutaneous every 12 hours  valproate sodium  IVPB 500 milliGRAM(s) IV Intermittent every 12 hours    MEDICATIONS  (PRN):  glycopyrrolate Injectable 0.4 milliGRAM(s) IV Push every 6 hours PRN secretions      Vital Signs Last 24 Hrs  T(C): 36.2 (11 Sep 2023 20:58), Max: 36.2 (11 Sep 2023 13:56)  T(F): 97.2 (11 Sep 2023 20:58), Max: 97.2 (11 Sep 2023 13:56)  HR: 68 (12 Sep 2023 05:20) (65 - 68)  BP: 135/78 (12 Sep 2023 05:20) (135/78 - 157/86)  BP(mean): --  RR: 17 (12 Sep 2023 05:20) (17 - 18)  SpO2: 93% (12 Sep 2023 05:20) (93% - 97%)    Parameters below as of 12 Sep 2023 05:20  Patient On (Oxygen Delivery Method): room air        PHYSICAL EXAMINATION:  GENERAL: NAD, well built  HEAD:  Atraumatic, Normocephalic  EYES:  conjunctiva and sclera clear  NECK: Supple, No JVD, Normal thyroid  CHEST/LUNG: Clear to auscultation. Clear to percussion bilaterally; No rales, rhonchi, wheezing, or rubs  HEART: Regular rate and rhythm; No murmurs, rubs, or gallops  ABDOMEN: Soft, Nontender, Nondistended; Bowel sounds present, no pain or masses on palpation  NERVOUS SYSTEM:  Alert & Oriented X3  : voiding well  EXTREMITIES:  2+ Peripheral Pulses, No clubbing, cyanosis, or edema  SKIN: warm dry                          9.0    7.33  )-----------( 167      ( 11 Sep 2023 06:48 )             29.2     09-11    136  |  101  |  12  ----------------------------<  213<H>  4.7   |  34<H>  |  0.98    Ca    8.9      11 Sep 2023 06:48  Phos  2.4     09-11  Mg     1.9     09-11    TPro  5.3<L>  /  Alb  1.6<L>  /  TBili  0.2  /  DBili  x   /  AST  10  /  ALT  6<L>  /  AlkPhos  50  09-11    LIVER FUNCTIONS - ( 11 Sep 2023 06:48 )  Alb: 1.6 g/dL / Pro: 5.3 g/dL / ALK PHOS: 50 U/L / ALT: 6 U/L DA / AST: 10 U/L / GGT: x                   I&O's Summary    11 Sep 2023 07:01  -  12 Sep 2023 07:00  --------------------------------------------------------  IN: 0 mL / OUT: 1850 mL / NET: -1850 mL            CAPILLARY BLOOD GLUCOSE      RADIOLOGY & ADDITIONAL TESTS:                   PGY-1 Progress Note discussed with attending    PAGER #: [] TILL 5:00 PM  PLEASE CONTACT ON CALL TEAM:  - On Call Team (Please refer to Art) FROM 5:00 PM - 8:30PM  - Nightfloat Team FROM 8:30 -7:30 AM    CHIEF COMPLAINT & BRIEF HOSPITAL COURSE:      INTERVAL HPI/OVERNIGHT EVENTS: Overnight, pt removed NG tube. Arousable on exam.       REVIEW OF SYSTEMS:  limited due to patient's mentation    MEDICATIONS  (STANDING):  chlorhexidine 2% Cloths 1 Application(s) Topical <User Schedule>  heparin   Injectable 5000 Unit(s) SubCutaneous every 12 hours  valproate sodium  IVPB 500 milliGRAM(s) IV Intermittent every 12 hours    MEDICATIONS  (PRN):  glycopyrrolate Injectable 0.4 milliGRAM(s) IV Push every 6 hours PRN secretions      Vital Signs Last 24 Hrs  T(C): 36.2 (11 Sep 2023 20:58), Max: 36.2 (11 Sep 2023 13:56)  T(F): 97.2 (11 Sep 2023 20:58), Max: 97.2 (11 Sep 2023 13:56)  HR: 68 (12 Sep 2023 05:20) (65 - 68)  BP: 135/78 (12 Sep 2023 05:20) (135/78 - 157/86)  BP(mean): --  RR: 17 (12 Sep 2023 05:20) (17 - 18)  SpO2: 93% (12 Sep 2023 05:20) (93% - 97%)    Parameters below as of 12 Sep 2023 05:20  Patient On (Oxygen Delivery Method): room air        PHYSICAL EXAMINATION:  HEAD:  Atraumatic, Normocephalic  EYES:  conjunctiva and sclera clear  NECK: Supple, No JVD, Normal thyroid  CHEST/LUNG: +coarse breath sounds b/l. No rales, rhonchi, wheezing, or rubs  HEART: Regular rate and rhythm; No murmurs, rubs, or gallops  ABDOMEN: Soft, Nontender, Nondistended; Bowel sounds present, no pain or masses on palpation  EXTREMITIES:  2+ Peripheral Pulses, No clubbing, cyanosis, or edema  SKIN: warm dry                            9.0    7.33  )-----------( 167      ( 11 Sep 2023 06:48 )             29.2     09-11    136  |  101  |  12  ----------------------------<  213<H>  4.7   |  34<H>  |  0.98    Ca    8.9      11 Sep 2023 06:48  Phos  2.4     09-11  Mg     1.9     09-11    TPro  5.3<L>  /  Alb  1.6<L>  /  TBili  0.2  /  DBili  x   /  AST  10  /  ALT  6<L>  /  AlkPhos  50  09-11    LIVER FUNCTIONS - ( 11 Sep 2023 06:48 )  Alb: 1.6 g/dL / Pro: 5.3 g/dL / ALK PHOS: 50 U/L / ALT: 6 U/L DA / AST: 10 U/L / GGT: x                   I&O's Summary    11 Sep 2023 07:01  -  12 Sep 2023 07:00  --------------------------------------------------------  IN: 0 mL / OUT: 1850 mL / NET: -1850 mL            CAPILLARY BLOOD GLUCOSE      RADIOLOGY & ADDITIONAL TESTS:

## 2023-09-13 LAB
ALBUMIN SERPL ELPH-MCNC: 1.7 G/DL — LOW (ref 3.5–5)
ALP SERPL-CCNC: 50 U/L — SIGNIFICANT CHANGE UP (ref 40–120)
ALT FLD-CCNC: 7 U/L DA — LOW (ref 10–60)
ANION GAP SERPL CALC-SCNC: 2 MMOL/L — LOW (ref 5–17)
ANISOCYTOSIS BLD QL: SLIGHT — SIGNIFICANT CHANGE UP
AST SERPL-CCNC: 8 U/L — LOW (ref 10–40)
BASOPHILS # BLD AUTO: 0 K/UL — SIGNIFICANT CHANGE UP (ref 0–0.2)
BASOPHILS NFR BLD AUTO: 0 % — SIGNIFICANT CHANGE UP (ref 0–2)
BILIRUB SERPL-MCNC: 0.2 MG/DL — SIGNIFICANT CHANGE UP (ref 0.2–1.2)
BUN SERPL-MCNC: 20 MG/DL — HIGH (ref 7–18)
CALCIUM SERPL-MCNC: 9.1 MG/DL — SIGNIFICANT CHANGE UP (ref 8.4–10.5)
CHLORIDE SERPL-SCNC: 100 MMOL/L — SIGNIFICANT CHANGE UP (ref 96–108)
CO2 SERPL-SCNC: 35 MMOL/L — HIGH (ref 22–31)
CREAT SERPL-MCNC: 1.13 MG/DL — SIGNIFICANT CHANGE UP (ref 0.5–1.3)
EGFR: 72 ML/MIN/1.73M2 — SIGNIFICANT CHANGE UP
EOSINOPHIL # BLD AUTO: 0.22 K/UL — SIGNIFICANT CHANGE UP (ref 0–0.5)
EOSINOPHIL NFR BLD AUTO: 3 % — SIGNIFICANT CHANGE UP (ref 0–6)
GLUCOSE SERPL-MCNC: 95 MG/DL — SIGNIFICANT CHANGE UP (ref 70–99)
HCT VFR BLD CALC: 28.5 % — LOW (ref 39–50)
HGB BLD-MCNC: 8.8 G/DL — LOW (ref 13–17)
HYPOCHROMIA BLD QL: SLIGHT — SIGNIFICANT CHANGE UP
LYMPHOCYTES # BLD AUTO: 2.19 K/UL — SIGNIFICANT CHANGE UP (ref 1–3.3)
LYMPHOCYTES # BLD AUTO: 30 % — SIGNIFICANT CHANGE UP (ref 13–44)
MAGNESIUM SERPL-MCNC: 2 MG/DL — SIGNIFICANT CHANGE UP (ref 1.6–2.6)
MANUAL SMEAR VERIFICATION: SIGNIFICANT CHANGE UP
MCHC RBC-ENTMCNC: 26.6 PG — LOW (ref 27–34)
MCHC RBC-ENTMCNC: 30.9 GM/DL — LOW (ref 32–36)
MCV RBC AUTO: 86.1 FL — SIGNIFICANT CHANGE UP (ref 80–100)
MONOCYTES # BLD AUTO: 1.09 K/UL — HIGH (ref 0–0.9)
MONOCYTES NFR BLD AUTO: 15 % — HIGH (ref 2–14)
MYELOCYTES NFR BLD: 2 % — HIGH (ref 0–0)
NEUTROPHILS # BLD AUTO: 3.5 K/UL — SIGNIFICANT CHANGE UP (ref 1.8–7.4)
NEUTROPHILS NFR BLD AUTO: 46 % — SIGNIFICANT CHANGE UP (ref 43–77)
NEUTS BAND # BLD: 2 % — SIGNIFICANT CHANGE UP (ref 0–8)
NRBC # BLD: 0 /100 — SIGNIFICANT CHANGE UP (ref 0–0)
PHOSPHATE SERPL-MCNC: 3.5 MG/DL — SIGNIFICANT CHANGE UP (ref 2.5–4.5)
PLAT MORPH BLD: NORMAL — SIGNIFICANT CHANGE UP
PLATELET # BLD AUTO: 160 K/UL — SIGNIFICANT CHANGE UP (ref 150–400)
PLATELET COUNT - ESTIMATE: NORMAL — SIGNIFICANT CHANGE UP
POIKILOCYTOSIS BLD QL AUTO: SLIGHT — SIGNIFICANT CHANGE UP
POTASSIUM SERPL-MCNC: 5.5 MMOL/L — HIGH (ref 3.5–5.3)
POTASSIUM SERPL-SCNC: 5.5 MMOL/L — HIGH (ref 3.5–5.3)
PROT SERPL-MCNC: 5.3 G/DL — LOW (ref 6–8.3)
RBC # BLD: 3.31 M/UL — LOW (ref 4.2–5.8)
RBC # FLD: 19.9 % — HIGH (ref 10.3–14.5)
RBC BLD AUTO: ABNORMAL
SODIUM SERPL-SCNC: 137 MMOL/L — SIGNIFICANT CHANGE UP (ref 135–145)
VALPROATE SERPL-MCNC: 75 UG/ML — SIGNIFICANT CHANGE UP (ref 50–100)
VARIANT LYMPHS # BLD: 2 % — SIGNIFICANT CHANGE UP (ref 0–6)
WBC # BLD: 7.29 K/UL — SIGNIFICANT CHANGE UP (ref 3.8–10.5)
WBC # FLD AUTO: 7.29 K/UL — SIGNIFICANT CHANGE UP (ref 3.8–10.5)

## 2023-09-13 PROCEDURE — 71045 X-RAY EXAM CHEST 1 VIEW: CPT | Mod: 26

## 2023-09-13 PROCEDURE — 99233 SBSQ HOSP IP/OBS HIGH 50: CPT | Mod: GC

## 2023-09-13 RX ADMIN — HEPARIN SODIUM 5000 UNIT(S): 5000 INJECTION INTRAVENOUS; SUBCUTANEOUS at 08:25

## 2023-09-13 RX ADMIN — CHLORHEXIDINE GLUCONATE 1 APPLICATION(S): 213 SOLUTION TOPICAL at 08:12

## 2023-09-13 RX ADMIN — Medication 55 MILLIGRAM(S): at 18:46

## 2023-09-13 RX ADMIN — ROBINUL 0.4 MILLIGRAM(S): 0.2 INJECTION INTRAMUSCULAR; INTRAVENOUS at 01:05

## 2023-09-13 RX ADMIN — HEPARIN SODIUM 5000 UNIT(S): 5000 INJECTION INTRAVENOUS; SUBCUTANEOUS at 18:46

## 2023-09-13 RX ADMIN — Medication 55 MILLIGRAM(S): at 08:24

## 2023-09-13 NOTE — PROGRESS NOTE ADULT - PROBLEM SELECTOR PLAN 1
pt. is ANOx 0  family wants to consider J tube for feeding -> poor surgical candidate , surgery wants another conversation of goals.   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   rejected transfer to [place]

## 2023-09-13 NOTE — ADVANCED PRACTICE NURSE CONSULT - RECOMMEDATIONS
-Apply a Foam dressing to the Coccyx area Q 72hrs PRN  -Elevate/float the patients heels using heel protectors and reposition the patient Q 2hrs using wedges or pillows
-Clean all wounds with normal saline and apply skin prep to the surrounding skin  -Leave the R. Heel wound open to air  -Apply a Foam dressing to the Coccyx area Q 72hrs PRN  -Elevate/float the patients heels using heel protectors and reposition the patient Q 2hrs using wedges or pillows

## 2023-09-13 NOTE — ADVANCED PRACTICE NURSE CONSULT - ASSESSMENT
This is a 68yr old male patient admitted for Altered Mental Status, presenting with the following:  -There is a Stage 1 Pressure Injury to the Coccyx and L. Heels, as evident by non-blanchable erythema  -There is an intact DTI to the R. Heel (0.8cm x 0.5cm) without drainage

## 2023-09-13 NOTE — PROGRESS NOTE ADULT - SUBJECTIVE AND OBJECTIVE BOX
PGY-1 Progress Note discussed with attending    PAGER #: [] TILL 5:00 PM  PLEASE CONTACT ON CALL TEAM:  - On Call Team (Please refer to Art) FROM 5:00 PM - 8:30PM  - Nightfloat Team FROM 8:30 -7:30 AM    CHIEF COMPLAINT & BRIEF HOSPITAL COURSE:      INTERVAL HPI/OVERNIGHT EVENTS:       REVIEW OF SYSTEMS:  CONSTITUTIONAL: No fever, weight loss, or fatigue  RESPIRATORY: No cough, wheezing, chills or hemoptysis; No shortness of breath  CARDIOVASCULAR: No chest pain, palpitations, dizziness, or leg swelling  GASTROINTESTINAL: No abdominal pain. No nausea, vomiting, or hematemesis; No diarrhea or constipation. No melena or hematochezia.  GENITOURINARY: No dysuria or hematuria, urinary frequency  NEUROLOGICAL: No headaches, memory loss, loss of strength, numbness, or tremors  SKIN: No itching, burning, rashes, or lesions     MEDICATIONS  (STANDING):  chlorhexidine 2% Cloths 1 Application(s) Topical <User Schedule>  heparin   Injectable 5000 Unit(s) SubCutaneous every 12 hours  valproate sodium  IVPB 500 milliGRAM(s) IV Intermittent every 12 hours    MEDICATIONS  (PRN):  glycopyrrolate Injectable 0.4 milliGRAM(s) IV Push every 6 hours PRN secretions      Vital Signs Last 24 Hrs  T(C): 36.8 (13 Sep 2023 05:18), Max: 36.8 (13 Sep 2023 05:18)  T(F): 98.2 (13 Sep 2023 05:18), Max: 98.2 (13 Sep 2023 05:18)  HR: 66 (13 Sep 2023 05:18) (64 - 67)  BP: 128/70 (13 Sep 2023 05:18) (123/77 - 134/72)  BP(mean): --  RR: 18 (13 Sep 2023 05:18) (17 - 18)  SpO2: 96% (13 Sep 2023 05:18) (96% - 98%)    Parameters below as of 13 Sep 2023 05:18  Patient On (Oxygen Delivery Method): room air        PHYSICAL EXAMINATION:  GENERAL: NAD, well built  HEAD:  Atraumatic, Normocephalic  EYES:  conjunctiva and sclera clear  NECK: Supple, No JVD, Normal thyroid  CHEST/LUNG: Clear to auscultation. Clear to percussion bilaterally; No rales, rhonchi, wheezing, or rubs  HEART: Regular rate and rhythm; No murmurs, rubs, or gallops  ABDOMEN: Soft, Nontender, Nondistended; Bowel sounds present, no pain or masses on palpation  NERVOUS SYSTEM:  Alert & Oriented X3  : voiding well  EXTREMITIES:  2+ Peripheral Pulses, No clubbing, cyanosis, or edema  SKIN: warm dry                          8.8    7.29  )-----------( 160      ( 13 Sep 2023 07:15 )             28.5     09-13    137  |  100  |  20<H>  ----------------------------<  95  5.5<H>   |  35<H>  |  1.13    Ca    9.1      13 Sep 2023 07:15  Phos  3.5     09-13  Mg     2.0     09-13    TPro  5.3<L>  /  Alb  1.7<L>  /  TBili  0.2  /  DBili  x   /  AST  8<L>  /  ALT  7<L>  /  AlkPhos  50  09-13    LIVER FUNCTIONS - ( 13 Sep 2023 07:15 )  Alb: 1.7 g/dL / Pro: 5.3 g/dL / ALK PHOS: 50 U/L / ALT: 7 U/L DA / AST: 8 U/L / GGT: x                   I&O's Summary    12 Sep 2023 07:01  -  13 Sep 2023 07:00  --------------------------------------------------------  IN: 0 mL / OUT: 400 mL / NET: -400 mL            CAPILLARY BLOOD GLUCOSE      RADIOLOGY & ADDITIONAL TESTS:                   PGY-1 Progress Note discussed with attending    PAGER #: [] TILL 5:00 PM  PLEASE CONTACT ON CALL TEAM:  - On Call Team (Please refer to Art) FROM 5:00 PM - 8:30PM  - Nightfloat Team FROM 8:30 -7:30 AM    CHIEF COMPLAINT & BRIEF HOSPITAL COURSE:      INTERVAL HPI/OVERNIGHT EVENTS: no acute overnight events      REVIEW OF SYSTEMS:  CONSTITUTIONAL: No fever, weight loss, or fatigue  RESPIRATORY: No cough, wheezing, chills or hemoptysis; No shortness of breath  CARDIOVASCULAR: No chest pain, palpitations, dizziness, or leg swelling  GASTROINTESTINAL: No abdominal pain. No nausea, vomiting, or hematemesis; No diarrhea or constipation. No melena or hematochezia.  GENITOURINARY: No dysuria or hematuria, urinary frequency  NEUROLOGICAL: No headaches, memory loss, loss of strength, numbness, or tremors  SKIN: No itching, burning, rashes, or lesions     MEDICATIONS  (STANDING):  chlorhexidine 2% Cloths 1 Application(s) Topical <User Schedule>  heparin   Injectable 5000 Unit(s) SubCutaneous every 12 hours  valproate sodium  IVPB 500 milliGRAM(s) IV Intermittent every 12 hours    MEDICATIONS  (PRN):  glycopyrrolate Injectable 0.4 milliGRAM(s) IV Push every 6 hours PRN secretions      Vital Signs Last 24 Hrs  T(C): 36.8 (13 Sep 2023 05:18), Max: 36.8 (13 Sep 2023 05:18)  T(F): 98.2 (13 Sep 2023 05:18), Max: 98.2 (13 Sep 2023 05:18)  HR: 66 (13 Sep 2023 05:18) (64 - 67)  BP: 128/70 (13 Sep 2023 05:18) (123/77 - 134/72)  BP(mean): --  RR: 18 (13 Sep 2023 05:18) (17 - 18)  SpO2: 96% (13 Sep 2023 05:18) (96% - 98%)    Parameters below as of 13 Sep 2023 05:18  Patient On (Oxygen Delivery Method): room air        PHYSICAL EXAMINATION:  GENERAL: NAD, well built  HEAD:  Atraumatic, Normocephalic  EYES:  conjunctiva and sclera clear  NECK: Supple, No JVD, Normal thyroid  CHEST/LUNG: Clear to auscultation. Clear to percussion bilaterally; No rales, rhonchi, wheezing, or rubs  HEART: Regular rate and rhythm; No murmurs, rubs, or gallops  ABDOMEN: Soft, Nontender, Nondistended; Bowel sounds present, no pain or masses on palpation  NERVOUS SYSTEM:  Alert & Oriented X3  : voiding well  EXTREMITIES:  2+ Peripheral Pulses, No clubbing, cyanosis, or edema  SKIN: warm dry                          8.8    7.29  )-----------( 160      ( 13 Sep 2023 07:15 )             28.5     09-13    137  |  100  |  20<H>  ----------------------------<  95  5.5<H>   |  35<H>  |  1.13    Ca    9.1      13 Sep 2023 07:15  Phos  3.5     09-13  Mg     2.0     09-13    TPro  5.3<L>  /  Alb  1.7<L>  /  TBili  0.2  /  DBili  x   /  AST  8<L>  /  ALT  7<L>  /  AlkPhos  50  09-13    LIVER FUNCTIONS - ( 13 Sep 2023 07:15 )  Alb: 1.7 g/dL / Pro: 5.3 g/dL / ALK PHOS: 50 U/L / ALT: 7 U/L DA / AST: 8 U/L / GGT: x                   I&O's Summary    12 Sep 2023 07:01  -  13 Sep 2023 07:00  --------------------------------------------------------  IN: 0 mL / OUT: 400 mL / NET: -400 mL            CAPILLARY BLOOD GLUCOSE      RADIOLOGY & ADDITIONAL TESTS:

## 2023-09-13 NOTE — CHART NOTE - NSCHARTNOTEFT_GEN_A_CORE
X-ray came back for NG tube placement with no signs of tube in the lungs or abdomen, making unable to confirm placement. pt. assess at bedside no signs of coiling in the oropharynx. Took out NG tube. In attempt to assess NG tube placement it was discovered only 18 cm was in the nasal cavity. Pt. would not NG tube reinsertion. Ng tube was successfully removed but not replaced. Will sign out to primary team for new NG tube placement.

## 2023-09-13 NOTE — PROGRESS NOTE ADULT - ATTENDING COMMENTS
68 year old man admitted from home because of altered mental status and dehydration.  Patient was on home hospice, but family has asked for more aggressive treatment especially Nutrition. Patient with Progressive Dementia over the past year to 1.5 yrs as per Son although was able to ambulate with assistance was in outside [facility] [place] and later in [facility] diagnosed with Gastric cancer last month.  PMH advanced dementia. Oncology assessed that chemotherapy would not benefit because of overall poor functional status and underlying Dementia. Patient also with multiple cardiovascular diseases - DM, CVA, HTN, HLD, CKD, hx of chronic indwelling catheter. Discharged from [facility] about a week ago after management for suspected breakthrough seizure with home VPA dose increased to 750mg BID. Discharged to home Hospice but family was not happy with care     Acute Toxic and Metabolic encephalopathy with underlying Dementia possibly contributing  Severe Protein Calorie Malnutrition with cachexia  Acute respiratory failure with hypoxia, atelectasis vs aspiration improved  Leucocytosis reactive now normalized  Chronic Anemia likely Anemia of Chronic disease s/p PRBC with stable H/H  Recently diagnosed gastric cancer. poorly differentiated Adenocarcinoma .   Fluid responsive septic shock resolved  NEVILLE improved vs NEVILLE on CKD improved  Hx CVA  Hx seizure  - NGT placed - confirm placement of NGT, if in place can start back on NGT artificial nutrition    - Jevity 1.5, increase to 40cc/hr if tolerated   - Free water via NGT with flushes  - Discussed possible placement to [place] with NGT with discussion with Son at bedside. He is agreeable to possible placement but will like to discuss with the rest of his family  s/p one unit PRBC as per family wishes; no active bleed; H/H stable,   Completed IV abx; No appreciable improvement in mentation despite medical mgmt at this time;  WBC count now normalized back  Aspiration precaution  Hold oral feeds while encephalopathic; once more awake can feed with caution  Patient is not a candidate for J tube placement at this time  Patient appears to be micro aspirating and unable to clear his airway due to underlying dementia and progressive physical deconditioning.   Continue Valproate  500 mg BID  Prognosis extremely guarded and mortality expected

## 2023-09-14 LAB
ANION GAP SERPL CALC-SCNC: 6 MMOL/L — SIGNIFICANT CHANGE UP (ref 5–17)
ANION GAP SERPL CALC-SCNC: 7 MMOL/L — SIGNIFICANT CHANGE UP (ref 5–17)
BUN SERPL-MCNC: 25 MG/DL — HIGH (ref 7–18)
BUN SERPL-MCNC: 26 MG/DL — HIGH (ref 7–18)
CALCIUM SERPL-MCNC: 9.3 MG/DL — SIGNIFICANT CHANGE UP (ref 8.4–10.5)
CALCIUM SERPL-MCNC: 9.3 MG/DL — SIGNIFICANT CHANGE UP (ref 8.4–10.5)
CHLORIDE SERPL-SCNC: 100 MMOL/L — SIGNIFICANT CHANGE UP (ref 96–108)
CHLORIDE SERPL-SCNC: 99 MMOL/L — SIGNIFICANT CHANGE UP (ref 96–108)
CO2 SERPL-SCNC: 31 MMOL/L — SIGNIFICANT CHANGE UP (ref 22–31)
CO2 SERPL-SCNC: 31 MMOL/L — SIGNIFICANT CHANGE UP (ref 22–31)
CREAT SERPL-MCNC: 1.03 MG/DL — SIGNIFICANT CHANGE UP (ref 0.5–1.3)
CREAT SERPL-MCNC: 1.17 MG/DL — SIGNIFICANT CHANGE UP (ref 0.5–1.3)
EGFR: 69 ML/MIN/1.73M2 — SIGNIFICANT CHANGE UP
EGFR: 80 ML/MIN/1.73M2 — SIGNIFICANT CHANGE UP
GLUCOSE SERPL-MCNC: 69 MG/DL — LOW (ref 70–99)
GLUCOSE SERPL-MCNC: 92 MG/DL — SIGNIFICANT CHANGE UP (ref 70–99)
HCT VFR BLD CALC: 32.9 % — LOW (ref 39–50)
HGB BLD-MCNC: 10.3 G/DL — LOW (ref 13–17)
MCHC RBC-ENTMCNC: 26.4 PG — LOW (ref 27–34)
MCHC RBC-ENTMCNC: 31.3 GM/DL — LOW (ref 32–36)
MCV RBC AUTO: 84.4 FL — SIGNIFICANT CHANGE UP (ref 80–100)
NRBC # BLD: 0 /100 WBCS — SIGNIFICANT CHANGE UP (ref 0–0)
PLATELET # BLD AUTO: 130 K/UL — LOW (ref 150–400)
POTASSIUM SERPL-MCNC: 4.4 MMOL/L — SIGNIFICANT CHANGE UP (ref 3.5–5.3)
POTASSIUM SERPL-MCNC: 6.5 MMOL/L — CRITICAL HIGH (ref 3.5–5.3)
POTASSIUM SERPL-SCNC: 4.4 MMOL/L — SIGNIFICANT CHANGE UP (ref 3.5–5.3)
POTASSIUM SERPL-SCNC: 6.5 MMOL/L — CRITICAL HIGH (ref 3.5–5.3)
RBC # BLD: 3.9 M/UL — LOW (ref 4.2–5.8)
RBC # FLD: 19.5 % — HIGH (ref 10.3–14.5)
SODIUM SERPL-SCNC: 137 MMOL/L — SIGNIFICANT CHANGE UP (ref 135–145)
SODIUM SERPL-SCNC: 137 MMOL/L — SIGNIFICANT CHANGE UP (ref 135–145)
WBC # BLD: 6.81 K/UL — SIGNIFICANT CHANGE UP (ref 3.8–10.5)
WBC # FLD AUTO: 6.81 K/UL — SIGNIFICANT CHANGE UP (ref 3.8–10.5)

## 2023-09-14 PROCEDURE — 99232 SBSQ HOSP IP/OBS MODERATE 35: CPT | Mod: GC

## 2023-09-14 RX ORDER — DEXTROSE 50 % IN WATER 50 %
50 SYRINGE (ML) INTRAVENOUS ONCE
Refills: 0 | Status: COMPLETED | OUTPATIENT
Start: 2023-09-14 | End: 2023-09-14

## 2023-09-14 RX ORDER — SODIUM CHLORIDE 9 MG/ML
1000 INJECTION, SOLUTION INTRAVENOUS
Refills: 0 | Status: DISCONTINUED | OUTPATIENT
Start: 2023-09-14 | End: 2023-09-14

## 2023-09-14 RX ORDER — INSULIN HUMAN 100 [IU]/ML
10 INJECTION, SOLUTION SUBCUTANEOUS ONCE
Refills: 0 | Status: COMPLETED | OUTPATIENT
Start: 2023-09-14 | End: 2023-09-14

## 2023-09-14 RX ORDER — SODIUM CHLORIDE 9 MG/ML
1000 INJECTION INTRAMUSCULAR; INTRAVENOUS; SUBCUTANEOUS
Refills: 0 | Status: DISCONTINUED | OUTPATIENT
Start: 2023-09-14 | End: 2023-09-15

## 2023-09-14 RX ORDER — SODIUM CHLORIDE 9 MG/ML
1000 INJECTION, SOLUTION INTRAVENOUS
Refills: 0 | Status: DISCONTINUED | OUTPATIENT
Start: 2023-09-15 | End: 2023-09-19

## 2023-09-14 RX ORDER — CALCIUM GLUCONATE 100 MG/ML
2 VIAL (ML) INTRAVENOUS ONCE
Refills: 0 | Status: COMPLETED | OUTPATIENT
Start: 2023-09-14 | End: 2023-09-14

## 2023-09-14 RX ADMIN — Medication 55 MILLIGRAM(S): at 05:53

## 2023-09-14 RX ADMIN — Medication 200 GRAM(S): at 12:33

## 2023-09-14 RX ADMIN — SODIUM CHLORIDE 75 MILLILITER(S): 9 INJECTION INTRAMUSCULAR; INTRAVENOUS; SUBCUTANEOUS at 17:43

## 2023-09-14 RX ADMIN — Medication 55 MILLIGRAM(S): at 17:33

## 2023-09-14 RX ADMIN — CHLORHEXIDINE GLUCONATE 1 APPLICATION(S): 213 SOLUTION TOPICAL at 05:53

## 2023-09-14 RX ADMIN — HEPARIN SODIUM 5000 UNIT(S): 5000 INJECTION INTRAVENOUS; SUBCUTANEOUS at 17:35

## 2023-09-14 RX ADMIN — HEPARIN SODIUM 5000 UNIT(S): 5000 INJECTION INTRAVENOUS; SUBCUTANEOUS at 05:53

## 2023-09-14 RX ADMIN — Medication 50 MILLILITER(S): at 12:27

## 2023-09-14 RX ADMIN — INSULIN HUMAN 10 UNIT(S): 100 INJECTION, SOLUTION SUBCUTANEOUS at 12:26

## 2023-09-14 NOTE — PROGRESS NOTE ADULT - PROBLEM SELECTOR PLAN 10
- hx of seizures, on increased dose from recent [facility] discharge  - Reduce valproic acid from 750 to 500 BID   -c/w Valproic acid

## 2023-09-14 NOTE — PROGRESS NOTE ADULT - ATTENDING COMMENTS
68 year old man admitted from home because of altered mental status and dehydration. Patient with Progressive Dementia over the past year to 1.5 yrs as per son. In [facility] [place] diagnosed with Gastric cancer last month.  PMH advanced dementia. Oncology assessed that chemotherapy would not benefit because of overall poor functional status and underlying Dementia. Patient also with multiple cardiovascular diseases - DM, CVA, HTN, HLD, CKD, hx of chronic indwelling catheter. Discharged from [facility] about a week prior to this admission after management for suspected breakthrough seizure with home VPA dose increased to 750mg BID. Discharged to home Hospice but family brought patient to ED due to worsening mental status.    Acute Toxic and Metabolic encephalopathy with underlying Dementia  Acute respiratory failure with hypoxia, atelectasis vs aspiration improved  Severe Protein Calorie Malnutrition with cachexia  Chronic Anemia likely Anemia of Chronic disease s/p PRBC with stable H/H  Gastric cancer with poorly differentiated Adenocarcinoma   Fluid responsive septic shock resolved  NEVILLE on CKD  Hx CVA  Hx seizure  - NG tube dislodged again and not shown on CXR. Likely pulled out partially by patient and coiled in oropharynx. NGT removed.  - No further NGT placement due to significant patient discomfort and likely minimal clinical benefit to patient  - Will restart IVF hydration in the meantime  - Family declining calvary placement.  - Completed IV abx; No appreciable improvement in mentation despite medical mgmt at this time;  - Aspiration precaution  - Hold oral feeds as patient continues to be minimally responsive and significantly encephalopathic. S/S reeval and patient unable to participate. When trial of feeding patient with food left in mouth with no initiation of swallowing  - Patient appears to be micro aspirating and unable to clear his airway due to underlying dementia and progressive physical deconditioning.   - Patient is not a candidate for J tube placement at this time  - Continue Valproate  500 mg BID  Prognosis extremely guarded and mortality expected

## 2023-09-14 NOTE — PROGRESS NOTE ADULT - PROBLEM SELECTOR PLAN 1
pt. is ANOx 0  family wants to consider J tube for feeding -> poor surgical candidate , surgery wants another conversation of goals.   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   rejected transfer to [place]

## 2023-09-14 NOTE — PROVIDER CONTACT NOTE (CRITICAL VALUE NOTIFICATION) - TEST AND RESULT REPORTED:
Hemoglobin 6.8
hgb 6.8
Glucose 51, hemoglobulin 6.5
lactate 2.6
HBG 6.7
Hgb 6.4, Hct 20.2
H/H  6.2/19.8
potassium 6.5 not hemolyzed

## 2023-09-14 NOTE — PROVIDER CONTACT NOTE (CRITICAL VALUE NOTIFICATION) - PERSON GIVING RESULT:
Drew
Lab
[facility] 
[facility]/ADIEL Saxena
Winnie Gambino and Chayo Rivera
LEYLA arroyo
MILADYS Gambino / Eliceo
POP Fenton

## 2023-09-14 NOTE — PROGRESS NOTE ADULT - NUTRITIONAL ASSESSMENT
This patient has been assessed with a concern for Malnutrition and has been determined to have a diagnosis/diagnoses of Severe protein-calorie malnutrition and Underweight (BMI < 19).    This patient is being managed with:   Diet NPO-  Entered: Sep 14 2023  7:08AM    The following pending diet order is being considered for treatment of Severe protein-calorie malnutrition and Underweight (BMI < 19):  Diet Pureed-  Supplement Feeding Modality:  Oral  Ensure Enlive Cans or Servings Per Day:  1       Frequency:  Three Times a day  Entered: Aug 30 2023  2:55PM

## 2023-09-14 NOTE — PROGRESS NOTE ADULT - SUBJECTIVE AND OBJECTIVE BOX
PGY-1 Progress Note discussed with attending    PAGER #: [] TILL 5:00 PM  PLEASE CONTACT ON CALL TEAM:  - On Call Team (Please refer to Art) FROM 5:00 PM - 8:30PM  - Nightfloat Team FROM 8:30 -7:30 AM    CHIEF COMPLAINT & BRIEF HOSPITAL COURSE:       INTERVAL HPI/OVERNIGHT EVENTS: pt. removed NG tube. No acute events overnight.       REVIEW OF SYSTEMS:  limited due to patient's mentation    MEDICATIONS  (STANDING):  chlorhexidine 2% Cloths 1 Application(s) Topical <User Schedule>  heparin   Injectable 5000 Unit(s) SubCutaneous every 12 hours  valproate sodium  IVPB 500 milliGRAM(s) IV Intermittent every 12 hours    MEDICATIONS  (PRN):  glycopyrrolate Injectable 0.4 milliGRAM(s) IV Push every 6 hours PRN secretions      Vital Signs Last 24 Hrs  T(C): 26.7 (14 Sep 2023 12:51), Max: 36.8 (14 Sep 2023 05:17)  T(F): 80 (14 Sep 2023 12:51), Max: 98.2 (14 Sep 2023 05:17)  HR: 80 (14 Sep 2023 12:51) (63 - 80)  BP: 102/69 (14 Sep 2023 12:51) (102/69 - 167/98)  BP(mean): --  RR: 17 (14 Sep 2023 12:51) (17 - 18)  SpO2: 97% (14 Sep 2023 12:51) (96% - 99%)    Parameters below as of 14 Sep 2023 12:51  Patient On (Oxygen Delivery Method): room air        PHYSICAL EXAMINATION:  HEAD:  Atraumatic, Normocephalic  EYES:  conjunctiva and sclera clear  NECK: Supple, No JVD, Normal thyroid  CHEST/LUNG: +coarse breath sounds b/l. No rales, rhonchi, wheezing, or rubs  HEART: Regular rate and rhythm; No murmurs, rubs, or gallops  ABDOMEN: Soft, Nontender, Nondistended; Bowel sounds present, no pain or masses on palpation  EXTREMITIES:  2+ Peripheral Pulses, No clubbing, cyanosis, or edema  SKIN: warm dry                          10.3   6.81  )-----------( 130      ( 14 Sep 2023 10:29 )             32.9     09-14    137  |  99  |  26<H>  ----------------------------<  69<L>  6.5<HH>   |  31  |  1.17    Ca    9.3      14 Sep 2023 10:29  Phos  3.5     09-13  Mg     2.0     09-13    TPro  5.3<L>  /  Alb  1.7<L>  /  TBili  0.2  /  DBili  x   /  AST  8<L>  /  ALT  7<L>  /  AlkPhos  50  09-13    LIVER FUNCTIONS - ( 13 Sep 2023 07:15 )  Alb: 1.7 g/dL / Pro: 5.3 g/dL / ALK PHOS: 50 U/L / ALT: 7 U/L DA / AST: 8 U/L / GGT: x                   I&O's Summary    13 Sep 2023 07:01  -  14 Sep 2023 07:00  --------------------------------------------------------  IN: 0 mL / OUT: 850 mL / NET: -850 mL            CAPILLARY BLOOD GLUCOSE      RADIOLOGY & ADDITIONAL TESTS:

## 2023-09-15 LAB
ALBUMIN SERPL ELPH-MCNC: 1.8 G/DL — LOW (ref 3.5–5)
ALP SERPL-CCNC: 52 U/L — SIGNIFICANT CHANGE UP (ref 40–120)
ALT FLD-CCNC: 7 U/L DA — LOW (ref 10–60)
ANION GAP SERPL CALC-SCNC: 6 MMOL/L — SIGNIFICANT CHANGE UP (ref 5–17)
AST SERPL-CCNC: 14 U/L — SIGNIFICANT CHANGE UP (ref 10–40)
BASOPHILS # BLD AUTO: 0.04 K/UL — SIGNIFICANT CHANGE UP (ref 0–0.2)
BASOPHILS NFR BLD AUTO: 0.6 % — SIGNIFICANT CHANGE UP (ref 0–2)
BILIRUB SERPL-MCNC: 0.2 MG/DL — SIGNIFICANT CHANGE UP (ref 0.2–1.2)
BUN SERPL-MCNC: 22 MG/DL — HIGH (ref 7–18)
CALCIUM SERPL-MCNC: 8.9 MG/DL — SIGNIFICANT CHANGE UP (ref 8.4–10.5)
CHLORIDE SERPL-SCNC: 100 MMOL/L — SIGNIFICANT CHANGE UP (ref 96–108)
CO2 SERPL-SCNC: 31 MMOL/L — SIGNIFICANT CHANGE UP (ref 22–31)
CREAT SERPL-MCNC: 1.1 MG/DL — SIGNIFICANT CHANGE UP (ref 0.5–1.3)
EGFR: 74 ML/MIN/1.73M2 — SIGNIFICANT CHANGE UP
EOSINOPHIL # BLD AUTO: 0.12 K/UL — SIGNIFICANT CHANGE UP (ref 0–0.5)
EOSINOPHIL NFR BLD AUTO: 1.9 % — SIGNIFICANT CHANGE UP (ref 0–6)
GLUCOSE SERPL-MCNC: 123 MG/DL — HIGH (ref 70–99)
HCT VFR BLD CALC: 29 % — LOW (ref 39–50)
HGB BLD-MCNC: 9.1 G/DL — LOW (ref 13–17)
IMM GRANULOCYTES NFR BLD AUTO: 3.5 % — HIGH (ref 0–0.9)
LYMPHOCYTES # BLD AUTO: 1.65 K/UL — SIGNIFICANT CHANGE UP (ref 1–3.3)
LYMPHOCYTES # BLD AUTO: 26.1 % — SIGNIFICANT CHANGE UP (ref 13–44)
MAGNESIUM SERPL-MCNC: 1.9 MG/DL — SIGNIFICANT CHANGE UP (ref 1.6–2.6)
MCHC RBC-ENTMCNC: 27.2 PG — SIGNIFICANT CHANGE UP (ref 27–34)
MCHC RBC-ENTMCNC: 31.4 GM/DL — LOW (ref 32–36)
MCV RBC AUTO: 86.6 FL — SIGNIFICANT CHANGE UP (ref 80–100)
MONOCYTES # BLD AUTO: 1.05 K/UL — HIGH (ref 0–0.9)
MONOCYTES NFR BLD AUTO: 16.6 % — HIGH (ref 2–14)
NEUTROPHILS # BLD AUTO: 3.24 K/UL — SIGNIFICANT CHANGE UP (ref 1.8–7.4)
NEUTROPHILS NFR BLD AUTO: 51.3 % — SIGNIFICANT CHANGE UP (ref 43–77)
NRBC # BLD: 0 /100 WBCS — SIGNIFICANT CHANGE UP (ref 0–0)
PHOSPHATE SERPL-MCNC: 3.3 MG/DL — SIGNIFICANT CHANGE UP (ref 2.5–4.5)
PLATELET # BLD AUTO: 176 K/UL — SIGNIFICANT CHANGE UP (ref 150–400)
POTASSIUM SERPL-MCNC: 4.7 MMOL/L — SIGNIFICANT CHANGE UP (ref 3.5–5.3)
POTASSIUM SERPL-SCNC: 4.7 MMOL/L — SIGNIFICANT CHANGE UP (ref 3.5–5.3)
PROT SERPL-MCNC: 5.4 G/DL — LOW (ref 6–8.3)
RBC # BLD: 3.35 M/UL — LOW (ref 4.2–5.8)
RBC # FLD: 19.5 % — HIGH (ref 10.3–14.5)
SODIUM SERPL-SCNC: 137 MMOL/L — SIGNIFICANT CHANGE UP (ref 135–145)
WBC # BLD: 6.32 K/UL — SIGNIFICANT CHANGE UP (ref 3.8–10.5)
WBC # FLD AUTO: 6.32 K/UL — SIGNIFICANT CHANGE UP (ref 3.8–10.5)

## 2023-09-15 PROCEDURE — 99232 SBSQ HOSP IP/OBS MODERATE 35: CPT | Mod: GC

## 2023-09-15 RX ADMIN — Medication 55 MILLIGRAM(S): at 05:17

## 2023-09-15 RX ADMIN — Medication 55 MILLIGRAM(S): at 18:09

## 2023-09-15 RX ADMIN — SODIUM CHLORIDE 75 MILLILITER(S): 9 INJECTION, SOLUTION INTRAVENOUS at 15:30

## 2023-09-15 RX ADMIN — HEPARIN SODIUM 5000 UNIT(S): 5000 INJECTION INTRAVENOUS; SUBCUTANEOUS at 05:17

## 2023-09-15 RX ADMIN — SODIUM CHLORIDE 75 MILLILITER(S): 9 INJECTION, SOLUTION INTRAVENOUS at 05:18

## 2023-09-15 RX ADMIN — CHLORHEXIDINE GLUCONATE 1 APPLICATION(S): 213 SOLUTION TOPICAL at 05:17

## 2023-09-15 RX ADMIN — HEPARIN SODIUM 5000 UNIT(S): 5000 INJECTION INTRAVENOUS; SUBCUTANEOUS at 17:10

## 2023-09-15 NOTE — PROGRESS NOTE ADULT - PROBLEM SELECTOR PLAN 1
pt. is ANOx 0  family wants to consider J tube for feeding -> poor surgical candidate , surgery wants another conversation of goals.   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   rejected transfer to [place]

## 2023-09-15 NOTE — PROGRESS NOTE ADULT - ATTENDING COMMENTS
68 year old man admitted from home because of altered mental status and dehydration. Patient with Progressive Dementia over the past year to 1.5 yrs as per son. In [facility] [place] diagnosed with Gastric cancer last month.  PMH advanced dementia. Oncology assessed that chemotherapy would not benefit because of overall poor functional status and underlying Dementia. Patient also with multiple cardiovascular diseases - DM, CVA, HTN, HLD, CKD, hx of chronic indwelling catheter. Discharged from [facility] about a week prior to this admission after management for suspected breakthrough seizure with home VPA dose increased to 750mg BID. Discharged to home Hospice but family brought patient to ED due to worsening mental status.    Acute Toxic and Metabolic encephalopathy with underlying Dementia  Acute respiratory failure with hypoxia, atelectasis vs aspiration improved  Severe Protein Calorie Malnutrition with cachexia  Chronic Anemia likely Anemia of Chronic disease s/p PRBC with stable H/H  Gastric cancer with poorly differentiated Adenocarcinoma   Fluid responsive septic shock resolved  NEVILLE on CKD  Hx CVA  Hx seizure  - NG tube dislodged again and not shown on CXR. Likely pulled out partially by patient and coiled in oropharynx. NGT removed.  - No further NGT placement due to significant patient discomfort and likely minimal clinical benefit to patient  - Continue IVF hydration in the meantime  - Family declining calvary placement. - Will continue to discuss further GOC and dispo options  - Completed IV abx; No appreciable improvement in mentation despite medical mgmt at this time;  - Aspiration precaution  - Hold oral feeds as patient continues to be minimally responsive and significantly encephalopathic. S/S reeval and patient unable to participate. When trial of feeding patient with food left in mouth with no initiation of swallowing  - Patient appears to be micro aspirating and unable to clear his airway due to underlying dementia and progressive physical deconditioning.   - Patient is not a candidate for J tube placement at this time  - Continue Valproate  500 mg BID  Prognosis extremely guarded and mortality expected .

## 2023-09-15 NOTE — PROGRESS NOTE ADULT - SUBJECTIVE AND OBJECTIVE BOX
PGY-1 Progress Note discussed with attending    PAGER #: [] TILL 5:00 PM  PLEASE CONTACT ON CALL TEAM:  - On Call Team (Please refer to Art) FROM 5:00 PM - 8:30PM  - Nightfloat Team FROM 8:30 -7:30 AM    INTERVAL HPI/OVERNIGHT EVENTS: no acute overnight events.       REVIEW OF SYSTEMS:  limited due to patient's mentation.     MEDICATIONS  (STANDING):  chlorhexidine 2% Cloths 1 Application(s) Topical <User Schedule>  dextrose 5% + sodium chloride 0.9%. 1000 milliLiter(s) (75 mL/Hr) IV Continuous <Continuous>  heparin   Injectable 5000 Unit(s) SubCutaneous every 12 hours  valproate sodium  IVPB 500 milliGRAM(s) IV Intermittent every 12 hours    MEDICATIONS  (PRN):  glycopyrrolate Injectable 0.4 milliGRAM(s) IV Push every 6 hours PRN secretions      Vital Signs Last 24 Hrs  T(C): 36.8 (15 Sep 2023 05:18), Max: 36.8 (15 Sep 2023 05:18)  T(F): 98.2 (15 Sep 2023 05:18), Max: 98.2 (15 Sep 2023 05:18)  HR: 61 (15 Sep 2023 05:18) (60 - 61)  BP: 165/88 (15 Sep 2023 05:18) (130/64 - 165/88)  BP(mean): --  RR: 18 (15 Sep 2023 05:18) (17 - 18)  SpO2: 94% (15 Sep 2023 05:18) (94% - 96%)    Parameters below as of 15 Sep 2023 05:18  Patient On (Oxygen Delivery Method): room air      PHYSICAL EXAMINATION:  HEAD:  Atraumatic, Normocephalic  NECK: Supple, No JVD, Normal thyroid  CHEST/LUNG: CTA b/l, No rales, rhonchi, wheezing, or rubs  HEART: Regular rate and rhythm; No murmurs, rubs, or gallops  ABDOMEN: Soft, Nontender, Nondistended;   EXTREMITIES:  2+ Peripheral Pulses, No clubbing, cyanosis, or edema  SKIN: warm dry                          9.1    6.32  )-----------( 176      ( 15 Sep 2023 06:45 )             29.0     09-15    137  |  100  |  22<H>  ----------------------------<  123<H>  4.7   |  31  |  1.10    Ca    8.9      15 Sep 2023 06:45  Phos  3.3     09-15  Mg     1.9     09-15    TPro  5.4<L>  /  Alb  1.8<L>  /  TBili  0.2  /  DBili  x   /  AST  14  /  ALT  7<L>  /  AlkPhos  52  09-15    LIVER FUNCTIONS - ( 15 Sep 2023 06:45 )  Alb: 1.8 g/dL / Pro: 5.4 g/dL / ALK PHOS: 52 U/L / ALT: 7 U/L DA / AST: 14 U/L / GGT: x                   I&O's Summary    14 Sep 2023 07:01  -  15 Sep 2023 07:00  --------------------------------------------------------  IN: 0 mL / OUT: 400 mL / NET: -400 mL            CAPILLARY BLOOD GLUCOSE      RADIOLOGY & ADDITIONAL TESTS:

## 2023-09-15 NOTE — PROGRESS NOTE ADULT - PROBLEM SELECTOR PLAN 11
- hx of DM not on any home meds  - FS q6 while NPO

## 2023-09-16 LAB
ALBUMIN SERPL ELPH-MCNC: 1.7 G/DL — LOW (ref 3.5–5)
ALP SERPL-CCNC: 52 U/L — SIGNIFICANT CHANGE UP (ref 40–120)
ALT FLD-CCNC: 8 U/L DA — LOW (ref 10–60)
ANION GAP SERPL CALC-SCNC: 7 MMOL/L — SIGNIFICANT CHANGE UP (ref 5–17)
AST SERPL-CCNC: 13 U/L — SIGNIFICANT CHANGE UP (ref 10–40)
BASOPHILS # BLD AUTO: 0.02 K/UL — SIGNIFICANT CHANGE UP (ref 0–0.2)
BASOPHILS NFR BLD AUTO: 0.4 % — SIGNIFICANT CHANGE UP (ref 0–2)
BILIRUB SERPL-MCNC: 0.2 MG/DL — SIGNIFICANT CHANGE UP (ref 0.2–1.2)
BUN SERPL-MCNC: 15 MG/DL — SIGNIFICANT CHANGE UP (ref 7–18)
CALCIUM SERPL-MCNC: 9 MG/DL — SIGNIFICANT CHANGE UP (ref 8.4–10.5)
CHLORIDE SERPL-SCNC: 103 MMOL/L — SIGNIFICANT CHANGE UP (ref 96–108)
CO2 SERPL-SCNC: 29 MMOL/L — SIGNIFICANT CHANGE UP (ref 22–31)
CREAT SERPL-MCNC: 0.91 MG/DL — SIGNIFICANT CHANGE UP (ref 0.5–1.3)
EGFR: 93 ML/MIN/1.73M2 — SIGNIFICANT CHANGE UP
EOSINOPHIL # BLD AUTO: 0.13 K/UL — SIGNIFICANT CHANGE UP (ref 0–0.5)
EOSINOPHIL NFR BLD AUTO: 2.4 % — SIGNIFICANT CHANGE UP (ref 0–6)
GLUCOSE SERPL-MCNC: 150 MG/DL — HIGH (ref 70–99)
HCT VFR BLD CALC: 28.4 % — LOW (ref 39–50)
HGB BLD-MCNC: 8.9 G/DL — LOW (ref 13–17)
IMM GRANULOCYTES NFR BLD AUTO: 1.7 % — HIGH (ref 0–0.9)
LYMPHOCYTES # BLD AUTO: 1.33 K/UL — SIGNIFICANT CHANGE UP (ref 1–3.3)
LYMPHOCYTES # BLD AUTO: 24.4 % — SIGNIFICANT CHANGE UP (ref 13–44)
MAGNESIUM SERPL-MCNC: 1.7 MG/DL — SIGNIFICANT CHANGE UP (ref 1.6–2.6)
MCHC RBC-ENTMCNC: 26.6 PG — LOW (ref 27–34)
MCHC RBC-ENTMCNC: 31.3 GM/DL — LOW (ref 32–36)
MCV RBC AUTO: 85 FL — SIGNIFICANT CHANGE UP (ref 80–100)
MONOCYTES # BLD AUTO: 1.13 K/UL — HIGH (ref 0–0.9)
MONOCYTES NFR BLD AUTO: 20.8 % — HIGH (ref 2–14)
NEUTROPHILS # BLD AUTO: 2.74 K/UL — SIGNIFICANT CHANGE UP (ref 1.8–7.4)
NEUTROPHILS NFR BLD AUTO: 50.3 % — SIGNIFICANT CHANGE UP (ref 43–77)
NRBC # BLD: 0 /100 WBCS — SIGNIFICANT CHANGE UP (ref 0–0)
PHOSPHATE SERPL-MCNC: 2.6 MG/DL — SIGNIFICANT CHANGE UP (ref 2.5–4.5)
PLATELET # BLD AUTO: 177 K/UL — SIGNIFICANT CHANGE UP (ref 150–400)
POTASSIUM SERPL-MCNC: 3.9 MMOL/L — SIGNIFICANT CHANGE UP (ref 3.5–5.3)
POTASSIUM SERPL-SCNC: 3.9 MMOL/L — SIGNIFICANT CHANGE UP (ref 3.5–5.3)
PROT SERPL-MCNC: 5.4 G/DL — LOW (ref 6–8.3)
RBC # BLD: 3.34 M/UL — LOW (ref 4.2–5.8)
RBC # FLD: 19.6 % — HIGH (ref 10.3–14.5)
SODIUM SERPL-SCNC: 139 MMOL/L — SIGNIFICANT CHANGE UP (ref 135–145)
WBC # BLD: 5.44 K/UL — SIGNIFICANT CHANGE UP (ref 3.8–10.5)
WBC # FLD AUTO: 5.44 K/UL — SIGNIFICANT CHANGE UP (ref 3.8–10.5)

## 2023-09-16 PROCEDURE — 99232 SBSQ HOSP IP/OBS MODERATE 35: CPT

## 2023-09-16 RX ADMIN — HEPARIN SODIUM 5000 UNIT(S): 5000 INJECTION INTRAVENOUS; SUBCUTANEOUS at 06:02

## 2023-09-16 RX ADMIN — Medication 55 MILLIGRAM(S): at 18:31

## 2023-09-16 RX ADMIN — Medication 55 MILLIGRAM(S): at 06:01

## 2023-09-16 RX ADMIN — SODIUM CHLORIDE 75 MILLILITER(S): 9 INJECTION, SOLUTION INTRAVENOUS at 06:00

## 2023-09-16 RX ADMIN — HEPARIN SODIUM 5000 UNIT(S): 5000 INJECTION INTRAVENOUS; SUBCUTANEOUS at 17:21

## 2023-09-16 RX ADMIN — SODIUM CHLORIDE 75 MILLILITER(S): 9 INJECTION, SOLUTION INTRAVENOUS at 20:35

## 2023-09-16 RX ADMIN — CHLORHEXIDINE GLUCONATE 1 APPLICATION(S): 213 SOLUTION TOPICAL at 06:02

## 2023-09-16 NOTE — PROGRESS NOTE ADULT - SUBJECTIVE AND OBJECTIVE BOX
Patient is a 68y old  Male who presents with a chief complaint of AMS (15 Sep 2023 13:14)      INTERVAL HPI/OVERNIGHT EVENTS: no events noted overnight.    MEDICATIONS  (STANDING):  chlorhexidine 2% Cloths 1 Application(s) Topical <User Schedule>  dextrose 5% + sodium chloride 0.9%. 1000 milliLiter(s) (75 mL/Hr) IV Continuous <Continuous>  heparin   Injectable 5000 Unit(s) SubCutaneous every 12 hours  valproate sodium  IVPB 500 milliGRAM(s) IV Intermittent every 12 hours    MEDICATIONS  (PRN):  glycopyrrolate Injectable 0.4 milliGRAM(s) IV Push every 6 hours PRN secretions      __________________________________________________  REVIEW OF SYSTEMS:    CONSTITUTIONAL: No fever,   EYES: no acute visual disturbances  NECK: No pain or stiffness  RESPIRATORY: No cough; No shortness of breath  CARDIOVASCULAR: No chest pain, no palpitations  GASTROINTESTINAL: No pain. No nausea or vomiting; No diarrhea   NEUROLOGICAL: No headache or numbness, no tremors  MUSCULOSKELETAL: No joint pain, no muscle pain  GENITOURINARY: no dysuria, no frequency, no hesitancy  PSYCHIATRY: no depression , no anxiety  ALL OTHER  ROS negative        Vital Signs Last 24 Hrs  T(C): 36.4 (16 Sep 2023 05:12), Max: 36.4 (15 Sep 2023 20:51)  T(F): 97.5 (16 Sep 2023 05:12), Max: 97.5 (15 Sep 2023 20:51)  HR: 72 (16 Sep 2023 11:55) (63 - 98)  BP: 152/80 (16 Sep 2023 11:55) (116/77 - 164/82)  BP(mean): --  RR: 18 (16 Sep 2023 11:55) (18 - 20)  SpO2: 100% (16 Sep 2023 11:55) (99% - 100%)    Parameters below as of 16 Sep 2023 11:55  Patient On (Oxygen Delivery Method): room air        ________________________________________________  PHYSICAL EXAM:  GENERAL: NAD  HEENT: Normocephalic;  conjunctivae and sclerae clear; moist mucous membranes;   NECK : supple  CHEST/LUNG: Clear to auscultation bilaterally with good air entry   HEART: S1 S2  regular; no murmurs, gallops or rubs  ABDOMEN: Soft, Nontender, Nondistended; Bowel sounds present  EXTREMITIES: no cyanosis; no edema; no calf tenderness  SKIN: warm and dry; no rash  NERVOUS SYSTEM:  Awake and alert; Oriented  to place, person and time ; no new deficits    _________________________________________________  LABS:                        8.9    5.44  )-----------( 177      ( 16 Sep 2023 06:15 )             28.4     09-16    139  |  103  |  15  ----------------------------<  150<H>  3.9   |  29  |  0.91    Ca    9.0      16 Sep 2023 06:15  Phos  2.6     09-16  Mg     1.7     09-16    TPro  5.4<L>  /  Alb  1.7<L>  /  TBili  0.2  /  DBili  x   /  AST  13  /  ALT  8<L>  /  AlkPhos  52  09-16      Urinalysis Basic - ( 16 Sep 2023 06:15 )    Color: x / Appearance: x / SG: x / pH: x  Gluc: 150 mg/dL / Ketone: x  / Bili: x / Urobili: x   Blood: x / Protein: x / Nitrite: x   Leuk Esterase: x / RBC: x / WBC x   Sq Epi: x / Non Sq Epi: x / Bacteria: x      CAPILLARY BLOOD GLUCOSE            RADIOLOGY & ADDITIONAL TESTS:    Imaging Personally Reviewed:  YES    Consultant(s) Notes Reviewed:   YES    Care Discussed with Consultants : YES     Plan of care was discussed with patient and /or primary care giver; all questions and concerns were addressed and care was aligned with patient's wishes.     This is a late entry, patient was seen and examined in am.     Patient is a 68y old  Male who presents with a chief complaint of AMS (15 Sep 2023 13:14)      INTERVAL HPI/OVERNIGHT EVENTS: Patient was seen and examined, he remains lethargic. Patient was able to open his eyes but only mumbled.     MEDICATIONS  (STANDING):  chlorhexidine 2% Cloths 1 Application(s) Topical <User Schedule>  dextrose 5% + sodium chloride 0.9%. 1000 milliLiter(s) (75 mL/Hr) IV Continuous <Continuous>  heparin   Injectable 5000 Unit(s) SubCutaneous every 12 hours  valproate sodium  IVPB 500 milliGRAM(s) IV Intermittent every 12 hours    MEDICATIONS  (PRN):  glycopyrrolate Injectable 0.4 milliGRAM(s) IV Push every 6 hours PRN secretions      __________________________________________________  REVIEW OF SYSTEMS:  unable to obtain, patient lethargic        Vital Signs Last 24 Hrs  T(C): 36.4 (16 Sep 2023 05:12), Max: 36.4 (15 Sep 2023 20:51)  T(F): 97.5 (16 Sep 2023 05:12), Max: 97.5 (15 Sep 2023 20:51)  HR: 72 (16 Sep 2023 11:55) (63 - 98)  BP: 152/80 (16 Sep 2023 11:55) (116/77 - 164/82)  BP(mean): --  RR: 18 (16 Sep 2023 11:55) (18 - 20)  SpO2: 100% (16 Sep 2023 11:55) (99% - 100%)    Parameters below as of 16 Sep 2023 11:55  Patient On (Oxygen Delivery Method): room air        ________________________________________________  PHYSICAL EXAM:  GENERAL: NAD, sleeping in bed   HEENT: Normocephalic;  conjunctivae and sclerae clear; moist mucous membranes;   NECK : supple  CHEST/LUNG: Clear to auscultation bilaterally with good air entry   HEART: S1 S2  regular; no murmurs, gallops or rubs  ABDOMEN: Soft, Nontender, Nondistended; Bowel sounds present  EXTREMITIES: no cyanosis; no edema; no calf tenderness  SKIN: warm and dry; no rash  NERVOUS SYSTEM:  lethargic, able to open his eyes     _________________________________________________  LABS:                        8.9    5.44  )-----------( 177      ( 16 Sep 2023 06:15 )             28.4     09-16    139  |  103  |  15  ----------------------------<  150<H>  3.9   |  29  |  0.91    Ca    9.0      16 Sep 2023 06:15  Phos  2.6     09-16  Mg     1.7     09-16    TPro  5.4<L>  /  Alb  1.7<L>  /  TBili  0.2  /  DBili  x   /  AST  13  /  ALT  8<L>  /  AlkPhos  52  09-16      Urinalysis Basic - ( 16 Sep 2023 06:15 )    Color: x / Appearance: x / SG: x / pH: x  Gluc: 150 mg/dL / Ketone: x  / Bili: x / Urobili: x   Blood: x / Protein: x / Nitrite: x   Leuk Esterase: x / RBC: x / WBC x   Sq Epi: x / Non Sq Epi: x / Bacteria: x      CAPILLARY BLOOD GLUCOSE            RADIOLOGY & ADDITIONAL TESTS:    Imaging Personally Reviewed:  YES    Consultant(s) Notes Reviewed:   YES    Care Discussed with Consultants : YES     Plan of care was discussed with patient and /or primary care giver; all questions and concerns were addressed and care was aligned with patient's wishes.

## 2023-09-16 NOTE — PROGRESS NOTE ADULT - ASSESSMENT
68 year old man admitted from home w/ PMH of dementia, DM, CVA, HTN, HLD, CKD, hx of chronic indwelling catheter, recent diagnosis of gastric cancer p/w altered mental status and dehydration.  Patient was assessed by Oncology and was not deemed to be a candidate of chemotherapy given overall poor functional status.       A/P:  Acute Toxic and Metabolic encephalopathy with underlying Dementia  Acute respiratory failure with hypoxia, atelectasis vs aspiration- improved  Severe Protein Calorie Malnutrition with cachexia  Chronic Anemia likely Anemia of Chronic disease s/p PRBC with stable H/H  Gastric cancer with poorly differentiated Adenocarcinoma   Fluid responsive septic shock resolved  NEVILLE on CKD  Hx CVA  Hx seizure    -Patient's mental status remains poor, he is hospice eligible, however, family not in agreement. - Family declining calvary placement. - Will continue to discuss further GOC and dispo options  - No further NGT placement due to significant patient discomfort and likely minimal clinical benefit to patient  - Continue IVF hydration in the meantime  - Completed IV abx   - Aspiration precaution  - Hold oral feeds as patient continues to be minimally responsive and significantly encephalopathic. S/S reeval and patient unable to participate. When trial of feeding patient with food left in mouth with no initiation of swallowing  - Patient appears to be micro aspirating and unable to clear his airway due to underlying dementia and progressive physical deconditioning.   - Patient is not a candidate for J tube placement at this time  - Continue Valproate  500 mg BID  Prognosis extremely guarded and mortality expected .

## 2023-09-17 LAB
ALBUMIN SERPL ELPH-MCNC: 1.7 G/DL — LOW (ref 3.5–5)
ALP SERPL-CCNC: 53 U/L — SIGNIFICANT CHANGE UP (ref 40–120)
ALT FLD-CCNC: 6 U/L DA — LOW (ref 10–60)
ANION GAP SERPL CALC-SCNC: 6 MMOL/L — SIGNIFICANT CHANGE UP (ref 5–17)
AST SERPL-CCNC: 10 U/L — SIGNIFICANT CHANGE UP (ref 10–40)
BASOPHILS # BLD AUTO: 0.02 K/UL — SIGNIFICANT CHANGE UP (ref 0–0.2)
BASOPHILS NFR BLD AUTO: 0.3 % — SIGNIFICANT CHANGE UP (ref 0–2)
BILIRUB SERPL-MCNC: 0.2 MG/DL — SIGNIFICANT CHANGE UP (ref 0.2–1.2)
BUN SERPL-MCNC: 10 MG/DL — SIGNIFICANT CHANGE UP (ref 7–18)
CALCIUM SERPL-MCNC: 8.3 MG/DL — LOW (ref 8.4–10.5)
CHLORIDE SERPL-SCNC: 106 MMOL/L — SIGNIFICANT CHANGE UP (ref 96–108)
CO2 SERPL-SCNC: 30 MMOL/L — SIGNIFICANT CHANGE UP (ref 22–31)
CREAT SERPL-MCNC: 0.89 MG/DL — SIGNIFICANT CHANGE UP (ref 0.5–1.3)
EGFR: 95 ML/MIN/1.73M2 — SIGNIFICANT CHANGE UP
EOSINOPHIL # BLD AUTO: 0.17 K/UL — SIGNIFICANT CHANGE UP (ref 0–0.5)
EOSINOPHIL NFR BLD AUTO: 2.5 % — SIGNIFICANT CHANGE UP (ref 0–6)
GLUCOSE SERPL-MCNC: 131 MG/DL — HIGH (ref 70–99)
HCT VFR BLD CALC: 28.8 % — LOW (ref 39–50)
HGB BLD-MCNC: 8.9 G/DL — LOW (ref 13–17)
IMM GRANULOCYTES NFR BLD AUTO: 1.5 % — HIGH (ref 0–0.9)
LYMPHOCYTES # BLD AUTO: 2.04 K/UL — SIGNIFICANT CHANGE UP (ref 1–3.3)
LYMPHOCYTES # BLD AUTO: 29.9 % — SIGNIFICANT CHANGE UP (ref 13–44)
MAGNESIUM SERPL-MCNC: 1.6 MG/DL — SIGNIFICANT CHANGE UP (ref 1.6–2.6)
MCHC RBC-ENTMCNC: 26.7 PG — LOW (ref 27–34)
MCHC RBC-ENTMCNC: 30.9 GM/DL — LOW (ref 32–36)
MCV RBC AUTO: 86.5 FL — SIGNIFICANT CHANGE UP (ref 80–100)
MONOCYTES # BLD AUTO: 1.29 K/UL — HIGH (ref 0–0.9)
MONOCYTES NFR BLD AUTO: 18.9 % — HIGH (ref 2–14)
NEUTROPHILS # BLD AUTO: 3.21 K/UL — SIGNIFICANT CHANGE UP (ref 1.8–7.4)
NEUTROPHILS NFR BLD AUTO: 46.9 % — SIGNIFICANT CHANGE UP (ref 43–77)
NRBC # BLD: 0 /100 WBCS — SIGNIFICANT CHANGE UP (ref 0–0)
PHOSPHATE SERPL-MCNC: 2.5 MG/DL — SIGNIFICANT CHANGE UP (ref 2.5–4.5)
PLATELET # BLD AUTO: 187 K/UL — SIGNIFICANT CHANGE UP (ref 150–400)
POTASSIUM SERPL-MCNC: 3.9 MMOL/L — SIGNIFICANT CHANGE UP (ref 3.5–5.3)
POTASSIUM SERPL-SCNC: 3.9 MMOL/L — SIGNIFICANT CHANGE UP (ref 3.5–5.3)
PROT SERPL-MCNC: 5.3 G/DL — LOW (ref 6–8.3)
RBC # BLD: 3.33 M/UL — LOW (ref 4.2–5.8)
RBC # FLD: 19.8 % — HIGH (ref 10.3–14.5)
SODIUM SERPL-SCNC: 142 MMOL/L — SIGNIFICANT CHANGE UP (ref 135–145)
WBC # BLD: 6.91 K/UL — SIGNIFICANT CHANGE UP (ref 3.8–10.5)
WBC # FLD AUTO: 6.91 K/UL — SIGNIFICANT CHANGE UP (ref 3.8–10.5)

## 2023-09-17 PROCEDURE — 99232 SBSQ HOSP IP/OBS MODERATE 35: CPT

## 2023-09-17 RX ADMIN — CHLORHEXIDINE GLUCONATE 1 APPLICATION(S): 213 SOLUTION TOPICAL at 05:11

## 2023-09-17 RX ADMIN — Medication 55 MILLIGRAM(S): at 17:55

## 2023-09-17 RX ADMIN — Medication 55 MILLIGRAM(S): at 05:10

## 2023-09-17 RX ADMIN — HEPARIN SODIUM 5000 UNIT(S): 5000 INJECTION INTRAVENOUS; SUBCUTANEOUS at 17:54

## 2023-09-17 RX ADMIN — SODIUM CHLORIDE 75 MILLILITER(S): 9 INJECTION, SOLUTION INTRAVENOUS at 11:20

## 2023-09-17 RX ADMIN — HEPARIN SODIUM 5000 UNIT(S): 5000 INJECTION INTRAVENOUS; SUBCUTANEOUS at 05:13

## 2023-09-17 NOTE — PROGRESS NOTE ADULT - ASSESSMENT
68 year old man admitted from home w/ PMH of dementia, DM, CVA, HTN, HLD, CKD, hx of chronic indwelling catheter, recent diagnosis of gastric cancer p/w altered mental status and dehydration.  Patient was assessed by Oncology and was not deemed to be a candidate of chemotherapy given overall poor functional status.       A/P:  Acute Toxic and Metabolic encephalopathy with underlying Dementia  Acute respiratory failure with hypoxia, atelectasis vs aspiration- improved  Severe Protein Calorie Malnutrition with cachexia  Chronic Anemia likely Anemia of Chronic disease s/p PRBC with stable H/H  Gastric cancer with poorly differentiated Adenocarcinoma   Fluid responsive septic shock resolved  NEVILLE on CKD  Hx CVA  Hx seizure    -Patient's mental status remains poor, he is hospice eligible, however, family not in agreement. - Family declining calvary placement. - Will continue to discuss further GOC and dispo options  - No further NGT placement due to significant patient discomfort and likely minimal clinical benefit to patient  - Continue IVF hydration in the meantime  - Completed IV abx   - Aspiration precaution  - Hold oral feeds as patient continues to be minimally responsive and significantly encephalopathic. S/S reeval and patient unable to participate. When trial of feeding patient with food left in mouth with no initiation of swallowing  - Patient appears to be micro aspirating and unable to clear his airway due to underlying dementia and progressive physical deconditioning.   - Patient is not a candidate for J tube placement at this time  - Continue Valproate  500 mg BID  Prognosis extremely guarded and mortality expected

## 2023-09-17 NOTE — PROGRESS NOTE ADULT - SUBJECTIVE AND OBJECTIVE BOX
[facility] Medicine  Patient is a 68y old  Male who presents with a chief complaint of AMS (16 Sep 2023 16:15)      SUBJECTIVE / OVERNIGHT EVENTS:  No acute events over night. Sleeping in bed comfortably. Spoke to son at bedside regarding plan today, in agreement    MEDICATIONS  (STANDING):  chlorhexidine 2% Cloths 1 Application(s) Topical <User Schedule>  dextrose 5% + sodium chloride 0.9%. 1000 milliLiter(s) (75 mL/Hr) IV Continuous <Continuous>  heparin   Injectable 5000 Unit(s) SubCutaneous every 12 hours  valproate sodium  IVPB 500 milliGRAM(s) IV Intermittent every 12 hours    MEDICATIONS  (PRN):  glycopyrrolate Injectable 0.4 milliGRAM(s) IV Push every 6 hours PRN secretions          OBJECTIVE:  Vital Signs Last 24 Hrs  T(C): 36.9 (17 Sep 2023 12:40), Max: 36.9 (17 Sep 2023 12:40)  T(F): 98.4 (17 Sep 2023 12:40), Max: 98.4 (17 Sep 2023 12:40)  HR: 72 (17 Sep 2023 12:40) (64 - 72)  BP: 176/80 (17 Sep 2023 12:40) (112/64 - 176/80)  BP(mean): --  RR: 18 (17 Sep 2023 12:40) (18 - 18)  SpO2: 99% (17 Sep 2023 12:40) (99% - 100%)    Parameters below as of 17 Sep 2023 12:40  Patient On (Oxygen Delivery Method): room air      PHYSICAL EXAM:  GENERAL: NAD, sleeping in bed   HEENT: Normocephalic;  conjunctivae and sclerae clear; moist mucous membranes;   NECK : supple  CHEST/LUNG: Clear to auscultation bilaterally with good air entry   HEART: S1 S2  regular; no murmurs, gallops or rubs  ABDOMEN: Soft, Nontender, Nondistended; Bowel sounds present  EXTREMITIES: no cyanosis; no edema; no calf tenderness  SKIN: warm and dry; no rash  NERVOUS SYSTEM:  lethargic, able to open his eyes     CAPILLARY BLOOD GLUCOSE        I&O's Summary    16 Sep 2023 07:01  -  17 Sep 2023 07:00  --------------------------------------------------------  IN: 0 mL / OUT: 2100 mL / NET: -2100 mL              LABS:                        8.9    6.91  )-----------( 187      ( 17 Sep 2023 05:55 )             28.8     09-17    142  |  106  |  10  ----------------------------<  131<H>  3.9   |  30  |  0.89    Ca    8.3<L>      17 Sep 2023 05:55  Phos  2.5     09-17  Mg     1.6     09-17    TPro  5.3<L>  /  Alb  1.7<L>  /  TBili  0.2  /  DBili  x   /  AST  10  /  ALT  6<L>  /  AlkPhos  53  09-17          Urinalysis Basic - ( 17 Sep 2023 05:55 )    Color: x / Appearance: x / SG: x / pH: x  Gluc: 131 mg/dL / Ketone: x  / Bili: x / Urobili: x   Blood: x / Protein: x / Nitrite: x   Leuk Esterase: x / RBC: x / WBC x   Sq Epi: x / Non Sq Epi: x / Bacteria: x            RADIOLOGY & ADDITIONAL TESTS:

## 2023-09-18 LAB
ALBUMIN SERPL ELPH-MCNC: 1.7 G/DL — LOW (ref 3.5–5)
ALP SERPL-CCNC: 54 U/L — SIGNIFICANT CHANGE UP (ref 40–120)
ALT FLD-CCNC: 8 U/L DA — LOW (ref 10–60)
ANION GAP SERPL CALC-SCNC: 5 MMOL/L — SIGNIFICANT CHANGE UP (ref 5–17)
AST SERPL-CCNC: 10 U/L — SIGNIFICANT CHANGE UP (ref 10–40)
BASOPHILS # BLD AUTO: 0.04 K/UL — SIGNIFICANT CHANGE UP (ref 0–0.2)
BASOPHILS NFR BLD AUTO: 0.4 % — SIGNIFICANT CHANGE UP (ref 0–2)
BILIRUB SERPL-MCNC: 0.2 MG/DL — SIGNIFICANT CHANGE UP (ref 0.2–1.2)
BUN SERPL-MCNC: 8 MG/DL — SIGNIFICANT CHANGE UP (ref 7–18)
CALCIUM SERPL-MCNC: 8.4 MG/DL — SIGNIFICANT CHANGE UP (ref 8.4–10.5)
CHLORIDE SERPL-SCNC: 106 MMOL/L — SIGNIFICANT CHANGE UP (ref 96–108)
CO2 SERPL-SCNC: 30 MMOL/L — SIGNIFICANT CHANGE UP (ref 22–31)
CREAT SERPL-MCNC: 0.74 MG/DL — SIGNIFICANT CHANGE UP (ref 0.5–1.3)
EGFR: 100 ML/MIN/1.73M2 — SIGNIFICANT CHANGE UP
EOSINOPHIL # BLD AUTO: 0.19 K/UL — SIGNIFICANT CHANGE UP (ref 0–0.5)
EOSINOPHIL NFR BLD AUTO: 2 % — SIGNIFICANT CHANGE UP (ref 0–6)
GLUCOSE SERPL-MCNC: 132 MG/DL — HIGH (ref 70–99)
HCT VFR BLD CALC: 27.8 % — LOW (ref 39–50)
HGB BLD-MCNC: 8.9 G/DL — LOW (ref 13–17)
IMM GRANULOCYTES NFR BLD AUTO: 0.7 % — SIGNIFICANT CHANGE UP (ref 0–0.9)
LYMPHOCYTES # BLD AUTO: 2.11 K/UL — SIGNIFICANT CHANGE UP (ref 1–3.3)
LYMPHOCYTES # BLD AUTO: 22.4 % — SIGNIFICANT CHANGE UP (ref 13–44)
MAGNESIUM SERPL-MCNC: 1.5 MG/DL — LOW (ref 1.6–2.6)
MCHC RBC-ENTMCNC: 27.2 PG — SIGNIFICANT CHANGE UP (ref 27–34)
MCHC RBC-ENTMCNC: 32 GM/DL — SIGNIFICANT CHANGE UP (ref 32–36)
MCV RBC AUTO: 85 FL — SIGNIFICANT CHANGE UP (ref 80–100)
MONOCYTES # BLD AUTO: 1.38 K/UL — HIGH (ref 0–0.9)
MONOCYTES NFR BLD AUTO: 14.7 % — HIGH (ref 2–14)
NEUTROPHILS # BLD AUTO: 5.61 K/UL — SIGNIFICANT CHANGE UP (ref 1.8–7.4)
NEUTROPHILS NFR BLD AUTO: 59.8 % — SIGNIFICANT CHANGE UP (ref 43–77)
NRBC # BLD: 0 /100 WBCS — SIGNIFICANT CHANGE UP (ref 0–0)
PHOSPHATE SERPL-MCNC: 2.4 MG/DL — LOW (ref 2.5–4.5)
PLATELET # BLD AUTO: 187 K/UL — SIGNIFICANT CHANGE UP (ref 150–400)
POTASSIUM SERPL-MCNC: 3.9 MMOL/L — SIGNIFICANT CHANGE UP (ref 3.5–5.3)
POTASSIUM SERPL-SCNC: 3.9 MMOL/L — SIGNIFICANT CHANGE UP (ref 3.5–5.3)
PROT SERPL-MCNC: 5.3 G/DL — LOW (ref 6–8.3)
RBC # BLD: 3.27 M/UL — LOW (ref 4.2–5.8)
RBC # FLD: 19.7 % — HIGH (ref 10.3–14.5)
SODIUM SERPL-SCNC: 141 MMOL/L — SIGNIFICANT CHANGE UP (ref 135–145)
WBC # BLD: 9.4 K/UL — SIGNIFICANT CHANGE UP (ref 3.8–10.5)
WBC # FLD AUTO: 9.4 K/UL — SIGNIFICANT CHANGE UP (ref 3.8–10.5)

## 2023-09-18 PROCEDURE — 99232 SBSQ HOSP IP/OBS MODERATE 35: CPT

## 2023-09-18 RX ADMIN — SODIUM CHLORIDE 75 MILLILITER(S): 9 INJECTION, SOLUTION INTRAVENOUS at 00:49

## 2023-09-18 RX ADMIN — Medication 55 MILLIGRAM(S): at 17:58

## 2023-09-18 RX ADMIN — HEPARIN SODIUM 5000 UNIT(S): 5000 INJECTION INTRAVENOUS; SUBCUTANEOUS at 06:15

## 2023-09-18 RX ADMIN — Medication 55 MILLIGRAM(S): at 06:12

## 2023-09-18 RX ADMIN — HEPARIN SODIUM 5000 UNIT(S): 5000 INJECTION INTRAVENOUS; SUBCUTANEOUS at 17:35

## 2023-09-18 RX ADMIN — CHLORHEXIDINE GLUCONATE 1 APPLICATION(S): 213 SOLUTION TOPICAL at 06:13

## 2023-09-18 RX ADMIN — SODIUM CHLORIDE 75 MILLILITER(S): 9 INJECTION, SOLUTION INTRAVENOUS at 12:56

## 2023-09-18 NOTE — PROGRESS NOTE ADULT - ASSESSMENT
68 year old man admitted from home w/ PMH of dementia, DM, CVA, HTN, HLD, CKD, hx of chronic indwelling catheter, recent diagnosis of gastric cancer p/w altered mental status and dehydration.  Patient was assessed by Oncology and was not deemed to be a candidate of chemotherapy given overall poor functional status.       A/P:  Acute Toxic and Metabolic encephalopathy with underlying Dementia  Acute respiratory failure with hypoxia, atelectasis vs aspiration- improved  Severe Protein Calorie Malnutrition with cachexia  Chronic Anemia likely Anemia of Chronic disease s/p PRBC with stable H/H  Gastric cancer with poorly differentiated Adenocarcinoma   Fluid responsive septic shock resolved  NEVILLE on CKD  Hx CVA  Hx seizure    -Patient's mental status remains poor, he is hospice eligible, however, family not in agreement. - Family declining calvary placement. - Will continue to discuss further GOC and dispo options     - If not calvary placement, Will discuss potential DC back home  - No further NGT placement due to significant patient discomfort and likely minimal clinical benefit to patient  - Continue IVF hydration in the meantime  - Completed IV abx   - Aspiration precaution  - Hold oral feeds as patient continues to be minimally responsive and significantly encephalopathic. S/S reeval and patient unable to participate. When trial of feeding patient with food left in mouth with no initiation of swallowing  - Patient appears to be micro aspirating and unable to clear his airway due to underlying dementia and progressive physical deconditioning.   - Patient is not a candidate for J tube placement at this time  - Continue Valproate  500 mg BID  Prognosis extremely guarded and mortality expected

## 2023-09-18 NOTE — PROGRESS NOTE ADULT - SUBJECTIVE AND OBJECTIVE BOX
[facility] Medicine  Patient is a 68y old  Male who presents with a chief complaint of AMS (17 Sep 2023 14:19)    SUBJECTIVE / OVERNIGHT EVENTS:  No acute events over night. Lying in bed comfortably.    MEDICATIONS  (STANDING):  chlorhexidine 2% Cloths 1 Application(s) Topical <User Schedule>  dextrose 5% + sodium chloride 0.9%. 1000 milliLiter(s) (75 mL/Hr) IV Continuous <Continuous>  heparin   Injectable 5000 Unit(s) SubCutaneous every 12 hours  valproate sodium  IVPB 500 milliGRAM(s) IV Intermittent every 12 hours    MEDICATIONS  (PRN):  glycopyrrolate Injectable 0.4 milliGRAM(s) IV Push every 6 hours PRN secretions      OBJECTIVE:  Vital Signs Last 24 Hrs  T(C): 36.7 (18 Sep 2023 05:15), Max: 36.7 (18 Sep 2023 05:15)  T(F): 98.1 (18 Sep 2023 05:15), Max: 98.1 (18 Sep 2023 05:15)  HR: 69 (18 Sep 2023 05:15) (66 - 70)  BP: 161/90 (18 Sep 2023 05:15) (157/80 - 171/54)  BP(mean): --  RR: 20 (18 Sep 2023 05:15) (17 - 20)  SpO2: 98% (18 Sep 2023 05:15) (95% - 98%)    Parameters below as of 18 Sep 2023 05:15  Patient On (Oxygen Delivery Method): room air      PHYSICAL EXAM:  GENERAL: NAD, cachectic  HEENT: Normocephalic;  conjunctivae and sclerae clear; moist mucous membranes;   NECK : supple  CHEST/LUNG: Clear to auscultation bilaterally with good air entry   HEART: S1 S2  regular; no murmurs, gallops or rubs  ABDOMEN: Soft, Nontender, Nondistended; Bowel sounds present  EXTREMITIES: no cyanosis; no edema; no calf tenderness  SKIN: warm and dry; no rash  NERVOUS SYSTEM:  A/Ox0, lethargic     CAPILLARY BLOOD GLUCOSE        I&O's Summary    17 Sep 2023 07:01  -  18 Sep 2023 07:00  --------------------------------------------------------  IN: 0 mL / OUT: 950 mL / NET: -950 mL              LABS:                        8.9    9.40  )-----------( 187      ( 18 Sep 2023 10:17 )             27.8     09-18    141  |  106  |  8   ----------------------------<  132<H>  3.9   |  30  |  0.74    Ca    8.4      18 Sep 2023 10:17  Phos  2.4     09-18  Mg     1.5     09-18    TPro  5.3<L>  /  Alb  1.7<L>  /  TBili  0.2  /  DBili  x   /  AST  10  /  ALT  8<L>  /  AlkPhos  54  09-18          Urinalysis Basic - ( 18 Sep 2023 10:17 )    Color: x / Appearance: x / SG: x / pH: x  Gluc: 132 mg/dL / Ketone: x  / Bili: x / Urobili: x   Blood: x / Protein: x / Nitrite: x   Leuk Esterase: x / RBC: x / WBC x   Sq Epi: x / Non Sq Epi: x / Bacteria: x            RADIOLOGY & ADDITIONAL TESTS:

## 2023-09-19 DIAGNOSIS — R53.81 OTHER MALAISE: ICD-10-CM

## 2023-09-19 DIAGNOSIS — E43 UNSPECIFIED SEVERE PROTEIN-CALORIE MALNUTRITION: ICD-10-CM

## 2023-09-19 DIAGNOSIS — R53.2 FUNCTIONAL QUADRIPLEGIA: ICD-10-CM

## 2023-09-19 DIAGNOSIS — Z02.9 ENCOUNTER FOR ADMINISTRATIVE EXAMINATIONS, UNSPECIFIED: ICD-10-CM

## 2023-09-19 PROCEDURE — 99232 SBSQ HOSP IP/OBS MODERATE 35: CPT

## 2023-09-19 RX ORDER — VALPROIC ACID (AS SODIUM SALT) 250 MG/5ML
500 SOLUTION, ORAL ORAL
Refills: 0 | Status: DISCONTINUED | OUTPATIENT
Start: 2023-09-19 | End: 2023-09-30

## 2023-09-19 RX ADMIN — HEPARIN SODIUM 5000 UNIT(S): 5000 INJECTION INTRAVENOUS; SUBCUTANEOUS at 05:46

## 2023-09-19 RX ADMIN — CHLORHEXIDINE GLUCONATE 1 APPLICATION(S): 213 SOLUTION TOPICAL at 05:46

## 2023-09-19 RX ADMIN — Medication 55 MILLIGRAM(S): at 05:46

## 2023-09-19 RX ADMIN — HEPARIN SODIUM 5000 UNIT(S): 5000 INJECTION INTRAVENOUS; SUBCUTANEOUS at 17:05

## 2023-09-19 RX ADMIN — SODIUM CHLORIDE 75 MILLILITER(S): 9 INJECTION, SOLUTION INTRAVENOUS at 01:53

## 2023-09-19 RX ADMIN — Medication 500 MILLIGRAM(S): at 17:07

## 2023-09-19 NOTE — PROGRESS NOTE ADULT - PROBLEM SELECTOR PLAN 2
2/2 metastatic gastric ca   failed speech and swallow   s/p NGTube and Midline for feedings both now dislodged   not a candidate for PEG tube   hospice eligible   palliative follows

## 2023-09-19 NOTE — PROGRESS NOTE ADULT - PROBLEM SELECTOR PLAN 9
patient from home hospice   family no longer interested in hospice   requesting transfer to [facility]   plan of care discussed with son in law Mary 9/19/23   pending D/C decision

## 2023-09-19 NOTE — PROGRESS NOTE ADULT - SUBJECTIVE AND OBJECTIVE BOX
NP Note discussed with  primary attending    Patient is a 68y old  Male who presents with a chief complaint of AMS (18 Sep 2023 13:04)      INTERVAL HPI/OVERNIGHT EVENTS: midline dislodged    MEDICATIONS  (STANDING):  chlorhexidine 2% Cloths 1 Application(s) Topical <User Schedule>  dextrose 5% + sodium chloride 0.9%. 1000 milliLiter(s) (75 mL/Hr) IV Continuous <Continuous>  heparin   Injectable 5000 Unit(s) SubCutaneous every 12 hours  valproate sodium  IVPB 500 milliGRAM(s) IV Intermittent every 12 hours    MEDICATIONS  (PRN):  glycopyrrolate Injectable 0.4 milliGRAM(s) IV Push every 6 hours PRN secretions      __________________________________________________  REVIEW OF SYSTEMS:  limited 2/2 to cognitive impairment       Vital Signs Last 24 Hrs  T(C): 36.8 (19 Sep 2023 05:03), Max: 36.9 (18 Sep 2023 14:20)  T(F): 98.2 (19 Sep 2023 05:03), Max: 98.4 (18 Sep 2023 14:20)  HR: 72 (19 Sep 2023 05:03) (72 - 83)  BP: 160/75 (19 Sep 2023 05:03) (133/69 - 160/75)  BP(mean): --  RR: 17 (19 Sep 2023 05:03) (16 - 17)  SpO2: 100% (19 Sep 2023 05:03) (98% - 100%)    Parameters below as of 19 Sep 2023 05:03  Patient On (Oxygen Delivery Method): room air        ________________________________________________  PHYSICAL EXAM:  GENERAL: NAD, frail chronically ill appearing   HEENT: B/L temporal waisting   NECK : supple  CHEST/LUNG: diminished 2/2 poor effort   HEART: S1 S2  regular; no murmurs, gallops or rubs  ABDOMEN: Soft, Nontender, Nondistended; Bowel sounds present  EXTREMITIES: atrophied   SKIN: warm and dry; no rash  NERVOUS SYSTEM: lethargic,  does not arouse does not follow commands _________________________________________________  LABS:                        8.9    9.40  )-----------( 187      ( 18 Sep 2023 10:17 )             27.8     09-18    141  |  106  |  8   ----------------------------<  132<H>  3.9   |  30  |  0.74    Ca    8.4      18 Sep 2023 10:17  Phos  2.4     09-18  Mg     1.5     09-18    TPro  5.3<L>  /  Alb  1.7<L>  /  TBili  0.2  /  DBili  x   /  AST  10  /  ALT  8<L>  /  AlkPhos  54  09-18      Urinalysis Basic - ( 18 Sep 2023 10:17 )    Color: x / Appearance: x / SG: x / pH: x  Gluc: 132 mg/dL / Ketone: x  / Bili: x / Urobili: x   Blood: x / Protein: x / Nitrite: x   Leuk Esterase: x / RBC: x / WBC x   Sq Epi: x / Non Sq Epi: x / Bacteria: x      CAPILLARY BLOOD GLUCOSE    RADIOLOGY & ADDITIONAL TESTS:     < from: Xray Chest 1 View- PORTABLE-Routine (09.09.23 @ 11:55) >    IMPRESSION: NG tube tip in gastric fundus.  .Bilateral perihilar patchy ill-defined airspace opacities.      --- End of Report ---    < end of copied text >  < from: Xray Chest 1 View- PORTABLE-Urgent (Xray Chest 1 View- PORTABLE-Urgent .) (09.07.23 @ 18:00) >    IMPRESSION:  Small left effusion. Feeding tube to stomach.        Thank you for the courtesy of this referral.    --- End of Report ---      < end of copied text >  < from: CT Head No Cont (08.27.23 @ 18:53) >  IMPRESSION:  No evidence of acute transcortical infarct, acute intracranial   hemorrhage, or mass effect.    --- Endof Report ---    < end of copied text >  < from: Xray Chest 1 View- PORTABLE-Urgent (08.27.23 @ 17:19) >  IMPRESSION:  Continued elevation of right hemidiaphragm.    Patchy right lower lung opacities projecting over the right hemidiaphragm   which could be due to atelectasis or infection.    --- End of Report ---    < end of copied text >    Plan of care was discussed with patient and /or primary care giver; all questions and concerns were addressed and care was aligned with patient's wishes.

## 2023-09-19 NOTE — PROGRESS NOTE ADULT - PROBLEM SELECTOR PLAN 8
likely 2/2 worsening advanced dementia  CTH without acute pathology or metastasis  failed s&s  supportive care   saftey precautions   hospice eligible

## 2023-09-19 NOTE — PROGRESS NOTE ADULT - PROBLEM SELECTOR PLAN 1
- history of metastatic gastric CA   - CTH wnl no metastatic changes , recent CT Ab/P imaging showed - Gastric pyloric mass, suspect adenocarcinoma. Bulky local necrotic metastatic adenopathy.  - diagnosed 2 months ago  - deemed not a surgical or chemo candidate   - failed speech and swallow eval   - s/p NGTube and Midline for feedings and IVF both now dislodged   - hospice eligible   - family not agreeable

## 2023-09-19 NOTE — PROGRESS NOTE ADULT - PROBLEM SELECTOR PLAN 6
p/w sepsis likely 2/2 aspiration PNA on admission   S/P IV Abx now resolved   remains NPO 2/2 failed s&s

## 2023-09-19 NOTE — PROGRESS NOTE ADULT - ASSESSMENT
68 year old man with advanced dementia, gastric cancer w/  mets deemed not surgical or chemo candidate, seizure disorder, HTN, HLD, DM, CVA x2 (last 2013), CKD (Baseline Cr 1.5-1.8), chronic bangura (exchanged in ED on admission) admitted to medicine for AHRF 2/2 asp. pna, NEVILLE on CKD, and worsening mental status in the setting of advanced gastric cancer. Hospital course complicated by failed S&s eval 2/2 lethargy and cognitive impairment, S/P NGTube and midline for feedings and IVF both now dislodged. Found hospice eligible for which palliative followed but family not agreeable. Opts for transfer to [facility], plan of care discussed with son in law Mary on 9/19 who states he will speak with [facility]. Will likely D/C home with resumption of services

## 2023-09-20 LAB
MRSA PCR RESULT.: DETECTED
S AUREUS DNA NOSE QL NAA+PROBE: DETECTED

## 2023-09-20 PROCEDURE — 99232 SBSQ HOSP IP/OBS MODERATE 35: CPT

## 2023-09-20 RX ADMIN — HEPARIN SODIUM 5000 UNIT(S): 5000 INJECTION INTRAVENOUS; SUBCUTANEOUS at 17:41

## 2023-09-20 RX ADMIN — Medication 500 MILLIGRAM(S): at 17:41

## 2023-09-20 RX ADMIN — HEPARIN SODIUM 5000 UNIT(S): 5000 INJECTION INTRAVENOUS; SUBCUTANEOUS at 06:13

## 2023-09-20 RX ADMIN — Medication 500 MILLIGRAM(S): at 06:13

## 2023-09-20 RX ADMIN — CHLORHEXIDINE GLUCONATE 1 APPLICATION(S): 213 SOLUTION TOPICAL at 06:13

## 2023-09-20 NOTE — PROGRESS NOTE ADULT - ASSESSMENT
68 year old man with advanced dementia, gastric cancer w/  mets deemed not surgical or chemo candidate, seizure disorder, HTN, HLD, DM, CVA x2 (last 2013), CKD (Baseline Cr 1.5-1.8), chronic bangura (exchanged in ED on admission) admitted to medicine for AHRF 2/2 asp. pna, NEVILLE on CKD, and worsening mental status in the setting of advanced gastric cancer. Hospital course complicated by failed S&s eval 2/2 lethargy and cognitive impairment, S/P NGTube and midline for feedings and IVF both now dislodged. Found hospice eligible for which palliative followed but family not agreeable. dispo plan likely NH, Sw following.

## 2023-09-20 NOTE — PROGRESS NOTE ADULT - PROBLEM SELECTOR PLAN 1
- history of metastatic gastric CA   - CTH wnl no metastatic changes , recent CT Ab/P imaging showed - Gastric pyloric mass, suspect adenocarcinoma.    Bulky local necrotic metastatic adenopathy.  - diagnosed 2 months ago  - deemed not a surgical or chemo candidate   - failed speech and swallow eval   - s/p NGTube and Midline for feedings and IVF both now dislodged   - hospice eligible   - family not agreeable- dispo planning to NH, SW following

## 2023-09-20 NOTE — PROGRESS NOTE ADULT - PROBLEM SELECTOR PLAN 2
2/2 metastatic gastric ca   failed speech and swallow   s/p NGTube and Midline for feedings both now dislodged   not a candidate for PEG tube   hospice eligible   palliative follows 2/2 metastatic gastric ca   Initially failed speech and swallow, but was re-evaluated, now can take liquids by teaspoons  s/p NG Tube, but patient self removed and could not tolerate.  Midline for fluids removed due to infiltration. Deemed not a candidate for PEG tube   hospice eligible   Family concerned about ability to take care of him at home, is interested in nursing home  palliative follows

## 2023-09-20 NOTE — PROGRESS NOTE ADULT - PROBLEM SELECTOR PLAN 9
patient from home hospice   family no longer interested in hospice   pending D/C to NH, Sw following

## 2023-09-20 NOTE — CHART NOTE - NSCHARTNOTEFT_GEN_A_CORE
Assessment:   68yMalePatient is a 68y old  Male who presents with a chief complaint of AMS (20 Sep 2023 14:21). Pt Visited. Pt asleep. Lethargic  Pt  S/P SLP notes Noted- Full Liquid Comfort feed. Pt Pulled out NG tube. Palliative on case.        Factors impacting intake: [ ] none [ ] nausea  [ ] vomiting [ ] diarrhea [ ] constipation  [ ]chewing problems [ ] swallowing issues  [ ] other:     Diet Prescription: Diet, Full Liquid:   Liquid via Teaspoon Only  Single Sips Only  Supplement Feeding Modality:  Oral  Two Yonas HN Cans or Servings Per Day:  1       Frequency:  Three Times a day (09-19-23 @ 13:49)    Intake :     Current Weight:   % Weight Change    Pertinent Medications: MEDICATIONS  (STANDING):  chlorhexidine 2% Cloths 1 Application(s) Topical <User Schedule>  heparin   Injectable 5000 Unit(s) SubCutaneous every 12 hours  valproic  acid Syrup 500 milliGRAM(s) Oral two times a day    MEDICATIONS  (PRN):  glycopyrrolate Injectable 0.4 milliGRAM(s) IV Push every 6 hours PRN secretions    Pertinent Labs: 09-18 Na141 mmol/L Glu 132 mg/dL<H> K+ 3.9 mmol/L Cr  0.74 mg/dL BUN 8 mg/dL 09-18 Phos 2.4 mg/dL<L> 09-18 Alb 1.7 g/dL<L>     CAPILLARY BLOOD GLUCOSE        Skin: DTI @ R Heel, Stage 1 @ L heel and coccyx.     Estimated Needs:   [ ] no change since previous assessment  [ ] recalculated:     Previous Nutrition Diagnosis:   [ ] Inadequate Energy Intake [ ]Inadequate Oral Intake [ ] Excessive Energy Intake   [ ] Underweight [ ] Increased Nutrient Needs [ ] Overweight/Obesity   [ ] Altered GI Function [ ] Unintended Weight Loss [ ] Food & Nutrition Related Knowledge Deficit [ x] Malnutrition Severe    Nutrition Diagnosis is [ x] ongoing  [ ] resolved [ ] not applicable     New Nutrition Diagnosis: [ ] not applicable       Interventions:   Recommend  [ ] Change Diet To:  [ ] Nutrition Supplement  [ ] Nutrition Support  [ x] Other: Per Goals of care     Monitoring and Evaluation:   [ ] PO intake [  ] Tolerance to diet prescription [  ] weights [ x ] labs[ x ] follow up per protocol  [ ] other:

## 2023-09-20 NOTE — PROGRESS NOTE ADULT - SUBJECTIVE AND OBJECTIVE BOX
Patient is a 68y old  Male who presents with a chief complaint of AMS (19 Sep 2023 12:28)      INTERVAL HPI/OVERNIGHT EVENTS: no overnight events    I&O's Summary    19 Sep 2023 07:01  -  20 Sep 2023 07:00  --------------------------------------------------------  IN: 0 mL / OUT: 500 mL / NET: -500 mL      Vital Signs Last 24 Hrs  T(C): 36.7 (20 Sep 2023 14:00), Max: 36.7 (20 Sep 2023 14:00)  T(F): 98 (20 Sep 2023 14:00), Max: 98 (20 Sep 2023 14:00)  HR: 75 (20 Sep 2023 14:00) (68 - 75)  BP: 148/67 (20 Sep 2023 14:00) (128/66 - 148/67)  BP(mean): --  RR: 18 (20 Sep 2023 14:00) (17 - 18)  SpO2: 100% (20 Sep 2023 14:00) (97% - 100%)    Parameters below as of 20 Sep 2023 14:00  Patient On (Oxygen Delivery Method): room air      PAST MEDICAL & SURGICAL HISTORY:  DM (diabetes mellitus)      Seizure      CVA (cerebrovascular accident)      HLD (hyperlipidemia)      CKD (chronic kidney disease)      Dementia      Gastric cancer      No significant past surgical history          SOCIAL HISTORY  Alcohol:  Tobacco:  Illicit substance use:      FAMILY HISTORY:      LABS:              CAPILLARY BLOOD GLUCOSE                MEDICATIONS  (STANDING):  chlorhexidine 2% Cloths 1 Application(s) Topical <User Schedule>  heparin   Injectable 5000 Unit(s) SubCutaneous every 12 hours  valproic  acid Syrup 500 milliGRAM(s) Oral two times a day    MEDICATIONS  (PRN):  glycopyrrolate Injectable 0.4 milliGRAM(s) IV Push every 6 hours PRN secretions      REVIEW OF SYSTEMS: unable to obtain ROS due dementia    RADIOLOGY & ADDITIONAL TESTS:  < from: CT Head No Cont (08.27.23 @ 18:53) >    ACC: [id number] EXAM:  CT BRAIN   ORDERED BY:  BEATRIZ LEVY     PROCEDURE DATE:  08/27/2023          INTERPRETATION:  CLINICAL INDICATION: Altered mental status    TECHNIQUE: Axial CT scanning of the brain was obtained from the skull   base to the vertex without the administration of intravenous contrast.   Reformatted coronal and sagittal images were subsequently obtained and   reviewed.    COMPARISON: 8/17/2023    FINDINGS:  There is no CT evidence of acute transcortical infarct. Age-related   involutional changes and chronic microvascular ischemic changes.   Redemonstration of multiple chronic lacunar infarcts as previously   described.    There is no hydrocephalus, mass effect, or acute intracranial hemorrhage.   No extra-axial collection. Basal cisterns are patent.    The visualized paranasal sinuses and mastoid air cells are clear.    The calvarium is intact.    IMPRESSION:  No evidence of acute transcortical infarct, acute intracranial   hemorrhage, or mass effect.    --- Endof Report ---  JOSHUA CROWE MD; Attending Radiologist  This document has been electronically signed. Aug 27 2023  7:15PM    < end of copied text >    ACC: [id number] EXAM:  XR CHEST PORTABLE URGENT 1V   ORDERED BY: SHEILA MORE     PROCEDURE DATE:  09/13/2023          INTERPRETATION:  Chest one view    HISTORY: NG tube placement    COMPARISON STUDY: 9/9/2023    Frontal expiratory view of the chest shows the heart to be normal in   size. No NG tube is identified.    The lungs show slight reduction of basilar atelectasis and there is no   evidence of pneumothorax nor pleural effusion.    IMPRESSION:  NG tube not visualized.    Thank you for the courtesy of this referral.    --- End of Report ---      Imaging Personally Reviewed:  [x ] YES  [ ] NO    Consultant(s) Notes Reviewed:  [x ] YES  [ ] NO    PHYSICAL EXAM:  GENERAL: NAD  HEAD:  Atraumatic, Normocephalic  EYES: conjunctiva and sclera clear  ENMT: Moist mucous membranes  NECK: Supple  NERVOUS SYSTEM:  Awake  CHEST/LUNG: CTA  HEART: Regular rate and rhythm  ABDOMEN: Soft, Nontender, Nondistended; Bowel sounds present  EXTREMITIES: No clubbing, cyanosis, or edema  SKIN: No rashes     Care Collaborated Discussed with Consultants/Other Providers [x ] YES  [ ] NO

## 2023-09-20 NOTE — PROGRESS NOTE ADULT - NUTRITIONAL ASSESSMENT
This patient has been assessed with a concern for Malnutrition and has been determined to have a diagnosis/diagnoses of Underweight (BMI < 19) and Severe protein-calorie malnutrition.    This patient is being managed with:   Diet Full Liquid-  Liquid via Teaspoon Only  Single Sips Only  Supplement Feeding Modality:  Oral  Two Yonas HN Cans or Servings Per Day:  1       Frequency:  Three Times a day  Entered: Sep 19 2023  1:49PM    The following pending diet order is being considered for treatment of Underweight (BMI < 19) and Severe protein-calorie malnutrition:  Diet Pureed-  Supplement Feeding Modality:  Oral  Ensure Enlive Cans or Servings Per Day:  1       Frequency:  Three Times a day  Entered: Aug 30 2023  2:55PM

## 2023-09-20 NOTE — PROGRESS NOTE ADULT - PROBLEM SELECTOR PLAN 3
bedbound   nonverbal   requires full assist for ADLs bedbound   difficulty speaking, requires full assist for ADLs

## 2023-09-21 DIAGNOSIS — Z29.9 ENCOUNTER FOR PROPHYLACTIC MEASURES, UNSPECIFIED: ICD-10-CM

## 2023-09-21 RX ORDER — CHLORHEXIDINE GLUCONATE 213 G/1000ML
1 SOLUTION TOPICAL EVERY 12 HOURS
Refills: 0 | Status: DISCONTINUED | OUTPATIENT
Start: 2023-09-21 | End: 2023-09-30

## 2023-09-21 RX ORDER — MUPIROCIN 20 MG/G
1 OINTMENT TOPICAL
Refills: 0 | Status: COMPLETED | OUTPATIENT
Start: 2023-09-21 | End: 2023-09-26

## 2023-09-21 RX ADMIN — HEPARIN SODIUM 5000 UNIT(S): 5000 INJECTION INTRAVENOUS; SUBCUTANEOUS at 17:39

## 2023-09-21 RX ADMIN — HEPARIN SODIUM 5000 UNIT(S): 5000 INJECTION INTRAVENOUS; SUBCUTANEOUS at 06:29

## 2023-09-21 RX ADMIN — MUPIROCIN 1 APPLICATION(S): 20 OINTMENT TOPICAL at 17:40

## 2023-09-21 RX ADMIN — CHLORHEXIDINE GLUCONATE 1 APPLICATION(S): 213 SOLUTION TOPICAL at 17:40

## 2023-09-21 RX ADMIN — CHLORHEXIDINE GLUCONATE 1 APPLICATION(S): 213 SOLUTION TOPICAL at 06:30

## 2023-09-21 RX ADMIN — Medication 500 MILLIGRAM(S): at 17:40

## 2023-09-21 RX ADMIN — Medication 500 MILLIGRAM(S): at 06:25

## 2023-09-21 NOTE — PROGRESS NOTE ADULT - PROBLEM SELECTOR PLAN 9
Prognosis poor  multiple conversations with family for Hospice- family declines  appropriate for LTC with pleasure feeds Prognosis poor  multiple conversations with family for Hospice- family declines  appropriate for LTC with pleasure feeds  will hold off am LABS

## 2023-09-21 NOTE — PROGRESS NOTE ADULT - PROBLEM SELECTOR PLAN 4
- history of metastatic gastric CA   - CTH wnl no metastatic changes , recent CT Ab/P imaging showed - Gastric pyloric mass, suspect adenocarcinoma.    Bulky local necrotic metastatic adenopathy.  - deemed not a surgical or chemo candidate   - poor prognosis

## 2023-09-21 NOTE — PROGRESS NOTE ADULT - PROBLEM SELECTOR PLAN 2
mixed etiology likely to aspiration pneumonia and worsening advanced dementia  CTH without acute pathology or metastasis

## 2023-09-21 NOTE — PROGRESS NOTE ADULT - SUBJECTIVE AND OBJECTIVE BOX
NP Note discussed with  Primary Attending    Patient is a 68y old  Male who presents with a chief complaint of AMS (20 Sep 2023 14:21)      INTERVAL HPI/OVERNIGHT EVENTS: no new complaints    MEDICATIONS  (STANDING):  chlorhexidine 2% Cloths 1 Application(s) Topical every 12 hours  chlorhexidine 2% Cloths 1 Application(s) Topical <User Schedule>  heparin   Injectable 5000 Unit(s) SubCutaneous every 12 hours  mupirocin 2% Ointment 1 Application(s) Both Nostrils two times a day  valproic  acid Syrup 500 milliGRAM(s) Oral two times a day    MEDICATIONS  (PRN):  glycopyrrolate Injectable 0.4 milliGRAM(s) IV Push every 6 hours PRN secretions      __________________________________________________  REVIEW OF SYSTEMS:    unable to assess, poor historian      Vital Signs Last 24 Hrs  T(C): 36.3 (21 Sep 2023 12:32), Max: 36.7 (20 Sep 2023 14:00)  T(F): 97.3 (21 Sep 2023 12:32), Max: 98 (20 Sep 2023 14:00)  HR: 78 (21 Sep 2023 12:32) (66 - 78)  BP: 139/68 (21 Sep 2023 12:32) (139/68 - 151/69)  BP(mean): --  RR: 18 (21 Sep 2023 12:32) (18 - 18)  SpO2: 95% (21 Sep 2023 12:32) (95% - 100%)    Parameters below as of 21 Sep 2023 12:32  Patient On (Oxygen Delivery Method): room air        ________________________________________________  PHYSICAL EXAM:  GENERAL: lethargic, unable to follow commands  HEENT: Normocephalic;  conjunctivae and sclerae clear; moist mucous membranes;   NECK : supple  CHEST/LUNG: bibasilar crackles  HEART: S1 S2  regular; no murmurs, gallops or rubs  ABDOMEN: Soft, Nontender, Nondistended; Bowel sounds present  EXTREMITIES: no cyanosis; no edema; no calf tenderness  SKIN: temporal and clavicular muscle wasting, pressure injury stage 1 coccyx, left heel, DTI right heel  NERVOUS SYSTEM:  Lethargic, arousable to touch and loud voice    _________________________________________________  LABS:              CAPILLARY BLOOD GLUCOSE            RADIOLOGY & ADDITIONAL TESTS:  < from: Xray Chest 1 View- PORTABLE-Urgent (Xray Chest 1 View- PORTABLE-Urgent .) (09.13.23 @ 17:10) >  ACC: [id number] EXAM:  XR CHEST PORTABLE URGENT 1V   ORDERED BY: SHEILA MORE     PROCEDURE DATE:  09/13/2023          INTERPRETATION:  Chest one view    HISTORY: NG tube placement    COMPARISON STUDY: 9/9/2023    Frontal expiratory view of the chest shows the heart to be normal in   size. No NG tube is identified.    The lungs show slight reduction of basilar atelectasis and there is no   evidence of pneumothorax nor pleural effusion.    IMPRESSION:  NG tube not visualized.        Thank you for the courtesy of this referral.    --- End of Report ---    < end of copied text >    Imaging  Reviewed:  YES    Consultant(s) Notes Reviewed:   YES      Plan of care was discussed with patient and /or primary care giver; all questions and concerns were addressed

## 2023-09-21 NOTE — PROGRESS NOTE ADULT - ASSESSMENT
Patient is a 68 year old man with advanced dementia, metastatic gastric cancer-not surgical or chemo candidate, seizure disorder, HTN, HLD, DM, CVA, CKD (Baseline Cr 1.5-1.8), chronic bangura (exchanged in ED on admission). Patient admitted to medicine for Acute metabolic encephalopathy and AHRF 2/2 asp. pna, NEVILLE on CKD. Patient completed antibiotic therapy. Patient initially failed swallow evaluation, placed NG-tube then dislodge, unable to be reaccessed. Palliative consulted due to poor prognosis. Patient appropriate for hospice, however family decline hospice. Patient reevaluated with speech specialist, patient appropriate for pleasure feeds. Patient appropriate for LTC with pleasure feeds.

## 2023-09-21 NOTE — PROGRESS NOTE ADULT - NUTRITIONAL ASSESSMENT
This patient has been assessed with a concern for Malnutrition and has been determined to have a diagnosis/diagnoses of Severe protein-calorie malnutrition and Underweight (BMI < 19).    This patient is being managed with:   Diet Full Liquid-  Liquid via Teaspoon Only  Single Sips Only  Supplement Feeding Modality:  Oral  Two Yonas HN Cans or Servings Per Day:  1       Frequency:  Three Times a day  Entered: Sep 19 2023  1:49PM    The following pending diet order is being considered for treatment of Severe protein-calorie malnutrition and Underweight (BMI < 19):  Diet Pureed-  Supplement Feeding Modality:  Oral  Ensure Enlive Cans or Servings Per Day:  1       Frequency:  Three Times a day  Entered: Aug 30 2023  2:55PM

## 2023-09-22 PROCEDURE — 99232 SBSQ HOSP IP/OBS MODERATE 35: CPT

## 2023-09-22 RX ADMIN — CHLORHEXIDINE GLUCONATE 1 APPLICATION(S): 213 SOLUTION TOPICAL at 18:53

## 2023-09-22 RX ADMIN — HEPARIN SODIUM 5000 UNIT(S): 5000 INJECTION INTRAVENOUS; SUBCUTANEOUS at 18:53

## 2023-09-22 RX ADMIN — HEPARIN SODIUM 5000 UNIT(S): 5000 INJECTION INTRAVENOUS; SUBCUTANEOUS at 06:22

## 2023-09-22 RX ADMIN — Medication 500 MILLIGRAM(S): at 18:53

## 2023-09-22 RX ADMIN — Medication 500 MILLIGRAM(S): at 06:21

## 2023-09-22 RX ADMIN — CHLORHEXIDINE GLUCONATE 1 APPLICATION(S): 213 SOLUTION TOPICAL at 06:20

## 2023-09-22 RX ADMIN — MUPIROCIN 1 APPLICATION(S): 20 OINTMENT TOPICAL at 06:22

## 2023-09-22 RX ADMIN — MUPIROCIN 1 APPLICATION(S): 20 OINTMENT TOPICAL at 18:53

## 2023-09-22 NOTE — PROGRESS NOTE ADULT - SUBJECTIVE AND OBJECTIVE BOX
NP Note discussed with  Primary Attending    Patient is a 68y old  Male who presents with a chief complaint of AMS (21 Sep 2023 13:46)      INTERVAL HPI/OVERNIGHT EVENTS: no new complaints    MEDICATIONS  (STANDING):  chlorhexidine 2% Cloths 1 Application(s) Topical every 12 hours  chlorhexidine 2% Cloths 1 Application(s) Topical <User Schedule>  heparin   Injectable 5000 Unit(s) SubCutaneous every 12 hours  mupirocin 2% Ointment 1 Application(s) Both Nostrils two times a day  valproic  acid Syrup 500 milliGRAM(s) Oral two times a day    MEDICATIONS  (PRN):  glycopyrrolate Injectable 0.4 milliGRAM(s) IV Push every 6 hours PRN secretions      __________________________________________________  REVIEW OF SYSTEMS:    unable to assess, poor historian      Vital Signs Last 24 Hrs  T(C): 36.3 (22 Sep 2023 05:35), Max: 37.1 (21 Sep 2023 21:14)  T(F): 97.3 (22 Sep 2023 05:35), Max: 98.7 (21 Sep 2023 21:14)  HR: 68 (22 Sep 2023 05:35) (68 - 90)  BP: 149/83 (22 Sep 2023 05:35) (124/78 - 149/83)  BP(mean): --  RR: 18 (22 Sep 2023 05:35) (18 - 18)  SpO2: 97% (22 Sep 2023 05:35) (97% - 98%)    Parameters below as of 22 Sep 2023 05:35  Patient On (Oxygen Delivery Method): room air        ________________________________________________  PHYSICAL EXAM:  GENERAL: lethargic, unable to follow commands  HEENT: Normocephalic;  conjunctivae and sclerae clear; moist mucous membranes;   NECK : supple  CHEST/LUNG: bibasilar crackles  HEART: S1 S2  regular; no murmurs, gallops or rubs  ABDOMEN: Soft, Nontender, Nondistended; Bowel sounds present  EXTREMITIES: no cyanosis; no edema; no calf tenderness  SKIN: temporal and clavicular muscle wasting, pressure injury stage 1 coccyx, left heel, DTI right heel  NERVOUS SYSTEM:  Lethargic, arousable to touch and loud voice    _________________________________________________  LABS:              CAPILLARY BLOOD GLUCOSE            RADIOLOGY & ADDITIONAL TESTS:  no new image from 9/21/23  Imaging  Reviewed:  YES/NO    Consultant(s) Notes Reviewed:   YES/ No      Plan of care was discussed with patient and /or primary care giver; all questions and concerns were addressed

## 2023-09-22 NOTE — PROGRESS NOTE ADULT - NUTRITIONAL ASSESSMENT
This patient has been assessed with a concern for Malnutrition and has been determined to have a diagnosis/diagnoses of Severe protein-calorie malnutrition and Underweight (BMI < 19).    This patient is being managed with:   Diet Pureed-  Consistent Carbohydrate {No Snacks}  Moderately Thick Liquids (MODTHICKLIQS)  Halal  Liquid via Teaspoon Only  Single Sips Only  Supplement Feeding Modality:  Oral  Glucerna Shake Cans or Servings Per Day:  1       Frequency:  Two Times a day  Entered: Sep 21 2023  1:50PM    Diet Pureed-  Supplement Feeding Modality:  Oral  Ensure Enlive Cans or Servings Per Day:  1       Frequency:  Three Times a day  Entered: Aug 30 2023  2:55PM    The following pending diet order is being considered for treatment of Severe protein-calorie malnutrition and Underweight (BMI < 19):null

## 2023-09-22 NOTE — PROGRESS NOTE ADULT - PROBLEM SELECTOR PLAN 9
Prognosis poor  multiple conversations with family for Hospice- family declines  appropriate for LTC with pleasure feeds  will hold off am LABS

## 2023-09-23 PROCEDURE — 99232 SBSQ HOSP IP/OBS MODERATE 35: CPT

## 2023-09-23 RX ADMIN — MUPIROCIN 1 APPLICATION(S): 20 OINTMENT TOPICAL at 18:24

## 2023-09-23 RX ADMIN — Medication 500 MILLIGRAM(S): at 18:24

## 2023-09-23 RX ADMIN — Medication 500 MILLIGRAM(S): at 05:42

## 2023-09-23 RX ADMIN — CHLORHEXIDINE GLUCONATE 1 APPLICATION(S): 213 SOLUTION TOPICAL at 18:24

## 2023-09-23 RX ADMIN — MUPIROCIN 1 APPLICATION(S): 20 OINTMENT TOPICAL at 05:26

## 2023-09-23 RX ADMIN — HEPARIN SODIUM 5000 UNIT(S): 5000 INJECTION INTRAVENOUS; SUBCUTANEOUS at 05:24

## 2023-09-23 RX ADMIN — CHLORHEXIDINE GLUCONATE 1 APPLICATION(S): 213 SOLUTION TOPICAL at 05:25

## 2023-09-23 RX ADMIN — HEPARIN SODIUM 5000 UNIT(S): 5000 INJECTION INTRAVENOUS; SUBCUTANEOUS at 18:40

## 2023-09-23 NOTE — PROGRESS NOTE ADULT - ASSESSMENT
68 year old man admitted from home w/ PMH of dementia, DM, CVA, HTN, HLD, CKD, hx of chronic indwelling catheter, recent diagnosis of gastric cancer p/w altered mental status and dehydration.  Patient was assessed by Oncology and was not deemed to be a candidate of chemotherapy given overall poor functional status.      Patient seen and examined at bedside today. This more he is seen awake with wife at bedside.  Pt's family prefers decisions through son in law Mary Dan, however I provided Fojol with SW # today to give a call regarding choices.      A/P   Acute Toxic and Metabolic encephalopathy with underlying Dementia   Acute respiratory failure with hypoxia, atelectasis vs aspiration- improved   Severe Protein Calorie Malnutrition with cachexia   Chronic Anemia likely Anemia of Chronic disease s/p pRBC with stable H/H   Gastric cancer with poorly differentiated Adenocarcinoma    Fluid responsive septic shock, resolved   NEVILLE on CKD   Hx CVA   Hx seizure   Functional quadriplegia      - Patient's mental status remains poor, he is hospice eligible, however, family not in agreement. Family declining [facility] placement. Home vs long term care facility would be next steps, family leaning towards care facility, pending choices/decision  - pleasure feeds by jordy, needs assistance with feeds, family is aware of aspiration risk   - trial of liquids as patient’s family expressed that they would want patient to get some type of food   - Completed IV abx    - Aspiration precautions   - Patient appears to have difficulty clearing his airway at times due to underlying dementia and progressive physical deconditioning.    - Patient is not a candidate for J tube placement at this time, no further NGT placement due to significant patient discomfort and likely minimal clinical benefit to patient   - Continue Valproate po 500 mg BID   Prognosis extremely guarded and mortality expected

## 2023-09-23 NOTE — PROGRESS NOTE ADULT - SUBJECTIVE AND OBJECTIVE BOX
MEDICATIONS  (STANDING):  chlorhexidine 2% Cloths 1 Application(s) Topical every 12 hours  chlorhexidine 2% Cloths 1 Application(s) Topical <User Schedule>  heparin   Injectable 5000 Unit(s) SubCutaneous every 12 hours  mupirocin 2% Ointment 1 Application(s) Both Nostrils two times a day  valproic  acid Syrup 500 milliGRAM(s) Oral two times a day    MEDICATIONS  (PRN):  glycopyrrolate Injectable 0.4 milliGRAM(s) IV Push every 6 hours PRN secretions      Vital Signs Last 24 Hrs  T(C): 36.5 (23 Sep 2023 13:46), Max: 36.5 (23 Sep 2023 13:46)  T(F): 97.7 (23 Sep 2023 13:46), Max: 97.7 (23 Sep 2023 13:46)  HR: 66 (23 Sep 2023 13:46) (62 - 69)  BP: 112/56 (23 Sep 2023 13:46) (107/52 - 164/66)  BP(mean): --  RR: 18 (23 Sep 2023 13:46) (18 - 18)  SpO2: 100% (23 Sep 2023 13:46) (95% - 100%)    Parameters below as of 23 Sep 2023 13:46  Patient On (Oxygen Delivery Method): room air

## 2023-09-23 NOTE — PROGRESS NOTE ADULT - NUTRITIONAL ASSESSMENT
This patient has been assessed with a concern for Malnutrition and has been determined to have a diagnosis/diagnoses of Severe protein-calorie malnutrition and Underweight (BMI < 19).    This patient is being managed with:   Diet Pureed-  Supplement Feeding Modality:  Oral  Ensure Enlive Cans or Servings Per Day:  1       Frequency:  Three Times a day  Entered: Aug 30 2023  2:55PM

## 2023-09-24 PROCEDURE — 99232 SBSQ HOSP IP/OBS MODERATE 35: CPT

## 2023-09-24 RX ADMIN — MUPIROCIN 1 APPLICATION(S): 20 OINTMENT TOPICAL at 17:22

## 2023-09-24 RX ADMIN — Medication 500 MILLIGRAM(S): at 17:21

## 2023-09-24 RX ADMIN — CHLORHEXIDINE GLUCONATE 1 APPLICATION(S): 213 SOLUTION TOPICAL at 17:22

## 2023-09-24 RX ADMIN — HEPARIN SODIUM 5000 UNIT(S): 5000 INJECTION INTRAVENOUS; SUBCUTANEOUS at 05:59

## 2023-09-24 RX ADMIN — CHLORHEXIDINE GLUCONATE 1 APPLICATION(S): 213 SOLUTION TOPICAL at 05:58

## 2023-09-24 RX ADMIN — Medication 500 MILLIGRAM(S): at 05:57

## 2023-09-24 RX ADMIN — CHLORHEXIDINE GLUCONATE 1 APPLICATION(S): 213 SOLUTION TOPICAL at 05:59

## 2023-09-24 RX ADMIN — HEPARIN SODIUM 5000 UNIT(S): 5000 INJECTION INTRAVENOUS; SUBCUTANEOUS at 17:22

## 2023-09-24 RX ADMIN — MUPIROCIN 1 APPLICATION(S): 20 OINTMENT TOPICAL at 05:57

## 2023-09-24 NOTE — PROGRESS NOTE ADULT - ASSESSMENT
68 year old man admitted from home w/ PMH of dementia, DM, CVA, HTN, HLD, CKD, hx of chronic indwelling catheter, recent diagnosis of gastric cancer p/w altered mental status and dehydration.  Patient was assessed by Oncology and was not deemed to be a candidate of chemotherapy given overall poor functional status.      Patient seen and examined at bedside today. This morning he is seen awake with son at bedside. He briefly looks at son but then closes his eyes, not speaking.      A/P   Acute Toxic and Metabolic encephalopathy with underlying Dementia   Acute respiratory failure with hypoxia, atelectasis vs aspiration- improved   Severe Protein Calorie Malnutrition with cachexia   Chronic Anemia likely Anemia of Chronic disease s/p pRBC with stable H/H   Gastric cancer with poorly differentiated Adenocarcinoma    Fluid responsive septic shock, resolved   NEVILLE on CKD   Hx CVA   Hx seizure   Functional quadriplegia      - Patient's mental status remains poor, he is hospice eligible, however, family not in agreement. Family declining [facility] placement. Home vs long term care facility would be next steps, family leaning towards care facility, pending choices/decision  - pleasure feeds by teaspoon, needs assistance with feeds, family is aware of aspiration risk   - trial of liquids as patient’s family expressed that they would want patient to get some type of food   - Completed IV abx    - Aspiration precautions   - Patient appears to have difficulty clearing his airway at times due to underlying dementia and progressive physical deconditioning.    - Patient is not a candidate for J tube placement at this time, no further NGT placement due to significant patient discomfort and likely minimal clinical benefit to patient   - Continue Valproate po 500 mg BID   Prognosis extremely guarded and mortality expected

## 2023-09-24 NOTE — PROGRESS NOTE ADULT - SUBJECTIVE AND OBJECTIVE BOX
MEDICATIONS  (STANDING):  chlorhexidine 2% Cloths 1 Application(s) Topical <User Schedule>  chlorhexidine 2% Cloths 1 Application(s) Topical every 12 hours  heparin   Injectable 5000 Unit(s) SubCutaneous every 12 hours  mupirocin 2% Ointment 1 Application(s) Both Nostrils two times a day  valproic  acid Syrup 500 milliGRAM(s) Oral two times a day    MEDICATIONS  (PRN):  glycopyrrolate Injectable 0.4 milliGRAM(s) IV Push every 6 hours PRN secretions      Vital Signs Last 24 Hrs  T(C): 36.5 (24 Sep 2023 13:44), Max: 36.7 (23 Sep 2023 20:58)  T(F): 97.7 (24 Sep 2023 13:44), Max: 98.1 (23 Sep 2023 20:58)  HR: 69 (24 Sep 2023 13:44) (61 - 69)  BP: 122/60 (24 Sep 2023 13:44) (96/60 - 130/52)  BP(mean): --  RR: 18 (24 Sep 2023 13:44) (18 - 18)  SpO2: 94% (24 Sep 2023 13:44) (94% - 100%)    Parameters below as of 24 Sep 2023 13:44  Patient On (Oxygen Delivery Method): room air

## 2023-09-24 NOTE — PROGRESS NOTE ADULT - NUTRITIONAL ASSESSMENT
This patient has been assessed with a concern for Malnutrition and has been determined to have a diagnosis/diagnoses of Severe protein-calorie malnutrition and Underweight (BMI < 19).    This patient is being managed with:   Diet Pureed-  Moderately Thick Liquids (MODTHICKLIQS)  Supplement Feeding Modality:  Oral  Ensure Enlive Cans or Servings Per Day:  1       Frequency:  Three Times a day  Entered: Sep 24 2023  1:35PM

## 2023-09-25 PROCEDURE — 99232 SBSQ HOSP IP/OBS MODERATE 35: CPT

## 2023-09-25 RX ADMIN — MUPIROCIN 1 APPLICATION(S): 20 OINTMENT TOPICAL at 06:31

## 2023-09-25 RX ADMIN — CHLORHEXIDINE GLUCONATE 1 APPLICATION(S): 213 SOLUTION TOPICAL at 18:39

## 2023-09-25 RX ADMIN — CHLORHEXIDINE GLUCONATE 1 APPLICATION(S): 213 SOLUTION TOPICAL at 06:18

## 2023-09-25 RX ADMIN — Medication 500 MILLIGRAM(S): at 18:39

## 2023-09-25 RX ADMIN — HEPARIN SODIUM 5000 UNIT(S): 5000 INJECTION INTRAVENOUS; SUBCUTANEOUS at 06:18

## 2023-09-25 RX ADMIN — HEPARIN SODIUM 5000 UNIT(S): 5000 INJECTION INTRAVENOUS; SUBCUTANEOUS at 18:38

## 2023-09-25 RX ADMIN — Medication 500 MILLIGRAM(S): at 06:17

## 2023-09-25 RX ADMIN — MUPIROCIN 1 APPLICATION(S): 20 OINTMENT TOPICAL at 18:39

## 2023-09-25 NOTE — PROGRESS NOTE ADULT - SUBJECTIVE AND OBJECTIVE BOX
NP Note discussed with  primary attending    Patient is a 68y old  Male who presents with a chief complaint of AMS (24 Sep 2023 16:54)      INTERVAL HPI/OVERNIGHT EVENTS: no new complaints, pt seen at bedside. Pending LTC with hospice.     MEDICATIONS  (STANDING):  chlorhexidine 2% Cloths 1 Application(s) Topical every 12 hours  chlorhexidine 2% Cloths 1 Application(s) Topical <User Schedule>  heparin   Injectable 5000 Unit(s) SubCutaneous every 12 hours  mupirocin 2% Ointment 1 Application(s) Both Nostrils two times a day  valproic  acid Syrup 500 milliGRAM(s) Oral two times a day    MEDICATIONS  (PRN):  glycopyrrolate Injectable 0.4 milliGRAM(s) IV Push every 6 hours PRN secretions      __________________________________________________  REVIEW OF SYSTEMS:    ROS due to mental status       Vital Signs Last 24 Hrs  T(C): 36.4 (25 Sep 2023 04:30), Max: 37.8 (24 Sep 2023 20:56)  T(F): 97.5 (25 Sep 2023 04:30), Max: 100.1 (24 Sep 2023 20:56)  HR: 65 (25 Sep 2023 04:30) (65 - 74)  BP: 115/72 (25 Sep 2023 04:30) (115/72 - 124/60)  BP(mean): --  RR: 18 (25 Sep 2023 04:30) (18 - 20)  SpO2: 98% (25 Sep 2023 04:30) (94% - 99%)    Parameters below as of 25 Sep 2023 04:30  Patient On (Oxygen Delivery Method): room air        ________________________________________________  PHYSICAL EXAM:  GENERAL: NAD  HEENT: Normocephalic;  conjunctivae and sclerae clear; moist mucous membranes;   NECK : supple  CHEST/LUNG: diminished and crackles to ausculitation bilaterally with good air entry   HEART: S1 S2  regular; no murmurs, gallops or rubs  ABDOMEN: Soft, Nontender, Nondistended; Bowel sounds present  EXTREMITIES: functional quadriplegic   SKIN: warm and dry; no rash  NERVOUS SYSTEM:  Awake and alert; Oriented  to place, person and time ; no new deficits    _________________________________________________  LABS:              CAPILLARY BLOOD GLUCOSE            RADIOLOGY & ADDITIONAL TESTS:    < from: Xray Chest 1 View- PORTABLE-Urgent (Xray Chest 1 View- PORTABLE-Urgent .) (09.13.23 @ 17:10) >  INTERPRETATION:  Chest one view    HISTORY: NG tube placement    COMPARISON STUDY: 9/9/2023    Frontal expiratory view of the chest shows the heart to be normal in   size. No NG tube is identified.    The lungs show slight reduction of basilar atelectasis and there is no   evidence of pneumothorax nor pleural effusion.    IMPRESSION:  NG tube not visualized.    < end of copied text >      Imaging Personally Reviewed:  YES/NO    Consultant(s) Notes Reviewed:   YES/ No    Care Discussed with Consultants :     Plan of care was discussed with patient and /or primary care giver; all questions and concerns were addressed and care was aligned with patient's wishes.

## 2023-09-26 PROCEDURE — 99232 SBSQ HOSP IP/OBS MODERATE 35: CPT

## 2023-09-26 RX ADMIN — HEPARIN SODIUM 5000 UNIT(S): 5000 INJECTION INTRAVENOUS; SUBCUTANEOUS at 07:00

## 2023-09-26 RX ADMIN — HEPARIN SODIUM 5000 UNIT(S): 5000 INJECTION INTRAVENOUS; SUBCUTANEOUS at 17:36

## 2023-09-26 RX ADMIN — Medication 500 MILLIGRAM(S): at 17:36

## 2023-09-26 RX ADMIN — CHLORHEXIDINE GLUCONATE 1 APPLICATION(S): 213 SOLUTION TOPICAL at 17:36

## 2023-09-26 RX ADMIN — MUPIROCIN 1 APPLICATION(S): 20 OINTMENT TOPICAL at 07:00

## 2023-09-26 RX ADMIN — CHLORHEXIDINE GLUCONATE 1 APPLICATION(S): 213 SOLUTION TOPICAL at 07:01

## 2023-09-26 RX ADMIN — Medication 500 MILLIGRAM(S): at 07:00

## 2023-09-26 RX ADMIN — CHLORHEXIDINE GLUCONATE 1 APPLICATION(S): 213 SOLUTION TOPICAL at 07:00

## 2023-09-26 NOTE — PROGRESS NOTE ADULT - PROBLEM SELECTOR PLAN 9
Prognosis poor  multiple conversations with family for Hospice- family declines  appropriate for LTC with pleasure feeds  supportive measures   will hold off am LABS

## 2023-09-26 NOTE — PROGRESS NOTE ADULT - PROBLEM SELECTOR PLAN 1
secondary to aspiration pneumonia  completed antibiotic therapy  glycopyyrolate 0.2 mg prn for secretions   now resolved

## 2023-09-26 NOTE — PROVIDER CONTACT NOTE (CRITICAL VALUE NOTIFICATION) - ACTION/TREATMENT ORDERED:
SUBJECTIVE:    Chief Compliant: had concerns including Medicare Wellness Visit and Medication Management (Not fasting).      ADLs  ADL Before Admission: Independent  ADL Needs Assist: No  ADL Score: 12    Short of Breath or Fatigue with ADL's: No  Recent Decline in ADL's: No    Mobility Assist Devices: None  Are you deaf or do you have serious difficulty  hearing? : No  Are you blind or do you have serious difficulty seeing, even when wearing glasses?: No  Sensory Support Devices: Contacts, Eyeglasses    STEADI-Fall Risk  Assessment of Fall Risk (STEADI) for Patients equal/greater than 18 Years of Age: Yes  I have fallen in the past year: No  I Use or Have Been Advised to Use a Cane or Walker to Get Around Safely: No  Sometimes I Feel Unsteady When I am Walking: No  I Steady Myself by Holding Onto Furniture When Walking at Home: No  I am Worried About Falling: No  I Need to Push With My Hands to Stand Up from a Chair: No  I Have Some Trouble Stepping Up Onto a Curb: No  I Often Have to Rush to the Toilet: No  I Have Lost Some Feeling in My Feet: No  I Take Medicine That Sometimes Makes Me Feel Lightheaded or More Tired Than Usual: No  I Take Medicine to Help Me Sleep or Improve My Mood: No  I Often Feel Sad or Depressed: No  STEADI Fall Score: 0  STEADI Score Equal To/Greater Than 4: No    Hearing Impairment: Are you deaf or do you have serious difficulty  hearing? : No  Vision Impairment: Are you blind or do you have serious difficulty seeing, even when wearing glasses?: No    Vision/Hearing Screening:   Vision Screening    Right eye Left eye Both eyes   Without correction      With correction   20/20   Hearing Screening - Comments:: Bilateral hearing for finger rubbing method is positive        Cognitive Assessment: no evidence of cognitive dysfunction by direct observation    Patient Answered Medicare HRA Screening Questions  1.) Do you have an Advance directive, living will, or power of  for health care 
Ordered 2 units of PRBC
document that contains your wishes for end of life care? No    2.) Would you like additional information on advance directives? Yes    3.) During the past 4 weeks, how would you rate your health? Excellent    4.) During the past 4 weeks, what was the hardest physical activity you could do for at least 2 minutes? Moderate    5.) Do you do moderate to strenuous exercise (brisk walk) for about 20 minutes for 3 or more days per week? Yes, some of the time    6.) How many servings of the following would you typically eat in a day?  Fruits and Vegetables (1 serving = 1 piece of fruit, 1/2 cup fruits or vegetables) 1 per day  High Fiber / Whole Grain Foods (1 serving = 1 cup cold cereal, 1/2 cup cooked cereal, 1 slice bread) 1 per day  Fried or High Fat Foods (1 serving = 1 Clement, French Fries, chips, doughnut, fried chicken/fish) None  Sugar Sweetened Beverages (1 serving = 1 can or 12 oz cup of soda or juice) None    7.) Have you had a fall two or more times in the past year? No    8.) During the past 4 weeks, has your physical and emotional health limited your social activities with family, friends, neighbors, or other groups? Not at all    9.) Do you feel safe at home? Yes    10.) How often do you have trouble taking medicines the way you have been told to take them? I always take my prescribed medications    11.) Over the past 4 weeks how often have you experienced the following?  Bladder Control problems - (urine leaking)Never  Bowel control problems - Never  Teeth or Denture Problems - Never  Bodily pain - Sometimes  Tiredness or Fatigue - Sometimes  Feeling stressed or overwhelmed - Never  Anger or frustration - Never  Problems with your hearing - Never  Problems using the telephone - Never  Problems with your balance - Never  Driven/Ridden in a car without wearing your seatbelt - Never  Sexual Problems - Sometimes    12.) Do you need help with any of the following activities (bathing, grooming, feeding, toileting, 
getting out of bed/chairs)? None of these apply to me    13.) Do you need help with any of the following activities (going to places outside of walking distance, shopping, housework/laundry, preparing meals, handling own money)? None of these apply to me    14.) During the past 4 weeks, was someone available to help if you needed and wanted help? Yes, as much as I wanted    15.) How confident are you that you can control and manage most of your health problems? Very confident    HPI: This 72-year-old male is here for face-to-face and office evaluation for subsequent Medicare wellness visit.  Last Medicare wellness visit was done October 3, 2019.  His appetite is good.  He is sleeping well.  He is taking p.o. fluids well and urinating well.  He is having regular bowel movements.  Patient appears to be doing very well from a Medicare wellness standpoint.      Patient is also here for overdue 6-month chronic care follow-up in regards to hypertension, dyslipidemia, elevated PSA, pheochromocytoma, DJD, erectile dysfunction.  See office visit EMR note December 15, 2022.  He is taking his medications.  He is try to follow a low-salt, low-cholesterol diet.  Blood pressure diary = 99/53, 154/76, 107/67, 125/67, 104/62, 113/62, 101/54, 99/52, 102/59, 148/85, 119/68, 138/70, 107/66, 115/65, 113/62.  He denies any headaches or edema.  Nocturia has been occurring only rarely on occasion.  The osteoarthritis has been stable with occasional mild knee flareups which are tolerable.  He denies any flushing sensations.  Erectile dysfunction has been \"okay\" with using sildenafil if needed.  He is under the care of Dr. Mccullough ( endocrinology), Dr. Justice (cardiology).  Last PSA level was slightly elevated from previous level but stable (December 2022).  I advised patient to repeat PSA in 3 months but he did not have it done.  Last lipids were normal, urine microalbumin was normal (all done September 2022.  Last CBC, CMP and TSH were last 
done in March 2022.  Last EKG was done in September 2022.  Patient is not fasting today.  He needs refills on some medication from me.      Review of Systems   Constitutional: Negative for chills and fever.   HENT: Negative for sore throat and trouble swallowing.    Respiratory: Negative for cough and shortness of breath.    Cardiovascular: Negative for chest pain and palpitations.        See HPI   Gastrointestinal: Negative for abdominal pain, nausea and vomiting.   Endocrine: Negative for cold intolerance, heat intolerance and polydipsia.        See HPI   Genitourinary: Negative for difficulty urinating and flank pain.        See HPI   Musculoskeletal: Positive for arthralgias. Negative for myalgias.        See HPI   Neurological: Negative for dizziness and headaches.       ALLERGIES:  No Known Allergies  Current Outpatient Medications   Medication Sig Dispense Refill   • valsartan-hydrochlorothiazide (DIOVAN-HCT) 320-25 MG per tablet Take 1 tablet by mouth daily. FINAL COURTESY REFILL. APPOINTMENT REQUIRED. 90 tablet 1   • atorvastatin (LIPITOR) 10 MG tablet Take 1 tablet by mouth daily. 90 tablet 1   • NIFEdipine CC (ADALAT CC) 30 MG 24 hr tablet Take 1 tablet by mouth in the morning and 1 tablet in the evening. 180 tablet 1   • labetalol (NORMODYNE) 300 MG tablet Take 2 tablets by mouth in the morning and 2 tablets in the evening. 360 tablet 0   • meloxicam (MOBIC) 15 MG tablet Take 1 tablet by mouth daily after a meal. 15 tablet 0   • aspirin (ECOTRIN) 81 MG EC tablet Take 1 tablet by mouth daily. 90 tablet 3   • Misc Natural Products (Osteo Bi-Flex Joint Shield) Tab Take 1 tablet by mouth 2 times daily. 60 tablet 0   • MULTIPLE VITAMIN PO      • Cholecalciferol (VITAMIN D3 PO) Take 2,000 Int'l Units by mouth.     • Lysine 1000 MG Tab Take by mouth daily.     • Omega-3 Fatty Acids (Fish Oil) 1200 MG capsule Take 1,200 mg by mouth 2 times daily.     • magnesium 250 MG tablet      • sildenafil (REVATIO) 20 MG 
tablet TAKE 2 TO 3 TABLETS ONE HOUR PRIOR TO SEXUAL ACTIVITY, PER 24 HOURS PRN. 30 tablet 1     No current facility-administered medications for this visit.     Patient Active Problem List   Diagnosis   • Hypertension   • Osteoarthritis   • Encounter for Medicare annual wellness exam   • Pheochromocytoma of left adrenal gland   • Dyslipidemia   • Nocturia   • Erectile dysfunction   • Wart   • History of partial adrenalectomy (CMD)   • Preoperative general physical examination   • Obesity (BMI 30.0-34.9)   • Coronary artery calcification seen on CAT scan   • Elevated PSA   • Medicare annual wellness visit, subsequent     Past Medical History:   Diagnosis Date   • Essential (primary) hypertension    • Pheochromocytoma, left      Past Surgical History:   Procedure Laterality Date   • Adrenal gland surgery  2019    states on removed   • Anterior cruciate ligament repair Left 1989   • Colonoscopy      2015     Family History   Problem Relation Age of Onset   • Pacemaker Mother    • Dementia/Alzheimers Father    • Cancer, Breast Sister    • Stroke Maternal Grandmother    • Diabetes Maternal Grandfather    • Heart Paternal Grandmother    • Heart Paternal Grandfather    • Heart Sister      Social History     Socioeconomic History   • Marital status: /Civil Union     Spouse name: Not on file   • Number of children: Not on file   • Years of education: Not on file   • Highest education level: Not on file   Occupational History   • Not on file   Tobacco Use   • Smoking status: Never   • Smokeless tobacco: Never   Vaping Use   • Vaping Use: never used   Substance and Sexual Activity   • Alcohol use: Yes     Comment: 2-3 drinks per week   • Drug use: No   • Sexual activity: Not on file   Other Topics Concern   • Not on file   Social History Narrative   • Not on file     Social Determinants of Health     Financial Resource Strain: Not on file   Food Insecurity: Not on file   Transportation Needs: Not on file   Physical 
Activity: Not on file   Stress: Not on file   Social Connections: Not on file   Intimate Partner Violence: Not At Risk (4/13/2021)    Intimate Partner Violence    • Social Determinants: Intimate Partner Violence Past Fear: No    • Social Determinants: Intimate Partner Violence Current Fear: No       OBJECTIVE:  Visit Vitals  /64 (BP Location: LUE - Left upper extremity, Patient Position: Sitting, Cuff Size: Regular)   Pulse 63   Temp 97.5 °F (36.4 °C) (Temporal)   Ht 5' 9\" (1.753 m)   Wt 94.3 kg (208 lb)   SpO2 97%   BMI 30.72 kg/m²     Physical Exam  Constitutional:       General: He is not in acute distress.     Appearance: He is well-developed.   HENT:      Mouth/Throat:      Mouth: Mucous membranes are moist.      Pharynx: Oropharynx is clear.   Eyes:      General: No scleral icterus.     Pupils: Pupils are equal, round, and reactive to light.   Neck:      Thyroid: No thyromegaly.   Cardiovascular:      Rate and Rhythm: Normal rate and regular rhythm.      Pulses:           Radial pulses are 2+ on the right side and 2+ on the left side.      Heart sounds: Normal heart sounds. No murmur heard.  Pulmonary:      Effort: Pulmonary effort is normal. No respiratory distress.      Breath sounds: Normal breath sounds.   Abdominal:      General: Bowel sounds are normal.      Palpations: Abdomen is soft. There is no hepatomegaly or mass.      Tenderness: There is no abdominal tenderness. There is no right CVA tenderness or left CVA tenderness.   Musculoskeletal:         General: Normal range of motion.      Cervical back: Neck supple.      Right lower leg: No edema.      Left lower leg: No edema.   Lymphadenopathy:      Cervical: No cervical adenopathy.   Neurological:      Mental Status: He is alert.      Motor: Motor function is intact.      Coordination: Coordination normal.      Gait: Gait normal.      Deep Tendon Reflexes: Reflexes are normal and symmetric.      Reflex Scores:       Brachioradialis reflexes are 
2+ on the right side and 2+ on the left side.  Psychiatric:         Mood and Affect: Mood normal.         Behavior: Behavior is cooperative.         Judgment: Judgment normal.         ASSESSMENT   ED Diagnosis   1. Medicare annual wellness visit, subsequent        2. Primary hypertension  Electrocardiogram 12-Lead    CBC with Automated Differential    Comprehensive Metabolic Panel    Lipid Panel With Reflex    Thyroid Stimulating Hormone Reflex    Microalbumin Urine Random      3. Dyslipidemia  Electrocardiogram 12-Lead    CBC with Automated Differential    Comprehensive Metabolic Panel    Lipid Panel With Reflex    Thyroid Stimulating Hormone Reflex    atorvastatin (LIPITOR) 10 MG tablet      4. Elevated PSA  PSA      5. Nocturia  PSA      6. Pheochromocytoma of left adrenal gland  CBC with Automated Differential    Comprehensive Metabolic Panel      7. Osteoarthritis of left knee, unspecified osteoarthritis type  CBC with Automated Differential      8. Vasculogenic erectile dysfunction, unspecified vasculogenic erectile dysfunction type        9. History of partial adrenalectomy (CMD)        10. Obesity (BMI 30.0-34.9)  CBC with Automated Differential    Comprehensive Metabolic Panel    Thyroid Stimulating Hormone Reflex      11. Hypertension, unspecified type  valsartan-hydrochlorothiazide (DIOVAN-HCT) 320-25 MG per tablet          PLAN:  1.  Discussion: Medicare wellness form questions as completed by patient.  2.  Discussion: Patient appears to be doing well from a Medicare wellness standpoint.  3.  Discussion: Hypertension, blood pressure appears to be stable on doing well with current treatment.  4.  Discussion: Dyslipidemia, due to recheck levels.  5.  Discussion: Elevated PSA level, due to recheck level.  6.  Discussion: Pheochromocytoma, appears to be stable without symptoms, under management with endocrinologist.  7.  Discussion: Osteoarthritis, reportedly stable without any major flareup.  8.  
Discussion: Erectile dysfunction, doing well with rarely needing to take sildenafil.  9.  Discussion: Blood work results from December 2022, September 2022, March 2022.  10.  CBC, CMP, lipids, PSA, TSH, urine microalbumin to be done when patient is fasting.  Patient will return for fasting lab visit.  11.  EKG to be done in office today, as ordered above.  12.  Continue to work hard on low-salt, low-cholesterol diet.  13.  Blood pressure diary.  15.  Atorvastatin, nifedipine and labetalol medications refilled for total of 6-month supply as ordered below.  16.  Continue other medication, patient has refills.  17.  Continue any medication from specialist.  18.  Continue OTC medication.  19.  Follow-up with Dr. Mccullough (endocrinologist) when due, patient was seen in March 2023.  20.  Follow-up with Dr. Justice (cardiologist) patient is due for 6-month check at this time and he states he will call for appointment.  21.  Hydration, balanced diet.  22.  8 hours of sleep per night, multiple vitamin daily.  23.  Patient is aware that today's visit is being combined for his Medicare wellness evaluation as well as overdue 6-month chronic care follow-up in regards to hypertension, dyslipidemia, elevated PSA, pheochromocytoma, osteoarthritis, rectal dysfunction, lab work that is needed and he will see separate listings in regards to these issues for diagnosis and billing purposes from his insurance for today's visit.  24.  Return to office in 3 to 6 months, depending on lab test results.    Orders Placed This Encounter   • CBC with Automated Differential   • Comprehensive Metabolic Panel   • Lipid Panel With Reflex   • PSA   • Thyroid Stimulating Hormone Reflex   • Microalbumin Urine Random   • Electrocardiogram 12-Lead   • valsartan-hydrochlorothiazide (DIOVAN-HCT) 320-25 MG per tablet   • atorvastatin (LIPITOR) 10 MG tablet   • NIFEdipine CC (ADALAT CC) 30 MG 24 hr tablet   • labetalol (NORMODYNE) 300 MG tablet 
        Electronically signed by:  MD Arnaud Rodriges MD  9/26/2023    
MD said he is going to reorder a CBC and continue to monitor pt at this time.

## 2023-09-26 NOTE — PROGRESS NOTE ADULT - SUBJECTIVE AND OBJECTIVE BOX
NP Note discussed with  primary attending    Patient is a 68y old  Male who presents with a chief complaint of AMS (25 Sep 2023 09:49)      INTERVAL HPI/OVERNIGHT EVENTS: no new complaints, pt seen at bedside nonverbal. Saturating well on room air. Poor appetite.     MEDICATIONS  (STANDING):  chlorhexidine 2% Cloths 1 Application(s) Topical every 12 hours  chlorhexidine 2% Cloths 1 Application(s) Topical <User Schedule>  heparin   Injectable 5000 Unit(s) SubCutaneous every 12 hours  valproic  acid Syrup 500 milliGRAM(s) Oral two times a day    MEDICATIONS  (PRN):  glycopyrrolate Injectable 0.4 milliGRAM(s) IV Push every 6 hours PRN secretions      __________________________________________________  REVIEW OF SYSTEMS:    Limited ROS due to mental status     Vital Signs Last 24 Hrs  T(C): 36.3 (26 Sep 2023 05:05), Max: 36.5 (25 Sep 2023 14:17)  T(F): 97.4 (26 Sep 2023 05:05), Max: 97.7 (25 Sep 2023 14:17)  HR: 96 (26 Sep 2023 05:05) (63 - 96)  BP: 110/94 (26 Sep 2023 05:05) (110/94 - 130/56)  BP(mean): --  RR: 18 (26 Sep 2023 05:05) (18 - 20)  SpO2: 98% (26 Sep 2023 05:05) (97% - 99%)    Parameters below as of 26 Sep 2023 05:05  Patient On (Oxygen Delivery Method): room air        ________________________________________________  PHYSICAL EXAM:  GENERAL: NAD  HEENT: Normocephalic;  conjunctivae and sclerae clear; dry mucous membranes;   NECK : supple  CHEST/LUNG: Clear to ausculitation bilaterally with good air entry   HEART: S1 S2  regular; no murmurs, gallops or rubs  ABDOMEN: no appetite  EXTREMITIES: functional quadriplegic   SKIN: warm and dry; no rash  NERVOUS SYSTEM:  sleeping and lethargic, minimally responsive     _________________________________________________  LABS:              CAPILLARY BLOOD GLUCOSE            RADIOLOGY & ADDITIONAL TESTS:    < from: Xray Chest 1 View- PORTABLE-Routine (09.09.23 @ 11:55) >  BONES: Visualized osseous thorax intact.    IMPRESSION: NG tube tip in gastric fundus.  .Bilateral perihilar patchy ill-defined airspace opacities.    Ng tube removed   < end of copied text >      Imaging Personally Reviewed:  YES/NO    Consultant(s) Notes Reviewed:   YES/ No    Care Discussed with Consultants :     Plan of care was discussed with patient and /or primary care giver; all questions and concerns were addressed and care was aligned with patient's wishes.     NP Note discussed with  primary attending    Patient is a 68y old  Male who presents with a chief complaint of AMS (25 Sep 2023 09:49)      INTERVAL HPI/OVERNIGHT EVENTS: no new complaints, pt seen at bedside nonverbal. Saturating well on room air. Poor appetite.     MEDICATIONS  (STANDING):  chlorhexidine 2% Cloths 1 Application(s) Topical every 12 hours  chlorhexidine 2% Cloths 1 Application(s) Topical <User Schedule>  heparin   Injectable 5000 Unit(s) SubCutaneous every 12 hours  valproic  acid Syrup 500 milliGRAM(s) Oral two times a day    MEDICATIONS  (PRN):  glycopyrrolate Injectable 0.4 milliGRAM(s) IV Push every 6 hours PRN secretions      __________________________________________________  REVIEW OF SYSTEMS:    Limited ROS due to mental status     Vital Signs Last 24 Hrs  T(C): 36.3 (26 Sep 2023 05:05), Max: 36.5 (25 Sep 2023 14:17)  T(F): 97.4 (26 Sep 2023 05:05), Max: 97.7 (25 Sep 2023 14:17)  HR: 96 (26 Sep 2023 05:05) (63 - 96)  BP: 110/94 (26 Sep 2023 05:05) (110/94 - 130/56)  BP(mean): --  RR: 18 (26 Sep 2023 05:05) (18 - 20)  SpO2: 98% (26 Sep 2023 05:05) (97% - 99%)    Parameters below as of 26 Sep 2023 05:05  Patient On (Oxygen Delivery Method): room air        ________________________________________________  PHYSICAL EXAM:  GENERAL: NAD  HEENT: Normocephalic;  conjunctivae and sclerae clear; dry mucous membranes;   NECK : supple  CHEST/LUNG: Clear to ausculitation bilaterally with good air entry   HEART: S1 S2  regular; no murmurs, gallops or rubs  ABDOMEN: no appetite  EXTREMITIES: functional quadriplegic   SKIN: warm and dry; no rash  NERVOUS SYSTEM:  sleeping and lethargic, minimally responsive     _________________________________________________  LABS:    None new        RADIOLOGY & ADDITIONAL TESTS:    < from: Xray Chest 1 View- PORTABLE-Routine (09.09.23 @ 11:55) >  BONES: Visualized osseous thorax intact.    IMPRESSION: NG tube tip in gastric fundus.  .Bilateral perihilar patchy ill-defined airspace opacities.    Ng tube removed   < end of copied text >      Imaging Personally Reviewed:  YES/NO    Consultant(s) Notes Reviewed:   YES/ No    Care Discussed with Consultants :     Plan of care was discussed with patient and /or primary care giver; all questions and concerns were addressed and care was aligned with patient's wishes.

## 2023-09-27 LAB
MAGNESIUM SERPL-MCNC: 1.9 MG/DL — SIGNIFICANT CHANGE UP (ref 1.6–2.6)
PHOSPHATE SERPL-MCNC: 2.8 MG/DL — SIGNIFICANT CHANGE UP (ref 2.5–4.5)

## 2023-09-27 PROCEDURE — 99232 SBSQ HOSP IP/OBS MODERATE 35: CPT

## 2023-09-27 RX ADMIN — CHLORHEXIDINE GLUCONATE 1 APPLICATION(S): 213 SOLUTION TOPICAL at 17:51

## 2023-09-27 RX ADMIN — HEPARIN SODIUM 5000 UNIT(S): 5000 INJECTION INTRAVENOUS; SUBCUTANEOUS at 17:51

## 2023-09-27 RX ADMIN — CHLORHEXIDINE GLUCONATE 1 APPLICATION(S): 213 SOLUTION TOPICAL at 05:50

## 2023-09-27 RX ADMIN — Medication 500 MILLIGRAM(S): at 17:51

## 2023-09-27 RX ADMIN — Medication 500 MILLIGRAM(S): at 05:50

## 2023-09-27 RX ADMIN — HEPARIN SODIUM 5000 UNIT(S): 5000 INJECTION INTRAVENOUS; SUBCUTANEOUS at 05:51

## 2023-09-27 NOTE — PROGRESS NOTE ADULT - SUBJECTIVE AND OBJECTIVE BOX
Patient is a 68y old  Male who presents with a chief complaint of AMS       INTERVAL HPI/OVERNIGHT EVENTS: no overnight events    I&O's Summary    26 Sep 2023 07:01  -  27 Sep 2023 07:00  --------------------------------------------------------  IN: 0 mL / OUT: 700 mL / NET: -700 mL    27 Sep 2023 07:01  -  27 Sep 2023 13:06  --------------------------------------------------------  IN: 0 mL / OUT: 900 mL / NET: -900 mL      Vital Signs Last 24 Hrs  T(C): 36.3 (27 Sep 2023 05:25), Max: 36.9 (26 Sep 2023 21:57)  T(F): 97.4 (27 Sep 2023 05:25), Max: 98.5 (26 Sep 2023 21:57)  HR: 66 (27 Sep 2023 05:25) (66 - 69)  BP: 131/80 (27 Sep 2023 05:25) (115/58 - 131/80)  BP(mean): --  RR: 18 (27 Sep 2023 05:25) (16 - 18)  SpO2: 100% (27 Sep 2023 05:25) (96% - 100%)    Parameters below as of 27 Sep 2023 05:25  Patient On (Oxygen Delivery Method): nasal cannula  O2 Flow (L/min): 2    PAST MEDICAL & SURGICAL HISTORY:  DM (diabetes mellitus)      Seizure      CVA (cerebrovascular accident)      HLD (hyperlipidemia)      CKD (chronic kidney disease)      Dementia      Gastric cancer      No significant past surgical history          SOCIAL HISTORY  Alcohol:  Tobacco:  Illicit substance use:      FAMILY HISTORY:      LABS:      Phos  2.8     09-27  Mg     1.9     09-27          CAPILLARY BLOOD GLUCOSE          MEDICATIONS  (STANDING):  chlorhexidine 2% Cloths 1 Application(s) Topical every 12 hours  chlorhexidine 2% Cloths 1 Application(s) Topical <User Schedule>  heparin   Injectable 5000 Unit(s) SubCutaneous every 12 hours  valproic  acid Syrup 500 milliGRAM(s) Oral two times a day    MEDICATIONS  (PRN):  glycopyrrolate Injectable 0.4 milliGRAM(s) IV Push every 6 hours PRN secretions      REVIEW OF SYSTEMS:  unable to obtain ros due to pt mental status    RADIOLOGY & ADDITIONAL TESTS:  < from: CT Head No Cont (08.27.23 @ 18:53) >    ACC: [id number] EXAM:  CT BRAIN   ORDERED BY:  BEATRIZ LEVY     PROCEDURE DATE:  08/27/2023          INTERPRETATION:  CLINICAL INDICATION: Altered mental status    TECHNIQUE: Axial CT scanning of the brain was obtained from the skull   base to the vertex without the administration of intravenous contrast.   Reformatted coronal and sagittal images were subsequently obtained and   reviewed.    COMPARISON: 8/17/2023    FINDINGS:  There is no CT evidence of acute transcortical infarct. Age-related   involutional changes and chronic microvascular ischemic changes.   Redemonstration of multiple chronic lacunar infarcts as previously   described.    There is no hydrocephalus, mass effect, or acute intracranial hemorrhage.   No extra-axial collection. Basal cisterns are patent.    The visualized paranasal sinuses and mastoid air cells are clear.    The calvarium is intact.    IMPRESSION:  No evidence of acute transcortical infarct, acute intracranial   hemorrhage, or mass effect.    --- Endof Report ---        < end of copied text >    ACC: [id number] EXAM:  XR CHEST PORTABLE URGENT 1V   ORDERED BY: SHEILA MORE     PROCEDURE DATE:  09/13/2023          INTERPRETATION:  Chest one view    HISTORY: NG tube placement    COMPARISON STUDY: 9/9/2023    Frontal expiratory view of the chest shows the heart to be normal in   size. No NG tube is identified.    The lungs show slight reduction of basilar atelectasis and there is no   evidence of pneumothorax nor pleural effusion.    IMPRESSION:  NG tube not visualized.        Thank you for the courtesy of this referral.    --- End of Report ---      Imaging Personally Reviewed:  [x ] YES  [ ] NO    Consultant(s) Notes Reviewed:  [ x] YES  [ ] NO    PHYSICAL EXAM:  GENERAL: NAD  HEAD:  Atraumatic, Normocephalic  EYES:  conjunctiva and sclera clear  ENMT:  Moist mucous membranes  NECK: Supple  NERVOUS SYSTEM: lethargic  CHEST/LUNG: CTA bilaterally  HEART: Regular rate and rhythm  ABDOMEN: Soft, Nontender, Nondistended; Bowel sounds present  EXTREMITIES:   No clubbing, cyanosis, or edema  SKIN: No rashes    Care Collaborated Discussed with Consultants/Other Providers [x ] YES  [ ] NO

## 2023-09-28 PROCEDURE — 99232 SBSQ HOSP IP/OBS MODERATE 35: CPT

## 2023-09-28 RX ADMIN — HEPARIN SODIUM 5000 UNIT(S): 5000 INJECTION INTRAVENOUS; SUBCUTANEOUS at 05:56

## 2023-09-28 RX ADMIN — CHLORHEXIDINE GLUCONATE 1 APPLICATION(S): 213 SOLUTION TOPICAL at 17:53

## 2023-09-28 RX ADMIN — CHLORHEXIDINE GLUCONATE 1 APPLICATION(S): 213 SOLUTION TOPICAL at 05:57

## 2023-09-28 RX ADMIN — Medication 500 MILLIGRAM(S): at 17:52

## 2023-09-28 RX ADMIN — Medication 500 MILLIGRAM(S): at 05:58

## 2023-09-28 RX ADMIN — HEPARIN SODIUM 5000 UNIT(S): 5000 INJECTION INTRAVENOUS; SUBCUTANEOUS at 17:53

## 2023-09-28 NOTE — ED PROVIDER NOTE - CONSTITUTIONAL MENTATION
ALTERED LOC Island Pedicle Flap With Canthal Suspension Text: The defect edges were debeveled with a #15 scalpel blade.  Given the location of the defect, shape of the defect and the proximity to free margins an island pedicle advancement flap was deemed most appropriate.  Using a sterile surgical marker, an appropriate advancement flap was drawn incorporating the defect, outlining the appropriate donor tissue and placing the expected incisions within the relaxed skin tension lines where possible. The area thus outlined was incised deep to adipose tissue with a #15 scalpel blade.  The skin margins were undermined to an appropriate distance in all directions around the primary defect and laterally outward around the island pedicle utilizing iris scissors.  There was minimal undermining beneath the pedicle flap. A suspension suture was placed in the canthal tendon to prevent tension and prevent ectropion.

## 2023-09-28 NOTE — PROGRESS NOTE ADULT - PROBLEM SELECTOR PLAN 9
Prognosis poor  multiple conversations with family for Hospice- family declines  appropriate for LTC with pleasure feeds  supportive measures   will hold off am LABS    - Disposition is home with Home care.  - Per CM, [facility] not available to start until Saturday 9/30  - Anticipated d/c is on Saturday 9/30   - 24 hour discharge to be given on Frday 9/29

## 2023-09-28 NOTE — PROGRESS NOTE ADULT - SUBJECTIVE AND OBJECTIVE BOX
NP Note discussed with  primary attending    Patient is a 68y old  Male who presents with a chief complaint of AMS (27 Sep 2023 13:04)      INTERVAL HPI/OVERNIGHT EVENTS: no new complaints    MEDICATIONS  (STANDING):  chlorhexidine 2% Cloths 1 Application(s) Topical every 12 hours  chlorhexidine 2% Cloths 1 Application(s) Topical <User Schedule>  heparin   Injectable 5000 Unit(s) SubCutaneous every 12 hours  valproic  acid Syrup 500 milliGRAM(s) Oral two times a day    MEDICATIONS  (PRN):  glycopyrrolate Injectable 0.4 milliGRAM(s) IV Push every 6 hours PRN secretions      __________________________________________________  REVIEW OF SYSTEMS:    CONSTITUTIONAL: No fever,   EYES: no acute visual disturbances  NECK: No pain or stiffness  RESPIRATORY: No cough; No shortness of breath  CARDIOVASCULAR: No chest pain, no palpitations  GASTROINTESTINAL: No pain. No nausea or vomiting; No diarrhea   NEUROLOGICAL: No headache or numbness, no tremors  MUSCULOSKELETAL: No joint pain, no muscle pain  GENITOURINARY: no dysuria, no frequency, no hesitancy  PSYCHIATRY: no depression , no anxiety  ALL OTHER  ROS negative        Vital Signs Last 24 Hrs  T(C): 36.7 (28 Sep 2023 14:30), Max: 36.9 (28 Sep 2023 05:05)  T(F): 98 (28 Sep 2023 14:30), Max: 98.4 (28 Sep 2023 05:05)  HR: 68 (28 Sep 2023 14:30) (68 - 100)  BP: 109/67 (28 Sep 2023 14:30) (105/55 - 118/57)  BP(mean): --  RR: 18 (28 Sep 2023 14:30) (17 - 18)  SpO2: 98% (28 Sep 2023 14:30) (98% - 100%)    Parameters below as of 28 Sep 2023 14:30  Patient On (Oxygen Delivery Method): room air        ________________________________________________  PHYSICAL EXAM:  GENERAL: NAD  HEENT: Normocephalic;  conjunctivae and sclerae clear; moist mucous membranes;   NECK : supple  CHEST/LUNG: Clear to ausculitation bilaterally with good air entry   HEART: S1 S2  regular; no murmurs, gallops or rubs  ABDOMEN: Soft, Nontender, Nondistended; Bowel sounds present  EXTREMITIES: no cyanosis; no edema; no calf tenderness  SKIN: warm and dry; no rash  NERVOUS SYSTEM:  A&Ox1    _________________________________________________  LABS:      Phos  2.8     09-27  Mg     1.9     09-27          CAPILLARY BLOOD GLUCOSE            RADIOLOGY & ADDITIONAL TESTS:    Imaging Personally Reviewed:  YES    Consultant(s) Notes Reviewed:   YES    Care Discussed with Consultants :     Plan of care was discussed with patient and /or primary care giver; all questions and concerns were addressed and care was aligned with patient's wishes.

## 2023-09-29 PROCEDURE — 99232 SBSQ HOSP IP/OBS MODERATE 35: CPT

## 2023-09-29 RX ORDER — FINASTERIDE 5 MG/1
1 TABLET, FILM COATED ORAL
Refills: 0 | DISCHARGE

## 2023-09-29 RX ADMIN — Medication 500 MILLIGRAM(S): at 17:31

## 2023-09-29 RX ADMIN — CHLORHEXIDINE GLUCONATE 1 APPLICATION(S): 213 SOLUTION TOPICAL at 05:45

## 2023-09-29 RX ADMIN — HEPARIN SODIUM 5000 UNIT(S): 5000 INJECTION INTRAVENOUS; SUBCUTANEOUS at 05:41

## 2023-09-29 RX ADMIN — CHLORHEXIDINE GLUCONATE 1 APPLICATION(S): 213 SOLUTION TOPICAL at 17:31

## 2023-09-29 RX ADMIN — Medication 500 MILLIGRAM(S): at 05:41

## 2023-09-29 RX ADMIN — HEPARIN SODIUM 5000 UNIT(S): 5000 INJECTION INTRAVENOUS; SUBCUTANEOUS at 17:31

## 2023-09-29 NOTE — PROGRESS NOTE ADULT - PROBLEM/PLAN-3
Dr Mendiola notified, ordered 81 mg aspirin, will continue with procedure.  
DISPLAY PLAN FREE TEXT

## 2023-09-29 NOTE — PROGRESS NOTE ADULT - PROVIDER SPECIALTY LIST ADULT
Internal Medicine
Palliative Care
Internal Medicine
Palliative Care
Internal Medicine
Internal Medicine
Palliative Care
Internal Medicine

## 2023-09-29 NOTE — PROGRESS NOTE ADULT - NS ATTEND AMEND GEN_ALL_CORE FT
68 year old man admitted from home w/ PMH of dementia, DM, CVA, HTN, HLD, CKD, hx of chronic indwelling catheter, recent diagnosis of gastric cancer p/w altered mental status and dehydration.  Patient was assessed by Oncology and was not deemed to be a candidate of chemotherapy given overall poor functional status.      Patient seen and examined at bedside today. He is remains sleepy and lethargic but will awaken briefly when tapped and his name called. pt's family prefers decisions through son in law Mary Dan, he was called today to ask if he had selected a care facility. He said his family was reviewing the choices and would call back SW.     A/P   Acute Toxic and Metabolic encephalopathy with underlying Dementia   Acute respiratory failure with hypoxia, atelectasis vs aspiration- improved   Severe Protein Calorie Malnutrition with cachexia   Chronic Anemia likely Anemia of Chronic disease s/p pRBC with stable H/H   Gastric cancer with poorly differentiated Adenocarcinoma    Fluid responsive septic shock, resolved   NEVILLE on CKD   Hx CVA   Hx seizure   Functional quadriplegia      - Patient's mental status remains poor, he is hospice eligible, however, family not in agreement. Family declining [facility] placement. Home vs long term care facility would be next steps, family leaning towards care facility, pending choices  - pleasure feeds by teaspoon, needs assistance with feeds, family is aware of aspiration risk   - trial of liquids as patient’s family expressed that they would want patient to get some type of food   - Completed IV abx    - Aspiration precautions   - Patient appears to have difficulty clearing his airway at times due to underlying dementia and progressive physical deconditioning.    - Patient is not a candidate for J tube placement at this time, no further NGT placement due to significant patient discomfort and likely minimal clinical benefit to patient   - Continue Valproate po 500 mg BID   Prognosis extremely guarded and mortality expected.
68 year old man admitted from home w/ PMH of dementia, DM, CVA, HTN, HLD, CKD, hx of chronic indwelling catheter, recent diagnosis of gastric cancer p/w altered mental status and dehydration.  Patient was assessed by Oncology and was not deemed to be a candidate of chemotherapy given overall poor functional status.      Patient seen and examined at bedside today. He only briefly opens his eyes when name is called but then dozes back off to sleep.       A/P   Acute Toxic and Metabolic encephalopathy with underlying Dementia   Acute respiratory failure with hypoxia, atelectasis vs aspiration- improved   Severe Protein Calorie Malnutrition with cachexia   Chronic Anemia likely Anemia of Chronic disease s/p pRBC with stable H/H   Gastric cancer with poorly differentiated Adenocarcinoma    Fluid responsive septic shock, resolved   NEVILLE on CKD   Hx CVA   Hx seizure   Functional quadriplegia      - Patient's mental status remains poor, he is hospice eligible, however, family not in agreement. Family declining [facility] placement. Home vs long term care facility would be next steps, family leaning towards care facility, pending choices/decision, SW awaiting to hear back  - pleasure feeds by jordy, needs assistance with feeds, family is aware of aspiration risk   - trial of liquids as patient’s family expressed that they would want patient to get some type of food   - Completed IV abx    - Aspiration precautions   - Patient appears to have difficulty clearing his airway at times due to underlying dementia and progressive physical deconditioning.    - Patient is not a candidate for J tube placement at this time, no further NGT placement due to significant patient discomfort and likely minimal clinical benefit to patient   - Continue Valproate po 500 mg BID   Prognosis extremely guarded and mortality expected
Patient seen and examined.  Case discussed with NP.  Agree with above as edited.   Patient is a 68yoM with history of dementia, DM, CVA, HTN, HLD, CKD, hx of chronic indwelling catheter, with recent diagnosis of gastric cancer who presented with AMS and dehydration.    No overnight changes.   Gastric cancer - patient has been seen by oncology - not a candidate for chemotherapy given overall poor functional status.      Acute Toxic and Metabolic encephalopathy with underlying Dementia - advanced and is unchanged  Acute respiratory failure with hypoxia, atelectasis vs aspiration- improved, off O2  Severe Protein Calorie Malnutrition with cachexia - pleasure feeds already in place  Hx of seizure - continue on valproic acid as ordered  Functional quadriplegia - unchanged  Multiple prior discussions with family regarding GOC and patient's very poor prognosis.   Family declined [facility] placement and hospice.   Family has decided to take patient home with A  Case management f/u appreciated - HHA to be in place 9/30. Plan for d/c in am on 9/30.  Prognosis remains extremely guarded
Patient seen and examined.  Case discussed with NP.  Agree with above as edited.   Patient is a 68yoM with history of dementia, DM, CVA, HTN, HLD, CKD, hx of chronic indwelling catheter, with recent diagnosis of gastric cancer who presented with AMS and dehydration.    No overnight changes.   Gastric cancer - seen by oncology - not a candidate for chemotherapy given overall poor functional status.      Acute Toxic and Metabolic encephalopathy with underlying Dementia - advanced and unchanged  Acute respiratory failure with hypoxia, atelectasis vs aspiration- improved   Severe Protein Calorie Malnutrition with cachexia - pleasure feeds in place  Hx of seizure - continue on valproic acid  Functional quadriplegia - unchanged  Multiple prior discussions with family regarding GOC and patient's very poor prognosis.   Family declined [facility] placement and hospice.   Family was deciding between home vs long term care facility.   Plan is now for home with HHA.  Case management - HHA to be in place 9/30. Plan for d/c on 9/30.  Prognosis remains extremely guarded
68 year old man admitted from home w/ PMH of dementia, DM, CVA, HTN, HLD, CKD, hx of chronic indwelling catheter, recent diagnosis of gastric cancer p/w altered mental status and dehydration.  Patient was assessed by Oncology and was not deemed to be a candidate of chemotherapy given overall poor functional status.        Patient seen and examined at bedside today. He appears sleepy, has to be woken multiple times to answer simple question. He is able to mumble his name and say he is in the hosptial. States he has some back pain. Pt lost midline access and noted to have some minor swelling on LUE, denied pain on the arm when asked. Spoke to son in law Mr Ni, explained that no further interventions are being offered currently and the next steps would be to either take the patient home or enroll him in a nursing home. He stated he would speak to patient’s son Marv. I also called Marv twice but was unable to reach him.      A/P   Acute Toxic and Metabolic encephalopathy with underlying Dementia   Acute respiratory failure with hypoxia, atelectasis vs aspiration- improved   Severe Protein Calorie Malnutrition with cachexia   Chronic Anemia likely Anemia of Chronic disease s/p PRBC with stable H/H   Gastric cancer with poorly differentiated Adenocarcinoma    Fluid responsive septic shock resolved   NEV[place]LE on CKD   Hx CVA   Hx seizure   Functional quadriplegia      - Patient's mental status remains poor, he is hospice eligible, however, family not in agreement. Family declining [facility]. Home vs nursing home would be next steps, pending contact with patient’s son   - No further NGT placement due to significant patient discomfort and likely minimal clinical benefit to patient   - SLP eval for liquids by spoon, assist with feeds   - trial of liquids as patient’s family expressed that they would want patient to get some type of food   - Completed IV abx    - Aspiration precautions   - Patient appears to be micro aspirating and unable to clear his airway due to underlying dementia and progressive physical deconditioning.    - Patient is not a candidate for J tube placement at this time   - Continue Valproate  500 mg BID   Prognosis extremely guarded and mortality expected
Patient seen and examined.  Case discussed with NP.  Agree with above as edited.   Patient is a 68yoM with history of dementia, DM, CVA, HTN, HLD, CKD, hx of chronic indwelling catheter, with recent diagnosis of gastric cancer who presented with AMS and dehydration.    No overnight changes  Gastric cancer - seen by oncology - not a candidate for chemotherapy given overall poor functional status.      Acute Toxic and Metabolic encephalopathy with underlying Dementia - advanced and unchanged  Acute respiratory failure with hypoxia, atelectasis vs aspiration- improved   Severe Protein Calorie Malnutrition with cachexia - pleasure feeds  Hx of seizure - on valproic acid  Functional quadriplegia   Multiple prior discussions with family regarding very poor prognosis. Family declined [facility] placement and hospice. Planning for home vs long term care facility. Case management working on placement.   Prognosis remains extremely guarded
68 year old man admitted from home w/ PMH of dementia, DM, CVA, HTN, HLD, CKD, hx of chronic indwelling catheter, recent diagnosis of gastric cancer p/w altered mental status and dehydration.  Patient was assessed by Oncology and was not deemed to be a candidate of chemotherapy given overall poor functional status.        Patient seen and examined at bedside today. He is still sleepy but will awaken when tapped and his name called and looks at his visitors. Patient's son and wife at bedside. They express understanding that the spoon feeding is a pleasure feed but carries high risk of aspiration given patient's mental status and poor cough, despite this they still wish for him to have these pleasure feeds.    A/P   Acute Toxic and Metabolic encephalopathy with underlying Dementia   Acute respiratory failure with hypoxia, atelectasis vs aspiration- improved   Severe Protein Calorie Malnutrition with cachexia   Chronic Anemia likely Anemia of Chronic disease s/p pRBC with stable H/H   Gastric cancer with poorly differentiated Adenocarcinoma    Fluid responsive septic shock, resolved   NEV[place] on CKD   Hx CVA   Hx seizure   Functional quadriplegia      - Patient's mental status remains poor, he is hospice eligible, however, family not in agreement. Family declining [facility]. Home vs long term care facility would be next steps, family leaning towards care facility, pending choices  - pleasure feeds by teaspoon, needs assistance with feeds, family is aware of aspiration risk   - trial of liquids as patient’s family expressed that they would want patient to get some type of food   - Completed IV abx    - Aspiration precautions   - Patient appears to have difficulty clearing his airway at times due to underlying dementia and progressive physical deconditioning.    - Patient is not a candidate for J tube placement at this time, no further NGT placement due to significant patient discomfort and likely minimal clinical benefit to patient   - Continue Valproate po 500 mg BID   Prognosis extremely guarded and mortality expected.
Patient seen and examined.  Case discussed with NP.  Agree with above as edited.   Patient is a 68yoM with history of dementia, DM, CVA, HTN, HLD, CKD, hx of chronic indwelling catheter, with recent diagnosis of gastric cancer who presented with AMS and dehydration.    Gastric cancer - seen by oncology - not a candidate for chemotherapy given overall poor functional status.      Acute Toxic and Metabolic encephalopathy with underlying Dementia - advanced  Acute respiratory failure with hypoxia, atelectasis vs aspiration- improved   Severe Protein Calorie Malnutrition with cachexia   Hx of seizure - on valproic acid  Functional quadriplegia   Multiple prior discussions with family regarding very poor prognosis. Family declined [facility] placement and hospice. Planning for home vs long term care facility. Case management working on placement.   Prognosis remains extremely guarded
68 year old man admitted from home w/ PMH of dementia, DM, CVA, HTN, HLD, CKD, hx of chronic indwelling catheter, recent diagnosis of gastric cancer p/w altered mental status and dehydration.  Patient was assessed by Oncology and was not deemed to be a candidate of chemotherapy given overall poor functional status.        Patient seen and examined at bedside today. He is still sleepy but will awaken when tapped and his name called. SLP cleared him for feed with liquids via teaspoon, which patient tolerated. He was able to take his medication in liquid form as well. Pt's son Fojol at bedside and had his sister on the phone, current conditions explained to family and recommended that since they had concerns about who would care for him at home, they can consider a nursing facility instead. Informed them SW will be able to give them more information regarding this and they were amenable. They asked that we speak to son in law Mr Mary Dan [phone]    A/P   Acute Toxic and Metabolic encephalopathy with underlying Dementia   Acute respiratory failure with hypoxia, atelectasis vs aspiration- improved   Severe Protein Calorie Malnutrition with cachexia   Chronic Anemia likely Anemia of Chronic disease s/p pRBC with stable H/H   Gastric cancer with poorly differentiated Adenocarcinoma    Fluid responsive septic shock, resolved   NE[place] on CKD   Hx CVA   Hx seizure   Functional quadriplegia      - Patient's mental status remains poor, he is hospice eligible, however, family not in agreement. Family declining [facility]. Home vs nursing home would be next steps, family leaning towards nursing facility, pending choices  - No further NGT placement due to significant patient discomfort and likely minimal clinical benefit to patient   - SLP eval for liquids by teaspoon, assist with feeds   - trial of liquids as patient’s family expressed that they would want patient to get some type of food   - Completed IV abx    - Aspiration precautions   - Patient appears to have difficulty clearing his airway at times due to underlying dementia and progressive physical deconditioning.    - Patient is not a candidate for J tube placement at this time   - Continue Valproate po 500 mg BID   Prognosis extremely guarded and mortality expected.

## 2023-09-29 NOTE — PROGRESS NOTE ADULT - SUBJECTIVE AND OBJECTIVE BOX
NP Note discussed with  Primary Attending    INTERVAL HPI/OVERNIGHT EVENTS: no new complaints no overnight event.     MEDICATIONS  (STANDING):  chlorhexidine 2% Cloths 1 Application(s) Topical every 12 hours  chlorhexidine 2% Cloths 1 Application(s) Topical <User Schedule>  heparin   Injectable 5000 Unit(s) SubCutaneous every 12 hours  valproic  acid Syrup 500 milliGRAM(s) Oral two times a day    MEDICATIONS  (PRN):  glycopyrrolate Injectable 0.4 milliGRAM(s) IV Push every 6 hours PRN secretions      __________________________________________________  REVIEW OF SYSTEMS:  Unable to assess ROS due to impaired cognition. Not in pain      Vital Signs Last 24 Hrs  T(C): 37 (29 Sep 2023 13:18), Max: 37.2 (28 Sep 2023 20:38)  T(F): 98.6 (29 Sep 2023 13:18), Max: 99 (28 Sep 2023 20:38)  HR: 89 (29 Sep 2023 13:18) (68 - 92)  BP: 129/75 (29 Sep 2023 13:18) (109/67 - 147/83)  BP(mean): --  RR: 18 (29 Sep 2023 13:18) (18 - 20)  SpO2: 98% (29 Sep 2023 13:18) (97% - 100%)    Parameters below as of 29 Sep 2023 13:18  Patient On (Oxygen Delivery Method): room air        ________________________________________________  PHYSICAL EXAM:  GENERAL: NAD, cachectic, ill appearing  HEENT: Normocephalic;  conjunctivae and sclerae clear; moist mucous membranes;   NECK : supple  CHEST/LUNG: Clear to auscultation bilaterally with fair air entry eupneic on room air  HEART: S1 S2  regular; no murmurs, gallops or rubs  ABDOMEN: Soft, Nontender, Nondistended; Bowel sounds present  EXTREMITIES: no cyanosis; no edema; no calf tenderness  SKIN: warm and dry; no rash  NERVOUS SYSTEM:  Awake and alert; Oriented  to place, person and time ; no new deficits    _________________________________________________  LABS:    CAPILLARY BLOOD GLUCOSE  RADIOLOGY & ADDITIONAL TESTS:    Imaging  Reviewed:  YES  no new imaging  Consultant(s) Notes Reviewed:   YES      Plan of care was discussed with patient and /or primary care giver; all questions and concerns were addressed

## 2023-09-29 NOTE — PROGRESS NOTE ADULT - PROBLEM SELECTOR PLAN 9
Prognosis poor  multiple conversations with family for Hospice- family declines  appropriate for LTC with pleasure feeds family refuses as well  supportive measures     - Disposition is home with Home care.  - Per CM, [facility]HA not available to start until Saturday 9/30  - Anticipated d/c is on Saturday 9/30   - 24 hour discharge on Frday 9/29

## 2023-09-30 ENCOUNTER — TRANSCRIPTION ENCOUNTER (OUTPATIENT)
Age: 68
End: 2023-09-30

## 2023-09-30 VITALS
OXYGEN SATURATION: 99 % | DIASTOLIC BLOOD PRESSURE: 72 MMHG | RESPIRATION RATE: 18 BRPM | TEMPERATURE: 98 F | HEART RATE: 93 BPM | SYSTOLIC BLOOD PRESSURE: 120 MMHG

## 2023-09-30 PROCEDURE — 99239 HOSP IP/OBS DSCHRG MGMT >30: CPT

## 2023-09-30 RX ORDER — ATORVASTATIN CALCIUM 80 MG/1
1 TABLET, FILM COATED ORAL
Refills: 0 | DISCHARGE

## 2023-09-30 RX ORDER — SITAGLIPTIN 50 MG/1
1 TABLET, FILM COATED ORAL
Refills: 0 | DISCHARGE

## 2023-09-30 RX ORDER — FAMOTIDINE 10 MG/ML
1 INJECTION INTRAVENOUS
Refills: 0 | DISCHARGE

## 2023-09-30 RX ORDER — DISULFIRAM 500 MG
1 TABLET ORAL
Refills: 0 | DISCHARGE

## 2023-09-30 RX ORDER — FOLIC ACID 0.8 MG
1 TABLET ORAL
Refills: 0 | DISCHARGE

## 2023-09-30 RX ORDER — VALPROIC ACID (AS SODIUM SALT) 250 MG/5ML
15 SOLUTION, ORAL ORAL
Qty: 900 | Refills: 0
Start: 2023-09-30 | End: 2023-10-29

## 2023-09-30 RX ADMIN — HEPARIN SODIUM 5000 UNIT(S): 5000 INJECTION INTRAVENOUS; SUBCUTANEOUS at 06:06

## 2023-09-30 RX ADMIN — CHLORHEXIDINE GLUCONATE 1 APPLICATION(S): 213 SOLUTION TOPICAL at 06:10

## 2023-09-30 RX ADMIN — Medication 500 MILLIGRAM(S): at 06:07

## 2023-09-30 NOTE — DISCHARGE NOTE NURSING/CASE MANAGEMENT/SOCIAL WORK - PATIENT PORTAL LINK FT
You can access the FollowMyHealth Patient Portal offered by [facility] by registering at the following website: [email or web address]. By joining [facility]’s FollowMyHealth portal, you will also be able to view your health information using other applications (apps) compatible with our system.

## 2023-09-30 NOTE — CHART NOTE - NSCHARTNOTEFT_GEN_A_CORE
Patient bangura was changed prior to discharge as of today. Family was updated and advised to follow up with urology for bangura change.

## 2023-09-30 NOTE — CHART NOTE - NSCHARTNOTESELECT_GEN_ALL_CORE
Event Note
Event Note
Nutrition Services
Event Note
Event Note
Nutrition Services
Nutrition Services
update/Event Note

## 2023-10-08 PROCEDURE — P9040: CPT

## 2023-10-08 PROCEDURE — 80307 DRUG TEST PRSMV CHEM ANLYZR: CPT

## 2023-10-08 PROCEDURE — 82962 GLUCOSE BLOOD TEST: CPT

## 2023-10-08 PROCEDURE — 85025 COMPLETE CBC W/AUTO DIFF WBC: CPT

## 2023-10-08 PROCEDURE — 87899 AGENT NOS ASSAY W/OPTIC: CPT

## 2023-10-08 PROCEDURE — 93005 ELECTROCARDIOGRAM TRACING: CPT

## 2023-10-08 PROCEDURE — 80048 BASIC METABOLIC PNL TOTAL CA: CPT

## 2023-10-08 PROCEDURE — 86923 COMPATIBILITY TEST ELECTRIC: CPT

## 2023-10-08 PROCEDURE — 70450 CT HEAD/BRAIN W/O DYE: CPT | Mod: MA

## 2023-10-08 PROCEDURE — 86850 RBC ANTIBODY SCREEN: CPT

## 2023-10-08 PROCEDURE — 36430 TRANSFUSION BLD/BLD COMPNT: CPT

## 2023-10-08 PROCEDURE — 87040 BLOOD CULTURE FOR BACTERIA: CPT

## 2023-10-08 PROCEDURE — 92610 EVALUATE SWALLOWING FUNCTION: CPT

## 2023-10-08 PROCEDURE — 71045 X-RAY EXAM CHEST 1 VIEW: CPT

## 2023-10-08 PROCEDURE — 99285 EMERGENCY DEPT VISIT HI MDM: CPT

## 2023-10-08 PROCEDURE — 80164 ASSAY DIPROPYLACETIC ACD TOT: CPT

## 2023-10-08 PROCEDURE — 87640 STAPH A DNA AMP PROBE: CPT

## 2023-10-08 PROCEDURE — 84132 ASSAY OF SERUM POTASSIUM: CPT

## 2023-10-08 PROCEDURE — 84100 ASSAY OF PHOSPHORUS: CPT

## 2023-10-08 PROCEDURE — 86901 BLOOD TYPING SEROLOGIC RH(D): CPT

## 2023-10-08 PROCEDURE — 81001 URINALYSIS AUTO W/SCOPE: CPT

## 2023-10-08 PROCEDURE — 83735 ASSAY OF MAGNESIUM: CPT

## 2023-10-08 PROCEDURE — 83935 ASSAY OF URINE OSMOLALITY: CPT

## 2023-10-08 PROCEDURE — 84295 ASSAY OF SERUM SODIUM: CPT

## 2023-10-08 PROCEDURE — 86900 BLOOD TYPING SEROLOGIC ABO: CPT

## 2023-10-08 PROCEDURE — 82570 ASSAY OF URINE CREATININE: CPT

## 2023-10-08 PROCEDURE — 87086 URINE CULTURE/COLONY COUNT: CPT

## 2023-10-08 PROCEDURE — 84484 ASSAY OF TROPONIN QUANT: CPT

## 2023-10-08 PROCEDURE — 87641 MR-STAPH DNA AMP PROBE: CPT

## 2023-10-08 PROCEDURE — 84300 ASSAY OF URINE SODIUM: CPT

## 2023-10-08 PROCEDURE — 82140 ASSAY OF AMMONIA: CPT

## 2023-10-08 PROCEDURE — 92526 ORAL FUNCTION THERAPY: CPT

## 2023-10-08 PROCEDURE — 82803 BLOOD GASES ANY COMBINATION: CPT

## 2023-10-08 PROCEDURE — 82330 ASSAY OF CALCIUM: CPT

## 2023-10-08 PROCEDURE — 80053 COMPREHEN METABOLIC PANEL: CPT

## 2023-10-08 PROCEDURE — 36415 COLL VENOUS BLD VENIPUNCTURE: CPT

## 2023-10-08 PROCEDURE — 87637 SARSCOV2&INF A&B&RSV AMP PRB: CPT

## 2023-10-08 PROCEDURE — 83690 ASSAY OF LIPASE: CPT

## 2023-10-08 PROCEDURE — 87449 NOS EACH ORGANISM AG IA: CPT

## 2023-10-08 PROCEDURE — 85027 COMPLETE CBC AUTOMATED: CPT

## 2023-10-08 PROCEDURE — 83605 ASSAY OF LACTIC ACID: CPT

## 2023-10-18 NOTE — PROGRESS NOTE ADULT - PROBLEM SELECTOR PLAN 2
Operative Report    Date: 10/18/2023    Surgeon: Jarrell Harris M.D.    Assistant:NAZ Brown    Anesthesiologist: Kevin Armstrong M.D.     Anesthesia Type: General with Adductor canal block    Pre-operative Diagnosis: Degenerative Arthritis    Post-operative Diagnosis: Degenerative Arthritis    Procedure: left Total Knee Arthroplasty- [facility]    EBL: 200 ml    Implants: Pressfit Isamar Triathlon size 4 cruciate Retaining femur, size 4 tibia with a 9 insert, and a 38 oval patella    Indications: Patient is an 69 y.o.-year-old female with longstanding pain in the left knee secondary to degenerative arthritis.  A variety of nonsurgical treatments have been tried that include activity modification, over-the-counter medications, therapy for strengthening the muscles, and intra-articular injections if they were tolerated by the patient.  The pain is now to the point of limiting the activities of daily living, interfering with enjoyable activities, and not responding to nonsurgical methods of treatment.  On physical examination the knee shows joint crepitus and grating with range of motion, a mild to moderate effusion, and limitations in range of motion.  Standing x-ray views of the knee show advanced degenerative arthritis with subchondral sclerosis peripheral osteophytes and joint space narrowing.  The patient is therefore a candidate for a left total knee replacement.    A walker was provided to assist with ambulation following the above surgery. The patient understands the importance of using the device to safely ambulate until they regain strength and stability.      Findings: Severe Degenerative change medial and patellofemoral joints    Procedure in detail: Patient was brought to the operating room and anesthesia was administered.  Antibiotics and tranexamic acid were given IV.  The knee was prepped and draped in the usual sterile manner, leg was exsanguinated and the tourniquet inflated.  Timeout was  called.    An anterior incision was made centered over the medial patella.  A standard medial parapatellar arthrotomy was made.  A medial release was carried out to the mid coronal plane of the tibia.  The fat pad and synovium were excised for visualization.  The patella measured 25mm of thickness and was cut down to a uniform 15mm.  The 38mm oval button had the best coverage and the holes were prepared using the appropriate jig.  The knee was then flexed and the tourniquet released.  The anterior cruciate ligament and the medial and lateral menisci were then excised.    We then placed the Bala instrumentation to include 2 tibial and 2 femoral pins.  The tibia pins were placed in the most distal aspect of the incision.  The femoral pins were placed in the most proximal part of the incision.  The arrays were secured.  The center of rotation of the hip was then noted.  Soft tissue balancing was carried out with the knee in full extension and 85 degrees of flexion.  Adjustments were made on the femoral cuts and tibial cuts to balance the knee in flexion and extension.  The Bala robot was then brought into place.  Cuts were made in the appropriate order.  Osteophytes removed from the posterior femur and medial and lateral tibia as indicated.  Balancing was carried out with release off either the medial or lateral tibia and the posterior capsule as needed to balance the knee.    The tibia measured to a size 4 and the trial was pinned into place.  The femoral component measured to a size 4.  We trialed the components and with a 9mm insert we had excellent full extension, symmetrical medial lateral stability, and the patella was tracking nicely.  The flexion and extension gaps were equal.  The posterior cruciate was intact and functioning well.    Once we were happy with the sizing, range of motion, and soft tissue balance, we removed the tibial and femoral pins and arrays, and placed the final implants.  Cement was used  for components that require cement, otherwise standard press-fit implants were utilized.  Local anesthetic was placed in the posterior capsule.  After final trialing we selected the 9mm cruciate Retaining insert that was locked securely into position.    The wound was soaked in dilute Betadine for several minutes in patients without a betadine allergy.  The knee was then flushed with saline.  We placed the knee in flexion and the tendon was closed with a running #2 Quill suture and this was augmented with interrupted #1 Vicryl's where needed.  The remainder of the local anesthetic was then injected intra-articularly.  The skin was closed with layers of interrupted 2-0 Monocryl and running 3-0 Monocryl subcuticular.  A silver-impregnateddressing was then utilized with a compression wrap.    The patient was stable during the procedure and at the time of this dictation.      Dispo:PACU - hemodynamically stable.    see above see above  - WBC downtrending 19.5>13.51

## 2023-11-01 NOTE — PROVIDER CONTACT NOTE (OTHER) - DATE AND TIME:
MRI Right Tib-fib ordered. Per protocol, Radiologist contacted for clearance due to foreign body noted on xray of right tib-fib. Per Dr. Berna Patel, no MRI due to foreign body. Stated could do a CT scan if needed, but not MRI. 19-Jul-2023 23:00

## 2023-11-21 NOTE — PROGRESS NOTE ADULT - NUTRITIONAL ASSESSMENT
This patient has been assessed with a concern for Malnutrition and has been determined to have a diagnosis/diagnoses of Severe protein-calorie malnutrition and Underweight (BMI < 19).    This patient is being managed with:   Diet NPO-  Entered: Sep 14 2023  7:08AM    The following pending diet order is being considered for treatment of Severe protein-calorie malnutrition and Underweight (BMI < 19):  Diet Pureed-  Supplement Feeding Modality:  Oral  Ensure Enlive Cans or Servings Per Day:  1       Frequency:  Three Times a day  Entered: Aug 30 2023  2:55PM   Niacinamide Counseling: I recommended taking niacin or niacinamide, also know as vitamin B3, twice daily. Recent evidence suggests that taking vitamin B3 (500 mg twice daily) can reduce the risk of actinic keratoses and non-melanoma skin cancers. Side effects of vitamin B3 include flushing and headache.

## 2024-05-28 NOTE — PROGRESS NOTE ADULT - ASSESSMENT
Patient is a 68 year old man with advanced dementia, metastatic gastric cancer-not surgical or chemo candidate, seizure disorder, HTN, HLD, DM, CVA, CKD (Baseline Cr 1.5-1.8), chronic bangura (exchanged in ED on admission). Patient admitted to medicine for Acute metabolic encephalopathy and AHRF 2/2 asp. pna, NEVILLE on CKD. Patient completed antibiotic therapy. Patient initially failed swallow evaluation, placed NG-tube then dislodge, unable to be reaccessed. Palliative consulted due to poor prognosis. Patient appropriate for hospice, however family decline hospice. Patient reevaluated with speech specialist, patient appropriate for pleasure feeds. Patient appropriate for LTC with pleasure feeds.  Normal Normal Normal Normal Normal Normal Normal Normal Normal Normal Normal Normal Normal Normal Normal Normal Normal Normal Normal Normal Normal Normal Normal

## 2024-11-18 NOTE — PATIENT PROFILE ADULT - HAVE YOU BEEN EATING POORLY BECAUSE OF A DECREASED APPETITE?
Body Location Override (Optional - Billing Will Still Be Based On Selected Body Map Location If Applicable): left lower abdomen
Detail Level: Detailed
Size Of Lesion: 4 mm x 3 mm round brown macule with even pigment
No (0)

## 2025-02-06 NOTE — PROGRESS NOTE ADULT - PROBLEM/PLAN-5
Chief Complaint   Patient presents with    Medicare AWV    Diabetes     Wellness    Pt did not bring meds, went over list, pt confirmed     No other concerns    \"Have you been to the ER, urgent care clinic since your last visit?  Hospitalized since your last visit?\"    NO    “Have you seen or consulted any other health care providers outside our system since your last visit?”    NO             
DISPLAY PLAN FREE TEXT

## 2025-02-23 NOTE — CONSULT NOTE ADULT - PROBLEM SELECTOR RECOMMENDATION 4
11 Clinical evidence indicates that the patient has Severe protein calorie malnutrition/ 3rd degree. Serum Albumin= 2.2 (4/23/2023). R    In context of Chronic Illness (>1 month)    Energy/Food intake <50% of estimated energy requirement >5 days  Weight loss: Moderate - severe (lbs lost recently)  Body Fat loss: Severe   (Cachexia, temporal wasting, contracted, muscle atrophy)  Muscle mass loss: Severe  (Prone to skin failure/pressure ulcers)   Strength: weakened severe (bedbound)    Recommend:   Early Swallowing evaluation  Nutrition consult, Nutritional supplements as tolerated  Aspiration precautions, elevate the head of the bed 60-90 degrees when feeding patient, careful hand-feeding,

## 2025-07-05 NOTE — ED PROVIDER NOTE - CHIEF COMPLAINT
The patient is a 68y Male complaining of weakness.
Obtain Level of Care/Service Not Available at this Facility

## (undated) DEVICE — PACK IV START WITH CHG

## (undated) DEVICE — SALIVA EJECTOR (BLUE)

## (undated) DEVICE — BIOPSY FORCEP RADIAL JAW 4 STANDARD WITH NEEDLE

## (undated) DEVICE — CATH IV SAFE BC 22G X 1" (BLUE)

## (undated) DEVICE — TUBING MEDI-VAC W MAXIGRIP CONNECTORS 1/4"X6'

## (undated) DEVICE — GOWN LG

## (undated) DEVICE — BASIN EMESIS 10IN GRADUATED MAUVE

## (undated) DEVICE — BITE BLOCK ADULT 20 X 27MM (GREEN)

## (undated) DEVICE — DRSG 2X2

## (undated) DEVICE — CLAMP BX HOT RAD JAW 3

## (undated) DEVICE — DENTURE CUP PINK

## (undated) DEVICE — UNDERPAD LINEN SAVER 17 X 24"

## (undated) DEVICE — DRSG CURITY GAUZE SPONGE 4 X 4" 12-PLY NON-STERILE

## (undated) DEVICE — TUBING IV SET GRAVITY 3Y 100" MACRO

## (undated) DEVICE — BIOPSY FORCEP COLD DISP

## (undated) DEVICE — BALLOON US ENDO

## (undated) DEVICE — CONTAINER FORMALIN 80ML YELLOW

## (undated) DEVICE — LINE BREATHE SAMPLNG

## (undated) DEVICE — LUBRICATING JELLY HR ONE SHOT 3G

## (undated) DEVICE — ELCTR ECG CONDUCTIVE ADHESIVE

## (undated) DEVICE — DRSG BANDAID 0.75X3"